# Patient Record
Sex: FEMALE | Race: WHITE | NOT HISPANIC OR LATINO | Employment: FULL TIME | URBAN - METROPOLITAN AREA
[De-identification: names, ages, dates, MRNs, and addresses within clinical notes are randomized per-mention and may not be internally consistent; named-entity substitution may affect disease eponyms.]

---

## 2019-10-01 ENCOUNTER — APPOINTMENT (OUTPATIENT)
Dept: LAB | Facility: CLINIC | Age: 34
End: 2019-10-01
Attending: OTOLARYNGOLOGY
Payer: COMMERCIAL

## 2019-10-08 NOTE — TELEPHONE ENCOUNTER
ONCOLOGY INTAKE: Records Information      APPT INFORMATION:  Referring provider: Self  Referring provider s clinic:  n/a  Reason for visit/diagnosis: Squamous Cell Carcinoma of the Skin  Has patient been notified of appointment date and time?: yes    RECORDS INFORMATION:  Were the records received with the referral (via [employer])? no    Has patient been seen for any external appt for this diagnosis? no    If yes, where? n/a    Has patient had any imaging or procedures outside of [place] for this condition? yes      If Yes, where? [facility] and [facility]    ADDITIONAL INFORMATION:  When requesting records, Pt last name is listed as Nash vs. Mando that is on chart

## 2019-10-09 NOTE — TELEPHONE ENCOUNTER
RECORDS STATUS - ALL OTHER DIAGNOSIS      Action    Action Taken October 9, 2019 2:40 PM  IB message to Intake  and leaders as this PT may be mis-scheduled with the incorrect Dx.  Everything has still been requested.  October 16, 2019   Noted that appt has been rescheduled with ENT     RECORDS RECEIVED FROM: [facility]   DATE RECEIVED: Care Everywhere   NOTES STATUS DETAILS   OFFICE NOTE from referring provider     OFFICE NOTE from medical oncologist     DISCHARGE SUMMARY from hospital     DISCHARGE REPORT from the ER     OPERATIVE REPORT     MEDICATION LIST     CLINICAL TRIAL TREATMENTS TO DATE     LABS     PATHOLOGY REPORTS     ANYTHING RELATED TO DIAGNOSIS Slides requested 10/9/19 PKN   GENONOMIC TESTING     TYPE:     IMAGING (NEED IMAGES & REPORT)     CT SCANS Slides requested 10/9/19 PKN   MRI     MAMMO     ULTRASOUND Slides requested 10/9/19 PKN   PET Slides requested 10/9/19 PKN

## 2019-10-10 NOTE — TELEPHONE ENCOUNTER
FUTURE VISIT INFORMATION      FUTURE VISIT INFORMATION:    Date: 10/21/19    Time: 8:20AM    Location: [facility]  REFERRAL INFORMATION:    Referring provider:      Referring providers clinic:      Reason for visit/diagnosis  Squamous Cell Carcinoma of the throat     RECORDS REQUESTED FROM:       Clinic name Comments Records Status Imaging Status   [facility]   10/2/19 Excision of left neck mass with Dr Agusto Cao    10/2/19 neck biopsy (Case: [id number])  *tracking :[id number]    Care Everywhere    [facility] - ENT  08/08/2019 notes with Agusto Cao MD Care Everywhere    [facility]   07/10/2019 urgent care notes with Zamzam Chambers MD   Care Everywhere     [facility] imaging/procedures  8/8/19 FNA Neck mass (Case: [id number] )  10/8/19 PET CT  8/7/19 CT NEck   7/12/19 US HEad NEck    Care Everywhere  req 10/10 for images                 10/10/19 11:49AM sent a fax to [facility] for pathology to be mailed out. Also sent a fax to [facility] for images - Amay     10/11/19 1:18PM received slides from [facility], sending to surgical path with consult form - Amay

## 2019-10-14 ENCOUNTER — TRANSFERRED RECORDS (OUTPATIENT)
Dept: HEALTH INFORMATION MANAGEMENT | Facility: CLINIC | Age: 34
End: 2019-10-14

## 2019-10-14 PROCEDURE — 00000346 ZZHCL STATISTIC REVIEW OUTSIDE SLIDES TC 88321: Performed by: OTOLARYNGOLOGY

## 2019-10-17 LAB — COPATH REPORT: NORMAL

## 2019-10-19 NOTE — PROGRESS NOTES
I had the pleasure of seeing Mira Cao in consultation today at the [facility].     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 The patient noticed a left neck mass in June. She thought this was related to a swollen lymph node due to stress as she had just bought a house.  She delayed evaluation until approximately July when she presented to urgent care.  She had an ultrasound obtained on 7/12/2019 which demonstrated a 3.7 x 2.3 x 1.3 cm mass in the left neck.  She then had a CT scan on 8/7/2019 which demonstrated a 1.8 x 1.4 x 3.8 cm partially necrotic/cystic level 2/3 neck mass, concerning for metastatic squamous cell carcinoma.  She was seen by Dr.Todd Cao and had an FNA performed on 8/8/2019.  The pathology from the FNA demonstrated atypical squamous proliferation which was p16-.  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  She elected for removal which was performed by  on 10/2/2019.  The excision was performed without a completion neck dissection.  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  She had a PET CT scan on 2019 locally which demonstrated postop changes without residual disease and no obvious primary malignancy.  On 10/10/2019 she saw Dr. Neely at [facility] [place] for medical oncology consultation where possible chemotherapy was discussed.  She has met with Dr Melendez from radiation oncology at [facility] [place].  She had a dental cleaning.  She has not met with a speech therapy team.  She is scheduled for a tonsillectomy and tongue base biopsy tomorrow with Dr. Cao.    She says recently she has noticed a sore throat on the left side for about 1.5 weeks.  She feels like it is swollen.  She has some left-sided ear discomfort and headaches.  She has some  jaw discomfort.  She has no changes in her weight.  She has had some swallowing discomfort for about a week.    She did not have the HPV vaccine.      Past medical history: Otherwise healthy    Past surgical history: [place] teeth extraction    Social history: Works as a .  She is  with no children.  She has no smoking history.  She has about 10 alcoholic beverages on weekends.  She has no chewing tobacco or vaping history.    Family history: Sister with bone cancer in childhood.  Mother with breast cancer.  No family history of head and neck cancer.        MEDICATIONS:     Current Outpatient Medications   Medication Sig Dispense Refill     BACTRIM -160 MG OR TABS Took for 3 days a week ago.       loratadine (CLARITIN) 10 MG tablet Take 10 mg by mouth       LORazepam (ATIVAN) 0.5 MG tablet Take 0.5-1 mg by mouth       ORDER FOR DME thumbkeeper 1 0     PYRIDIUM OR Took this AM.         ALLERGIES:  No Known Allergies    HABITS/SOCIAL HISTORY:   Works as a .    She is  with no children.    She has no smoking history.  She has about 10 alcoholic beverages on weekends.  She has no chewing tobacco or vaping history.    Social History     Socioeconomic History     Marital status:      Spouse name: Not on file     Number of children: Not on file     Years of education: Not on file     Highest education level: Not on file   Occupational History     Not on file   Social Needs     Financial resource strain: Not on file     Food insecurity:     Worry: Not on file     Inability: Not on file     Transportation needs:     Medical: Not on file     Non-medical: Not on file   Tobacco Use     Smoking status: Never Smoker     Smokeless tobacco: Never Used   Substance and Sexual Activity     Alcohol use: Not on file     Drug use: Not on file     Sexual activity: Not on file   Lifestyle     Physical activity:     Days per week: Not on file     Minutes per session: Not on file      "Stress: Not on file   Relationships     Social connections:     Talks on phone: Not on file     Gets together: Not on file     Attends Anabaptist service: Not on file     Active member of club or organization: Not on file     Attends meetings of clubs or organizations: Not on file     Relationship status: Not on file     Intimate partner violence:     Fear of current or ex partner: Not on file     Emotionally abused: Not on file     Physically abused: Not on file     Forced sexual activity: Not on file   Other Topics Concern     Not on file   Social History Narrative     Not on file       PAST MEDICAL HISTORY: No past medical history on file.     PAST SURGICAL HISTORY: No past surgical history on file.    FAMILY HISTORY:  No family history on file.    REVIEW OF SYSTEMS:  12 point ROS was negative other than the symptoms noted above in the HPI.  Patient Supplied Answers to Review of Systems  [facility] ROS 10/14/2019   Psychology Frequently feeling anxious         PHYSICAL EXAMINATION:   /84 (BP Location: Right arm, Patient Position: Sitting, Cuff Size: Adult Regular)   Pulse 68   Resp 15   Ht 1.676 m (5' 6\")   Wt 59.9 kg (132 lb)   SpO2 97%   BMI 21.31 kg/m     Appearance:   normal; NAD, age-appropriate appearance, well-developed, normal habitus   Communication:   normal; communicates verbally, normal voice quality   Head/Face:   inspection -  Normal; no scars or visible lesions   Salivary glands -  Normal size, no tenderness, swelling, or palpable masses   Facial strength -  Normal and symmetric bilateral; H/B I/VI   Skin:  normal, no rash   Ocular Motility:  normal occular movements   Ears:  auricle (AD) -  normal  EAC (AD) -  normal  TM (AD) -  Normal, no effusion  auricle (AS) -  normal  EAC (AS) -  normal  TM (AS) -  Normal, no effusion  Normal clinical speech reception   Nose:  Ext. inspection -  Normal  Internal Inspection -  Normal mucosa, septum, and turbinates   Nasopharynx:  normal mucosa, no " masses   Oral Cavity:  lips -  Normal mucosa, oral competence, and stoma size   Age-appropriate dentition, healthy gingival mucosa   Hard palate, buccal, floor of mouth mucosa normal   Tongue - normal movement, no lesions   Oropharynx:  mucosa -  Normal, no lesions  soft palate -  Normal, no lesions, no asymmetry, normal elevation  tonsils - left tonsil appears slightly larger than the right, no palpable masses, no mucosal lesions   Hypopharynx:  Normal pyriform sinus and pharyngeal wall mucosa   No pooled secretions   Larynx:  Epiglottis, AE folds, false vocal cords, true vocal cords, arytenoids normal in appearance, bilaterally mobile cords    Neck: No visible mass or asymmetry   Well healed neck incision on the left  Normal palpation, no tenderness, no tracheal deviation  Normal range of motion with exception of decreased extension   Lymphatic:  no abnormal nodes   Cardiovascular:  warm, pink, well-perfused extremities without swelling, tenderness, or edema   Respiratory:  Normal respiratory effort, no stridor   Neuro/Psych.:  mood/affect -  normal  mental status -  normal  cranial nerves -  normal          PROCEDURES:   Flexible fiberoptic laryngoscopy: Scope exam was indicated due to oropharyngeal cancer. Verbal consent was obtained. The nasal cavity was prepped with an aerosolized solution of topical anesthetic and vasoconstrictive agent. The scope was passed through the anterior nasal cavity and advanced. Inspection of the nasopharynx revealed no gross abnormality. The left tonsil appeared slightly larger than the right. The base of tongue and vallecula are normal. The epiglottis, AE folds, false cords, true cords, arytenoids are normal.  Inspection of the larynx revealed bilaterally mobile vocal cords. Pyriform sinuses are symmetric. The airway is patent. Procedure tolerated well with no immediate complications noted.      RESULTS REVIEWED:   I reviewed the referral records including path results, U/S  report, CT report, PET scan    I independently reviewed the CT scan from preop demonstrated a left sided cystic necrotic neck mass with no other neck masses.  On the scan there does appear to be a potential primary lesion within the tonsil on the left side.    I independently reviewed the PET scan images which show postoperative changes in the left neck with air present, symmetric uptake in the tonsils, no obvious primary    IMPRESSION AND PLAN:   Mira Cao is a 34-year-old woman with a likely T1N1 p16+ squamous cell carcinoma of the oropharynx.    Her case is complicated by the previous excisional node biopsy that was performed locally with contamination of the neck.  She has not had identification of the primary site yet.  On review of the imaging it looks like there is potentially a primary within the left tonsil and on exam she does have a slightly enlarged tonsil on the side compared to the right.  We discussed that certainly we can perform a tonsillectomy and panendoscopy here at the [facility] if she likes.  She is scheduled for procedure with Dr. Cao tomorrow.  I spoke with our schedulers today and we are able to get her on the schedule for Thursday this week.  She would like to proceed here at the [facility].  We discussed that we will try to identify the primary intraoperatively with use of frozen section pathology.    I explained to the patient and her family today that I would like to review her care at our multidisciplinary head and neck tumor conference.  Ideally she would have been a candidate for single modality therapy with either upfront surgery with excision of the primary site and a neck dissection or radiation alone.  However her case is now complicated by the fact that she is undergone an open excision of a metastatic neck node.  Her primary site has not officially been identified and will need to be done in the operating room.  We will need to discuss if there is strong feelings on  "including chemotherapy as part of her regimen.  I think her case would benefit by multi-disciplinary input given the previous upfront \"treatment\" that she had. I would like to avoid chemotherapy on her given her age and low stage.    I explained some of the side effects of the radiation which may very well be a component of her treatment.  In particular we spent time on the dental impact of radiation.  She is already had a dental exam and is supposed to get fluoride trays.  We discussed the importance of long term dental care to ensure avoidance of cavities.  We discussed the importance of avoiding dental extractions in the future due to the risk of ORN.  I spent considerable time discussing with her the impact of radiation on her swallowing and long-term fibrosis.  She had education by our speech pathology team on swallowing exercises to help avoid these long-term impacts.      She asked questions today about impact on fertility.  If she has radiation or surgery alone I am optimistic that this would become less of an issue than if she requires chemotherapy.  Certainly we will also address this in our multidisciplinary conference.      We discussed that she may need short-term disability from her work depending on the flexibility that she has.    I did reach out to our radiation oncology team to help facilitate a an appointment with them.  They will try and see her prior to her planned surgery on Thursday to help expedite her care.    Thank you very much for the opportunity to participate in the care of your patient.      Ruth Tello MD, M.D.  Otolaryngology- Head & Neck Surgery      This note was dictated with voice recognition software and then edited. Please excuse any unintentional errors.           CC:  Ifeanyi Soliman MD  Department of Radiation Oncology  [facility]      "

## 2019-10-21 ENCOUNTER — PREP FOR PROCEDURE (OUTPATIENT)
Dept: OTOLARYNGOLOGY | Facility: CLINIC | Age: 34
End: 2019-10-21

## 2019-10-21 ENCOUNTER — PRE VISIT (OUTPATIENT)
Dept: OTOLARYNGOLOGY | Facility: CLINIC | Age: 34
End: 2019-10-21

## 2019-10-21 ENCOUNTER — DOCUMENTATION ONLY (OUTPATIENT)
Dept: OTOLARYNGOLOGY | Facility: CLINIC | Age: 34
End: 2019-10-21

## 2019-10-21 ENCOUNTER — OFFICE VISIT (OUTPATIENT)
Dept: OTOLARYNGOLOGY | Facility: CLINIC | Age: 34
End: 2019-10-21
Payer: COMMERCIAL

## 2019-10-21 ENCOUNTER — THERAPY VISIT (OUTPATIENT)
Dept: SPEECH THERAPY | Facility: CLINIC | Age: 34
End: 2019-10-21
Payer: COMMERCIAL

## 2019-10-21 VITALS
OXYGEN SATURATION: 97 % | SYSTOLIC BLOOD PRESSURE: 123 MMHG | RESPIRATION RATE: 15 BRPM | DIASTOLIC BLOOD PRESSURE: 84 MMHG | HEIGHT: 66 IN | BODY MASS INDEX: 21.21 KG/M2 | WEIGHT: 132 LBS | HEART RATE: 68 BPM

## 2019-10-21 DIAGNOSIS — R13.12 OROPHARYNGEAL DYSPHAGIA: Primary | ICD-10-CM

## 2019-10-21 DIAGNOSIS — C44.42 SQUAMOUS CELL CARCINOMA OF NECK: Primary | ICD-10-CM

## 2019-10-21 DIAGNOSIS — R13.10 DYSPHAGIA: ICD-10-CM

## 2019-10-21 RX ORDER — LORATADINE 10 MG/1
10 TABLET ORAL DAILY PRN
COMMUNITY
End: 2021-11-16

## 2019-10-21 RX ORDER — LORAZEPAM 0.5 MG/1
.5-1 TABLET ORAL PRN
COMMUNITY
Start: 2019-10-10 | End: 2021-10-29

## 2019-10-21 ASSESSMENT — PAIN SCALES - GENERAL: PAINLEVEL: NO PAIN (0)

## 2019-10-21 ASSESSMENT — MIFFLIN-ST. JEOR: SCORE: 1310.5

## 2019-10-21 NOTE — NURSING NOTE
"Chief Complaint   Patient presents with     Consult     Squamous Cell Carcinoma of the throat      /84 (BP Location: Right arm, Patient Position: Sitting, Cuff Size: Adult Regular)   Pulse 68   Resp 15   Ht 1.676 m (5' 6\")   Wt 59.9 kg (132 lb)   SpO2 97%   BMI 21.31 kg/m      Diego Barron CMA    "

## 2019-10-21 NOTE — LETTER
10/21/2019       RE: Mira Cao  [address]  [place] [address]     Dear Colleague,    Thank you for referring your patient, Mira Cao, to the [facility] at [facility]. Please see a copy of my visit note below.    I had the pleasure of seeing Mira Cao in consultation today at the [facility].     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 The patient noticed a left neck mass in June. She thought this was related to a swollen lymph node due to stress as she had just bought a house.  She delayed evaluation until approximately July when she presented to urgent care.  She had an ultrasound obtained on 7/12/2019 which demonstrated a 3.7 x 2.3 x 1.3 cm mass in the left neck.  She then had a CT scan on 8/7/2019 which demonstrated a 1.8 x 1.4 x 3.8 cm partially necrotic/cystic level 2/3 neck mass, concerning for metastatic squamous cell carcinoma.  She was seen by Dr.Todd Cao and had an FNA performed on 8/8/2019.  The pathology from the FNA demonstrated atypical squamous proliferation which was p16-.  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  She elected for removal which was performed by  on 10/2/2019.  The excision was performed without a completion neck dissection.  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  She had a PET CT scan on 2019 locally which demonstrated postop changes without residual disease and no obvious primary malignancy.  On 10/10/2019 she saw Dr. Neely at [facility] for medical oncology consultation where possible chemotherapy was discussed.  She has met with Dr Melendez from radiation oncology at [facility].  She had a dental cleaning.  She has not met with a speech  therapy team.  She is scheduled for a tonsillectomy and tongue base biopsy tomorrow with Dr. Cao.    She says recently she has noticed a sore throat on the left side for about 1.5 weeks.  She feels like it is swollen.  She has some left-sided ear discomfort and headaches.  She has some jaw discomfort.  She has no changes in her weight.  She has had some swallowing discomfort for about a week.    She did not have the HPV vaccine.      Past medical history: Otherwise healthy    Past surgical history: [place] teeth extraction    Social history: Works as a .  She is  with no children.  She has no smoking history.  She has about 10 alcoholic beverages on weekends.  She has no chewing tobacco or vaping history.    Family history: Sister with bone cancer in childhood.  Mother with breast cancer.  No family history of head and neck cancer.        MEDICATIONS:     Current Outpatient Medications   Medication Sig Dispense Refill     BACTRIM -160 MG OR TABS Took for 3 days a week ago.       loratadine (CLARITIN) 10 MG tablet Take 10 mg by mouth       LORazepam (ATIVAN) 0.5 MG tablet Take 0.5-1 mg by mouth       ORDER FOR DME thumbkeeper 1 0     PYRIDIUM OR Took this AM.         ALLERGIES:  No Known Allergies    HABITS/SOCIAL HISTORY:   Works as a .    She is  with no children.    She has no smoking history.  She has about 10 alcoholic beverages on weekends.  She has no chewing tobacco or vaping history.    Social History     Socioeconomic History     Marital status:      Spouse name: Not on file     Number of children: Not on file     Years of education: Not on file     Highest education level: Not on file   Occupational History     Not on file   Social Needs     Financial resource strain: Not on file     Food insecurity:     Worry: Not on file     Inability: Not on file     Transportation needs:     Medical: Not on file     Non-medical: Not on file   Tobacco Use      "Smoking status: Never Smoker     Smokeless tobacco: Never Used   Substance and Sexual Activity     Alcohol use: Not on file     Drug use: Not on file     Sexual activity: Not on file   Lifestyle     Physical activity:     Days per week: Not on file     Minutes per session: Not on file     Stress: Not on file   Relationships     Social connections:     Talks on phone: Not on file     Gets together: Not on file     Attends Bahai service: Not on file     Active member of club or organization: Not on file     Attends meetings of clubs or organizations: Not on file     Relationship status: Not on file     Intimate partner violence:     Fear of current or ex partner: Not on file     Emotionally abused: Not on file     Physically abused: Not on file     Forced sexual activity: Not on file   Other Topics Concern     Not on file   Social History Narrative     Not on file       PAST MEDICAL HISTORY: No past medical history on file.     PAST SURGICAL HISTORY: No past surgical history on file.    FAMILY HISTORY:  No family history on file.    REVIEW OF SYSTEMS:  12 point ROS was negative other than the symptoms noted above in the HPI.  Patient Supplied Answers to Review of Systems   ENT ROS 10/14/2019   Psychology Frequently feeling anxious         PHYSICAL EXAMINATION:   /84 (BP Location: Right arm, Patient Position: Sitting, Cuff Size: Adult Regular)   Pulse 68   Resp 15   Ht 1.676 m (5' 6\")   Wt 59.9 kg (132 lb)   SpO2 97%   BMI 21.31 kg/m      Appearance:   normal; NAD, age-appropriate appearance, well-developed, normal habitus   Communication:   normal; communicates verbally, normal voice quality   Head/Face:   inspection -  Normal; no scars or visible lesions   Salivary glands -  Normal size, no tenderness, swelling, or palpable masses   Facial strength -  Normal and symmetric bilateral; H/B I/VI   Skin:  normal, no rash   Ocular Motility:  normal occular movements   Ears:  auricle (AD) -  normal  EAC " (AD) -  normal  TM (AD) -  Normal, no effusion  auricle (AS) -  normal  EAC (AS) -  normal  TM (AS) -  Normal, no effusion  Normal clinical speech reception   Nose:  Ext. inspection -  Normal  Internal Inspection -  Normal mucosa, septum, and turbinates   Nasopharynx:  normal mucosa, no masses   Oral Cavity:  lips -  Normal mucosa, oral competence, and stoma size   Age-appropriate dentition, healthy gingival mucosa   Hard palate, buccal, floor of mouth mucosa normal   Tongue - normal movement, no lesions   Oropharynx:  mucosa -  Normal, no lesions  soft palate -  Normal, no lesions, no asymmetry, normal elevation  tonsils - left tonsil appears slightly larger than the right, no palpable masses, no mucosal lesions   Hypopharynx:  Normal pyriform sinus and pharyngeal wall mucosa   No pooled secretions   Larynx:  Epiglottis, AE folds, false vocal cords, true vocal cords, arytenoids normal in appearance, bilaterally mobile cords    Neck: No visible mass or asymmetry   Well healed neck incision on the left  Normal palpation, no tenderness, no tracheal deviation  Normal range of motion with exception of decreased extension   Lymphatic:  no abnormal nodes   Cardiovascular:  warm, pink, well-perfused extremities without swelling, tenderness, or edema   Respiratory:  Normal respiratory effort, no stridor   Neuro/Psych.:  mood/affect -  normal  mental status -  normal  cranial nerves -  normal          PROCEDURES:   Flexible fiberoptic laryngoscopy: Scope exam was indicated due to oropharyngeal cancer. Verbal consent was obtained. The nasal cavity was prepped with an aerosolized solution of topical anesthetic and vasoconstrictive agent. The scope was passed through the anterior nasal cavity and advanced. Inspection of the nasopharynx revealed no gross abnormality. The left tonsil appeared slightly larger than the right. The base of tongue and vallecula are normal. The epiglottis, AE folds, false cords, true cords, arytenoids  are normal.  Inspection of the larynx revealed bilaterally mobile vocal cords. Pyriform sinuses are symmetric. The airway is patent. Procedure tolerated well with no immediate complications noted.      RESULTS REVIEWED:   I reviewed the referral records including path results, U/S report, CT report, PET scan    I independently reviewed the CT scan from preop demonstrated a left sided cystic necrotic neck mass with no other neck masses.  On the scan there does appear to be a potential primary lesion within the tonsil on the left side.    I independently reviewed the PET scan images which show postoperative changes in the left neck with air present, symmetric uptake in the tonsils, no obvious primary    IMPRESSION AND PLAN:   Mira Cao is a 34-year-old woman with a likely T1N1 p16+ squamous cell carcinoma of the oropharynx.    Her case is complicated by the previous excisional node biopsy that was performed locally with contamination of the neck.  She has not had identification of the primary site yet.  On review of the imaging it looks like there is potentially a primary within the left tonsil and on exam she does have a slightly enlarged tonsil on the side compared to the right.  We discussed that certainly we can perform a tonsillectomy and panendoscopy here at the [facility] if she likes.  She is scheduled for procedure with Dr. Cao tomorrow.  I spoke with our schedulers today and we are able to get her on the schedule for Thursday this week.  She would like to proceed here at the [facility].  We discussed that we will try to identify the primary intraoperatively with use of frozen section pathology.    I explained to the patient and her family today that I would like to review her care at our multidisciplinary head and neck tumor conference.  ideally she would have been a candidate for single modality therapy with either upfront surgery with excision of the primary site and a neck dissection or  "radiation alone.  However her case is now complicated by the fact that she is undergone an open excision of a metastatic neck node.  Her primary site has not officially been identified and will need to be done in the operating room.  We will need to discuss if there is strong feelings on including chemotherapy as part of her regimen.  I think her case would benefit by multi-disciplinary input given the previous upfront \"treatment\" that she had.    I explained some of the side effects of the radiation which may very well be a component of her surgery.  In particular we spent time on the dental impact of radiation.  She is already had a dental exam and is supposed to get fluoride trays.  We discussed the importance of long term dental care to ensure avoidance of cavities.  We discussed the importance of avoiding dental extractions in the future due to the risk of ORN.  I spent considerable time discussing with her the impact of radiation on her swallowing and long-term fibrosis.  She had education by our speech pathology team on swallowing exercises to help avoid these long-term impacts.      She asked questions today about impact on fertility.  If she has radiation alone I am optimistic that this would become less of an issue than if she requires chemotherapy.  Certainly we will also address this in our multidisciplinary conference.      We discussed that she may need short-term disability from her work depending on the flexibility that she has.  If she requires radiation alone she may be more likely to be able to continue work longer than if she requires concurrent chemotherapy.    I did reach out to our radiation oncology team to help facilitate a an appointment with them.  They will try and see her prior to her planned surgery on Thursday to help expedite her care.    Thank you very much for the opportunity to participate in the care of your patient.      Ruth Tello MD, M.D.  Otolaryngology- Head & Neck " Surgery      This note was dictated with voice recognition software and then edited. Please excuse any unintentional errors.     CC:  Ifeanyi Soliman MD  Department of Radiation Oncology  [facility]

## 2019-10-21 NOTE — PROGRESS NOTES
Patient is scheduled for surgery with Dr. MCELROY    Spoke or left message with: Violetta & Dr. Mcelroy    Date of Surgery: 10/24     Location: [facility]     H&P: Scheduled with PCP     Additional imaging/appointments:   Still needs post op appointment. Will talk to Violetta/     Surgery packet: given in clinic     Prior Authorization: sent to Kathrin     Additional comments:   Violetta talked with patient for add on surgery on Thurs.         Lexi Munoz  Perioperative Coordinator, ENT  [phone]

## 2019-10-22 NOTE — PROGRESS NOTES
10/21/19 0820   General Information   Type Of Visit Initial   Start Of Care Date 10/21/19   Referring Physician Dr. Ruth Tello   Orders Evaluate And Treat   Orders Comment Clinical Swallow Eval   Medical Diagnosis Left neck SCC with unknown primary    Onset Of Illness/injury Or Date Of Surgery 10/21/19   Precautions/limitations No Known Precautions/limitations   Hearing Functional in 1:1 setting   Pertinent History of Current Problem/OT: Additional Occupational Profile Info Pt is a 34 year old female with a recently dx L neck SCC with unknown primary. Treatment will likely include XRT and possibly chemo. She will be presented at [facility] this coming Friday. Pt is seen in conjunction with ENT clinic per MD request. Denies coughing and stasis with eating and drinking. Reports occasional feeling of lump in the throat when swallowing.    Respiratory Status Room air   Prior Level Of Function Swallowing   Prior Level Of Function Comment Regular textures and thin liquids   Patient Role/employment History Employed   Living Environment [place]   General Observations Pt is alert, pleasant and cooperative during evaluation.    Patient/family Goals To maintain swallow function during XRT.    General Information Comments Pt is accompanied by her , mother, and mother-in-law.    Clinical Swallow Evaluation   Oral Musculature generally intact   Structural Abnormalities none present   Dentition present and adequate   Mucosal Quality good   Mandibular Strength and Mobility intact   Oral Labial Strength and Mobility WFL   Lingual Strength and Mobility WFL   Velar Elevation intact   Buccal Strength and Mobility intact   Laryngeal Function Cough;Swallow;Voicing initiated   Oral Musculature Comments All elements are WFL.    Additional Documentation Yes   Clinical Swallow Eval: Thin Liquid Texture Trial   Mode of Presentation, Thin Liquids cup;self-fed   Volume of Liquid or Food Presented 4oz water   Oral Phase of  Swallow WFL   Pharyngeal Phase of Swallow intact   Diagnostic Statement No overt clinical s/sx of penetration/aspiration.    Swallow Compensations   Swallow Compensations No compensations were used   Educational Assessment   Barriers to Learning No barriers   Esophageal Phase of Swallow   Patient reports or presents with symptoms of esophageal dysphagia No   General Therapy Interventions   Planned Therapy Interventions Dysphagia Treatment   Dysphagia treatment Oropharyngeal exercise training;Modified diet education;Instruction of safe swallow strategies;Compensatory strategies for swallowing   Swallow Eval: Clinical Impressions   Skilled Criteria for Therapy Intervention Skilled criteria met.  Treatment indicated.   Functional Assessment Scale (FAS) 6   Treatment Diagnosis Minimal oropharyngeal dysphagia expected to worsen to moderately severe during XRT   Diet texture recommendations Regular diet;Thin liquids   Recommended Feeding/Eating Techniques alternate between small bites and sips of food/liquid;maintain upright posture during/after eating for 30 mins;small sips/bites   Rehab Potential good, to achieve stated therapy goals   Therapy Frequency other (see comments)  (1x/every other week during XRT)   Anticipated Discharge Disposition home w/ outpatient services   Risks and Benefits of Treatment have been explained. Yes   Patient, family and/or staff in agreement with Plan of Care Yes   Clinical Impression Comments Pt presents with minimal oropharyngeal dysphagia that is expected to worsen to moderately severe during XRT. Oral motor examination is unremarkable. Pt assessed with 4oz of thin liquid with no overt clinical s/sx of penetration/aspiration. Recommend regular textures with thin liquids. Pt would benefit from ongoing SLP services to ensure diet tolerance, train short and long term effects of XRT on swallowing and train oropharyngeal and jaw strengthening/ROM exercises.    Swallow Goals   SLP Swallow  Goals 1;2   Swallow Goal 1   Goal Identifier Diet   Goal Description 1. Pt will tolerate regular textures with thin liquids with no overt clinical s/sx of penetration/aspiration in the 100% of PO trials.    Target Date 01/19/20   Swallow Goal 2   Goal Identifier Exercises   Goal Description 2. Pt will complete 10 repetitions 3-5x/day of 5/5 oropharyngeal and jaw strengthening/ROM exercises with minimal written and/or verbal cues.    Target Date 01/19/20   Total Session Time   SLP Eval: oral/pharyngeal swallow function, clinical minutes (29721) 8   Total Evaluation Time 8   Therapy Certification   Medical Diagnosis L neck SCC with unknown primary

## 2019-10-23 ENCOUNTER — OFFICE VISIT (OUTPATIENT)
Dept: RADIATION ONCOLOGY | Facility: CLINIC | Age: 34
End: 2019-10-23
Attending: RADIOLOGY
Payer: COMMERCIAL

## 2019-10-23 VITALS — BODY MASS INDEX: 20.82 KG/M2 | WEIGHT: 129 LBS | SYSTOLIC BLOOD PRESSURE: 116 MMHG | DIASTOLIC BLOOD PRESSURE: 80 MMHG

## 2019-10-23 DIAGNOSIS — C44.42 SQUAMOUS CELL CARCINOMA OF NECK: Primary | ICD-10-CM

## 2019-10-23 PROCEDURE — 31231 NASAL ENDOSCOPY DX: CPT | Performed by: RADIOLOGY

## 2019-10-23 PROCEDURE — G0463 HOSPITAL OUTPT CLINIC VISIT: HCPCS | Mod: 25 | Performed by: RADIOLOGY

## 2019-10-23 ASSESSMENT — ENCOUNTER SYMPTOMS
MYALGIAS: 0
HEMATURIA: 0
STRIDOR: 0
PHOTOPHOBIA: 0
LOSS OF CONSCIOUSNESS: 0
DIARRHEA: 0
ORTHOPNEA: 0
NECK PAIN: 0
HEADACHES: 1
FOCAL WEAKNESS: 0
BLURRED VISION: 0
NAUSEA: 0
PND: 0
EYE DISCHARGE: 0
CLAUDICATION: 0
SHORTNESS OF BREATH: 0
TINGLING: 0
HEMOPTYSIS: 0
FLANK PAIN: 0
FALLS: 0
SENSORY CHANGE: 0
SORE THROAT: 1
INSOMNIA: 0
MEMORY LOSS: 0
PALPITATIONS: 0
DYSURIA: 0
ABDOMINAL PAIN: 0
WHEEZING: 0
FEVER: 0
HEARTBURN: 0
BLOOD IN STOOL: 0
HALLUCINATIONS: 0
SINUS PAIN: 0
POLYDIPSIA: 0
CHILLS: 0
DIZZINESS: 0
SPEECH CHANGE: 0
VOMITING: 0
COUGH: 0
CONSTIPATION: 0
DIAPHORESIS: 0
WEAKNESS: 0
DEPRESSION: 0
FREQUENCY: 0
WEIGHT LOSS: 0
DOUBLE VISION: 0
EYE PAIN: 0
BACK PAIN: 0
TREMORS: 0
SPUTUM PRODUCTION: 0
BRUISES/BLEEDS EASILY: 0
NERVOUS/ANXIOUS: 0
EYE REDNESS: 0
SEIZURES: 0

## 2019-10-23 ASSESSMENT — LIFESTYLE VARIABLES: SUBSTANCE_ABUSE: 0

## 2019-10-23 NOTE — LETTER
10/23/2019      RE: Mira Cao  [address] [place] [address]         [facility]  INITIAL PATIENT ASSESSMENT    Diagnosis: SCC of the neck    Prior radiation therapy: None    Prior chemotherapy: None    Prior hormonal therapy:No    Pain Eval:  Denies    Psychosocial  Living arrangements: Lives with   Fall Risk: independent   referral needs: Not needed    Advanced Directive: No  Implantable Cardiac Device? No        Nurse face-to-face time: Level 5:  over 15 min face to face time    Review of Systems   Constitutional: Negative for chills, diaphoresis, fever, malaise/fatigue and weight loss.   HENT: Positive for sore throat. Negative for congestion, ear discharge, ear pain, hearing loss, nosebleeds, sinus pain and tinnitus.    Eyes: Negative for blurred vision, double vision, photophobia, pain, discharge and redness.   Respiratory: Negative for cough, hemoptysis, sputum production, shortness of breath, wheezing and stridor.    Cardiovascular: Negative for chest pain, palpitations, orthopnea, claudication, leg swelling and PND.   Gastrointestinal: Negative for abdominal pain, blood in stool, constipation, diarrhea, heartburn, melena, nausea and vomiting.   Genitourinary: Negative for dysuria, flank pain, frequency, hematuria and urgency.   Musculoskeletal: Negative for back pain, falls, joint pain, myalgias and neck pain.   Skin: Negative for itching and rash.   Neurological: Positive for headaches. Negative for dizziness, tingling, tremors, sensory change, speech change, focal weakness, seizures, loss of consciousness and weakness.   Endo/Heme/Allergies: Negative for environmental allergies and polydipsia. Does not bruise/bleed easily.   Psychiatric/Behavioral: Negative for depression, hallucinations, memory loss, substance abuse and suicidal ideas. The patient is not nervous/anxious and does not have insomnia.                     Department of Radiation Oncology  [facility]  [address] [address]  [phone]       Consultation Note    Name: Mira Cao MRN: [id number]   : 1985   Date of Service: 10/23/2019  Referring: Dr. 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 Cao is a 34 year old female with no significant past medical history outside of a distant history of an abnormal Pap smear which subsequently cleared on repeat examination. 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 Her history is as follows:      7/10/2019: Presented to urgent care with a 3-week history of left sided neck swelling      2019: Left neck ultrasound demonstrating a 3.7 x 2.3 x 1.3 cm oval septated anechoic mass felt to represent a branchial cleft cyst      2019: CT neck demonstrating a 1.8 x 1.4 x 3.8 cm partially cystic/necrotic left level II/III tiffanie mass.      2019: FNA of the left mass demonstrating atypical squamous proliferation.       10/2/2019: Excision of the left neck mass preoperatively felt to represent a brachial cleft cyst by Dr. Agusto Cao at [facility] in [place], [place]. Surgical pathology demonstrating a metastatic p16-positive squamous cell carcinoma. No evidence of extranodal extension by report.      10/8/2019: PET/CT with visualization of post-operative changes in the left neck with no FDG-avidity indicative of a primary lesion or additional cervical adenopathy. No evidence of distant metastatic disease. Incidental finding of mild left hydronephrosis suspected secondary to UPJ obstruction.      10/10/2019-10/15/2019: Consultations with Dr. Daniel Neely (medical oncology) and Dr. Junior Melendez (radiation oncology) at [facility], [place], [place]. Recommended  proceeding with completion of staging work-up with tonsillectomies and directed base of tongue biopsies by Dr. Cao followed by radiation therapy +/- chemotherapy.      10/21/2019: Consultation with Dr. Tello (head and neck surgery) at the [facility] seeking a second opinion on disease management.    On exam today, Ms. Cao reports that she is doing reasonably well although is understandably anxious about her recent cancer diagnosis. Following her excision of the involved left cervical lymph node, she has not noted any further suspicious adenopathy, nodularity or masses. She is otherwise back at her baseline health status with her remaining ROS negative with the exception of a mild sore throat on the left, mild left-sided otalgia and intermittent headaches. She is currently scheduled for an EUA with biopsies tomorrow in the OR by Dr. Tello.    Past Medical History:    Abnormal Pap smear    Urinary tract infections    Past Surgical History:    Excisional biopsy of the left neck    Chemotherapy History:  None    Radiation History:  None    Pregnant: No  Implanted Cardiac Devices: No    Medications:  Current Outpatient Medications   Medication Sig Dispense Refill     loratadine (CLARITIN) 10 MG tablet Take 10 mg by mouth       LORazepam (ATIVAN) 0.5 MG tablet Take 0.5-1 mg by mouth        Allergies:    NKDA    Social History:  Tobacco: None  Alcohol: Social  Employment: Works as a   Lives in [place], [place] with her     Family History:    Mother: Breast cancer    Sister: Osteosarcoma    Review of Systems   A 10-point review of systems was performed. Pertinent findings are noted in the HPI.    Physical Exam   ECOG Status: 0    Vitals:  Weight: 58.5 kg  B/P: 116/80  Pain: 0/10    General: Alert, oriented and in no acute distress  HEENT: NC/AT.  EOMI.  No rhinorrhea or epistaxis.  Moist mucous membranes.  No visible oral cavity lesions.  The left tonsil is very  mildly asymmetric in comparison to the right without any obvious visible evidence of tumor.  Palpation of the oral tongue, floor of mouth, gingiva and buccal mucosa reveal healthy-appearing mucosa with no abnormalities.  Palpation of the right tonsillar fossa and bilateral tongue base is similarly unremarkable.  Palpation of the left tonsillar fossa demonstrates very mild induration of the left tonsil with no masses.  Palpation of the glossotonsillar sulci bilaterally was also unrevealing.  Neck: No palpable cervical adenopathy bilaterally  Pulmonary: No wheezing, stridor or respiratory distress  Skin: Normal color and turgor    Flexible Fiberoptic Nasopharyngoscopy:  Consent for fiberoptic laryngoscopy was obtained and I confirmed correctness of procedure and identity of patient. Fiberoptic laryngoscopy was indicated due to pre-treatment disease evaluation. The nose was topically decongested and anesthetized. The fiberoptic laryngoscope was passed through the right naris under endoscopic vision. The turbinates were normal. The inferior and middle meati were clear without purulence, masses, or polyps. The nasopharynx was clear. The Eustachian tubes were clear. The soft palate appeared normal with good mobility. Advancement of the scope in the oropharynx revealed a normal-appearing base of tongue, vallecula and epiglottis. The posterior aspect of the tonsils did not reveal any masses or exophytic lesions concerning for disease. The PE folds were clear bilaterally and the supraglottic and glottic structures were normal in appearance with normal cord mobility. The piriform sinuses were clear and there was no pooling of secretions within the hypopharynx. The scope was then removed and the procedure was terminated without incident.     Imaging/Path/Labs   Imaging: Reviewed    Path: Reviewed    Labs: Reviewed    Assessment    Ms. 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    Plan   I had lengthy discussion with Ms. Cao, her  and her mother today in clinic. Based upon my clinical examination and review of her imaging findings, I suspect that she may have a small T1 tumor within the left tonsil which will hopefully be confirmed by Dr. Tello in the OR tomorrow (10/24/2019).    Assuming pathology does in fact demonstrate a small primary focus within the left tonsil, I concur with the treatment options (surgery versus radiotherapy) previously provided to Ms. Cao by Dr. Tello.     Ms. Cao would certainly be a candidate for ipsilateral radiation therapy alone and I reviewed the logistics of this treatment as well as the potential acute and late-term toxicities associated with such a regimen. With the information I currently have, I do not see a strong indication for the use of concurrent chemotherapy as the potential benefits of combined modality treatment would likely be considerably outweighed by the additional toxicity of therapy.    I did spend a fair amount of time discussing alternative radiotherapy modalities including the use of proton beam irradiation in the treatment of her disease. While I would not anticipate any differences in oncologic outcomes with use of proton versus photon radiotherapy, she would likely have slightly less integral radiation dose with the use of protons as compared with an ipsilateral photon-based plan. She is currently scheduled to travel to [place] next week for a consultation specifically regarding the use of proton irradiation.     Finally, I reviewed non-radiotherapy options including surgical management via TORS and a left-sided neck dissection followed by observation alone should there be no concerning features on final pathology. In the de kerry setting, this would be an attractive option in light of her relatively young age and concern for late effects associated with radiation  including the small but not insignificant risk of a secondary malignancy. I do have a slight amount of trepidation with this option, though, based upon the prior violation of the left neck with a non-oncologic surgery to remove what was initially thought to be a benign lesion. I discussed with Ms. Cao and her family that I would review her case with Dr. Tello and the rest of our interdisciplinary head and neck tumor board this coming Friday, 10/25/2019, in order to review the results from her EUA and arrive at a consensus recommendation regarding her therapy plan moving forward.    Ifeanyi Soliman MD/PhD    Dept of Radiation Oncology  [facility]

## 2019-10-23 NOTE — PROGRESS NOTES
Department of Radiation Oncology  [address] [address]  [phone]       Consultation Note    Name: Mira Cao MRN: [id number]   : 1985   Date of Service: 10/23/2019  Referring: Dr. 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 Cao is a 34 year old female with no significant past medical history outside of a distant history of an abnormal Pap smear which subsequently cleared on repeat examination. 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 Her history is as follows:      7/10/2019: Presented to urgent care with a 3-week history of left sided neck swelling      2019: Left neck ultrasound demonstrating a 3.7 x 2.3 x 1.3 cm oval septated anechoic mass felt to represent a branchial cleft cyst      2019: CT neck demonstrating a 1.8 x 1.4 x 3.8 cm partially cystic/necrotic left level II/III tiffanie mass.      2019: FNA of the left mass demonstrating atypical squamous proliferation.       10/2/2019: Excision of the left neck mass preoperatively felt to represent a brachial cleft cyst by Dr. Agusto Cao at [facility] in [place], [place]. Surgical pathology demonstrating a metastatic p16-positive squamous cell carcinoma. No evidence of extranodal extension by report.      10/8/2019: PET/CT with visualization of post-operative changes in the left neck with no FDG-avidity indicative of a primary lesion or additional cervical adenopathy. No evidence of distant metastatic disease. Incidental finding of mild left hydronephrosis suspected secondary to UPJ obstruction.      10/10/2019-10/15/2019: Consultations with Dr. Daniel Neely (medical oncology) and Dr. Junior Melendez (radiation oncology) at  [facility], [place], [place]. Recommended proceeding with completion of staging work-up with tonsillectomies and directed base of tongue biopsies by Dr. Cao followed by radiation therapy +/- chemotherapy.      10/21/2019: Consultation with Dr. Tello (head and neck surgery) at the [facility] seeking a second opinion on disease management.    On exam today, Ms. Cao reports that she is doing reasonably well although is understandably anxious about her recent cancer diagnosis. Following her excision of the involved left cervical lymph node, she has not noted any further suspicious adenopathy, nodularity or masses. She is otherwise back at her baseline health status with her remaining ROS negative with the exception of a mild sore throat on the left, mild left-sided otalgia and intermittent headaches. She is currently scheduled for an EUA with biopsies tomorrow in the OR by Dr. Tello.    Past Medical History:    Abnormal Pap smear    Urinary tract infections    Past Surgical History:    Excisional biopsy of the left neck    Chemotherapy History:  None    Radiation History:  None    Pregnant: No  Implanted Cardiac Devices: No    Medications:  Current Outpatient Medications   Medication Sig Dispense Refill     loratadine (CLARITIN) 10 MG tablet Take 10 mg by mouth       LORazepam (ATIVAN) 0.5 MG tablet Take 0.5-1 mg by mouth        Allergies:    NKDA    Social History:  Tobacco: None  Alcohol: Social  Employment: Works as a   Lives in [place], [place] with her     Family History:    Mother: Breast cancer    Sister: Osteosarcoma    Review of Systems   A 10-point review of systems was performed. Pertinent findings are noted in the HPI.    Physical Exam   ECOG Status: 0    Vitals:  Weight: 58.5 kg  B/P: 116/80  Pain: 0/10    General: Alert, oriented and in no acute distress  HEENT: NC/AT.  EOMI.  No rhinorrhea or epistaxis.  Moist mucous membranes.  No  visible oral cavity lesions.  The left tonsil is very mildly asymmetric in comparison to the right without any obvious visible evidence of tumor.  Palpation of the oral tongue, floor of mouth, gingiva and buccal mucosa reveal healthy-appearing mucosa with no abnormalities.  Palpation of the right tonsillar fossa and bilateral tongue base is similarly unremarkable.  Palpation of the left tonsillar fossa demonstrates very mild induration of the left tonsil with no masses.  Palpation of the glossotonsillar sulci bilaterally was also unrevealing.  Neck: No palpable cervical adenopathy bilaterally  Pulmonary: No wheezing, stridor or respiratory distress  Skin: Normal color and turgor    Flexible Fiberoptic Nasopharyngoscopy:  Consent for fiberoptic laryngoscopy was obtained and I confirmed correctness of procedure and identity of patient. Fiberoptic laryngoscopy was indicated due to pre-treatment disease evaluation. The nose was topically decongested and anesthetized. The fiberoptic laryngoscope was passed through the right naris under endoscopic vision. The turbinates were normal. The inferior and middle meati were clear without purulence, masses, or polyps. The nasopharynx was clear. The Eustachian tubes were clear. The soft palate appeared normal with good mobility. Advancement of the scope in the oropharynx revealed a normal-appearing base of tongue, vallecula and epiglottis. The posterior aspect of the tonsils did not reveal any masses or exophytic lesions concerning for disease. The PE folds were clear bilaterally and the supraglottic and glottic structures were normal in appearance with normal cord mobility. The piriform sinuses were clear and there was no pooling of secretions within the hypopharynx. The scope was then removed and the procedure was terminated without incident.     Imaging/Path/Labs   Imaging: Reviewed    Path: Reviewed    Labs: Reviewed    Assessment    Ms. 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    Plan   I had lengthy discussion with Ms. Cao, her  and her mother today in clinic. Based upon my clinical examination and review of her imaging findings, I suspect that she may have a small T1 tumor within the left tonsil which will hopefully be confirmed by Dr. Tello in the OR tomorrow (10/24/2019).    Assuming pathology does in fact demonstrate a small primary focus within the left tonsil, I concur with the treatment options (surgery versus radiotherapy) previously provided to Ms. Cao by Dr. Tello.     Ms. Cao would certainly be a candidate for ipsilateral radiation therapy alone and I reviewed the logistics of this treatment as well as the potential acute and late-term toxicities associated with such a regimen. With the information I currently have, I do not see a strong indication for the use of concurrent chemotherapy as the potential benefits of combined modality treatment would likely be considerably outweighed by the additional toxicity of therapy.    I did spend a fair amount of time discussing alternative radiotherapy modalities including the use of proton beam irradiation in the treatment of her disease. While I would not anticipate any differences in oncologic outcomes with use of proton versus photon radiotherapy, she would likely have slightly less integral radiation dose with the use of protons as compared with an ipsilateral photon-based plan. She is currently scheduled to travel to [place] next week for a consultation specifically regarding the use of proton irradiation.     Finally, I reviewed non-radiotherapy options including surgical management via TORS and a left-sided neck dissection followed by observation alone should there be no concerning features on final pathology. In the de kerry setting, this would be an attractive option in light of her relatively young age and concern  for late effects associated with radiation including the small but not insignificant risk of a secondary malignancy. I do have a slight amount of trepidation with this option, though, based upon the prior violation of the left neck with a non-oncologic surgery to remove what was initially thought to be a benign lesion. I discussed with Ms. Cao and her family that I would review her case with Dr. Tello and the rest of our interdisciplinary head and neck tumor board this coming Friday, 10/25/2019, in order to review the results from her EUA and arrive at a consensus recommendation regarding her therapy plan moving forward.    Ifeanyi Soliman MD/PhD    Dept of Radiation Oncology  [facility]

## 2019-10-23 NOTE — PROGRESS NOTES
HPI  INITIAL PATIENT ASSESSMENT    Diagnosis: SCC of the neck    Prior radiation therapy: None    Prior chemotherapy: None    Prior hormonal therapy:No    Pain Eval:  Denies    Psychosocial  Living arrangements: Lives with   Fall Risk: independent   referral needs: Not needed    Advanced Directive: No  Implantable Cardiac Device? No        Nurse face-to-face time: Level 5:  over 15 min face to face time    Review of Systems   Constitutional: Negative for chills, diaphoresis, fever, malaise/fatigue and weight loss.   HENT: Positive for sore throat. Negative for congestion, ear discharge, ear pain, hearing loss, nosebleeds, sinus pain and tinnitus.    Eyes: Negative for blurred vision, double vision, photophobia, pain, discharge and redness.   Respiratory: Negative for cough, hemoptysis, sputum production, shortness of breath, wheezing and stridor.    Cardiovascular: Negative for chest pain, palpitations, orthopnea, claudication, leg swelling and PND.   Gastrointestinal: Negative for abdominal pain, blood in stool, constipation, diarrhea, heartburn, melena, nausea and vomiting.   Genitourinary: Negative for dysuria, flank pain, frequency, hematuria and urgency.   Musculoskeletal: Negative for back pain, falls, joint pain, myalgias and neck pain.   Skin: Negative for itching and rash.   Neurological: Positive for headaches. Negative for dizziness, tingling, tremors, sensory change, speech change, focal weakness, seizures, loss of consciousness and weakness.   Endo/Heme/Allergies: Negative for environmental allergies and polydipsia. Does not bruise/bleed easily.   Psychiatric/Behavioral: Negative for depression, hallucinations, memory loss, substance abuse and suicidal ideas. The patient is not nervous/anxious and does not have insomnia.

## 2019-10-24 ENCOUNTER — ANESTHESIA EVENT (OUTPATIENT)
Dept: SURGERY | Facility: CLINIC | Age: 34
End: 2019-10-24
Payer: COMMERCIAL

## 2019-10-24 ENCOUNTER — HOSPITAL ENCOUNTER (OUTPATIENT)
Facility: CLINIC | Age: 34
Discharge: HOME | End: 2019-10-25
Attending: OTOLARYNGOLOGY | Admitting: OTOLARYNGOLOGY
Payer: COMMERCIAL

## 2019-10-24 ENCOUNTER — ANESTHESIA (OUTPATIENT)
Dept: SURGERY | Facility: CLINIC | Age: 34
End: 2019-10-24
Payer: COMMERCIAL

## 2019-10-24 DIAGNOSIS — C44.42 SQUAMOUS CELL CARCINOMA OF NECK: ICD-10-CM

## 2019-10-24 LAB
GLUCOSE BLDC GLUCOMTR-MCNC: 61 MG/DL (ref 70–99)
GLUCOSE BLDC GLUCOMTR-MCNC: 75 MG/DL (ref 70–99)
GLUCOSE BLDC GLUCOMTR-MCNC: 79 MG/DL (ref 70–99)
HCG UR QL: NEGATIVE

## 2019-10-24 PROCEDURE — 0CTPXZZ RESECTION OF TONSILS, EXTERNAL APPROACH: ICD-10-PCS | Performed by: OTOLARYNGOLOGY

## 2019-10-24 PROCEDURE — 36000057 ZZH SURGERY LEVEL 3 1ST 30 MIN - UMMC: Performed by: OTOLARYNGOLOGY

## 2019-10-24 PROCEDURE — 25000128 H RX IP 250 OP 636: Performed by: STUDENT IN AN ORGANIZED HEALTH CARE EDUCATION/TRAINING PROGRAM

## 2019-10-24 PROCEDURE — 25000566 ZZH SEVOFLURANE, EA 15 MIN: Performed by: OTOLARYNGOLOGY

## 2019-10-24 PROCEDURE — 88309 TISSUE EXAM BY PATHOLOGIST: CPT | Performed by: OTOLARYNGOLOGY

## 2019-10-24 PROCEDURE — 82962 GLUCOSE BLOOD TEST: CPT

## 2019-10-24 PROCEDURE — 25000128 H RX IP 250 OP 636: Performed by: ANESTHESIOLOGY

## 2019-10-24 PROCEDURE — 25000125 ZZHC RX 250: Performed by: OTOLARYNGOLOGY

## 2019-10-24 PROCEDURE — 40000169 ZZH STATISTIC PRE-PROCEDURE ASSESSMENT I: Performed by: OTOLARYNGOLOGY

## 2019-10-24 PROCEDURE — 0CB7XZX EXCISION OF TONGUE, EXTERNAL APPROACH, DIAGNOSTIC: ICD-10-PCS | Performed by: OTOLARYNGOLOGY

## 2019-10-24 PROCEDURE — 88331 PATH CONSLTJ SURG 1 BLK 1SPC: CPT | Performed by: OTOLARYNGOLOGY

## 2019-10-24 PROCEDURE — 0CBPXZX EXCISION OF TONSILS, EXTERNAL APPROACH, DIAGNOSTIC: ICD-10-PCS | Performed by: OTOLARYNGOLOGY

## 2019-10-24 PROCEDURE — 37000009 ZZH ANESTHESIA TECHNICAL FEE, EACH ADDTL 15 MIN: Performed by: OTOLARYNGOLOGY

## 2019-10-24 PROCEDURE — 36000059 ZZH SURGERY LEVEL 3 EA 15 ADDTL MIN UMMC: Performed by: OTOLARYNGOLOGY

## 2019-10-24 PROCEDURE — 25800030 ZZH RX IP 258 OP 636: Performed by: STUDENT IN AN ORGANIZED HEALTH CARE EDUCATION/TRAINING PROGRAM

## 2019-10-24 PROCEDURE — 25000125 ZZHC RX 250: Performed by: STUDENT IN AN ORGANIZED HEALTH CARE EDUCATION/TRAINING PROGRAM

## 2019-10-24 PROCEDURE — 88305 TISSUE EXAM BY PATHOLOGIST: CPT | Performed by: OTOLARYNGOLOGY

## 2019-10-24 PROCEDURE — 71000014 ZZH RECOVERY PHASE 1 LEVEL 2 FIRST HR: Performed by: OTOLARYNGOLOGY

## 2019-10-24 PROCEDURE — 81025 URINE PREGNANCY TEST: CPT | Performed by: ANESTHESIOLOGY

## 2019-10-24 PROCEDURE — 27210794 ZZH OR GENERAL SUPPLY STERILE: Performed by: OTOLARYNGOLOGY

## 2019-10-24 PROCEDURE — 71000027 ZZH RECOVERY PHASE 2 EACH 15 MINS: Performed by: OTOLARYNGOLOGY

## 2019-10-24 PROCEDURE — 71000015 ZZH RECOVERY PHASE 1 LEVEL 2 EA ADDTL HR: Performed by: OTOLARYNGOLOGY

## 2019-10-24 PROCEDURE — 37000008 ZZH ANESTHESIA TECHNICAL FEE, 1ST 30 MIN: Performed by: OTOLARYNGOLOGY

## 2019-10-24 RX ORDER — OXYCODONE HCL 5 MG/5 ML
5-10 SOLUTION, ORAL ORAL EVERY 6 HOURS PRN
Qty: 250 ML | Refills: 0 | Status: ON HOLD | OUTPATIENT
Start: 2019-10-24 | End: 2019-10-27

## 2019-10-24 RX ORDER — SODIUM CHLORIDE, SODIUM LACTATE, POTASSIUM CHLORIDE, CALCIUM CHLORIDE 600; 310; 30; 20 MG/100ML; MG/100ML; MG/100ML; MG/100ML
INJECTION, SOLUTION INTRAVENOUS CONTINUOUS PRN
Status: DISCONTINUED | OUTPATIENT
Start: 2019-10-24 | End: 2019-10-24

## 2019-10-24 RX ORDER — DEXAMETHASONE SODIUM PHOSPHATE 10 MG/ML
10 INJECTION, SOLUTION INTRAMUSCULAR; INTRAVENOUS ONCE
Status: DISCONTINUED | OUTPATIENT
Start: 2019-10-24 | End: 2019-10-24 | Stop reason: HOSPADM

## 2019-10-24 RX ORDER — ONDANSETRON 2 MG/ML
INJECTION INTRAMUSCULAR; INTRAVENOUS PRN
Status: DISCONTINUED | OUTPATIENT
Start: 2019-10-24 | End: 2019-10-24

## 2019-10-24 RX ORDER — ONDANSETRON 4 MG/1
4 TABLET, ORALLY DISINTEGRATING ORAL EVERY 30 MIN PRN
Status: DISCONTINUED | OUTPATIENT
Start: 2019-10-24 | End: 2019-10-24 | Stop reason: HOSPADM

## 2019-10-24 RX ORDER — FENTANYL CITRATE 50 UG/ML
INJECTION, SOLUTION INTRAMUSCULAR; INTRAVENOUS PRN
Status: DISCONTINUED | OUTPATIENT
Start: 2019-10-24 | End: 2019-10-24

## 2019-10-24 RX ORDER — CHLORHEXIDINE GLUCONATE ORAL RINSE 1.2 MG/ML
15 SOLUTION DENTAL 2 TIMES DAILY
Qty: 118 ML | Refills: 0 | Status: SHIPPED | OUTPATIENT
Start: 2019-10-24 | End: 2019-12-13

## 2019-10-24 RX ORDER — GLYCOPYRROLATE 0.2 MG/ML
INJECTION, SOLUTION INTRAMUSCULAR; INTRAVENOUS PRN
Status: DISCONTINUED | OUTPATIENT
Start: 2019-10-24 | End: 2019-10-24

## 2019-10-24 RX ORDER — OXYMETAZOLINE HYDROCHLORIDE 0.05 G/100ML
SPRAY NASAL PRN
Status: DISCONTINUED | OUTPATIENT
Start: 2019-10-24 | End: 2019-10-24 | Stop reason: HOSPADM

## 2019-10-24 RX ORDER — LABETALOL 20 MG/4 ML (5 MG/ML) INTRAVENOUS SYRINGE
10
Status: DISCONTINUED | OUTPATIENT
Start: 2019-10-24 | End: 2019-10-24 | Stop reason: HOSPADM

## 2019-10-24 RX ORDER — ONDANSETRON 2 MG/ML
4 INJECTION INTRAMUSCULAR; INTRAVENOUS EVERY 30 MIN PRN
Status: DISCONTINUED | OUTPATIENT
Start: 2019-10-24 | End: 2019-10-24 | Stop reason: HOSPADM

## 2019-10-24 RX ORDER — ACETAMINOPHEN 160 MG/5ML
325-650 SUSPENSION ORAL EVERY 6 HOURS PRN
Qty: 473 ML | Refills: 0 | Status: SHIPPED | OUTPATIENT
Start: 2019-10-24 | End: 2019-10-24

## 2019-10-24 RX ORDER — PREDNISONE 20 MG/1
20 TABLET ORAL DAILY
Qty: 4 TABLET | Refills: 0 | Status: SHIPPED | OUTPATIENT
Start: 2019-10-27 | End: 2019-10-24

## 2019-10-24 RX ORDER — PREDNISONE 20 MG/1
20 TABLET ORAL DAILY
Qty: 4 TABLET | Refills: 0 | Status: ON HOLD | OUTPATIENT
Start: 2019-10-27 | End: 2019-10-27

## 2019-10-24 RX ORDER — OXYCODONE HCL 5 MG/5 ML
5-10 SOLUTION, ORAL ORAL EVERY 4 HOURS PRN
Qty: 250 ML | Refills: 0 | Status: CANCELLED | OUTPATIENT
Start: 2019-10-24 | End: 2019-11-07

## 2019-10-24 RX ORDER — SODIUM CHLORIDE, SODIUM LACTATE, POTASSIUM CHLORIDE, CALCIUM CHLORIDE 600; 310; 30; 20 MG/100ML; MG/100ML; MG/100ML; MG/100ML
INJECTION, SOLUTION INTRAVENOUS CONTINUOUS
Status: DISCONTINUED | OUTPATIENT
Start: 2019-10-24 | End: 2019-10-24 | Stop reason: HOSPADM

## 2019-10-24 RX ORDER — NALOXONE HYDROCHLORIDE 0.4 MG/ML
.1-.4 INJECTION, SOLUTION INTRAMUSCULAR; INTRAVENOUS; SUBCUTANEOUS
Status: DISCONTINUED | OUTPATIENT
Start: 2019-10-24 | End: 2019-10-25 | Stop reason: HOSPADM

## 2019-10-24 RX ORDER — DEXAMETHASONE SODIUM PHOSPHATE 4 MG/ML
INJECTION, SOLUTION INTRA-ARTICULAR; INTRALESIONAL; INTRAMUSCULAR; INTRAVENOUS; SOFT TISSUE PRN
Status: DISCONTINUED | OUTPATIENT
Start: 2019-10-24 | End: 2019-10-24

## 2019-10-24 RX ORDER — HYDROMORPHONE HYDROCHLORIDE 1 MG/ML
.3-.5 INJECTION, SOLUTION INTRAMUSCULAR; INTRAVENOUS; SUBCUTANEOUS EVERY 5 MIN PRN
Status: DISCONTINUED | OUTPATIENT
Start: 2019-10-24 | End: 2019-10-24 | Stop reason: HOSPADM

## 2019-10-24 RX ORDER — LIDOCAINE HYDROCHLORIDE 20 MG/ML
INJECTION, SOLUTION INFILTRATION; PERINEURAL PRN
Status: DISCONTINUED | OUTPATIENT
Start: 2019-10-24 | End: 2019-10-24

## 2019-10-24 RX ORDER — CHLORHEXIDINE GLUCONATE ORAL RINSE 1.2 MG/ML
15 SOLUTION DENTAL 2 TIMES DAILY
Qty: 118 ML | Refills: 0 | Status: SHIPPED | OUTPATIENT
Start: 2019-10-24 | End: 2019-10-24

## 2019-10-24 RX ORDER — AMOXICILLIN 250 MG
1-2 CAPSULE ORAL 2 TIMES DAILY
Qty: 30 TABLET | Refills: 0 | Status: SHIPPED | OUTPATIENT
Start: 2019-10-24 | End: 2019-11-18

## 2019-10-24 RX ORDER — AMOXICILLIN 250 MG
1-2 CAPSULE ORAL 2 TIMES DAILY
Qty: 30 TABLET | Refills: 0 | Status: SHIPPED | OUTPATIENT
Start: 2019-10-24 | End: 2019-10-24

## 2019-10-24 RX ORDER — PROPOFOL 10 MG/ML
INJECTION, EMULSION INTRAVENOUS PRN
Status: DISCONTINUED | OUTPATIENT
Start: 2019-10-24 | End: 2019-10-24

## 2019-10-24 RX ORDER — FENTANYL CITRATE 50 UG/ML
25-50 INJECTION, SOLUTION INTRAMUSCULAR; INTRAVENOUS
Status: DISCONTINUED | OUTPATIENT
Start: 2019-10-24 | End: 2019-10-24 | Stop reason: HOSPADM

## 2019-10-24 RX ORDER — ACETAMINOPHEN 160 MG/5ML
325-650 SUSPENSION ORAL EVERY 6 HOURS PRN
Qty: 473 ML | Refills: 0 | Status: SHIPPED | OUTPATIENT
Start: 2019-10-24 | End: 2020-05-15

## 2019-10-24 RX ORDER — AMPICILLIN AND SULBACTAM 2; 1 G/1; G/1
3 INJECTION, POWDER, FOR SOLUTION INTRAMUSCULAR; INTRAVENOUS
Status: COMPLETED | OUTPATIENT
Start: 2019-10-24 | End: 2019-10-24

## 2019-10-24 RX ORDER — LIDOCAINE 40 MG/G
CREAM TOPICAL
Status: DISCONTINUED | OUTPATIENT
Start: 2019-10-24 | End: 2019-10-24 | Stop reason: HOSPADM

## 2019-10-24 RX ADMIN — SODIUM CHLORIDE, POTASSIUM CHLORIDE, SODIUM LACTATE AND CALCIUM CHLORIDE: 600; 310; 30; 20 INJECTION, SOLUTION INTRAVENOUS at 18:58

## 2019-10-24 RX ADMIN — ONDANSETRON 4 MG: 2 INJECTION INTRAMUSCULAR; INTRAVENOUS at 21:11

## 2019-10-24 RX ADMIN — LIDOCAINE HYDROCHLORIDE 60 MG: 20 INJECTION, SOLUTION INFILTRATION; PERINEURAL at 19:07

## 2019-10-24 RX ADMIN — ONDANSETRON 4 MG: 2 INJECTION INTRAMUSCULAR; INTRAVENOUS at 22:10

## 2019-10-24 RX ADMIN — FENTANYL CITRATE 50 MCG: 50 INJECTION, SOLUTION INTRAMUSCULAR; INTRAVENOUS at 20:43

## 2019-10-24 RX ADMIN — AMPICILLIN AND SULBACTAM 3 G: 2; 1 INJECTION, POWDER, FOR SOLUTION INTRAMUSCULAR; INTRAVENOUS at 19:20

## 2019-10-24 RX ADMIN — FENTANYL CITRATE 50 MCG: 50 INJECTION, SOLUTION INTRAMUSCULAR; INTRAVENOUS at 19:34

## 2019-10-24 RX ADMIN — GLYCOPYRROLATE 0.2 MG: 0.2 INJECTION, SOLUTION INTRAMUSCULAR; INTRAVENOUS at 19:38

## 2019-10-24 RX ADMIN — AMPICILLIN AND SULBACTAM 1.5 G: 2; 1 INJECTION, POWDER, FOR SOLUTION INTRAMUSCULAR; INTRAVENOUS at 21:20

## 2019-10-24 RX ADMIN — PROPOFOL 130 MG: 10 INJECTION, EMULSION INTRAVENOUS at 19:07

## 2019-10-24 RX ADMIN — FENTANYL CITRATE 50 MCG: 50 INJECTION, SOLUTION INTRAMUSCULAR; INTRAVENOUS at 19:07

## 2019-10-24 RX ADMIN — ROCURONIUM BROMIDE 30 MG: 10 INJECTION INTRAVENOUS at 19:07

## 2019-10-24 RX ADMIN — HYDROMORPHONE HYDROCHLORIDE 0.5 MG: 1 INJECTION, SOLUTION INTRAMUSCULAR; INTRAVENOUS; SUBCUTANEOUS at 23:05

## 2019-10-24 RX ADMIN — MIDAZOLAM 1 MG: 1 INJECTION INTRAMUSCULAR; INTRAVENOUS at 18:59

## 2019-10-24 RX ADMIN — SUGAMMADEX 150 MG: 100 INJECTION, SOLUTION INTRAVENOUS at 21:41

## 2019-10-24 RX ADMIN — DEXAMETHASONE SODIUM PHOSPHATE 10 MG: 4 INJECTION, SOLUTION INTRA-ARTICULAR; INTRALESIONAL; INTRAMUSCULAR; INTRAVENOUS; SOFT TISSUE at 19:23

## 2019-10-24 RX ADMIN — FENTANYL CITRATE 50 MCG: 50 INJECTION, SOLUTION INTRAMUSCULAR; INTRAVENOUS at 20:53

## 2019-10-24 RX ADMIN — FENTANYL CITRATE 50 MCG: 50 INJECTION, SOLUTION INTRAMUSCULAR; INTRAVENOUS at 20:04

## 2019-10-24 RX ADMIN — ROCURONIUM BROMIDE 20 MG: 10 INJECTION INTRAVENOUS at 20:43

## 2019-10-24 RX ADMIN — HYDROMORPHONE HYDROCHLORIDE 0.5 MG: 1 INJECTION, SOLUTION INTRAMUSCULAR; INTRAVENOUS; SUBCUTANEOUS at 22:38

## 2019-10-24 RX ADMIN — HYDROMORPHONE HYDROCHLORIDE 0.2 MG: 1 INJECTION, SOLUTION INTRAMUSCULAR; INTRAVENOUS; SUBCUTANEOUS at 22:25

## 2019-10-24 RX ADMIN — HYDROMORPHONE HYDROCHLORIDE 0.3 MG: 1 INJECTION, SOLUTION INTRAMUSCULAR; INTRAVENOUS; SUBCUTANEOUS at 22:10

## 2019-10-24 ASSESSMENT — MIFFLIN-ST. JEOR: SCORE: 1292.5

## 2019-10-24 NOTE — OR NURSING
Alerted CRNA of medications ordered that were pulled from pharmacy and available for procedure. Patient  Ottoniel has patient wallet and phone. Glasses were labeled and placed with patient chart.

## 2019-10-24 NOTE — OR NURSING
FSBG lower than desired limits. Spoke with Dr. Tung daly via phone. Stated to give patient oral juice to increase blood glucoses since delay in surgery time apparent. Patient denied feeling hypoglycemic. Alert and talkative. Patient and family discussing options presented to patient by surgeon.

## 2019-10-24 NOTE — OR NURSING
Patient asking appropriate questions. Surgeon at bedside. Resources confirmed. NPO since 1430. Drank water (3/4 cup approx.) prior to arrival. Family at bedside. Consent to be addressed by surgeon once questions a addressed.

## 2019-10-25 ENCOUNTER — TELEPHONE (OUTPATIENT)
Dept: OTOLARYNGOLOGY | Facility: CLINIC | Age: 34
End: 2019-10-25

## 2019-10-25 ENCOUNTER — THERAPY VISIT (OUTPATIENT)
Dept: SPEECH THERAPY | Facility: CLINIC | Age: 34
End: 2019-10-25
Payer: COMMERCIAL

## 2019-10-25 ENCOUNTER — NURSE TRIAGE (OUTPATIENT)
Dept: NURSING | Facility: CLINIC | Age: 34
End: 2019-10-25

## 2019-10-25 ENCOUNTER — PREP FOR PROCEDURE (OUTPATIENT)
Dept: OTOLARYNGOLOGY | Facility: CLINIC | Age: 34
End: 2019-10-25

## 2019-10-25 ENCOUNTER — APPOINTMENT (OUTPATIENT)
Dept: GENERAL RADIOLOGY | Facility: CLINIC | Age: 34
End: 2019-10-25
Payer: COMMERCIAL

## 2019-10-25 ENCOUNTER — OFFICE VISIT (OUTPATIENT)
Dept: OTOLARYNGOLOGY | Facility: CLINIC | Age: 34
End: 2019-10-25
Payer: COMMERCIAL

## 2019-10-25 ENCOUNTER — HOSPITAL ENCOUNTER (INPATIENT)
Facility: CLINIC | Age: 34
LOS: 3 days | Discharge: HOME | End: 2019-10-28
Attending: EMERGENCY MEDICINE | Admitting: OTOLARYNGOLOGY
Payer: COMMERCIAL

## 2019-10-25 VITALS
BODY MASS INDEX: 20.59 KG/M2 | HEART RATE: 130 BPM | WEIGHT: 128.09 LBS | SYSTOLIC BLOOD PRESSURE: 120 MMHG | HEIGHT: 66 IN | DIASTOLIC BLOOD PRESSURE: 78 MMHG | RESPIRATION RATE: 14 BRPM | OXYGEN SATURATION: 96 % | TEMPERATURE: 97.9 F

## 2019-10-25 VITALS — WEIGHT: 127 LBS | BODY MASS INDEX: 20.51 KG/M2

## 2019-10-25 DIAGNOSIS — Z90.89 POST-TONSILLECTOMY PAIN: ICD-10-CM

## 2019-10-25 DIAGNOSIS — R51.9 ACUTE NONINTRACTABLE HEADACHE, UNSPECIFIED HEADACHE TYPE: ICD-10-CM

## 2019-10-25 DIAGNOSIS — C44.42 SQUAMOUS CELL CARCINOMA OF NECK: ICD-10-CM

## 2019-10-25 DIAGNOSIS — Z90.89 S/P TONSILLECTOMY: Primary | ICD-10-CM

## 2019-10-25 DIAGNOSIS — R13.12 OROPHARYNGEAL DYSPHAGIA: Primary | ICD-10-CM

## 2019-10-25 DIAGNOSIS — C10.9 SQUAMOUS CELL CARCINOMA OF OROPHARYNX (H): ICD-10-CM

## 2019-10-25 DIAGNOSIS — G89.18 POST-TONSILLECTOMY PAIN: ICD-10-CM

## 2019-10-25 DIAGNOSIS — C10.9 SQUAMOUS CELL CARCINOMA OF OROPHARYNX (H): Primary | ICD-10-CM

## 2019-10-25 LAB
ANION GAP SERPL CALCULATED.3IONS-SCNC: 5 MMOL/L (ref 3–14)
BUN SERPL-MCNC: 9 MG/DL (ref 7–30)
CALCIUM SERPL-MCNC: 8.1 MG/DL (ref 8.5–10.1)
CHLORIDE SERPL-SCNC: 109 MMOL/L (ref 94–109)
CO2 SERPL-SCNC: 25 MMOL/L (ref 20–32)
CREAT SERPL-MCNC: 0.59 MG/DL (ref 0.52–1.04)
GFR SERPL CREATININE-BSD FRML MDRD: >90 ML/MIN/{1.73_M2}
GLUCOSE BLDC GLUCOMTR-MCNC: 100 MG/DL (ref 70–99)
GLUCOSE BLDC GLUCOMTR-MCNC: 101 MG/DL (ref 70–99)
GLUCOSE BLDC GLUCOMTR-MCNC: 65 MG/DL (ref 70–99)
GLUCOSE BLDC GLUCOMTR-MCNC: 77 MG/DL (ref 70–99)
GLUCOSE BLDC GLUCOMTR-MCNC: 87 MG/DL (ref 70–99)
GLUCOSE SERPL-MCNC: 92 MG/DL (ref 70–99)
POTASSIUM SERPL-SCNC: 3.7 MMOL/L (ref 3.4–5.3)
SODIUM SERPL-SCNC: 138 MMOL/L (ref 133–144)

## 2019-10-25 PROCEDURE — 99285 EMERGENCY DEPT VISIT HI MDM: CPT | Mod: Z6 | Performed by: EMERGENCY MEDICINE

## 2019-10-25 PROCEDURE — 80048 BASIC METABOLIC PNL TOTAL CA: CPT | Performed by: STUDENT IN AN ORGANIZED HEALTH CARE EDUCATION/TRAINING PROGRAM

## 2019-10-25 PROCEDURE — 25000128 H RX IP 250 OP 636: Performed by: EMERGENCY MEDICINE

## 2019-10-25 PROCEDURE — 25800025 ZZH RX 258: Performed by: STUDENT IN AN ORGANIZED HEALTH CARE EDUCATION/TRAINING PROGRAM

## 2019-10-25 PROCEDURE — 36415 COLL VENOUS BLD VENIPUNCTURE: CPT | Performed by: STUDENT IN AN ORGANIZED HEALTH CARE EDUCATION/TRAINING PROGRAM

## 2019-10-25 PROCEDURE — 96361 HYDRATE IV INFUSION ADD-ON: CPT | Performed by: EMERGENCY MEDICINE

## 2019-10-25 PROCEDURE — 96374 THER/PROPH/DIAG INJ IV PUSH: CPT | Performed by: EMERGENCY MEDICINE

## 2019-10-25 PROCEDURE — 25000125 ZZHC RX 250

## 2019-10-25 PROCEDURE — 25000128 H RX IP 250 OP 636: Performed by: STUDENT IN AN ORGANIZED HEALTH CARE EDUCATION/TRAINING PROGRAM

## 2019-10-25 PROCEDURE — 12000001 ZZH R&B MED SURG/OB UMMC

## 2019-10-25 PROCEDURE — 40000986 XR ABDOMEN PORT 1 VW

## 2019-10-25 PROCEDURE — 00000146 ZZHCL STATISTIC GLUCOSE BY METER IP

## 2019-10-25 PROCEDURE — 99285 EMERGENCY DEPT VISIT HI MDM: CPT | Mod: 25 | Performed by: EMERGENCY MEDICINE

## 2019-10-25 PROCEDURE — 25000128 H RX IP 250 OP 636: Performed by: OTOLARYNGOLOGY

## 2019-10-25 RX ORDER — HYDROMORPHONE HYDROCHLORIDE 1 MG/ML
0.5 INJECTION, SOLUTION INTRAMUSCULAR; INTRAVENOUS; SUBCUTANEOUS ONCE
Status: COMPLETED | OUTPATIENT
Start: 2019-10-25 | End: 2019-10-25

## 2019-10-25 RX ORDER — PROCHLORPERAZINE MALEATE 5 MG
10 TABLET ORAL EVERY 6 HOURS PRN
Status: DISCONTINUED | OUTPATIENT
Start: 2019-10-25 | End: 2019-10-25 | Stop reason: HOSPADM

## 2019-10-25 RX ORDER — ONDANSETRON 4 MG/1
4 TABLET, ORALLY DISINTEGRATING ORAL EVERY 6 HOURS PRN
Status: DISCONTINUED | OUTPATIENT
Start: 2019-10-25 | End: 2019-10-27

## 2019-10-25 RX ORDER — MAGNESIUM HYDROXIDE 1200 MG/15ML
LIQUID ORAL
Status: COMPLETED
Start: 2019-10-25 | End: 2019-10-25

## 2019-10-25 RX ORDER — ONDANSETRON 2 MG/ML
4 INJECTION INTRAMUSCULAR; INTRAVENOUS EVERY 6 HOURS PRN
Status: DISCONTINUED | OUTPATIENT
Start: 2019-10-25 | End: 2019-10-27

## 2019-10-25 RX ORDER — DEXTROSE MONOHYDRATE, SODIUM CHLORIDE, AND POTASSIUM CHLORIDE 50; 1.49; 9 G/1000ML; G/1000ML; G/1000ML
INJECTION, SOLUTION INTRAVENOUS CONTINUOUS
Status: DISCONTINUED | OUTPATIENT
Start: 2019-10-25 | End: 2019-10-26

## 2019-10-25 RX ORDER — DEXAMETHASONE SODIUM PHOSPHATE 4 MG/ML
10 INJECTION, SOLUTION INTRA-ARTICULAR; INTRALESIONAL; INTRAMUSCULAR; INTRAVENOUS; SOFT TISSUE ONCE
Status: COMPLETED | OUTPATIENT
Start: 2019-10-25 | End: 2019-10-25

## 2019-10-25 RX ORDER — OXYCODONE HCL 5 MG/5 ML
5-10 SOLUTION, ORAL ORAL EVERY 4 HOURS PRN
Status: DISCONTINUED | OUTPATIENT
Start: 2019-10-25 | End: 2019-10-28 | Stop reason: HOSPADM

## 2019-10-25 RX ORDER — BIOTIN 1 MG
1000 TABLET ORAL DAILY
COMMUNITY
End: 2022-04-01

## 2019-10-25 RX ORDER — MAGNESIUM HYDROXIDE 1200 MG/15ML
LIQUID ORAL 3 TIMES DAILY
Status: DISCONTINUED | OUTPATIENT
Start: 2019-10-25 | End: 2019-10-26

## 2019-10-25 RX ORDER — AMOXICILLIN 250 MG
2 CAPSULE ORAL 2 TIMES DAILY
Status: DISCONTINUED | OUTPATIENT
Start: 2019-10-25 | End: 2019-10-27

## 2019-10-25 RX ORDER — HYDROMORPHONE HCL/0.9% NACL/PF 0.2MG/0.2
0.2 SYRINGE (ML) INTRAVENOUS
Status: DISCONTINUED | OUTPATIENT
Start: 2019-10-25 | End: 2019-10-26

## 2019-10-25 RX ORDER — FERROUS SULFATE 325(65) MG
325 TABLET ORAL
COMMUNITY
End: 2021-10-29

## 2019-10-25 RX ORDER — LIDOCAINE 40 MG/G
CREAM TOPICAL
Status: DISCONTINUED | OUTPATIENT
Start: 2019-10-25 | End: 2019-10-28 | Stop reason: HOSPADM

## 2019-10-25 RX ORDER — SODIUM BICARBONATE
POWDER (GRAM) MISCELLANEOUS 2 TIMES DAILY
Status: DISCONTINUED | OUTPATIENT
Start: 2019-10-25 | End: 2019-10-26

## 2019-10-25 RX ORDER — OXYCODONE HCL 5 MG/5 ML
5 SOLUTION, ORAL ORAL EVERY 4 HOURS PRN
Status: DISCONTINUED | OUTPATIENT
Start: 2019-10-25 | End: 2019-10-25

## 2019-10-25 RX ORDER — NALOXONE HYDROCHLORIDE 0.4 MG/ML
.1-.4 INJECTION, SOLUTION INTRAMUSCULAR; INTRAVENOUS; SUBCUTANEOUS
Status: DISCONTINUED | OUTPATIENT
Start: 2019-10-25 | End: 2019-10-28 | Stop reason: HOSPADM

## 2019-10-25 RX ORDER — AMOXICILLIN 250 MG
1 CAPSULE ORAL 2 TIMES DAILY
Status: DISCONTINUED | OUTPATIENT
Start: 2019-10-25 | End: 2019-10-27

## 2019-10-25 RX ORDER — CALCIUM/MAGNESIUM/ZINC 333-133 MG
1 TABLET ORAL DAILY
COMMUNITY
End: 2022-04-15

## 2019-10-25 RX ADMIN — SODIUM CHLORIDE 1000 ML: 900 INJECTION, SOLUTION INTRAVENOUS at 14:49

## 2019-10-25 RX ADMIN — SODIUM CHLORIDE 500 ML: 900 IRRIGANT IRRIGATION at 21:14

## 2019-10-25 RX ADMIN — Medication 0.2 MG: at 22:35

## 2019-10-25 RX ADMIN — Medication 0.2 MG: at 20:14

## 2019-10-25 RX ADMIN — HYDROMORPHONE HYDROCHLORIDE 0.5 MG: 1 INJECTION, SOLUTION INTRAMUSCULAR; INTRAVENOUS; SUBCUTANEOUS at 14:52

## 2019-10-25 RX ADMIN — Medication 0.2 MG: at 18:27

## 2019-10-25 RX ADMIN — PROCHLORPERAZINE EDISYLATE 10 MG: 5 INJECTION INTRAMUSCULAR; INTRAVENOUS at 00:40

## 2019-10-25 RX ADMIN — POTASSIUM CHLORIDE, DEXTROSE MONOHYDRATE AND SODIUM CHLORIDE: 150; 5; 900 INJECTION, SOLUTION INTRAVENOUS at 18:36

## 2019-10-25 RX ADMIN — DEXAMETHASONE SODIUM PHOSPHATE 10 MG: 4 INJECTION, SOLUTION INTRA-ARTICULAR; INTRALESIONAL; INTRAMUSCULAR; INTRAVENOUS; SOFT TISSUE at 20:07

## 2019-10-25 ASSESSMENT — ACTIVITIES OF DAILY LIVING (ADL)
ADLS_ACUITY_SCORE: 11
COGNITION: 0 - NO COGNITION ISSUES REPORTED
WHICH_OF_THE_ABOVE_FUNCTIONAL_RISKS_HAD_A_RECENT_ONSET_OR_CHANGE?: SWALLOWING
BATHING: 0-->INDEPENDENT
TOILETING: 0-->INDEPENDENT
DRESS: 0-->INDEPENDENT
RETIRED_EATING: 0-->INDEPENDENT
TRANSFERRING: 0-->INDEPENDENT
FALL_HISTORY_WITHIN_LAST_SIX_MONTHS: NO
RETIRED_COMMUNICATION: 0-->UNDERSTANDS/COMMUNICATES WITHOUT DIFFICULTY
AMBULATION: 0-->INDEPENDENT
SWALLOWING: 0-->SWALLOWS FOODS/LIQUIDS WITHOUT DIFFICULTY

## 2019-10-25 ASSESSMENT — ENCOUNTER SYMPTOMS
SORE THROAT: 1
SHORTNESS OF BREATH: 0
FEVER: 0
TROUBLE SWALLOWING: 1

## 2019-10-25 ASSESSMENT — PAIN DESCRIPTION - DESCRIPTORS
DESCRIPTORS: SORE

## 2019-10-25 ASSESSMENT — PAIN SCALES - GENERAL: PAINLEVEL: SEVERE PAIN (7)

## 2019-10-25 NOTE — H&P
[facility] H&P  10/25/2019    CC: post radical tonsillectomy pain, inability to tolerate PO    HPI: Ms. Meyers is a 34yoF with a history of a T1N1 p16+ SCC of the oropharynx who is followed by [facility] Omer del[facility]. She underwent a left radical tonsillectomy on 10/24/19 and was discharged postoperatively at her preference. Today she had poor PO intake and extensive post surgical pain, both of which did not resolve. She was only able to take in about 5 ml of oxycodone. She has been unable to tolerate any liquids orally, with coughing on trying to drink. She was seen in clinic this afternoon with SLP lobo. A FEES demonstrated penetration with no aspiration, with the penetration primarily due to residuals rather than on primary swallow. She was instructed to go to the ED for IV fluids, pain management, and NG tube placement. Given her extensive pain and dehydration, she was admitted for fluid resuscitation and pain management. She denies any bloody secretions or difficulty breathing.    PMHx: T1N1 p16+ SCC of oropharynx   PSHx: wisdom teeth extraction, Left radical tonsillectomy   Meds: claritin, lorazepam   Allergies: NKDA  Tob: Non smoker  EtOH: 10 drinks/weekend    ROS: A complete ROS is negative, otherwise stated in the HPI.    PHYSICAL EXAM  /58 (BP Location: Right arm)   Pulse 71   Temp 97.3  F (36.3  C) (Oral)   Resp 14   Wt 57 kg (125 lb 9.6 oz)   LMP 10/01/2019   SpO2 96%   BMI 20.28 kg/m    Awake, alert; NAD  EOMI, PERRL  Post surgical changes from recent Left tonsillectomy. No active hemorrhage. Buccal mucosa moist.  No rhinorrhea or epistaxis. No post nasal drainage.  Neck is soft, non tender to palpation.  Non labored breathing on room air.  Normal affect.    BMP: pending    A/P: 34yoF POD1 s/p Left radical tonsillectomy admitted 2/2 inability to tolerate PO and uncontrolled postoperative pain.  -- Neuro:   - PRN tylenol, oxycodone  -- HEENT:   - Decadron 10mg x 1   - Saline and baking  soda rinse and spit BID  -- Cards:   - HDS  -- Pulm:   - ORA  -- GI:   - Clear liquid diet   - NG placement. Nutrition consult   - F/u BMP   - Bowel regimen: senna/colace  -- Endo:   - No issues  -- Renal:   - voiding independently. Monitor UOP  -- Heme/ID:   - F/u AM CBC  -- PPx:   - SCDs  -- Dispo:   - Pending PO and pain control    This patient and plan was discussed with staff surgeon, Dr. Tello.    Kristi Adamson MD  [facility] PGY4        Teaching statement:  I evaluated the patient initially in clinic on 10/25/2019.  I then saw her again on the inpatient tuttle in the evening after admission.  Her pain control was already improved after the IV pain meds and fluids.  We discussed that we will work on getting her started on tube feeds.  I anticipate being able to start directly with bolus feeds given her lack of pre-existing malnutrition.  We will plan on working on pain control and hydration during hospitalization.  I anticipate a hospitalization of about 1 to 2 days pending toleration of tube feeds and pain control.    Ruth Tello MD    Department of Otolaryngology

## 2019-10-25 NOTE — OP NOTE
Date of Procedure: 10/24/2019    Attending Physician: Ruth Tello MD    Resident Physicians: Yrn Walden MD    Procedure Performed:  Direct laryngoscopy with biopsies  Left tonsillectomy  Left radical tonsillectomy    Preoperative Diagnosis:   1.Metastatic p16+ squamous cell carcinoma     Postoperative Diagnosis: same    Anesthesia: General    Blood loss: 10 cc    Specimens:   ID Type Source Tests Collected by Time Destination   A : Left Tonsil Biopsy Tissue Tonsil, Left SURGICAL PATHOLOGY EXAM Ruth Tello MD 10/24/2019  7:25 PM     B : Left Tonsil #2  Tissue Tonsil, Bilateral SURGICAL PATHOLOGY EXAM Ruth Tello MD 10/24/2019  7:28 PM     C : Left Base of Tongue Tissue Tongue SURGICAL PATHOLOGY EXAM Ruth Tello MD 10/24/2019  7:37 PM     D : Left Tonsilectomy Tissue Tonsil, Left SURGICAL PATHOLOGY EXAM Ruth Tello MD 10/24/2019  8:07 PM     E : Left Additional Base f Tongue Resection Tissue Tongue SURGICAL PATHOLOGY EXAM Ruth Tello MD 10/24/2019  8:55 PM     F : Deep Margin of Pharynx Tissue Other SURGICAL PATHOLOGY EXAM Ruth Tello MD 10/24/2019  8:58 PM     G : Pharyngeal Wall Mucosal Margin Tissue Other SURGICAL PATHOLOGY EXAM Ruth Tello MD 10/24/2019  9:00 PM     H : Soft Palate Mucosal Margin Tissue Other SURGICAL PATHOLOGY EXAM Ruth Tello MD 10/24/2019  9:02 PM     I : Lateral Pharyngeal Wall Mucosal Margin Tissue Other SURGICAL PATHOLOGY EXAM Ruth Tello MD 10/24/2019  9:03 PM     J : Completion Radical Tonsillectomy  Tissue Other SURGICAL PATHOLOGY EXAM Ruth Tello MD 10/24/2019  9:05 PM          Implants:  None    Complications: None    Findings:  Left tonsil more cryptic appearing than the right - biopsy demonstrated high grade dysplasia  Left base of tongue biopsy negative  Left tonsillectomy performed, small about 5 mm area palpable that firm to palpation concerning for primary site for malignancy - 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  She previously underwent excisional node biopsy by local otolaryngologist for a necrotic neck mass on the left.  Final pathology demonstrated metastatic p16+ squamous cell carcinoma.  PET CT scan did not demonstrate an obvious primary but the contrasted CT scan raise concern for possible primary within the left tonsil.  She is indicated for a direct laryngoscopy with biopsies to identify the primary site with possible tonsillectomy versus radical tonsillectomy pending intraoperative findings.    Description of Procedure:  After informed consent was obtained, the patient was brought back to the main operating room and placed in a supine position.  General anesthesia was induced and the patient was orotracheally intubated using a 6.0 wire reinforced endotracheal tube.  The bed was turned 90 degrees away from anesthesia.  The arms were tucked.  The patient was draped in usual fashion.  A timeout was performed.  The Thermoplast dental guards were placed to protect the upper and lower dentition.  The McIvor mouthgag was placed and used to inspect the oropharynx.  The patient had small tonsils bilaterally with slight increase in size of the left tonsil compared to the right.  The tonsils were both cryptic in nature but there was an area of abnormality with increased size of the crypts present along the left endophytic portion of the tonsil.  There were no obvious mucosal lesions and no palpable masses within the tonsil.  The 4 mm cups forceps was used to sample and biopsy of the left tonsil both superficially and deep.  The specimen was handed off to nursing for frozen section pathology.  The McIvor mouthgag was removed.  The Dedo laryngoscope was  introduced and used to inspect the oropharynx and larynx.  The 4 mm biopsy forceps were used to biopsy the left base of tongue and sent for frozen section.  Hemostasis was achieved with bipolar cautery.  The frozen section from the left tonsil showed high-grade dysplasia but no obvious invasive cancer.      The McIvor mouthgag was placed.  The tonsils were again palpated with no obvious primary identified.  The decision was made to proceed with a left tonsillectomy.  A curved Allis clamp was used to grasp the left tonsil and retracted medially.  The monopolar cautery was used to incise along the superior pole of the tonsil.  The monopolar cautery was used to dissect along the tonsillar capsule releasing it from the palatoglossus and palatal pharyngeus muscles.  The tonsil was released working from inferiorly.  The inferior pole of the tonsil was released near the base of the tongue.  Once the tonsil had been removed it was carefully palpated.  In 1 of the areas that have been biopsied towards the inferior one third of the tonsil there is a firm palpable nodularity that was approximately 3 to 5 mm in size.  The tonsil was oriented with a marking stitch.  The left tonsil was taken to pathology and was sampled for frozen section at the area of palpable abnormality.  Hemostasis was achieved with the bipolar cautery.  This was consistent on discussion with the pathologist with a basaloid squamous cell malignancy.      The decision was made to proceed with a completion radical tonsillectomy based on the preoperative discussion with the patient.  The McIvor mouthgag was again replaced and used to visualize the oropharynx.  The monopolar cautery was used to incise along the left soft palate lateral to the uvula.  This was extended laterally to include the anterior tonsillar pillar.  The incision was extended along the posterior pharyngeal wall at the midline and connected back superiorly deep to the posterior tonsillar  pillar.  The incision along the posterior pharyngeal wall was extended down through the superior constrictor muscles to the prevertebral fascia.  The superior constrictor muscle and the specimen was released working from superior to inferiorly.  The stylopharyngeus muscle was divided.  The parapharyngeal fat was  from the superior constrictor muscle.  Inferiorly the resection was extended down towards the base of tongue and transected.  An orientation suture was placed.  The specimen was handed off to nursing for permanent pathology.  Margins were obtained circumferentially and deep, and sent for frozen section pathology.  The frozen section margins were all negative.  Hemostasis was achieved with bipolar cautery.  The oral cavity and oropharynx were thoroughly irrigated.  The patient was taken out of suspension with the mouthgag and then resuspended.  Additional hemostasis was achieved.  A sustained Valsalva maneuver was performed.  The stomach was suctioned using an OG.  The oropharynx was again inspected with no obvious bleeding.    The patient was handed back to anesthesia for extubation.  She was transported to the recovery room in stable condition with no immediate complications.      Ruth Telol MD    Department of Otolaryngology

## 2019-10-25 NOTE — BRIEF OP NOTE
[facility], [place]    Brief Operative Note    Pre-operative diagnosis: Squamous cell carcinoma of neck [C44.42]  Post-operative diagnosis Same as pre-operative diagnosis    Procedure: Procedure(s):  Direct laryngscopy with biopsy  Left Radical  tonsillectomy  Surgeon: Surgeon(s) and Role:     * Ruth Tello MD - Primary     * Yrn Walden MD - Resident - Assisting  Anesthesia: General   Estimated blood loss: Less than 10 ml  Drains: None  Specimens:   ID Type Source Tests Collected by Time Destination   A : Left Tonsil Biopsy Tissue Tonsil, Left SURGICAL PATHOLOGY EXAM Ruth Tello MD 10/24/2019  7:25 PM    B : Left Tonsil #2  Tissue Tonsil, Bilateral SURGICAL PATHOLOGY EXAM Ruth Tello MD 10/24/2019  7:28 PM    C : Left Base of Tongue Tissue Tongue SURGICAL PATHOLOGY EXAM Ruth Tello MD 10/24/2019  7:37 PM    D : Left Tonsilectomy Tissue Tonsil, Left SURGICAL PATHOLOGY EXAM Ruth Tello MD 10/24/2019  8:07 PM    E : Left Additional Base f Tongue Resection Tissue Tongue SURGICAL PATHOLOGY EXAM Ruth Tello MD 10/24/2019  8:55 PM    F : Deep Margin of Pharynx Tissue Other SURGICAL PATHOLOGY EXAM Ruth Tello MD 10/24/2019  8:58 PM    G : Pharyngeal Wall Mucosal Margin Tissue Other SURGICAL PATHOLOGY EXAM Ruth Tello MD 10/24/2019  9:00 PM    H : Soft Palate Mucosal Margin Tissue Other SURGICAL PATHOLOGY EXAM Ruth Tello MD 10/24/2019  9:02 PM    I : Lateral Pharyngeal Wall Mucosal Margin Tissue Other SURGICAL PATHOLOGY EXAM Ruth Tello MD 10/24/2019  9:03 PM    J : Completion Radical Tonsillectomy  Tissue Other SURGICAL PATHOLOGY EXAM Ruth Tello MD 10/24/2019  9:05 PM      Findings:   See op note. Multiple frozen sections. Radical tonsillectomy left.   Complications: None.  Implants: * No implants in log *

## 2019-10-25 NOTE — ED TRIAGE NOTES
Patient presents to triage after a tonsillectomy yesterday. Pt to be a direct admit for IV fluids and pain medications, but there is no room available at this time.

## 2019-10-25 NOTE — TELEPHONE ENCOUNTER
Called and spoke with patient's spouse regarding the below symptoms.  Spouse states that their concern is that each time she swallows anything it feels that it is going down the wrong way making her cough. She is not able to take in much of her pain medication due to this. She has to to take very small sips and wait a long time in between sips.    Reviewed with Dr. Tello and we will have patient come to clinic this afternoon for follow-up and speech eval . Patient and spouse agreeable and will come to clinic at 12:30pm and we can have speech see her for evaluation followed by Dr. Tello.     Violetta Hurd, RN, BSN

## 2019-10-25 NOTE — TELEPHONE ENCOUNTER
Received call from Patient's . Patient is unable to swallow liquid pain meds due to pain and the feeling that  her throat is swelling up.Call taken taken over by Agus Cortez RN.

## 2019-10-25 NOTE — ANESTHESIA POSTPROCEDURE EVALUATION
Anesthesia POST Procedure Evaluation    Patient: Mira Cao   MRN:     [id number] Gender:   female   Age:    34 year old :      1985        Preoperative Diagnosis: Squamous cell carcinoma of neck [C44.42]   Procedure(s):  Direct laryngscopy with biopsy  Left Radical  tonsillectomy   Postop Comments: No value filed.       Anesthesia Type:  Not documented  No value filed.    Reportable Event: NO     PAIN: Uncomplicated   Sign Out status: Comfortable, Well controlled pain     PONV: No PONV   Sign Out status:  No Nausea or Vomiting     Neuro/Psych: Uneventful perioperative course   Sign Out Status: Preoperative baseline; Age appropriate mentation     Airway/Resp.: Uneventful perioperative course   Sign Out Status: Non labored breathing, age appropriate RR; Resp. Status within EXPECTED Parameters     CV: Uneventful perioperative course   Sign Out status: Appropriate BP and perfusion indices; Appropriate HR/Rhythm     Disposition:   Sign Out in:  PACU  Disposition:  Phase II; Home  Recovery Course: Uneventful  Follow-Up: Not required           Last Anesthesia Record Vitals:  CRNA VITALS  10/24/2019 2120 - 10/24/2019 2220      10/24/2019             Pulse:  96    SpO2:  99 %    Resp Rate (observed):  (!) 1          Last PACU Vitals:  Vitals Value Taken Time   /68 10/24/2019 11:10 PM   Temp 36.7  C (98.1  F) 10/24/2019 11:10 PM   Pulse 80 10/24/2019 11:10 PM   Resp 14 10/24/2019 11:10 PM   SpO2 96 % 10/24/2019 11:11 PM   Temp src     NIBP     Pulse     SpO2     Resp     Temp     Ht Rate     Temp 2     Vitals shown include unvalidated device data.      Electronically Signed By: Yrn Brown MD, 2019, 11:12 PM

## 2019-10-25 NOTE — NURSING NOTE
Chief Complaint   Patient presents with     RECHECK     Breathing concerns     Weight 57.6 kg (127 lb), last menstrual period 10/01/2019, not currently breastfeeding.    Melly Munoz EMT

## 2019-10-25 NOTE — PROGRESS NOTES
"   10/25/19 1300   General Information   Type Of Visit Initial   Start Of Care Date 10/25/19   Referring Physician Dr. Ruth Tello   Orders Evaluate And Treat   Orders Comment Clinical Swallow Eval   Medical Diagnosis Oropharyngeal dysphagia; SCC of left neck   Onset Of Illness/injury Or Date Of Surgery 10/24/19   Precautions/limitations No Known Precautions/limitations   Hearing Adequate for conversation   Pertinent History of Current Problem/OT: Additional Occupational Profile Info Pt is a 34 year old female with a recently diagnosed left neck SCC. She underwent a DL and biopsy and left radical tonsillectomy yesterday with suspected primary site as being the left tonsil. Pt's case was presented at tumor board earlier today and the recommendation was for consideration of a neck dissection rather than post-operative radiation therapy at this time. Pt was seen in conjunction with ENT clinic today due to increased difficulty swallowing and concern that liquid is going down the wrong way. Upon clinical interview, pt reported she doesn't feel like the left side of her throat is \"closing\" when swallowing. Stated she is coughing every time she swallows liquid. Endorsed this began immediately after surgery. She denied feeling as if the liquid is sticking in her throat but endorsed she does feel as if she has thickened mucus sticking in her throat. Stated she has not been able to take very much of her pain medication due to swallowing difficulties. Reported she also believes she may be dehydrated. Clinical evaluation completed both with and without visualization with MD passing endoscope.    Respiratory Status Room air   Prior Level Of Function Swallowing   Prior Level Of Function Comment Thin liquids   Patient Role/employment History Employed   [facility]   General Observations Pt pleasant and cooperative throughout evaluation.    Patient/family Goals To swallow without coughing or choking   General " Information Comments Pt accompanied by her  and mother.    Clinical Swallow Evaluation   Oral Musculature generally intact   Structural Abnormalities none present   Dentition present and adequate   Mucosal Quality adequate   Mandibular Strength and Mobility intact  (pt reported pain with jaw opening)   Oral Labial Strength and Mobility WFL   Lingual Strength and Mobility WFL   Velar Elevation other (see comments)  (unable to visualize)   Buccal Strength and Mobility intact   Laryngeal Function Cough;Swallow;Voicing initiated   Oral Musculature Comments Pt demonstrated adequate strength, coordination, and ROM of oral musculature. She endorses odynophagia and pain with jaw opening.    Additional Documentation Yes   Clinical Swallow Eval: Thin Liquid Texture Trial   Mode of Presentation, Thin Liquids cup;self-fed   Volume of Liquid or Food Presented ~2 oz   Oral Phase of Swallow WFL   Pharyngeal Phase of Swallow impaired;coughing/choking;repeated swallows;throat clearing   Diagnostic Statement During initial clinical trials, pt exhibited immediate cough response in 100% of PO trials with and without strategies including chin tuck and head turn to right and left. MD then passed endoscope and swallow was visualized with pt demonstrating penetration to the level of the vocal folds. Pt exhibited immediate cough response which cleared residuals. Ongoing penetration after the swallow from residuals in the pharynx.    Clinical Swallow Eval: Nectar Thick Liquid Texture Trial   Mode of Presentation, Nectar cup;self-fed   Volume of Nectar Presented ~1 oz   Oral Phase, Mascot WFL   Pharyngeal Phase, Nectar impaired;coughing/choking;repeated swallows;throat clearing;other (see comments)  (audible swallow)   Diagnostic Statement Reduced s/sx of aspiration during initial clinical trials as compared to thin liquids but ongoing immediate throat clearing and occasional delayed coughing. MD passed endoscope and re-assessed with  sips of liquid with deep penetration present. No definitive aspiration. Increased pharyngeal residuals in particular on left which spill into the laryngeal vestibule after the swallow. Pt demonstrated good sensation in response to penetration.    Swallow Compensations   Swallow Compensations Chin tuck;Reduce amounts;Multiple swallow;Head turn left;Head turn right   Educational Assessment   Barriers to Learning No barriers   Esophageal Phase of Swallow   Patient reports or presents with symptoms of esophageal dysphagia No   General Therapy Interventions   Planned Therapy Interventions Dysphagia Treatment   Dysphagia treatment Oropharyngeal exercise training;Modified diet education;Instruction of safe swallow strategies;Compensatory strategies for swallowing   Swallow Eval: Clinical Impressions   Skilled Criteria for Therapy Intervention Skilled criteria met.  Treatment indicated.   Functional Assessment Scale (FAS) 3   Treatment Diagnosis Moderate oropharyngeal dysphagia   Diet texture recommendations Thin liquids;Clear liquid  (advance as tolerated per MD; recommend consider alternative means of nutrition/hydration)   Recommended Feeding/Eating Techniques hard swallow w/ each bite or sip;maintain upright posture during/after eating for 30 mins;small sips/bites;other (see comments)  (double swallow, supraglottic swallow, oral cares including baking soda-salt water rinses and gargles)   Rehab Potential good, to achieve stated therapy goals   Therapy Frequency other (see comments)  (1x/week x 4 weeks, then drop to 2x/month x 3 months)   Risks and Benefits of Treatment have been explained. Yes   Patient, family and/or staff in agreement with Plan of Care Yes   Clinical Impression Comments Patient presents with moderate oropharyngeal dysphagia POD #1 from left radical tonsillectomy for squamous cell carcinoma of neck and suspected left tonsil; final path pending.  Patient is seen in conjunction with ENT clinic per MD  request.  She participated in a clinical swallow evaluation with and without visualization.  Pt exhibited overt s/sx of aspiration with thin and nectar-thick liquids, so pharyngeal swallow function was further assessed with visualization while MD passed endoscope. Patient was offered trials of blue colored thin and nectar-thick liquid.  She demonstrates moderate to significant amounts of thicker secretions diffusely throughout her pharynx upon visualization; secretions more significant on left side.  She demonstrates penetration of thin liquid to the level of the vocal folds after the swallow with residuals remaining on the vocal folds.  She has a strong reflexive cough response that clears residuals from vocal folds.  She demonstrates penetration of nectar-thick liquids after the swallow with increased pharyngeal residuals after the swallow.  Residuals frequently spill into the laryngeal vestibule after the swallow which patient spontaneously clears with a throat clear or reflexive cough.  Penetration of nectar-thick liquids does not appear to reach the level of the vocal folds.  No definitive aspiration present today.  Given results of today's evaluation, patient is at increased risk of aspiration during and after the swallow.  Per MD recommendation, pt is being sent to the hospital to be admitted for pain management and IV fluids; there is discussion about having an NG placed for nutrition.  Recommend clear thin liquid diet with use of swallowing strategies as above; per MD, diet to be advanced as tolerated.  Recommend initiate pharyngeal strengthening exercises as pain becomes better managed.  Pending POC at hospital, recommend consider referral for inpatient SLP services; otherwise, recommend follow-up with outpatient SLP after discharge to re-evaluate swallow function and ensure diet tolerance.  Patient and family participated in treatment after evaluation training recommndations and results.    Swallow Goals    SLP Swallow Goals 1;2   Swallow Goal 1   Goal Identifier Diet   Goal Description 1. Pt will tolerate regular textures with thin liquids with no overt clinical s/sx of penetration/aspiration in the 100% of PO trials when independently using swallowing strategies.    Target Date 01/23/20   Swallow Goal 2   Goal Identifier Exercises   Goal Description 2. Pt will complete 10 repetitions 3-5x/day of 5/5 oropharyngeal and jaw strengthening/ROM exercises with minimal written and/or verbal cues.    Target Date 01/23/20   Total Session Time   SLP Eval: oral/pharyngeal swallow function, clinical minutes ([id number]) 20   Total Evaluation Time 20     Thank you for the referral of Mira Cao. If you have any questions about this report, please contact me using the information below.     Shakira Gee MA, CCC-SLP  Speech-Language Pathologist   [facility]  Department of Otolaryngology/D&T - 4th floor  Pager: [phone]  Phone: [phone]  Email: [email or web address]

## 2019-10-25 NOTE — PROVIDER NOTIFICATION
Patient arrived from ED to 6A at 1605.  Per ED report here for IV fluids and pain medication.  S/p tonsillectomy 10/24.  Oriented to room.   at bedside.  ENT service was notified of patient's arrival to 6A.

## 2019-10-25 NOTE — DISCHARGE INSTRUCTIONS
[facility]  Same-Day Surgery   Adult Discharge Orders & Instructions     For 24 hours after surgery    1. Get plenty of rest.  A responsible adult must stay with you for at least 24 hours after you leave the hospital.   2. Do not drive or use heavy equipment.  If you have weakness or tingling, don't drive or use heavy equipment until this feeling goes away.  3. Do not drink alcohol.  4. Avoid strenuous or risky activities.  Ask for help when climbing stairs.   5. You may feel lightheaded.  IF so, sit for a few minutes before standing.  Have someone help you get up.   6. If you have nausea (feel sick to your stomach): Drink only clear liquids such as apple juice, ginger ale, broth or 7-Up.  Rest may also help.  Be sure to drink enough fluids.  Move to a regular diet as you feel able.  7. You may have a slight fever. Call the doctor if your fever is over 100 F (37.7 C) (taken under the tongue) or lasts longer than 24 hours.  8. You may have a dry mouth, a sore throat, muscle aches or trouble sleeping.  These should go away after 24 hours.  9. Do not make important or legal decisions.   Call your doctor for any of the followin.  Signs of infection (fever, growing tenderness at the surgery site, a large amount of drainage or bleeding, severe pain, foul-smelling drainage, redness, swelling).    2. It has been over 8 to 10 hours since surgery and you are still not able to urinate (pass water).    3.  Headache for over 24 hours.    4.  Numbness, tingling or weakness the day after surgery (if you had spinal anesthesia).  To contact a doctor, call ________________________________________ or:        [phone] and ask for the resident on call for   ______________________________________________ (answered 24 hours a day)      Emergency Department:    [facility]: [phone]       (TTY for hearing impaired: [phone])    [facility]: [phone]       (TTY for  hearing impaired: [phone])

## 2019-10-25 NOTE — PROGRESS NOTES
I spoke with the patient, her , and her mother at the bedside prior to surgery. We discussed that Dr Soliman and I talked extensively about her case prior to her surgery to discuss options. I explained that we would still recommend starting with a panendo to try to identify the primary site. If we cannot find the primary I would plan on performing a tonsillectomy in case there is a small intratonsillar tumor. The left tonsil is of highest suspicion to myself and Dr Soliman given her exam and the pre-surgical CT scan imaging. I discussed that if we find the primary within the tonsil that I would like her to give me some guidance as to whether she wants me to proceed with a radical tonsillectomy. I explained that if the tonsil is the primary and the radical tonsillectomy is performed that the primary site would be completely treated. We can then potentially consider a neck dissection and try to avoid radiation or at the very least perhaps decrease her fields/dosing. The main concern would be her age and the long term side effects from radiation. I discussed some of the potential side effects from the radical tonsillectomy. I explained that many times I would place an NG tube at the time of surgery but we can potentially avoid this if she can make sure to stay hydrated. If she has issues postop we would potentially have to place an NG. After about a 30 minute discussion where all questions were asked, she was agreeable to proceeding with the proposed surgery including possible radical tonsillectomy. She understands the plans for the neck dissection would be discussed at tumor conference.    Ruth Tello MD    Department of Otolaryngology

## 2019-10-25 NOTE — TELEPHONE ENCOUNTER
Pt had tonsilectomy 10/24/2019.    Since she had the surgery she is not able to swallow liquids and is having a hard time breathing due to the swelling in her throat after her surgery.  Pt is trying to take liquid pain medication.  But, when she tries to swallow the medication.   The medication goes down into her lungs instead of her stomach.      Both the Pt and  would like to know what else they can do for the swelling in the throat area for Pt care.   Family wanting to talk to a Nurse in the Clinic for Pt care.    Plan of Care  Called the ENT Clinic back line and transferred the call to [place] for further assistance for Pt care.    Jacy Norris RN  Central Triage Red Flags/Med Refills      Reason for Disposition    Patient sounds very sick or weak to the triager    Additional Information    Negative: [1] Severe difficulty swallowing (e.g., drooling or spitting) AND [2] started suddenly after taking a medicine or allergic food    Negative: Wheezing, stridor, hoarseness, or difficulty breathing    Negative: [1] Swollen tongue AND [2] sudden onset    Negative: Sounds like a life-threatening emergency to the triager    Negative: Mouth ulcers are seen    Negative: Sore throat (throat pain with swallowing)    Negative: Swallowed a (non-edible) foreign body    Negative: Feeding tube, questions or concerns related to    Negative: Swelling of tongue    Negative: [1] Symptoms of blocked esophagus (e.g., can't swallow normal secretions, drooling) AND [2] present now    Negative: Symptoms of food or bone stuck in throat or esophagus (e.g., pain in throat or chest, FB sensation, blood-tinged saliva)    Negative: [1] Severe difficulty swallowing (e.g., drooling or spitting, can't swallow water) AND [2] new onset AND [3] normal breathing    Negative: SEVERE symptoms of pill stuck in throat or esophagus (e.g., severe pain, bleeding, or inability to swallow liquids)    Negative: [1] Drinking very little AND [2] dehydration  suspected (e.g., no urine > 12 hours, very dry mouth, very lightheaded)    Negative: [1] Refuses to drink anything AND [2] for > 12 hours    Protocols used: SWALLOWING DIFFICULTY-A-AH

## 2019-10-25 NOTE — TELEPHONE ENCOUNTER
"SLay, , is seeking recommendations as patient is unable to swallow her pain medication.    B-10/24/2019 direct laryngoscopy with biopsy, left radical tonsillectomy.    ENEIDA Alcazar was administering her liquid pain medication (0927) and she was struggling with swallowing.    Patient wrote that she feels her \"airway is getting smaller and feels like it's unable to close.\"    She last took her pain medication around approximately 3 am.  She rates her pain at 6/10 at this time.    No unusual shortness of breaths, fever or chills noted at this time.  Temperature 98.1.  Patient is alert and oriented and sitting up with head of bed elevated.  Patient expressed feeling \"very worried.\"    Patient feels it's not necessary to go in to the emergency room at this time.    I briefly reviewed after care visit per after visit discharge 10/25/2019.    I encouraged and offered patient to do some relaxation techniques as patient expressed feeling anxious.  I assured her that I would reach out to Dr. Tello or her nurse.  If symptoms worsens I encouraged her to go to the emergency room.  Ottoniel and patient agreed with plan of care.    R-I informed NAKITA Shipley, via phone.  Violetta will reach out to patient and consult with Dr. Tello if necessary.      "

## 2019-10-25 NOTE — ANESTHESIA PREPROCEDURE EVALUATION
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 No pertinent surgical history.       Anesthesia Evaluation     . Pt has had prior anesthetic. Type: General    No history of anesthetic complications          ROS/MED HX    ENT/Pulmonary:  - neg pulmonary ROS     Neurologic:  - neg neurologic ROS     Cardiovascular:  - neg cardiovascular ROS       METS/Exercise Tolerance:     Hematologic:         Musculoskeletal:         GI/Hepatic:        (-) liver disease   Renal/Genitourinary:      (-) renal disease   Endo:         Psychiatric:         Infectious Disease:         Malignancy:   (+) Malignancy   Squamous cell carcinoma of neck        Other:                         PHYSICAL EXAM:   Mental Status/Neuro: A/A/O   Airway: Facies: Feasible  Mallampati: II  Mouth/Opening: Full  TM distance: > 6 cm  Neck ROM: Full   Respiratory: Auscultation: CTAB     Resp. Rate: Normal     Resp. Effort: Normal      CV: Rhythm: Regular  Rate: Age appropriate  Heart: Normal Sounds  Edema: None   Comments:      Dental: Retainer  Retainer: Lower                LABS:  CBC: No results found for: WBC, HGB, HCT, PLT  BMP: No results found for: NA, POTASSIUM, CHLORIDE, CO2, BUN, CR, GLC  COAGS: No results found for: PTT, INR, FIBR  POC:   Lab Results   Component Value Date    BGM 79 10/24/2019    HCG Negative 10/24/2019     OTHER: No results found for: PH, LACT, A1C, SYDNI, PHOS, MAG, ALBUMIN, PROTTOTAL, ALT, AST, GGT, ALKPHOS, BILITOTAL, BILIDIRECT, LIPASE, AMYLASE, AMMY, TSH, T4, T3, CRP, SED     Preop Vitals    BP Readings from Last 3 Encounters:   10/24/19 105/77   10/23/19 116/80   10/21/19 123/84     "Pulse Readings from Last 3 Encounters:   10/24/19 65   10/21/19 68   10/18/08 74      Resp Readings from Last 3 Encounters:   10/24/19 17   10/21/19 15    SpO2 Readings from Last 3 Encounters:   10/24/19 95%   10/21/19 97%      Temp Readings from Last 1 Encounters:   10/24/19 36.7  C (98.1  F) (Oral)    Ht Readings from Last 1 Encounters:   10/24/19 1.676 m (5' 5.98\")      Wt Readings from Last 1 Encounters:   10/24/19 58.1 kg (128 lb 1.4 oz)    Estimated body mass index is 20.68 kg/m  as calculated from the following:    Height as of this encounter: 1.676 m (5' 5.98\").    Weight as of this encounter: 58.1 kg (128 lb 1.4 oz).     LDA:  Peripheral IV 10/24/19 Anterior;Left Hand (Active)   Site Assessment WDL 10/24/2019  5:15 PM   Line Status Saline locked;Checked every 1 hour 10/24/2019  5:15 PM   Phlebitis Scale 0-->no symptoms 10/24/2019  5:15 PM   Infiltration Scale 0 10/24/2019  5:15 PM   Infiltration Site Treatment Method  None 10/24/2019  5:15 PM   Extravasation? No 10/24/2019  3:16 PM   Number of days: 0       ETT (adult) 6.5 (Active)   Number of days: 0        Assessment:   ASA SCORE: 2    H&P: History and physical reviewed and following examination; no interval change.         Plan:   Anes. Type:  General   Pre-Medication: None   Induction:  IV (Standard)   Airway: ETT; Oral   Access/Monitoring: PIV   Maintenance: Balanced     Postop Plan:   Postop Pain: Opioids  Postop Sedation/Airway: Not planned     PONV Management:   Adult Risk Factors: Female, Postop Opioids   Prevention: Ondansetron, Dexamethasone     CONSENT: Direct conversation   Plan and risks discussed with: Patient                      Deloris Rodriguez MD  "

## 2019-10-25 NOTE — ANESTHESIA CARE TRANSFER NOTE
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    Anesthesia Type:   No value filed.     Note:  Airway :Face Mask  Patient transferred to:PACU  Comments: VSS. Breathing spontaneously at a regular rate with adequate tidal volumes and maintaining O2 sats on 4 L O2 via face mask. Denies nausea or pain. No apparent complications from anesthesia.     Sanjay Kaufman MD  Anesthesiology Resident, CA-1    Handoff Report: Identifed the Patient, Identified the Reponsible Provider, Reviewed the pertinent medical history, Discussed the surgical course, Reviewed Intra-OP anesthesia mangement and issues during anesthesia, Set expectations for post-procedure period and Allowed opportunity for questions and acknowledgement of understanding      Vitals: (Last set prior to Anesthesia Care Transfer)    CRNA VITALS  10/24/2019 2120 - 10/24/2019 2156      10/24/2019             Pulse:  96    SpO2:  99 %    Resp Rate (observed):  (!) 1                Electronically Signed By: Sanjay Kaufman MD  October 24, 2019  9:56 PM

## 2019-10-25 NOTE — OR NURSING
Patient dressed/reclining in chair.  Became nauseated with activity.  Wants to rest. VSS.  States feels tired and pain is at a comfortable level.  No emesis.  Taking sips of water.  Instructions reviewed with , verbalizes understanding.  In room with patient

## 2019-10-25 NOTE — ED PROVIDER NOTES
History     Chief Complaint   Patient presents with     Post-op Problem     HPI  Mira Cao is a 34 year old female with a past medical history of squamous cell carcinoma of the neck who is status post left radical tonsillectomy yesterday who presents to the ED as there are no inpatient beds available to admit the patient.  The patient is admitted to the ENT service.  I did speak with ENT who recommended treating the patient with IV fluids and IV Dilaudid in the ED while she is awaiting an inpatient bed..  History is obtained primarily through the help of the  because the patient has difficulty speaking.  She complains of throat pain and difficulty swallowing.  She has been unable to take her pain medications.  She is also been coughing.  She denies tonsillar bleeding.  She states it is very difficult to talk. She denies difficulty breathing.     I have reviewed the Medications, Allergies, Past Medical and Surgical History, and Social History in the Epic system.    Review of Systems   Constitutional: Negative for fever.   HENT: Positive for sore throat and trouble swallowing.    Respiratory: Negative for shortness of breath.    Cardiovascular: Negative for chest pain.   All other systems reviewed and are negative.      Physical Exam   BP: 131/45  Pulse: 77  Resp: 18  Weight: 57.6 kg (127 lb)  SpO2: 100 %      Physical Exam  Physical Exam   Constitutional:   well nourished, well developed, appears uncomfortable  HENT:   Head: Normocephalic and atraumatic.   Pharynx: Patient has trismus with no active bleeding  Neck:   Shotty adenopathy on left, no bony tenderness  Cardiovascular: regular rate and rhythm without murmurs or gallops  Pulmonary/Chest: Clear to auscultation bilaterally, with no wheezes or retractions. No respiratory distress.  GI: Soft with good bowel sounds.  Non-tender, non-distended  Skin: Skin is warm and dry. No rash noted.   Neurological: alert and oriented to person, place, and  time. Nonfocal exam  Psychiatric:  normal mood and affect.   ED Course        Procedures             Critical Care time:  none             Labs Ordered and Resulted from Time of ED Arrival Up to the Time of Departure from the ED - No data to display         Assessments & Plan (with Medical Decision Making)       I have reviewed the nursing notes.    Emergency Department course:  The patient was seen and examined at 1451 pm in Triage room 5.  I treated the patient with normal saline bolus IV, and Dilaudid IV.  I spoke with ENT prior to her admission.   ENT does not believe the patient requires laboratory studies or antibiotics at this time.    The patient is a 34-year-old female who is status post radical tonsillectomy yesterday who presents to the ED after being admitted to inpatient ENT.  There are no inpatient beds available at this time.  She is receiving IV fluids and IV narcotics while awaiting a bed.  She was admitted upstairs at about 1540 pm  I have reviewed the findings, diagnosis, plan and need for follow up with the patient.      New Prescriptions    No medications on file       Final diagnoses:   Post-tonsillectomy pain   This note was created in part by the use of Dragon voice recognition dictation system. Inadvertent grammatical errors and typographical errors may still exist.  Sangeetha Morrison MD      10/25/2019   [facility], [place], EMERGENCY DEPARTMENT     Sangeetha Morrison MD  10/25/19 9296

## 2019-10-26 LAB
ANION GAP SERPL CALCULATED.3IONS-SCNC: 5 MMOL/L (ref 3–14)
BUN SERPL-MCNC: 7 MG/DL (ref 7–30)
CALCIUM SERPL-MCNC: 8.2 MG/DL (ref 8.5–10.1)
CHLORIDE SERPL-SCNC: 110 MMOL/L (ref 94–109)
CO2 SERPL-SCNC: 25 MMOL/L (ref 20–32)
CREAT SERPL-MCNC: 0.54 MG/DL (ref 0.52–1.04)
ERYTHROCYTE [DISTWIDTH] IN BLOOD BY AUTOMATED COUNT: 11.8 % (ref 10–15)
GFR SERPL CREATININE-BSD FRML MDRD: >90 ML/MIN/{1.73_M2}
GLUCOSE BLDC GLUCOMTR-MCNC: 155 MG/DL (ref 70–99)
GLUCOSE SERPL-MCNC: 154 MG/DL (ref 70–99)
HCT VFR BLD AUTO: 31.5 % (ref 35–47)
HGB BLD-MCNC: 10.4 G/DL (ref 11.7–15.7)
MCH RBC QN AUTO: 31.1 PG (ref 26.5–33)
MCHC RBC AUTO-ENTMCNC: 33 G/DL (ref 31.5–36.5)
MCV RBC AUTO: 94 FL (ref 78–100)
PLATELET # BLD AUTO: 222 10E9/L (ref 150–450)
POTASSIUM SERPL-SCNC: 4.1 MMOL/L (ref 3.4–5.3)
RBC # BLD AUTO: 3.34 10E12/L (ref 3.8–5.2)
SODIUM SERPL-SCNC: 140 MMOL/L (ref 133–144)
WBC # BLD AUTO: 8.3 10E9/L (ref 4–11)

## 2019-10-26 PROCEDURE — 25000132 ZZH RX MED GY IP 250 OP 250 PS 637: Performed by: STUDENT IN AN ORGANIZED HEALTH CARE EDUCATION/TRAINING PROGRAM

## 2019-10-26 PROCEDURE — 36415 COLL VENOUS BLD VENIPUNCTURE: CPT | Performed by: STUDENT IN AN ORGANIZED HEALTH CARE EDUCATION/TRAINING PROGRAM

## 2019-10-26 PROCEDURE — 85027 COMPLETE CBC AUTOMATED: CPT | Performed by: STUDENT IN AN ORGANIZED HEALTH CARE EDUCATION/TRAINING PROGRAM

## 2019-10-26 PROCEDURE — 00000146 ZZHCL STATISTIC GLUCOSE BY METER IP

## 2019-10-26 PROCEDURE — 80048 BASIC METABOLIC PNL TOTAL CA: CPT | Performed by: STUDENT IN AN ORGANIZED HEALTH CARE EDUCATION/TRAINING PROGRAM

## 2019-10-26 PROCEDURE — 25000128 H RX IP 250 OP 636: Performed by: STUDENT IN AN ORGANIZED HEALTH CARE EDUCATION/TRAINING PROGRAM

## 2019-10-26 PROCEDURE — 25800025 ZZH RX 258: Performed by: STUDENT IN AN ORGANIZED HEALTH CARE EDUCATION/TRAINING PROGRAM

## 2019-10-26 PROCEDURE — 25000132 ZZH RX MED GY IP 250 OP 250 PS 637: Performed by: OTOLARYNGOLOGY

## 2019-10-26 PROCEDURE — 25000125 ZZHC RX 250: Performed by: STUDENT IN AN ORGANIZED HEALTH CARE EDUCATION/TRAINING PROGRAM

## 2019-10-26 PROCEDURE — 12000001 ZZH R&B MED SURG/OB UMMC

## 2019-10-26 RX ORDER — SODIUM BICARBONATE 650 MG/1
650 TABLET ORAL 2 TIMES DAILY
Status: DISCONTINUED | OUTPATIENT
Start: 2019-10-26 | End: 2019-10-28 | Stop reason: HOSPADM

## 2019-10-26 RX ORDER — HYDROMORPHONE HCL/0.9% NACL/PF 0.2MG/0.2
0.2 SYRINGE (ML) INTRAVENOUS EVERY 4 HOURS PRN
Status: DISCONTINUED | OUTPATIENT
Start: 2019-10-26 | End: 2019-10-28

## 2019-10-26 RX ORDER — MAGNESIUM HYDROXIDE 1200 MG/15ML
LIQUID ORAL
Status: DISCONTINUED | OUTPATIENT
Start: 2019-10-26 | End: 2019-10-28 | Stop reason: HOSPADM

## 2019-10-26 RX ADMIN — Medication 0.2 MG: at 00:48

## 2019-10-26 RX ADMIN — SODIUM BICARBONATE 650 MG TABLET 650 MG: at 19:11

## 2019-10-26 RX ADMIN — SODIUM CHLORIDE 500 ML: 900 IRRIGANT IRRIGATION at 19:11

## 2019-10-26 RX ADMIN — SODIUM CHLORIDE 500 ML: 900 IRRIGANT IRRIGATION at 14:19

## 2019-10-26 RX ADMIN — MULTIVITAMIN 15 ML: LIQUID ORAL at 14:18

## 2019-10-26 RX ADMIN — SODIUM CHLORIDE 500 ML: 900 IRRIGANT IRRIGATION at 08:21

## 2019-10-26 RX ADMIN — OXYCODONE HYDROCHLORIDE 5 MG: 5 SOLUTION ORAL at 11:07

## 2019-10-26 RX ADMIN — OXYCODONE HYDROCHLORIDE 10 MG: 5 SOLUTION ORAL at 15:45

## 2019-10-26 RX ADMIN — Medication 0.2 MG: at 08:16

## 2019-10-26 RX ADMIN — Medication 0.2 MG: at 14:18

## 2019-10-26 RX ADMIN — SODIUM BICARBONATE 650 MG TABLET 650 MG: at 08:21

## 2019-10-26 RX ADMIN — Medication 0.2 MG: at 04:10

## 2019-10-26 RX ADMIN — ACETAMINOPHEN 650 MG: 325 SOLUTION ORAL at 21:30

## 2019-10-26 RX ADMIN — POTASSIUM CHLORIDE, DEXTROSE MONOHYDRATE AND SODIUM CHLORIDE: 150; 5; 900 INJECTION, SOLUTION INTRAVENOUS at 15:01

## 2019-10-26 RX ADMIN — ACETAMINOPHEN 650 MG: 325 SOLUTION ORAL at 14:19

## 2019-10-26 RX ADMIN — OXYCODONE HYDROCHLORIDE 10 MG: 5 SOLUTION ORAL at 21:30

## 2019-10-26 RX ADMIN — SENNOSIDES AND DOCUSATE SODIUM 2 TABLET: 8.6; 5 TABLET ORAL at 19:11

## 2019-10-26 RX ADMIN — ACETAMINOPHEN 650 MG: 325 SOLUTION ORAL at 19:11

## 2019-10-26 ASSESSMENT — ACTIVITIES OF DAILY LIVING (ADL)
ADLS_ACUITY_SCORE: 11

## 2019-10-26 NOTE — PLAN OF CARE
Status: POD#2 s/p Left radical tonsillectomy.  Was readmitted today d/t inability to tolerate PO, and postoperative pain.    Vitals: VSS on RA. Continuous pulse ox  Neuros: A&Ox4. Intact  IV: PIV Left arm, MIVF at 100mL/hr.   Resp/trach: LS clear   Diet: Clear liq diet and TF (Isosource 1.5). TF started this shift. Completed 1/2 of bottle. Pt requested to have other half in the morning.   Bowel status: LBM 10/24  : Voiding spontaneously without difficulty    Skin: intact. UTV surgery incision  Pain: throat pain managed with dilaudid Q2hrs, with good relief  Activity: Up with SBA, GB+,  Social:  at bedside.    Plan: Continue with pain management. Continue with current POC.

## 2019-10-26 NOTE — PHARMACY-ADMISSION MEDICATION HISTORY
Admission medication history interview status for the 10/25/2019 admission is complete. See Epic admission navigator for allergy information, pharmacy, prior to admission medications and immunization status.     Medication history interview sources:  Patient, [facility]    Changes made to PTA medication list (reason)  Added:   -Biotin 1000 MCG TABS tablet: Patient is taking this at home but on hold due to procedure  -Ferrous sulfate (KP FERROUS SULFATE) 325 (65 Fe) MG tablet: Patient is taking this at home but on hold due to procedure  -Milk Thistle-Dand-Fennel-Licor (MILK THISTLE XTRA) CAPS capsule: Patient is taking this at home but on hold due to procedure  -Omega-3 Fatty Acids (OMEGA 3 PO): Patient is taking this at home but on hold due to procedure    Deleted: None    Changed: None    Additional medication history information (including reliability of information, actions taken by pharmacist):  -Patient was a good historian  -Oxycodone 5 mg/5ml: Doses at 0100 & 0900  -Patient had not started taking (picked up from the pharmacy this morning): Acetaminophen oral suspension, Chlorhexidine rinse, Prednisone, & Senna-S  -Only able to swallow oral solutions at this time, unable to swallow pills (due to procedure)  -Lorazepam as needed, patient rarely uses this medication  -Not currently taking vitamins and supplements due to procedure    Prior to Admission medications    Medication Sig Last Dose Taking? Auth Provider   loratadine (CLARITIN) 10 MG tablet Take 10 mg by mouth daily  10/23/2019 at AM Yes Reported, Patient   LORazepam (ATIVAN) 0.5 MG tablet Take 0.5-1 mg by mouth as needed  10/23/2019 at PRN Yes Reported, Patient   oxyCODONE (ROXICODONE) 5 MG/5ML solution Take 5-10 mLs (5-10 mg) by mouth every 6 hours as needed for pain 10/25/2019 at 0900 Yes Ruth Tello MD   acetaminophen (TYLENOL) 160 MG/5ML suspension Take 10-20.5 mLs (320-656 mg) by mouth every 6 hours as needed for fever or mild pain  Unknown at Unknown time  Yrn Walden MD   biotin 1000 MCG TABS tablet Take 1,000 mcg by mouth daily   Unknown, Entered By History   chlorhexidine (PERIDEX) 0.12 % solution Swish and spit 15 mLs in mouth 2 times daily Unknown at Unknown time  Yrn Walden MD   ferrous sulfate (KP FERROUS SULFATE) 325 (65 Fe) MG tablet Take 325 mg by mouth daily (with breakfast)   Unknown, Entered By History   Milk Thistle-Dand-Fennel-Licor (MILK THISTLE XTRA) CAPS capsule Take 1 capsule by mouth daily   Unknown, Entered By History   Omega-3 Fatty Acids (OMEGA 3 PO) Take 2 capsules by mouth daily   Unknown, Entered By History   predniSONE (DELTASONE) 20 MG tablet Take 1 tablet (20 mg) by mouth daily Unknown at Unknown time  Yrn Walden MD   senna-docusate (SENOKOT-S/PERICOLACE) 8.6-50 MG tablet Take 1-2 tablets by mouth 2 times daily Unknown at Unknown time  Yrn Walden MD       Medication history completed by:   Lisbeth Aguilera  Student Pharmacist   [facility]  10/25/2019

## 2019-10-26 NOTE — PROGRESS NOTES
Status: POD#1 s/p Left radical tonsillectomy.  Was readmitted today d/t inability to tolerate PO, and postoperative pain.    Vitals: /58 (BP Location: Right arm)   Pulse 71   Temp 97.3  F (36.3  C) (Oral)   Resp 14   Wt 57 kg (125 lb 9.6 oz)   LMP 10/01/2019   SpO2 96%   BMI 20.28 kg/m    Neuros: A&Ox4  IV: PIV Left arm, MIVF at 100mL/hr.   Resp/trach: LS clear   Diet: Clear liq diet and TF (Isosource 1.5).  Unable to tolerate diet d/t not being able to swallow d/t swelling.  Feeding tube placed at bedside, per MD.  X-ray done to confirm placement.  Pending order for ability to use feeding tube.  MD notified.    Bowel status: LBM 10/24  : Voiding spont.  Voided, using toilet.  Skin: intact  Pain: throat pain managed with dilaudid Q2hrs, with good relief  Activity: Up with SBA, GB+,  Social:  at bedside.  Will spend the night.    Plan: Start TF once order to use feeding tube is written.  Continue with current POC.

## 2019-10-26 NOTE — PROGRESS NOTES
CLINICAL NUTRITION SERVICES - ASSESSMENT NOTE     Nutrition Prescription    RECOMMENDATIONS FOR MDs/PROVIDERS TO ORDER:  Low risk of refeeding given poor po is only 3 day duration    Meeting 100% of FWF via FT As pt not able to tolerate drinking water po for hydration - can adjust per MD discretion.   -rec minimal 30 mL q4h for tube patency    Malnutrition Status:    Patient does not meet two of the above criteria necessary for diagnosing malnutrition but is at risk for malnutrition    Recommendations already ordered by Registered Dietitian (RD):  rec via NGT:  --Isosource 1.5, 1 cans (250 ml) QID = 4 cans daily (1000 ml/day) = 1500 kcals (26kcal/kg), 68 g PRO (1.2 g/kg/day), 760 ml H2O, 176 g CHO and 15 g Fiber daily.  --to meet 100% of needs via FT rec 90 mL FWF before and after each 1 can bolus  --With gastric access, HOB > 30 degrees  --certavite via FT daily to ensure micronutrient needs being met    Future/Additional Recommendations:  Adjust feeding schedule to pt preference (I.e. 1 can q4h VS 2 cans BID)     REASON FOR ASSESSMENT  Mira Cao is a/an 34 year old female assessed by the dietitian for Provider Order - Registered Dietitian to Assess and Order TF per Medical Nutrition Therapy Protocol    PMH significant for: T1N1 p16+ SCC of the oropharynx  --s/p 10/24: left radical tonsillectomy   --admitted for fluid resuscitation and pain management    NUTRITION HISTORY  Pt reports that prior to OR on 10/24 she had been eating fine without issue and had been focusing on eating more to optimize nutrition prior to OR and had noticed some weight gain with this. Mother by bedside and both pt and mother report having had education on how to infuse feeds this morning and decline any questions/concerns. Pt has had 3 boluses, 1/2 can per bolus, and has tolerated these well thus far without issue.     CURRENT NUTRITION ORDERS  Diet: Clear Liquid; Via NGT: 3 cans of isosource 1.5/day  Intake/Tolerance: only doing  "ice chips -drinking water uncomfortable and feels like she is aspirating d/t excessive mucous/post-nasal drip    LABS  Labs reviewed  Cl 110H  Blood glucose 100-150's    MEDICATIONS  Medications reviewed  Doc-senna  Sodium bicarb BID  IVF: D5W-NS+20 mEq KCL @ 100 mL/hr -- 408 kcal/day    ANTHROPOMETRICS  Height:   Ht Readings from Last 2 Encounters:   10/24/19 1.676 m (5' 5.98\")   10/21/19 1.676 m (5' 6\")     Most Recent Weight: 57 kg (125 lb 9.6 oz)    IBW: 59.1 kg  BMI: 20.3 --  Normal BMI  Weight History: Wt down 4.8% over past week however question if some of wts are pt stated over past week  Wt Readings from Last 10 Encounters:   10/25/19 57 kg (125 lb 9.6 oz)   10/25/19 57.6 kg (127 lb)   10/24/19 58.1 kg (128 lb 1.4 oz)   10/23/19 58.5 kg (129 lb)   10/21/19 59.9 kg (132 lb)   7/17/19           57.6 kg  5/1/19             57.6 kg    Dosing Weight: 57 kg (actual 10/25)    ASSESSED NUTRITION NEEDS  Estimated Energy Needs: 4169-9252+ kcals/day (25 - 30+ kcals/kg)  Justification: Maintenance, monitor for increase w/ potential hypermetabolism in head and neck cancer pt  Estimated Protein Needs: 70-85 grams protein/day (1.2 - 1.5 grams of pro/kg)  Justification: Maintenance and healing post-op  Estimated Fluid Needs:  (1 mL/kcal)   Justification: Maintenance and Per provider pending fluid status    PHYSICAL FINDINGS  See malnutrition section below.  Tube taped to nose, no bridle, no skin breakdown noted    Imaging: AXR (10/25): Feeding tube with tip in the stomach    MALNUTRITION  % Intake: Decreased intake does not meet criteria  % Weight Loss: > 2% in 1 week (severe)  Subcutaneous Fat Loss: None observed  Muscle Loss: None observed  Fluid Accumulation/Edema: None noted  Malnutrition Diagnosis: Patient does not meet two of the above criteria necessary for diagnosing malnutrition but is at risk for malnutrition    NUTRITION DIAGNOSIS  Inadequate oral intake related to inability to tolerate po s/p radical " tonsillectomy on 10/24 as evidenced by pt report, NGT to meet nutrition needs      INTERVENTIONS  Implementation  Nutrition Education: Provided education on RD role and nutrition POC. Discussed role of enteral feeds and plan for advancement. Discussed role of water flushes for both tube patency and for hydration. All questions/concerns addressed.    Enteral Nutrition - Initiate  Feeding tube flush - per above  Micronutrient needs via certavite daily    Goals  Total avg nutritional intake to meet a minimum of 25 kcal/kg and 1.2 g PRO/kg daily (per dosing wt 57 kg).     Monitoring/Evaluation  Progress toward goals will be monitored and evaluated per protocol.    Mica Theodore MS, RD, , LD.  5C/BMT Pager:[phone]

## 2019-10-26 NOTE — PLAN OF CARE
Vitals: VSS on RA  Neuros: Alert and oriented x4.   IV: IVF infusing as ordered.  Resp/trach: WDL  Diet: Isosource TF-4 cans per day- 2 administered on shift. See flush orders. Clears ordered, able to tolerate a few ice chips.   Bowel status: BS+x4  : Voiding  Skin: Intact per pt, refused skin assessment.  Pain: Utilizing prn oxycodone and dilaudid for pain. Scheduled tylenol started.  Activity: Up independently, steady. Education to pt and spouse on administering TF and medications via NG. Return demo by patient and spouse.   Social: Spouse and mother at bedside this shift  Plan: Pain control and hydration as able.

## 2019-10-26 NOTE — PROGRESS NOTES
Procedure: NG tube placement    Lidocaine sprayed in right side of nose and NG placed to 55 at nose. Tolerated well without complications. Abdominal XR pending.     Marlo Arvizu MD ENT resident

## 2019-10-26 NOTE — PROGRESS NOTES
I had the pleasure of seeing Mira Cao in follow-up today at the [facility].     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 The patient noticed a left neck mass in June. She thought this was related to a swollen lymph node due to stress as she had just bought a house.  She delayed evaluation until approximately July when she presented to urgent care.  She had an ultrasound obtained on 7/12/2019 which demonstrated a 3.7 x 2.3 x 1.3 cm mass in the left neck.  She then had a CT scan on 8/7/2019 which demonstrated a 1.8 x 1.4 x 3.8 cm partially necrotic/cystic level 2/3 neck mass, concerning for metastatic squamous cell carcinoma.  She was seen by Dr.Todd Cao and had an FNA performed on 8/8/2019.  The pathology from the FNA demonstrated atypical squamous proliferation which was p16-.  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  She elected for removal which was performed by Dr. Cao on 10/2/2019.  The excision was performed without a completion neck dissection.  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  She had a PET CT scan on 2019 locally which demonstrated postop changes without residual disease and no obvious primary malignancy.  On 10/10/2019 she saw Dr. Neely at [facility] for medical oncology consultation where possible chemotherapy was discussed.  She has met with Dr Melendez from radiation oncology at [facility].  She had a dental cleaning.        Interval history:  She was taken to the operating room on 10/24/2019 for direct laryngoscopy with biopsy.  Initial biopsy of the tonsil demonstrated high-grade dysplasia.  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  A radical tonsillectomy was  then performed based on the preoperative discussion with the patient.  She says that since this morning she has had significant difficulty with her swallowing.  She has only been able to get down 5 mils of her oxycodone.  She feels like every time she swallows that it is going down into her airway.  She feels like she is dehydrated.      MEDICATIONS:     No current outpatient medications on file.       ALLERGIES:  No Known Allergies    HABITS/SOCIAL HISTORY:   Works as a .    She is  with no children.    She has no smoking history.  She has about 10 alcoholic beverages on weekends.  She has no chewing tobacco or vaping history.    Social History     Socioeconomic History     Marital status:      Spouse name: Not on file     Number of children: Not on file     Years of education: Not on file     Highest education level: Not on file   Occupational History     Not on file   Social Needs     Financial resource strain: Not on file     Food insecurity:     Worry: Not on file     Inability: Not on file     Transportation needs:     Medical: Not on file     Non-medical: Not on file   Tobacco Use     Smoking status: Never Smoker     Smokeless tobacco: Never Used   Substance and Sexual Activity     Alcohol use: Yes     Drug use: Never     Sexual activity: Not on file   Lifestyle     Physical activity:     Days per week: Not on file     Minutes per session: Not on file     Stress: Not on file   Relationships     Social connections:     Talks on phone: Not on file     Gets together: Not on file     Attends [employer] service: Not on file     Active member of club or organization: Not on file     Attends meetings of clubs or organizations: Not on file     Relationship status: Not on file     Intimate partner violence:     Fear of current or ex partner: Not on file     Emotionally abused: Not on file     Physically abused: Not on file     Forced sexual activity: Not on file   Other Topics Concern      Not on file   Social History Narrative     Not on file       PAST MEDICAL HISTORY:   Past Medical History:   Diagnosis Date     Squamous cell carcinoma of neck         PAST SURGICAL HISTORY:   Past Surgical History:   Procedure Laterality Date     LARYNGOSCOPY WITH BIOPSY(IES) N/A 10/24/2019    Procedure: Direct laryngscopy with biopsy;  Surgeon: Ruth Tello MD;  Location: [facility] OR     TONSILLECTOMY ADULT Left 10/24/2019    Procedure: Left Radical  tonsillectomy;  Surgeon: Ruth Tello MD;  Location:  OR       FAMILY HISTORY:    Family History   Problem Relation Age of Onset     Breast Cancer Mother      Bone Cancer Sister        REVIEW OF SYSTEMS:  12 point ROS was negative other than the symptoms noted above in the HPI.  Patient Supplied Answers to Review of Systems  [facility]NT ROS 10/25/2019   Constitutional Weight loss   Psychology Frequently feeling anxious   Ears, Nose, Throat Nasal congestion or drainage, Sore throat, Trouble swallowing   Musculoskeletal Neck pain   Endocrine Thirst         PHYSICAL EXAMINATION:   Wt 57.6 kg (127 lb)   LMP 10/01/2019   BMI 20.51 kg/m     Patient in no apparent distress, slightly pale  Mild dysarthria secondary to secretions, appears to be limiting tongue movement due to pain  No evidence of bleeding      PROCEDURES:   Flexible fiberoptic laryngoscopy with FEES: The flexible fiberoptic laryngoscope was passed through the right side of the nasal cavity back to the nasopharynx.  There was postoperative changes along the left base of tongue as expected.  There are thick secretions along the left base of tongue and within the vallecula.  The patient was given thin liquids as well as thickened liquids well scope was in place.  There was residuals that collected within the valleculae and along the area of surgery on the left base of tongue.  On initial swallow there was no penetration but the patient had evidence of penetration as the residual spilled over.  She had no  obvious aspiration.  There was slight improvement with head turn.  The thin liquids appeared to have fewer residuals than the thickened liquids.      RESULTS REVIEWED:       IMPRESSION AND PLAN:   Mira Cao is a 34-year-old woman with a likely T1N1 p16+ squamous cell carcinoma of the oropharynx.  She underwent a radical tonsillectomy yesterday based on frozen section pathology consistent with the left tonsil being the primary site of her disease.  We discussed staying overnight last night but she was insistent on going home.  We discussed NG tube placement at the time of surgery versus if she has issues.  At this point she is behind on her pain control.  We discussed that it probably reasonable to admit her for IV pain medicines and fluids with placement of an NG tube.  The alternative would be placement of NG tube and discharged home through the clinic without admission.  I am concerned though that she is dehydrated and I think it would be preferable to admit her.  She is amenable to this today.    We discussed that we would anticipate that her swallow should recover.  She is otherwise young and healthy.  There are multiple issues contributing to her swallowing, certainly there are the postsurgical changes.  Additionally she is having pain so she is not making an effortful swallow which is allowing residuals and secretions to collect along the left base of tongue.  The speech therapist gave her exercises to start using.  We will have her continue to do with sips of water as tolerated.  She also needs good oral care and I think salt and soda rinses would be beneficial with some gargle to help clear out and thin the secretions that are sitting in the back of her throat.      We briefly discussed that we reviewed her case at tumor conference today and the hope is pending the final pathology from the radical tonsillectomy that we could potentially proceed with a left neck dissection (with removal of her scar)  and try to avoid radiation.  There was an overall strong feeling at tumor conference today that we try to avoid radiation and chemotherapy given her young age and no risk of long-term side effects.  She understands that certainly pending the tonsil pathology or the neck pathology that this recommendation could change.  Given that she is feeling poorly today we will not plan on having an extensive conversation about the neck dissection today and will be revisit when she is feeling better.    Given the delay for bed availability in the hospital we will send her to the emergency department for IV fluids, IV pain meds, and NG-tube placement.    Thank you very much for the opportunity to participate in the care of your patient.      Ruth Tello MD, M.D.  Otolaryngology- Head & Neck Surgery      This note was dictated with voice recognition software and then edited. Please excuse any unintentional errors.           CC:  Ifeanyi Soliman MD  Department of Radiation Oncology  [facility]

## 2019-10-26 NOTE — PROGRESS NOTES
Otolaryngology Progress Note  October 26, 2019    S: No acute events overnight. Taking a lot of dilaudid and will plan to use tylenol and oxycodone prior to dilaudid. Tolerated 1/2 can of TF last evening.     O: /62   Pulse 78   Temp 96.7  F (35.9  C) (Oral)   Resp 16   Wt 57 kg (125 lb 9.6 oz)   LMP 10/01/2019   SpO2 98%   BMI 20.28 kg/m     General: Alert and oriented x 3, No acute distress   HEENT: EOMI. HB 1/6. No trismus. Healing tissue in upper tonsillar pole. NG in place.    Pulmonary: Breathing non-labored, no stridor, no accessory muscle use.    Intake/Output Summary (Last 24 hours) at 10/26/2019 1234  Last data filed at 10/25/2019 2259  Gross per 24 hour   Intake 438.33 ml   Output 250 ml   Net 188.33 ml     LABS:  ROUTINE IP LABS (Last four results)  BMP  Recent Labs   Lab 10/26/19  0630 10/25/19  2049    138   POTASSIUM 4.1 3.7   CHLORIDE 110* 109   SYDNI 8.2* 8.1*   CO2 25 25   BUN 7 9   CR 0.54 0.59   * 92     CBC  Recent Labs   Lab 10/26/19  0630   WBC 8.3   RBC 3.34*   HGB 10.4*   HCT 31.5*   MCV 94   MCH 31.1   MCHC 33.0   RDW 11.8        INRNo lab results found in last 7 days.    A/P: Mira Cao is a 34 year old female with a past medical history of tonsillectomy who was admitted on 10/25 for pain control and rehydration.     Neuro:  - Pain control: Scheduled tylenol. Oxycodone/dilaudid PRN     HEENT:S/p tonsillectomy. Exam stable, continue to monitor.  - Sodium bicarbonate swish and spit BID    Respiratory: Supplemental O2 PRN to keep sats >92%    CV/heme: RUDOLPH, hemodynamically stable    FEN/GI:  - Diet: TF regimen per nutrition. PLC ordered   - Bowel regimen: senna-docusate  - mIVF     : Voiding spontaneously, good UOP    Endo: RUODLPH    ID: Monitor for signs/symptoms of infection     PPX: SCDs, IS    Consults: Nutrition, PLC (ordered)    Dispo: pending improvement in pain and hydration status.     -- Patient and above plan discussed with Dr. Omer Sellers  MD Nolberto  Otolaryngology-Head & Neck Surgery PGY1  Please contact ENT with questions by dialing [phone] and entering job code [phone] when prompted.

## 2019-10-27 ENCOUNTER — HEALTH MAINTENANCE LETTER (OUTPATIENT)
Age: 34
End: 2019-10-27

## 2019-10-27 ENCOUNTER — APPOINTMENT (OUTPATIENT)
Dept: GENERAL RADIOLOGY | Facility: CLINIC | Age: 34
End: 2019-10-27
Payer: COMMERCIAL

## 2019-10-27 LAB
GLUCOSE BLDC GLUCOMTR-MCNC: 100 MG/DL (ref 70–99)
GLUCOSE BLDC GLUCOMTR-MCNC: 158 MG/DL (ref 70–99)
GLUCOSE BLDC GLUCOMTR-MCNC: 83 MG/DL (ref 70–99)

## 2019-10-27 PROCEDURE — 25000132 ZZH RX MED GY IP 250 OP 250 PS 637: Performed by: STUDENT IN AN ORGANIZED HEALTH CARE EDUCATION/TRAINING PROGRAM

## 2019-10-27 PROCEDURE — 74018 RADEX ABDOMEN 1 VIEW: CPT

## 2019-10-27 PROCEDURE — 25000125 ZZHC RX 250: Performed by: STUDENT IN AN ORGANIZED HEALTH CARE EDUCATION/TRAINING PROGRAM

## 2019-10-27 PROCEDURE — 25000128 H RX IP 250 OP 636: Performed by: STUDENT IN AN ORGANIZED HEALTH CARE EDUCATION/TRAINING PROGRAM

## 2019-10-27 PROCEDURE — 00000146 ZZHCL STATISTIC GLUCOSE BY METER IP

## 2019-10-27 PROCEDURE — 25000131 ZZH RX MED GY IP 250 OP 636 PS 637: Performed by: STUDENT IN AN ORGANIZED HEALTH CARE EDUCATION/TRAINING PROGRAM

## 2019-10-27 PROCEDURE — 12000001 ZZH R&B MED SURG/OB UMMC

## 2019-10-27 RX ORDER — POLYETHYLENE GLYCOL 3350 17 G/17G
17 POWDER, FOR SOLUTION ORAL DAILY
Status: DISCONTINUED | OUTPATIENT
Start: 2019-10-28 | End: 2019-10-28 | Stop reason: HOSPADM

## 2019-10-27 RX ORDER — ONDANSETRON 2 MG/ML
4-8 INJECTION INTRAMUSCULAR; INTRAVENOUS EVERY 6 HOURS PRN
Status: DISCONTINUED | OUTPATIENT
Start: 2019-10-27 | End: 2019-10-28 | Stop reason: HOSPADM

## 2019-10-27 RX ORDER — OXYCODONE HCL 5 MG/5 ML
5 SOLUTION, ORAL ORAL EVERY 6 HOURS PRN
Qty: 60 ML | Refills: 0 | Status: SHIPPED | OUTPATIENT
Start: 2019-10-27 | End: 2019-10-28

## 2019-10-27 RX ORDER — AMOXICILLIN 250 MG
2 CAPSULE ORAL 2 TIMES DAILY
Status: DISCONTINUED | OUTPATIENT
Start: 2019-10-28 | End: 2019-10-28 | Stop reason: HOSPADM

## 2019-10-27 RX ORDER — OXYCODONE HCL 5 MG/5 ML
5-10 SOLUTION, ORAL ORAL EVERY 4 HOURS PRN
Qty: 200 ML | Refills: 0 | Status: SHIPPED | OUTPATIENT
Start: 2019-10-27 | End: 2019-11-06

## 2019-10-27 RX ORDER — ONDANSETRON HYDROCHLORIDE 4 MG/5ML
4 SOLUTION ORAL 2 TIMES DAILY PRN
Qty: 50 ML | Refills: 0 | Status: ON HOLD | OUTPATIENT
Start: 2019-10-27 | End: 2019-11-21

## 2019-10-27 RX ORDER — ONDANSETRON 4 MG/1
4 TABLET, ORALLY DISINTEGRATING ORAL EVERY 6 HOURS PRN
Status: DISCONTINUED | OUTPATIENT
Start: 2019-10-27 | End: 2019-10-28 | Stop reason: HOSPADM

## 2019-10-27 RX ORDER — BISACODYL 10 MG
10 SUPPOSITORY, RECTAL RECTAL DAILY PRN
Status: DISCONTINUED | OUTPATIENT
Start: 2019-10-27 | End: 2019-10-28 | Stop reason: HOSPADM

## 2019-10-27 RX ORDER — CAFFEINE 200 MG
200 TABLET ORAL ONCE
Status: COMPLETED | OUTPATIENT
Start: 2019-10-27 | End: 2019-10-27

## 2019-10-27 RX ORDER — POLYETHYLENE GLYCOL 3350 17 G/17G
17 POWDER, FOR SOLUTION ORAL DAILY
Status: DISCONTINUED | OUTPATIENT
Start: 2019-10-27 | End: 2019-10-27

## 2019-10-27 RX ORDER — ONDANSETRON HYDROCHLORIDE 4 MG/5ML
4 SOLUTION ORAL EVERY 6 HOURS PRN
Status: DISCONTINUED | OUTPATIENT
Start: 2019-10-27 | End: 2019-10-27

## 2019-10-27 RX ORDER — ONDANSETRON HYDROCHLORIDE 4 MG/5ML
4 SOLUTION ORAL EVERY 6 HOURS PRN
Status: DISCONTINUED | OUTPATIENT
Start: 2019-10-27 | End: 2019-10-28 | Stop reason: HOSPADM

## 2019-10-27 RX ORDER — PREDNISONE 5 MG/ML
20 SOLUTION ORAL ONCE
Status: COMPLETED | OUTPATIENT
Start: 2019-10-27 | End: 2019-10-27

## 2019-10-27 RX ORDER — AMOXICILLIN 250 MG
1 CAPSULE ORAL 2 TIMES DAILY
Status: DISCONTINUED | OUTPATIENT
Start: 2019-10-28 | End: 2019-10-28 | Stop reason: HOSPADM

## 2019-10-27 RX ORDER — PREDNISONE 20 MG/1
20 TABLET ORAL DAILY
Qty: 4 TABLET | Refills: 0 | Status: SHIPPED | OUTPATIENT
Start: 2019-10-27 | End: 2019-10-28

## 2019-10-27 RX ADMIN — PREDNISONE 20 MG: 5 SOLUTION ORAL at 10:41

## 2019-10-27 RX ADMIN — OXYCODONE HYDROCHLORIDE 10 MG: 5 SOLUTION ORAL at 14:44

## 2019-10-27 RX ADMIN — ACETAMINOPHEN 650 MG: 325 SOLUTION ORAL at 02:00

## 2019-10-27 RX ADMIN — OXYCODONE HYDROCHLORIDE 10 MG: 5 SOLUTION ORAL at 06:26

## 2019-10-27 RX ADMIN — OXYCODONE HYDROCHLORIDE 10 MG: 5 SOLUTION ORAL at 10:37

## 2019-10-27 RX ADMIN — ONDANSETRON HYDROCHLORIDE 4 MG: 2 INJECTION, SOLUTION INTRAMUSCULAR; INTRAVENOUS at 14:16

## 2019-10-27 RX ADMIN — SODIUM CHLORIDE 10 ML: 900 IRRIGANT IRRIGATION at 04:26

## 2019-10-27 RX ADMIN — OXYCODONE HYDROCHLORIDE 10 MG: 5 SOLUTION ORAL at 19:10

## 2019-10-27 RX ADMIN — SENNOSIDES AND DOCUSATE SODIUM 2 TABLET: 8.6; 5 TABLET ORAL at 08:41

## 2019-10-27 RX ADMIN — ACETAMINOPHEN 650 MG: 325 SOLUTION ORAL at 10:37

## 2019-10-27 RX ADMIN — ACETAMINOPHEN 650 MG: 325 SOLUTION ORAL at 19:10

## 2019-10-27 RX ADMIN — SODIUM BICARBONATE 650 MG TABLET 650 MG: at 19:10

## 2019-10-27 RX ADMIN — ACETAMINOPHEN 650 MG: 325 SOLUTION ORAL at 14:43

## 2019-10-27 RX ADMIN — ACETAMINOPHEN 650 MG: 325 SOLUTION ORAL at 22:37

## 2019-10-27 RX ADMIN — MULTIVITAMIN 15 ML: LIQUID ORAL at 08:41

## 2019-10-27 RX ADMIN — SENNOSIDES AND DOCUSATE SODIUM 2 TABLET: 8.6; 5 TABLET ORAL at 19:10

## 2019-10-27 RX ADMIN — ONDANSETRON HYDROCHLORIDE 4 MG: 2 INJECTION, SOLUTION INTRAMUSCULAR; INTRAVENOUS at 10:41

## 2019-10-27 RX ADMIN — SODIUM BICARBONATE 650 MG TABLET 650 MG: at 08:42

## 2019-10-27 RX ADMIN — SODIUM CHLORIDE 500 ML: 900 IRRIGANT IRRIGATION at 12:41

## 2019-10-27 RX ADMIN — Medication 0.2 MG: at 11:19

## 2019-10-27 RX ADMIN — Medication 0.2 MG: at 04:20

## 2019-10-27 RX ADMIN — OXYCODONE HYDROCHLORIDE 10 MG: 5 SOLUTION ORAL at 02:01

## 2019-10-27 RX ADMIN — ONDANSETRON HYDROCHLORIDE 4 MG: 2 INJECTION, SOLUTION INTRAMUSCULAR; INTRAVENOUS at 18:30

## 2019-10-27 RX ADMIN — ACETAMINOPHEN 650 MG: 325 SOLUTION ORAL at 06:26

## 2019-10-27 RX ADMIN — CAFFEINE 200 MG: 200 TABLET ORAL at 14:43

## 2019-10-27 ASSESSMENT — ACTIVITIES OF DAILY LIVING (ADL)
ADLS_ACUITY_SCORE: 11

## 2019-10-27 NOTE — DISCHARGE SUMMARY
Discharge Summary  Mira Cao  [id number]  1985    Date of Admission: 10/25/2019  Date of Discharge: 10/28/2019    Admission Diagnosis:   Post-tonsillectomy pain [G89.18, Z90.89]  Dehydration  Discharge Diagnosis: Same    Procedures: Bedside NG tube placement   Date: 10/25/2019    Pathology: Surgical Pathology From 10/24/2019 pending     HPI: Mira Cao is a 34 year old female with history of a likely T1N1 p16+ SCC of the oropharynx who is followed by Dr. Omer delgado at the [facility]. She underwent a left radical tonsillectomy on 10/24/19 and was discharged postoperatively at her preference. She was seen in clinic on 10/25/2019 due to difficulty swallowing. She was readmitted for poor PO intake due to postoperative pain.    It was recommended that she be admitted for pain control, fluid resuscitation, NG tube placement for tube feeds.     Hospital Course: The patient was admitted to the hospital and underwent the NG tube placement, was resuscitated with fluids, and improvement with pain control. She was able to do her swallowing exercises and take ice chips. She had no complications during her hospital course.   At discharge, the patient's pain was well controlled with medications via feeding tube, felt comfortable administering tube feeds with water flushes, appropriately fluid resuscitated, voiding and ambulating.     Discharge Exam:  Vitals:    10/26/19 1520 10/26/19 2323 10/27/19 0734 10/27/19 0800   BP: 110/63 102/68 109/69    BP Location: Right arm Left arm Right arm    Pulse:  77     Resp: 16 16 14    Temp: 97.4  F (36.3  C) 97.8  F (36.6  C) 97.4  F (36.3  C)    TempSrc: Oral Axillary Oral    SpO2: 97% 95% 96%    Weight:    58.7 kg (129 lb 4.8 oz)       General: A&O x 3, No acute distress  Voice improved  Able to swallow ice chips without coughing  HEENT: Healing tissue in left tonsillar bed with no evidence of bleeding, mild tenderness to palpation of superior neck, NG tube secured in  place no ala breakdown   Respiratory: Breathing non-labored on room air, no stridor, no accessory muscle use.      Mira Cao   Home Medication Instructions SHAYY:[id number]    Printed on:10/28/19 5407   Medication Information                      acetaminophen (TYLENOL) 160 MG/5ML suspension  Take 10-20.5 mLs (320-656 mg) by mouth every 6 hours as needed for fever or mild pain             acetaminophen (TYLENOL) 32 mg/mL liquid  20.3 mLs (650 mg) by Oral or NG Tube route every 4 hours for 15 days             biotin 1000 MCG TABS tablet  Take 1,000 mcg by mouth daily             caffeine (NO-DOZE) 200 MG TABS tablet  Take 1 tablet (200 mg) by mouth daily as needed (headache)             chlorhexidine (PERIDEX) 0.12 % solution  Swish and spit 15 mLs in mouth 2 times daily             ferrous sulfate (KP FERROUS SULFATE) 325 (65 Fe) MG tablet  Take 325 mg by mouth daily (with breakfast)             loratadine (CLARITIN) 10 MG tablet  Take 10 mg by mouth daily              LORazepam (ATIVAN) 0.5 MG tablet  Take 0.5-1 mg by mouth as needed              Milk Thistle-Dand-Fennel-Licor (MILK THISTLE XTRA) CAPS capsule  Take 1 capsule by mouth daily             Omega-3 Fatty Acids (OMEGA 3 PO)  Take 2 capsules by mouth daily             ondansetron (ZOFRAN) 4 MG/5ML solution  5 mLs (4 mg) by Oral or NG Tube route 2 times daily as needed for nausea or vomiting             order for DME  Patient will be going home with size 14 NG tube for decrease oral intake due to post-tonsillectomy pain.   She will need NG tube for 1-2 weeks as pain subsides and oral intake increases  Tube feeds: Isosource 1.5 4 cans/day             order for DME  Equipment being ordered: Nasogastric bolus tube feeding supplies  Formula: Isosource 1.5, 4 cans per day  Gravity feeding bags  60 mL syringes    Treatment Diagnosis: squamous cell carcinoma of the tonsil             oxyCODONE (ROXICODONE) 5 MG/5ML solution  Take 5-10 mLs (5-10 mg) by  mouth every 4 hours as needed for pain             predniSONE (DELTASONE) 20 MG tablet  Take 1 tablet (20 mg) by mouth daily             senna-docusate (SENOKOT-S/PERICOLACE) 8.6-50 MG tablet  Take 1-2 tablets by mouth 2 times daily                 Discharge Procedure Orders   Home infusion referral   Referral Priority: Routine   Number of Visits Requested: 1     Reason for your hospital stay   Order Comments: Post tonsillectomy pain resulting in poor oral intake and dehydration.     Adult [facility] Follow-up and recommended labs and tests   Order Comments: Dr. Ruth Tello 11/1/2019 1240PM     Appointments on [place] and/or [facility] (with [facility] or [facility] provider or service). Call [phone] if you haven't heard regarding these appointments within 7 days of discharge.     Activity   Order Comments: Your activity upon discharge: no heavy lifting > 10 pounds for 2 weeks     Order Specific Question Answer Comments   Is discharge order? Yes      When to contact your care team   Order Comments: Increased postoperative pain and bleeding from surgical tonsil site     Supplies   Order Comments: List the supplies the pt needs to go home:  Provided by [facility]  Isosource 1.5  Gravity bags  IV pole  60 ml syringes     Full Code     Order Specific Question Answer Comments   Code status determined by: Discussion with patient/legal decision maker      Diet   Order Comments: Soft diet by mouth as tolerated, no hard or crunchy foods.   NG tube feeding for postoperative tonsillectomy pain and decrease oral intake  Isosource 1.5 4 cans/day. Give 1 can 4 times daily, separate feedings by 3-4 hours. Flush tube with 90 mL water before and after each feeding. Flush tube with 30 mL water before and after medications.     Order Specific Question Answer Comments   Is discharge order? Yes      DME:  Home with NG tube size 14 for poor oral intake s/p tonsillectomy 10/24/2019  NG to remain in place 1-2 weeks while patient  recovers for pain  Tube feeds: Isosource 1.5 4 cans/day       Dispo: To home in good condition. All of the patient's questions/concerns have been addressed at this time.     Christine Alexis PA-C  Otolaryngology-Head & Neck Surgery  Please contact ENT by dialing [phone] and entering job code [phone].      Teaching statement:  I independently rounded on the patient prior to discharge on 10/28/2019. I have edited the PA note to reflect my findings and exam. Patient says that her pain control is improved. She is able to do her swallowing exercises. She is now able to take some ice chips though still feels like she has mucus in the back of her throat, though more thin and easier to swallow. She is getting significant benefit from the saline rinses. She is able to do the tube feeds independently. She is comfortable with discharging to home. She will follow-up in clinic as scheduled and will be reassessed by SLP team in clinic. Considerable time spent providing reassurance to patient.    Ruth Tello MD    Department of Otolaryngology

## 2019-10-27 NOTE — PLAN OF CARE
Status: POD#3 s/p Left radical tonsillectomy.  Re-admitted yesterday d/t inability to tolerate PO, & postoperative pain.    Vitals: VSS on RA   Neuros: Intact  IV: L PIV SL   Resp: LS clear   Diet: Clears, tolerating ice chips. TF goal of 4 cans/day, pt received 3 cans yesterday & tolerated well. 90mL flushed before & after feeding. Provide fluids prn to re-hydrate pt.  Bowel status: LB 10/24  : Voiding spontaneously  Skin: intact. UTV surgery incision  Pain: For c/o of constant throat pain, administered scheduled Tylenol x2, PRN 10mg oxy x2, & PRN IV dilauded x1   Activity: Up ad doroteo  Social:  at bedside, supportive     Plan: Continue with pain management & re-hydration. Continue with current POC.

## 2019-10-27 NOTE — PLAN OF CARE
Vitals: VSS on RA  Neuros: Alert and oriented x4.   IV: SLd  Resp/trach: WDL  Diet: Isosource TF-4 cans per day- unable to tolerate TF this shift. Emesis x1, prn Zofran given. Clear liquids-no intake noted.  Bowel status: BS+x4. Nausea this shift with one emesis. Zofran effective.   : Voiding  Skin: Intact per pt, refused skin assessment.  Pain: Utilizing prn oxycodone and dilaudid for pain. Tylenol scheduled.  Activity: Up independently, steady. Education to patient, spouse, and patient's mother on administering TF and medications via NG. Return demo by patient, spouse, and mother today.   Social: Spouse, mother and sister at bedside this shift  Plan: Pain control and hydration as able. Monitor nausea.

## 2019-10-27 NOTE — PROGRESS NOTES
Otolaryngology Progress Note       October 27, 2019      S: No acute events overnight. Rounded on her this morning and patient endorses increase in pain. Nursing report around  11AM that patient vomited while giving oxy for pain relief. Checked in on patient states that Nausea resolved denies GI distress.      O: /69 (BP Location: Right arm)   Pulse 77   Temp 97.4  F (36.3  C) (Oral)   Resp 14   Wt 58.7 kg (129 lb 4.8 oz)   LMP 10/01/2019   SpO2 96%   BMI 20.88 kg/m     General: Alert and oriented x 3, No acute distress   HEENT:No trismus. Healing tissue in upper tonsillar pole. NG in place.    Pulmonary: Breathing non-labored, no stridor, no accessory muscle use.      Intake/Output Summary (Last 24 hours) at 10/27/2019 1308  Last data filed at 10/27/2019 1045  Gross per 24 hour   Intake 1706.67 ml   Output --   Net 1706.67 ml         LABS:  ROUTINE IP LABS (Last four results)  BMP  Recent Labs   Lab 10/26/19  0630 10/25/19  2049    138   POTASSIUM 4.1 3.7   CHLORIDE 110* 109   SYDNI 8.2* 8.1*   CO2 25 25   BUN 7 9   CR 0.54 0.59   * 92     CBC  Recent Labs   Lab 10/26/19  0630   WBC 8.3   RBC 3.34*   HGB 10.4*   HCT 31.5*   MCV 94   MCH 31.1   MCHC 33.0   RDW 11.8        INRNo lab results found in last 7 days.    A/P: Mira Cao is a 34 year old female with a past medical history of tonsillectomy who was admitted on 10/25 for pain control and rehydration.     Neuro:  - Pain control: Scheduled tylenol. Oxycodone/dilaudid PRN   - Increase pain likely due to postoperative swelling and healing process   - May try caffeine pill today for headache    HEENT:S/p tonsillectomy. Exam stable, continue to monitor.  - Sodium bicarbonate swish and spit BID    Respiratory: Supplemental O2 PRN to keep sats >92%    CV/heme: RUDOLPH, hemodynamically stable    FEN/GI:  - Nausea and vomitting today. Prn Zofran and change in TF regiment to 1/2 can     - KUB pending  - Diet: TF regimen per nutrition:  Isosource 1.5 4cans/day, 90ml FWF before and after   - PLC ordered  - Bowel regimen: senna-docusate, mirlax      : Voiding spontaneously, good UOP    Endo: RUDOLPH    ID: Monitor for signs/symptoms of infection     PPX: SCDs, IS    Consults: Nutrition, PLC (ordered)    Dispo: pending improvement in pain and hydration status.     -- Patient and above plan discussed with Dr. Omer Chow MD PGY-1  Otolaryngology-Head & Neck Surgery   Please contact ENT with questions by dialing [phone] and entering job code [phone] when prompted.

## 2019-10-27 NOTE — PROGRESS NOTES
I rounded on the patient on 10/27/2019.   She demonstrates improvement compared to admission. Pain control is improved, still having some discomfort and required 1 dose of IV dilaudid overnight.   She has been doing her swallowing exercises. She has been doing ice chips with minimal coughing.   She is having some headaches.  Secretions in throat are much better with saline gargles.   She was able to do an ice chip for me on exam this morning with no coughing.  Provided encouragement and reinforcement that this will continue to improve and she has already improved since time of admission.  Will try to do pain meds regularly rather than just waiting until she has pain.  Will try caffeine pill to help with headaches as she drinks coffee daily normally.   Explained that can do boost or ensure through tube if we cant get tube feeds arranged.  Once tolerating ice chips and water can work toward ensure/boost.  Discussed going home today if she feels up to it vs tomorrow. Will have resident team reassess this afternoon.    Ruth Tello MD    Department of Otolaryngology

## 2019-10-27 NOTE — PLAN OF CARE
Status: POD#2 s/p Left radical tonsillectomy.  Readmitted today d/t inability to tolerate PO, and postoperative pain.    Vitals: VSS   Neuros: Intact  IV: PIV SL   Resp/trach: LS clear   Diet: Clears diet. TF given x 1 can this shift, 3 total (4 cans goal)  Bowel status: LBM 10/24  : Voiding spontaneously  Skin: intact. UTV surgery incision  Pain: throat pain managed with tylenol and oxycodone   Activity:Up ad doroteo  Social:  at bedside.    Plan: Continue with pain management. Continue with current POC.

## 2019-10-28 ENCOUNTER — HOME INFUSION (PRE-WILLOW HOME INFUSION) (OUTPATIENT)
Dept: PHARMACY | Facility: CLINIC | Age: 34
End: 2019-10-28

## 2019-10-28 ENCOUNTER — TELEPHONE (OUTPATIENT)
Dept: OTOLARYNGOLOGY | Facility: CLINIC | Age: 34
End: 2019-10-28

## 2019-10-28 VITALS
WEIGHT: 129.3 LBS | HEART RATE: 90 BPM | DIASTOLIC BLOOD PRESSURE: 78 MMHG | TEMPERATURE: 96.8 F | SYSTOLIC BLOOD PRESSURE: 118 MMHG | OXYGEN SATURATION: 95 % | RESPIRATION RATE: 16 BRPM | BODY MASS INDEX: 20.88 KG/M2

## 2019-10-28 LAB
ANION GAP SERPL CALCULATED.3IONS-SCNC: 4 MMOL/L (ref 3–14)
BUN SERPL-MCNC: 6 MG/DL (ref 7–30)
CALCIUM SERPL-MCNC: 8.6 MG/DL (ref 8.5–10.1)
CHLORIDE SERPL-SCNC: 107 MMOL/L (ref 94–109)
CO2 SERPL-SCNC: 28 MMOL/L (ref 20–32)
CREAT SERPL-MCNC: 0.58 MG/DL (ref 0.52–1.04)
GFR SERPL CREATININE-BSD FRML MDRD: >90 ML/MIN/{1.73_M2}
GLUCOSE BLDC GLUCOMTR-MCNC: 86 MG/DL (ref 70–99)
GLUCOSE SERPL-MCNC: 100 MG/DL (ref 70–99)
POTASSIUM SERPL-SCNC: 3.3 MMOL/L (ref 3.4–5.3)
SODIUM SERPL-SCNC: 139 MMOL/L (ref 133–144)

## 2019-10-28 PROCEDURE — 25000128 H RX IP 250 OP 636: Performed by: STUDENT IN AN ORGANIZED HEALTH CARE EDUCATION/TRAINING PROGRAM

## 2019-10-28 PROCEDURE — 00000146 ZZHCL STATISTIC GLUCOSE BY METER IP

## 2019-10-28 PROCEDURE — 25000132 ZZH RX MED GY IP 250 OP 250 PS 637: Performed by: STUDENT IN AN ORGANIZED HEALTH CARE EDUCATION/TRAINING PROGRAM

## 2019-10-28 PROCEDURE — 25000125 ZZHC RX 250: Performed by: STUDENT IN AN ORGANIZED HEALTH CARE EDUCATION/TRAINING PROGRAM

## 2019-10-28 PROCEDURE — 80048 BASIC METABOLIC PNL TOTAL CA: CPT | Performed by: PHYSICIAN ASSISTANT

## 2019-10-28 PROCEDURE — 25000131 ZZH RX MED GY IP 250 OP 636 PS 637: Performed by: STUDENT IN AN ORGANIZED HEALTH CARE EDUCATION/TRAINING PROGRAM

## 2019-10-28 PROCEDURE — 36415 COLL VENOUS BLD VENIPUNCTURE: CPT | Performed by: PHYSICIAN ASSISTANT

## 2019-10-28 RX ORDER — PREDNISONE 5 MG/ML
20 SOLUTION ORAL ONCE
Status: COMPLETED | OUTPATIENT
Start: 2019-10-28 | End: 2019-10-28

## 2019-10-28 RX ORDER — CAFFEINE 200 MG
200 TABLET ORAL DAILY PRN
Qty: 7 TABLET | Refills: 0 | Status: SHIPPED | OUTPATIENT
Start: 2019-10-28 | End: 2020-05-15

## 2019-10-28 RX ORDER — PREDNISONE 20 MG/1
20 TABLET ORAL DAILY
Qty: 1 TABLET | Refills: 0 | Status: SHIPPED | OUTPATIENT
Start: 2019-10-28 | End: 2019-11-18

## 2019-10-28 RX ADMIN — ONDANSETRON HYDROCHLORIDE 4 MG: 2 INJECTION, SOLUTION INTRAMUSCULAR; INTRAVENOUS at 07:56

## 2019-10-28 RX ADMIN — ACETAMINOPHEN 650 MG: 325 SOLUTION ORAL at 02:59

## 2019-10-28 RX ADMIN — POLYETHYLENE GLYCOL 3350 17 G: 17 POWDER, FOR SOLUTION ORAL at 07:55

## 2019-10-28 RX ADMIN — OXYCODONE HYDROCHLORIDE 5 MG: 5 SOLUTION ORAL at 04:25

## 2019-10-28 RX ADMIN — SODIUM BICARBONATE 650 MG TABLET 650 MG: at 07:55

## 2019-10-28 RX ADMIN — SODIUM CHLORIDE 10 ML: 900 IRRIGANT IRRIGATION at 00:11

## 2019-10-28 RX ADMIN — OXYCODONE HYDROCHLORIDE 5 MG: 5 SOLUTION ORAL at 00:05

## 2019-10-28 RX ADMIN — OXYCODONE HYDROCHLORIDE 10 MG: 5 SOLUTION ORAL at 07:55

## 2019-10-28 RX ADMIN — ACETAMINOPHEN 650 MG: 325 SOLUTION ORAL at 07:55

## 2019-10-28 RX ADMIN — SENNOSIDES AND DOCUSATE SODIUM 2 TABLET: 8.6; 5 TABLET ORAL at 07:55

## 2019-10-28 RX ADMIN — OXYCODONE HYDROCHLORIDE 5 MG: 5 SOLUTION ORAL at 12:06

## 2019-10-28 RX ADMIN — ACETAMINOPHEN 650 MG: 325 SOLUTION ORAL at 12:07

## 2019-10-28 RX ADMIN — MULTIVITAMIN 15 ML: LIQUID ORAL at 07:56

## 2019-10-28 RX ADMIN — PREDNISONE 20 MG: 5 SOLUTION ORAL at 10:19

## 2019-10-28 RX ADMIN — ONDANSETRON HYDROCHLORIDE 4 MG: 2 INJECTION, SOLUTION INTRAMUSCULAR; INTRAVENOUS at 04:25

## 2019-10-28 ASSESSMENT — ACTIVITIES OF DAILY LIVING (ADL)
ADLS_ACUITY_SCORE: 11

## 2019-10-28 NOTE — PROGRESS NOTES
Home Infusion  Mira is expected to dc today and will be going home on enteral therapy; Isosource 1.5, 4 cans daily via NG.  Mira has not done home enteral therapy before however she has had teaching by nursing staff.  Met with patient and family at bedside and provided them with information about [facility] services.  Explained about gravity and syringe bolus administration methods, supplies and delivery of supplies, and 24/7 availability of I staff while on enteral therapy.     Patient verbalized understanding of all information given.   She is willing and able to learn and manage home enteral therapy.  Questions answered.    NAKITA Mi  [facility] Nurse Liaison   [email or web address]  Cell: [phone] M, W-F  [facility] Main: [phone]

## 2019-10-28 NOTE — PLAN OF CARE
Status: POD#4 s/p Left radical tonsillectomy.  Readmitted(10/25) d/t inability to tolerate PO, and postoperative pain.    Vitals: VSS on RA  Neuros: Intact  IV: PIV SL   Resp: LS clear   Diet: Clears diet, tolerating ice chips. TF goal of 4 cans per day via NG tube. Pt tolerated about one and half can yesterday per report. Zofran x1 this shift. pt able to give fluid through NG tube independently.  Bowel status: No BM  : Voiding spontaneously  Skin: intact. UTV surgery incision  Pain: throat pain managed with scheduled tylenol & prn 5mg oxycodone x2   Activity: Up ad doroteo  Social:  at bedside.    Plan: Continue with pain management & rehydration. Continue with current POC.

## 2019-10-28 NOTE — PROGRESS NOTES
Care Coordinator Progress Note    Admission Date/Time:  10/25/2019  Attending MD:  Dr Ruth Tello    Data  Chart reviewed, discussed with interdisciplinary team. In 6A Discharge Rounds it was reported pt was readmitted for pain control and poor oral intake. Pt s/p radical tonsillectomy on 10-24. CARMELA Genao, reported pt's nausea is better, plan is discharge today on tube feeding.     Patient was admitted for:  Inability to tolerate PO and uncontrolled post-op pain s/p left radical tonsillectomy.  Past history includes:  SCC of the oropharynx    Concerns with insurance coverage for discharge needs: None. Pt's insurance is [employer].   Current Living Situation: Patient lives with spouse.  Support System: Supportive  Services Involved: Home Infusion. Referral made to [facility] for tube feeding & supplies.   Transportation at Discharge: Family or friend will provide  Transportation to Medical Appointments:   - Name of caregiver:   Barriers to Discharge: None    Coordination of Care and Referrals  Introduced myself to pt and her  and explained I help with discharge planning. Pt alert, sitting up in bed. Has an NG tube. Pt reports she was instructed on tube feeding administration and feels able to do it.   Offered choice of providers for tube feeding. Pt did not have a preference.   Confirmed pt's home address and phone number.   Pt said she has an appt in [facility] on 11-01 but isn't sure if feeding tube will be removed at that time.   Referral made to [facility] for home tube feeding. Received a call back pt has 100% coverage for home tube feeding.      Assessment  Pt needing home tube feeding. Has received instruction and is ready for discharge. Supportive .      Plan  Anticipated Discharge Date:  Today  Anticipated Discharge Plan:   Discharge home  [facility] to provide home tube feeding and supplies.       Vania Galo RN Care Coordinator  [address], [facility]  Phone: [phone]

## 2019-10-28 NOTE — TELEPHONE ENCOUNTER
Called Lorraine to inform her that surgery was schedled for Nov 20th with Dr. Tello in the [facility] OR. Patient is coming to see Dr. tello on Friday and she will talk to her more about surgery on Friday when she is here at her clinic appointment. Patient did have her pre-op H&P outside the thirty days and understands she will need another one. No packet is needed, she will be given instructions at clinic visit.    Was sent to Kathrin for prior auth on 10/28.

## 2019-10-28 NOTE — PLAN OF CARE
Status: Pt POD #4 Tonsillectomy.  Vitals: VSS on RA.  Neuros: A&Ox4. Denies N/T. 5/5 all extremities.  IV: PIV SL. Removed per discharge protocol.  Resp/trach: Stable on RA. LS clear all fields.  Diet: Clear liquids and TF. Goal of 4 cans/day. PT tolerated 2 cans this shift before discharge.  Bowel status: No BM this shift. BS active all quads. Bowel meds given.  : Voids spontaneously.  Skin: NG tube in place. Skin intact.  Pain: Pt c/o pain in throat. Scheduled Tylenol and PRN Oxy given x 2. PRN IV Zofran given x 1 this am for nausea.  Activity: Up IND in room.  Social:  at bedside, involved and supportive with cares.  Plan: PLC class set up for 1030, pt refused stating she felt comfortable with NG tube cares and so did  and mother. RN performed and observed patient doing own NG tube cares. RN completed discharge education and instructions. Discharge orders placed at 1200, pt discharged off unit at 1300 via wheelchair escort from  with belongings. Pt mother driving them home.

## 2019-10-28 NOTE — PLAN OF CARE
Status: POD#3 s/p Left radical tonsillectomy.  Readmitted d/t inability to tolerate PO, and postoperative pain.    Vitals: VSS   Neuros: Intact  IV: PIV SL   Resp/trach: LS clear   Diet: Clears diet. TF given x 1 can this shift. Pt has been c/o nausea throughout day, gave Zofran x1  Bowel status: No BM  : Voiding spontaneously  Skin: intact. UTV surgery incision  Pain: throat pain managed with tylenol and oxycodone   Activity:Up ad doroteo  Social:  at bedside.    Plan: Continue with pain management. Continue with current POC.

## 2019-10-30 NOTE — PROGRESS NOTES
This is a recent snapshot of the patient's [facility] medical record.  For current drug dose and complete information and questions, call [phone] or In Basket pool, [facility] home infusion ([id number])  CSN Number:  [id number]

## 2019-10-31 LAB — COPATH REPORT: NORMAL

## 2019-11-01 ENCOUNTER — THERAPY VISIT (OUTPATIENT)
Dept: SPEECH THERAPY | Facility: CLINIC | Age: 34
End: 2019-11-01
Payer: COMMERCIAL

## 2019-11-01 ENCOUNTER — OFFICE VISIT (OUTPATIENT)
Dept: OTOLARYNGOLOGY | Facility: CLINIC | Age: 34
End: 2019-11-01
Payer: COMMERCIAL

## 2019-11-01 VITALS
BODY MASS INDEX: 20.44 KG/M2 | HEIGHT: 66 IN | OXYGEN SATURATION: 96 % | HEART RATE: 87 BPM | WEIGHT: 127.2 LBS | TEMPERATURE: 98.8 F | RESPIRATION RATE: 15 BRPM

## 2019-11-01 DIAGNOSIS — C10.9 SQUAMOUS CELL CARCINOMA OF OROPHARYNX (H): Primary | ICD-10-CM

## 2019-11-01 DIAGNOSIS — C44.42 SQUAMOUS CELL CARCINOMA OF NECK: ICD-10-CM

## 2019-11-01 DIAGNOSIS — R13.12 OROPHARYNGEAL DYSPHAGIA: Primary | ICD-10-CM

## 2019-11-01 ASSESSMENT — MIFFLIN-ST. JEOR: SCORE: 1288.48

## 2019-11-01 ASSESSMENT — PAIN SCALES - GENERAL: PAINLEVEL: MODERATE PAIN (5)

## 2019-11-01 NOTE — PROGRESS NOTES
Teaching Flowsheet - ENT   Relevant Diagnosis: SCC of the oropharynx  Teaching Topic: left neck dissection   Person(s) involved in teaching: patient, spouse and mother       Motivation Level:  Asks Questions:   Yes  Eager to Learn:   Yes  Cooperative:   Yes  Receptive (willing/able to accept information):   Yes  Comments: Reviewed pre-op H and P,  NPO prior to  surgery,  pre-op scrub (given Hibiclens)  Reviewed post-op  cares , activity and pain.     Patient demonstrates understanding of the following:  Reason for the appointment, diagnosis and treatment plan:   Yes  Knowledge of proper use of medications and conditions for which they are ordered (with special attention to potential side effects or drug interactions):  stop aspirin products 1 week before surgery Yes  Which situations necessitate calling provider and whom to contact:   Yes  Nutritional needs and diet plan:   Yes  Pain management techniques:   Yes  Patient instructed on hand hygiene:  Yes  How and/when to access community resources:   Yes     Infection Prevention:  Patient   demonstrates understanding of the following:  Surgical procedure site care taught Yes  Signs and symptoms of infection taught Yes  Wound care taught Yes  Instructional Materials Used/Given: pre- op booklet,verbal  Instruction.    Violetta Hurd, RN, BSN

## 2019-11-01 NOTE — PROGRESS NOTES
I had the pleasure of seeing Mira Cao in follow-up today at the [facility] Otolaryngology [facility].     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 The patient noticed a left neck mass in June. She thought this was related to a swollen lymph node due to stress as she had just bought a house.  She delayed evaluation until approximately July when she presented to urgent care.  She had an ultrasound obtained on 7/12/2019 which demonstrated a 3.7 x 2.3 x 1.3 cm mass in the left neck.  She then had a CT scan on 8/7/2019 which demonstrated a 1.8 x 1.4 x 3.8 cm partially necrotic/cystic level 2/3 neck mass, concerning for metastatic squamous cell carcinoma.  She was seen by Dr.Todd Cao and had an FNA performed on 8/8/2019.  The pathology from the FNA demonstrated atypical squamous proliferation which was p16-.  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  She elected for removal which was performed by Dr. Cao on 10/2/2019.  The excision was performed without a completion neck dissection.  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  She had a PET CT scan on 2019 locally which demonstrated postop changes without residual disease and no obvious primary malignancy.  On 10/10/2019 she saw Dr. Neely at [facility] for medical oncology consultation where possible chemotherapy was discussed.  She has met with Dr Melendez from radiation oncology at [facility].  She had a dental cleaning.  She was taken to the operating room on 10/24/2019 for direct laryngoscopy with biopsy.  Initial biopsy of the tonsil demonstrated high-grade dysplasia.  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  A radical tonsillectomy was then performed based on  the preoperative discussion with the patient. Final pathology demonstrated a T1 SCC of the tonsil with negative margins.      Interval history:  She comes in today for follow-up. She was seen in clinic 10/25/2019. She was admitted to the hospital for pain control and rehydration. She was discharged with NG tube in place. She says she is making slow progress. She is improved compared to her inpatient hospitalization. She is learning to adjust her expectations. She is doing the swallowing exercises and these are getting easier. She says she has coughing about 50% of the time with ice chips. She finds the narcotics are making her sleepy and is taking lower doses during the day. She still has to sleep upright. She thinks increasing her water flushes and the saline rinses help with the phlegm. She has not experienced any bleeding.        MEDICATIONS:     Current Outpatient Medications   Medication Sig Dispense Refill     acetaminophen (TYLENOL) 160 MG/5ML suspension Take 10-20.5 mLs (320-656 mg) by mouth every 6 hours as needed for fever or mild pain 473 mL 0     acetaminophen (TYLENOL) 32 mg/mL liquid 20.3 mLs (650 mg) by Oral or NG Tube route every 4 hours for 15 days 1827 mL 0     biotin 1000 MCG TABS tablet Take 1,000 mcg by mouth daily       caffeine (NO-DOZE) 200 MG TABS tablet Take 1 tablet (200 mg) by mouth daily as needed (headache) 7 tablet 0     chlorhexidine (PERIDEX) 0.12 % solution Swish and spit 15 mLs in mouth 2 times daily 118 mL 0     ferrous sulfate (KP FERROUS SULFATE) 325 (65 Fe) MG tablet Take 325 mg by mouth daily (with breakfast)       loratadine (CLARITIN) 10 MG tablet Take 10 mg by mouth daily        LORazepam (ATIVAN) 0.5 MG tablet Take 0.5-1 mg by mouth as needed        Milk Thistle-Dand-Fennel-Licor (MILK THISTLE XTRA) CAPS capsule Take 1 capsule by mouth daily       Omega-3 Fatty Acids (OMEGA 3 PO) Take 2 capsules by mouth daily       ondansetron (ZOFRAN) 4 MG/5ML solution 5 mLs (4 mg) by  Oral or NG Tube route 2 times daily as needed for nausea or vomiting 50 mL 0     order for DME Equipment being ordered: Nasogastric bolus tube feeding supplies  Formula: Isosource 1.5, 4 cans per day  Gravity feeding bags  60 mL syringes    Treatment Diagnosis: squamous cell carcinoma of the tonsil 7 days 0     order for DME Patient will be going home with size 14 NG tube for decrease oral intake due to post-tonsillectomy pain.   She will need NG tube for 1-2 weeks as pain subsides and oral intake increases  Tube feeds: Isosource 1.5 4 cans/day 1 Act 0     oxyCODONE (ROXICODONE) 5 MG/5ML solution Take 5-10 mLs (5-10 mg) by mouth every 4 hours as needed for pain 200 mL 0     predniSONE (DELTASONE) 20 MG tablet Take 1 tablet (20 mg) by mouth daily 1 tablet 0     senna-docusate (SENOKOT-S/PERICOLACE) 8.6-50 MG tablet Take 1-2 tablets by mouth 2 times daily 30 tablet 0       ALLERGIES:  No Known Allergies    HABITS/SOCIAL HISTORY:   Works as a .    She is  with no children.    She has no smoking history.  She has about 10 alcoholic beverages on weekends.  She has no chewing tobacco or vaping history.    Social History     Socioeconomic History     Marital status:      Spouse name: Not on file     Number of children: Not on file     Years of education: Not on file     Highest education level: Not on file   Occupational History     Not on file   Social Needs     Financial resource strain: Not on file     Food insecurity:     Worry: Not on file     Inability: Not on file     Transportation needs:     Medical: Not on file     Non-medical: Not on file   Tobacco Use     Smoking status: Never Smoker     Smokeless tobacco: Never Used   Substance and Sexual Activity     Alcohol use: Yes     Drug use: Never     Sexual activity: Not on file   Lifestyle     Physical activity:     Days per week: Not on file     Minutes per session: Not on file     Stress: Not on file   Relationships     Social  "connections:     Talks on phone: Not on file     Gets together: Not on file     Attends [employer] service: Not on file     Active member of club or organization: Not on file     Attends meetings of clubs or organizations: Not on file     Relationship status: Not on file     Intimate partner violence:     Fear of current or ex partner: Not on file     Emotionally abused: Not on file     Physically abused: Not on file     Forced sexual activity: Not on file   Other Topics Concern     Not on file   Social History Narrative     Not on file       PAST MEDICAL HISTORY:   Past Medical History:   Diagnosis Date     Squamous cell carcinoma of neck         PAST SURGICAL HISTORY:   Past Surgical History:   Procedure Laterality Date     LARYNGOSCOPY WITH BIOPSY(IES) N/A 10/24/2019    Procedure: Direct laryngscopy with biopsy;  Surgeon: Ruth Tello MD;  Location: [facility] OR     TONSILLECTOMY ADULT Left 10/24/2019    Procedure: Left Radical  tonsillectomy;  Surgeon: Ruth Tello MD;  Location: [facility] OR       FAMILY HISTORY:    Family History   Problem Relation Age of Onset     Breast Cancer Mother      Bone Cancer Sister        REVIEW OF SYSTEMS:  12 point ROS was negative other than the symptoms noted above in the HPI.  Patient Supplied Answers to Review of Systems  [facility] ROS 10/25/2019   Constitutional Weight loss   Psychology Frequently feeling anxious   Ears, Nose, Throat Nasal congestion or drainage, Sore throat, Trouble swallowing   Musculoskeletal Neck pain   Endocrine Thirst         PHYSICAL EXAMINATION:   Pulse 87   Temp 98.8  F (37.1  C) (Temporal)   Resp 15   Ht 1.676 m (5' 5.98\")   Wt 57.7 kg (127 lb 3.2 oz)   SpO2 96%   BMI 20.54 kg/m     Patient in no apparent distress, improved in appearance compared to last visit  Speech improved  NG tube in place  Left neck incision well healed  Rash over neck and upper chest      PROCEDURES:         RESULTS REVIEWED:     FINAL DIAGNOSIS:   A. Tonsil, left, biopsy: "   - HPV-associated oropharyngeal squamous cell carcinoma (see comment)     B. Tonsil, left #2, biopsy:   - HPV-associated oropharyngeal squamous cell carcinoma (see comment)     C. Tongue base, left, excision:   - Benign tonsillar tissue with no evidence of carcinoma     D. Tonsil, left, tonsillectomy:   - HPV-associated oropharyngeal squamous cell carcinoma (see comment)   - Tumor size: Cannot be determined   - Tumor thickness: 0.3 cm   - All margins are negative for carcinoma   - Distance of the tumor from closest margin is at least 2 mm (deep)   - See synoptic report for details   - See comment     E. Left additional base of tongue, resection:   - Unremarkable tonsillar and salivary gland tissue   - Negative for dysplasia or malignancy     F. Deep margin of pharynx, excision:   - Unremarkable fragments of fibroadipose tissue and skeletal muscle   - Negative for carcinoma     G. Pharyngeal wall mucosal margin, excision:   - Benign fibrous tissue, minor salivary glands and skeletal muscle   - Negative for carcinoma     H. Soft palate mucosal margin, excision:   - Benign squamous mucosa   - Negative for dysplasia or carcinoma     I. Lateral pharyngeal wall mucosal margin, excision:   - Benign squamous mucosa and fibroadipose tissue with mucous glands   - Negative for dysplasia or carcinoma     J. Completion radical tonsillectomy:   - Benign tonsillar tissue with minor salivary glands and skeletal muscle   - Negative for dysplasia or malignancy     COMMENT:   The tumor cells were positive for P16 in previous neck mass ([id number]).   Frozen section interpretations   correlate with final diagnoses.     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  She underwent a radical tonsillectomy which showed a small primary tumor in the tonsil which was completely resected.    She continues to recover from the surgery. She is tolerating her tube  feeds. We discussed expectations for recovery again. I would anticipate that her swallow will improve.    I discussed the plans for a neck dissection. She would have excision of her previous scar. We discussed the risks of surgery. I spent time discussing the risks of bleeding (with possible transfusion, return trip to the operating room), infection, scarring, lymphedema, marginal mandibular nerve weakness, spinal accessory nerve injury, hypoglossal nerve injury, phrenic nerve injury, vagus nerve injury, chyle leak, numbness of the incision/ear, cosmetic changes/concavity. She will be admitted postoperatively with a LISA drain. If the pathology has no adverse features the goal would be to avoid any further therapy. She is agreeable with proceeding with surgery as recommended.    She will return to clinic in about a week to meet with our speech team to reassess her swallowing.    Thank you very much for the opportunity to participate in the care of your patient.      Ruth Tello MD, M.D.  Otolaryngology- Head & Neck Surgery      This note was dictated with voice recognition software and then edited. Please excuse any unintentional errors.           CC:  Ifeanyi Soliman MD  Department of Radiation Oncology  [facility]

## 2019-11-01 NOTE — LETTER
11/1/2019       RE: Mira Cao  [address]  [place] [address]     Dear Colleague,    Thank you for referring your patient, Mira Cao, to the [facility] EAR NOSE AND THROAT at [facility]. Please see a copy of my visit note below.    I had the pleasure of seeing Mira Cao in follow-up today at the [facility].     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 The patient noticed a left neck mass in June. She thought this was related to a swollen lymph node due to stress as she had just bought a house.  She delayed evaluation until approximately July when she presented to urgent care.  She had an ultrasound obtained on 7/12/2019 which demonstrated a 3.7 x 2.3 x 1.3 cm mass in the left neck.  She then had a CT scan on 8/7/2019 which demonstrated a 1.8 x 1.4 x 3.8 cm partially necrotic/cystic level 2/3 neck mass, concerning for metastatic squamous cell carcinoma.  She was seen by Dr.Todd Cao and had an FNA performed on 8/8/2019.  The pathology from the FNA demonstrated atypical squamous proliferation which was p16-.  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  She elected for removal which was performed by Dr. Cao on 10/2/2019.  The excision was performed without a completion neck dissection.  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  She had a PET CT scan on 2019 locally which demonstrated postop changes without residual disease and no obvious primary malignancy.  On 10/10/2019 she saw Dr. Neely at [facility] [place], operated by [facility] for medical oncology consultation where possible chemotherapy was discussed.  She has met with Dr Melendez from radiation oncology at [facility] [place], operated by [facility].  She had a dental cleaning.  She was taken to the operating room  on 10/24/2019 for direct laryngoscopy with biopsy.  Initial biopsy of the tonsil demonstrated high-grade dysplasia.  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  A radical tonsillectomy was then performed based on the preoperative discussion with the patient. Final pathology demonstrated a T1 SCC of the tonsil with negative margins.      Interval history:  She comes in today for follow-up. She was seen in clinic 10/25/2019. She was admitted to the hospital for pain control and rehydration. She was discharged with NG tube in place. She says she is making slow progress. She is improved compared to her inpatient hospitalization. She is learning to adjust her expectations. She is doing the swallowing exercises and these are getting easier. She says she has coughing about 50% of the time with ice chips. She finds the narcotics are making her sleepy and is taking lower doses during the day. She still has to sleep upright. She thinks increasing her water flushes and the saline rinses help with the phlegm. She has not experienced any bleeding.        MEDICATIONS:     Current Outpatient Medications   Medication Sig Dispense Refill     acetaminophen (TYLENOL) 160 MG/5ML suspension Take 10-20.5 mLs (320-656 mg) by mouth every 6 hours as needed for fever or mild pain 473 mL 0     acetaminophen (TYLENOL) 32 mg/mL liquid 20.3 mLs (650 mg) by Oral or NG Tube route every 4 hours for 15 days 1827 mL 0     biotin 1000 MCG TABS tablet Take 1,000 mcg by mouth daily       caffeine (NO-DOZE) 200 MG TABS tablet Take 1 tablet (200 mg) by mouth daily as needed (headache) 7 tablet 0     chlorhexidine (PERIDEX) 0.12 % solution Swish and spit 15 mLs in mouth 2 times daily 118 mL 0     ferrous sulfate (KP FERROUS SULFATE) 325 (65 Fe) MG tablet Take 325 mg by mouth daily (with breakfast)       loratadine (CLARITIN) 10 MG tablet Take 10 mg by mouth daily        LORazepam  (ATIVAN) 0.5 MG tablet Take 0.5-1 mg by mouth as needed        Milk Thistle-Dand-Fennel-Licor (MILK THISTLE XTRA) CAPS capsule Take 1 capsule by mouth daily       Omega-3 Fatty Acids (OMEGA 3 PO) Take 2 capsules by mouth daily       ondansetron (ZOFRAN) 4 MG/5ML solution 5 mLs (4 mg) by Oral or NG Tube route 2 times daily as needed for nausea or vomiting 50 mL 0     order for DME Equipment being ordered: Nasogastric bolus tube feeding supplies  Formula: Isosource 1.5, 4 cans per day  Gravity feeding bags  60 mL syringes    Treatment Diagnosis: squamous cell carcinoma of the tonsil 7 days 0     order for DME Patient will be going home with size 14 NG tube for decrease oral intake due to post-tonsillectomy pain.   She will need NG tube for 1-2 weeks as pain subsides and oral intake increases  Tube feeds: Isosource 1.5 4 cans/day 1 Act 0     oxyCODONE (ROXICODONE) 5 MG/5ML solution Take 5-10 mLs (5-10 mg) by mouth every 4 hours as needed for pain 200 mL 0     predniSONE (DELTASONE) 20 MG tablet Take 1 tablet (20 mg) by mouth daily 1 tablet 0     senna-docusate (SENOKOT-S/PERICOLACE) 8.6-50 MG tablet Take 1-2 tablets by mouth 2 times daily 30 tablet 0       ALLERGIES:  No Known Allergies    HABITS/SOCIAL HISTORY:   Works as a .    She is  with no children.    She has no smoking history.  She has about 10 alcoholic beverages on weekends.  She has no chewing tobacco or vaping history.    Social History     Socioeconomic History     Marital status:      Spouse name: Not on file     Number of children: Not on file     Years of education: Not on file     Highest education level: Not on file   Occupational History     Not on file   Social Needs     Financial resource strain: Not on file     Food insecurity:     Worry: Not on file     Inability: Not on file     Transportation needs:     Medical: Not on file     Non-medical: Not on file   Tobacco Use     Smoking status: Never Smoker     Smokeless  "tobacco: Never Used   Substance and Sexual Activity     Alcohol use: Yes     Drug use: Never     Sexual activity: Not on file   Lifestyle     Physical activity:     Days per week: Not on file     Minutes per session: Not on file     Stress: Not on file   Relationships     Social connections:     Talks on phone: Not on file     Gets together: Not on file     Attends [employer] service: Not on file     Active member of club or organization: Not on file     Attends meetings of clubs or organizations: Not on file     Relationship status: Not on file     Intimate partner violence:     Fear of current or ex partner: Not on file     Emotionally abused: Not on file     Physically abused: Not on file     Forced sexual activity: Not on file   Other Topics Concern     Not on file   Social History Narrative     Not on file       PAST MEDICAL HISTORY:   Past Medical History:   Diagnosis Date     Squamous cell carcinoma of neck         PAST SURGICAL HISTORY:   Past Surgical History:   Procedure Laterality Date     LARYNGOSCOPY WITH BIOPSY(IES) N/A 10/24/2019    Procedure: Direct laryngscopy with biopsy;  Surgeon: Ruth Tello MD;  Location: [facility] OR     TONSILLECTOMY ADULT Left 10/24/2019    Procedure: Left Radical  tonsillectomy;  Surgeon: Ruth Tello MD;  Location: [facility] OR       FAMILY HISTORY:    Family History   Problem Relation Age of Onset     Breast Cancer Mother      Bone Cancer Sister        REVIEW OF SYSTEMS:  12 point ROS was negative other than the symptoms noted above in the HPI.  Patient Supplied Answers to Review of Systems  [facility] ROS 10/25/2019   Constitutional Weight loss   Psychology Frequently feeling anxious   Ears, Nose, Throat Nasal congestion or drainage, Sore throat, Trouble swallowing   Musculoskeletal Neck pain   Endocrine Thirst         PHYSICAL EXAMINATION:   Pulse 87   Temp 98.8  F (37.1  C) (Temporal)   Resp 15   Ht 1.676 m (5' 5.98\")   Wt 57.7 kg (127 lb 3.2 oz)   SpO2 96%   BMI " 20.54 kg/m      Patient in no apparent distress, improved in appearance compared to last visit  Speech improved  NG tube in place  Left neck incision well healed  Rash over neck and upper chest      PROCEDURES:         RESULTS REVIEWED:     FINAL DIAGNOSIS:   A. Tonsil, left, biopsy:   - HPV-associated oropharyngeal squamous cell carcinoma (see comment)     B. Tonsil, left #2, biopsy:   - HPV-associated oropharyngeal squamous cell carcinoma (see comment)     C. Tongue base, left, excision:   - Benign tonsillar tissue with no evidence of carcinoma     D. Tonsil, left, tonsillectomy:   - HPV-associated oropharyngeal squamous cell carcinoma (see comment)   - Tumor size: Cannot be determined   - Tumor thickness: 0.3 cm   - All margins are negative for carcinoma   - Distance of the tumor from closest margin is at least 2 mm (deep)   - See synoptic report for details   - See comment     E. Left additional base of tongue, resection:   - Unremarkable tonsillar and salivary gland tissue   - Negative for dysplasia or malignancy     F. Deep margin of pharynx, excision:   - Unremarkable fragments of fibroadipose tissue and skeletal muscle   - Negative for carcinoma     G. Pharyngeal wall mucosal margin, excision:   - Benign fibrous tissue, minor salivary glands and skeletal muscle   - Negative for carcinoma     H. Soft palate mucosal margin, excision:   - Benign squamous mucosa   - Negative for dysplasia or carcinoma     I. Lateral pharyngeal wall mucosal margin, excision:   - Benign squamous mucosa and fibroadipose tissue with mucous glands   - Negative for dysplasia or carcinoma     J. Completion radical tonsillectomy:   - Benign tonsillar tissue with minor salivary glands and skeletal muscle   - Negative for dysplasia or malignancy     COMMENT:   The tumor cells were positive for P16 in previous neck mass ([id number]).   Frozen section interpretations   correlate with final diagnoses.     IMPRESSION AND PLAN:   Mira  Nash is a 34-year-old woman with a likely T1N1 p16+ squamous cell carcinoma of the oropharynx.  She underwent a radical tonsillectomy which showed a small primary tumor in the tonsil which was completely resected.    She continues to recover from the surgery. She is tolerating her tube feeds. We discussed expectations for recovery again. I would anticipate that her swallow will improve.    I discussed the plans for a neck dissection. She would have excision of her previous scar. We discussed the risks of surgery. I spent time discussing the risks of bleeding (with possible transfusion, return trip to the operating room), infection, scarring, lymphedema, marginal mandibular nerve weakness, spinal accessory nerve injury, hypoglossal nerve injury, phrenic nerve injury, vagus nerve injury, chyle leak, numbness of the incision/ear, cosmetic changes/concavity. She will be admitted postoperatively with a LISA drain. If the pathology has no adverse features the goal would be to avoid any further therapy. She is agreeable with proceeding with surgery as recommended.    She will return to clinic in about a week to meet with our speech team to reassess her swallowing.    Thank you very much for the opportunity to participate in the care of your patient.      Ruth Tello MD, M.D.  Otolaryngology- Head & Neck Surgery      This note was dictated with voice recognition software and then edited. Please excuse any unintentional errors.           CC:  Ifeanyi Soliman MD  Department of Radiation Oncology  [facility]        Teaching Flowsheet - ENT   Relevant Diagnosis: SCC of the oropharynx  Teaching Topic: left neck dissection   Person(s) involved in teaching: patient, spouse and mother       Motivation Level:  Asks Questions:   Yes  Eager to Learn:   Yes  Cooperative:   Yes  Receptive (willing/able to accept information):   Yes  Comments: Reviewed pre-op H and P,  NPO prior to  surgery,  pre-op scrub (given  Hibiclens)  Reviewed post-op  cares , activity and pain.     Patient demonstrates understanding of the following:  Reason for the appointment, diagnosis and treatment plan:   Yes  Knowledge of proper use of medications and conditions for which they are ordered (with special attention to potential side effects or drug interactions):  stop aspirin products 1 week before surgery Yes  Which situations necessitate calling provider and whom to contact:   Yes  Nutritional needs and diet plan:   Yes  Pain management techniques:   Yes  Patient instructed on hand hygiene:  Yes  How and/when to access community resources:   Yes     Infection Prevention:  Patient   demonstrates understanding of the following:  Surgical procedure site care taught Yes  Signs and symptoms of infection taught Yes  Wound care taught Yes  Instructional Materials Used/Given: pre- op booklet,verbal  Instruction.    Violetta Hurd, RN, BSN      Again, thank you for allowing me to participate in the care of your patient.      Sincerely,    Ruth Tello MD

## 2019-11-01 NOTE — NURSING NOTE
"Chief Complaint   Patient presents with     Post-op Visit     Direct laryngscopy with biopsy,Left Radical  tonsillectomy, DOS 10/24     Pulse 87   Temp 98.8  F (37.1  C) (Temporal)   Resp 15   Ht 1.676 m (5' 5.98\")   Wt 57.7 kg (127 lb 3.2 oz)   SpO2 96%   BMI 20.54 kg/m      Diego Barron CMA    "

## 2019-11-04 ENCOUNTER — TELEPHONE (OUTPATIENT)
Dept: OTOLARYNGOLOGY | Facility: CLINIC | Age: 34
End: 2019-11-04

## 2019-11-04 NOTE — TELEPHONE ENCOUNTER
[facility] Call Center    Phone Message    May a detailed message be left on voicemail: yes    Reason for Call: Symptoms or Concerns     If patient has red-flag symptoms, warm transfer to triage line    Current symptom or concern: spitting up blood    Symptoms have been present for: 1- hour(s)    Has patient previously been seen for this? Yes    By : Dr. Tello      Are there any new or worsening symptoms? Yes: patients  called stating that patient woke up this am spitting up blood patient is now at the ER and continues to spit up blood, Patients  is requesting a return call to discuss thank you.       Action Taken: Message routed to:  [facility] ([facility] ENT

## 2019-11-04 NOTE — TELEPHONE ENCOUNTER
Call placed to patient's  to check in. Ottoniel states that they went to the local ER this morning after patient woke up and spit up blood. Spouse drove her to [facility] where they cauterized some of the bleeding without complications (per spouse). Ottoniel states they are going to keep patient overnight for evaluation. They will contact writer with updates.    Update on patient given to Dr. Tello.     Violetta Hurd, RN, BSN

## 2019-11-06 ENCOUNTER — HOME INFUSION (PRE-WILLOW HOME INFUSION) (OUTPATIENT)
Dept: PHARMACY | Facility: CLINIC | Age: 34
End: 2019-11-06

## 2019-11-06 ENCOUNTER — PATIENT OUTREACH (OUTPATIENT)
Dept: OTOLARYNGOLOGY | Facility: CLINIC | Age: 34
End: 2019-11-06

## 2019-11-06 DIAGNOSIS — Z90.89 S/P TONSILLECTOMY: ICD-10-CM

## 2019-11-06 RX ORDER — OXYCODONE HCL 5 MG/5 ML
5-10 SOLUTION, ORAL ORAL EVERY 4 HOURS PRN
Qty: 200 ML | Refills: 0 | Status: SHIPPED | OUTPATIENT
Start: 2019-11-06 | End: 2019-11-06

## 2019-11-06 RX ORDER — OXYCODONE HCL 5 MG/5 ML
5-10 SOLUTION, ORAL ORAL EVERY 4 HOURS PRN
Qty: 200 ML | Refills: 0 | Status: ON HOLD | OUTPATIENT
Start: 2019-11-06 | End: 2019-11-20

## 2019-11-06 NOTE — PROGRESS NOTES
Received a call from patient's spouse requesting refill on oxycodone as she will be out of the medication in the next day or so. Dr. Tello sent refill to patient's pharmacy.    Patient's spouse was updated and they were encouraged to call with further questions or concerns.     Violetta Hurd RN, BSN

## 2019-11-07 NOTE — PROGRESS NOTES
This is a recent snapshot of the patient's [facility] medical record.  For current drug dose and complete information and questions, call [phone] or In B[facility] pool, [facility]v home infusion ([id number])  CSN Number:  [id number]

## 2019-11-08 ENCOUNTER — THERAPY VISIT (OUTPATIENT)
Dept: SPEECH THERAPY | Facility: CLINIC | Age: 34
End: 2019-11-08
Payer: COMMERCIAL

## 2019-11-08 DIAGNOSIS — C44.42 SQUAMOUS CELL CARCINOMA OF NECK: ICD-10-CM

## 2019-11-08 DIAGNOSIS — R13.12 OROPHARYNGEAL DYSPHAGIA: Primary | ICD-10-CM

## 2019-11-13 ENCOUNTER — THERAPY VISIT (OUTPATIENT)
Dept: SPEECH THERAPY | Facility: CLINIC | Age: 34
End: 2019-11-13
Payer: COMMERCIAL

## 2019-11-13 DIAGNOSIS — C44.42 SQUAMOUS CELL CARCINOMA OF NECK: ICD-10-CM

## 2019-11-13 DIAGNOSIS — R13.12 OROPHARYNGEAL DYSPHAGIA: Primary | ICD-10-CM

## 2019-11-14 ENCOUNTER — PRE VISIT (OUTPATIENT)
Dept: ONCOLOGY | Facility: CLINIC | Age: 34
End: 2019-11-14

## 2019-11-14 ENCOUNTER — HOME INFUSION (PRE-WILLOW HOME INFUSION) (OUTPATIENT)
Dept: PHARMACY | Facility: CLINIC | Age: 34
End: 2019-11-14

## 2019-11-18 NOTE — PROGRESS NOTES
This is a recent snapshot of the patient's [facility] medical record.  For current drug dose and complete information and questions, call [phone] or In [facility] pool, fv home infusion ([id number])  CSN Number:  [id number]

## 2019-11-19 ENCOUNTER — THERAPY VISIT (OUTPATIENT)
Dept: SPEECH THERAPY | Facility: CLINIC | Age: 34
End: 2019-11-19
Payer: COMMERCIAL

## 2019-11-19 ENCOUNTER — ANESTHESIA EVENT (OUTPATIENT)
Dept: SURGERY | Facility: CLINIC | Age: 34
End: 2019-11-19
Payer: COMMERCIAL

## 2019-11-19 DIAGNOSIS — C44.42 SQUAMOUS CELL CARCINOMA OF NECK: ICD-10-CM

## 2019-11-19 DIAGNOSIS — R13.12 OROPHARYNGEAL DYSPHAGIA: Primary | ICD-10-CM

## 2019-11-19 NOTE — ANESTHESIA PREPROCEDURE EVALUATION
Anesthesia Pre-Procedure Evaluation    Patient: Mira Cao   MRN:     [id number] Gender:   female   Age:    34 year old :      1985        Preoperative Diagnosis: Squamous cell carcinoma of oropharynx (H) [C10.9]   Procedure(s):  DISSECTION, NECK, MODIFIED RADICAL LEFT     Past Medical History:   Diagnosis Date     Squamous cell carcinoma of neck       Past Surgical History:   Procedure Laterality Date     LARYNGOSCOPY WITH BIOPSY(IES) N/A 10/24/2019    Procedure: Direct laryngscopy with biopsy;  Surgeon: Ruth Tello MD;  Location: [facility] OR     TONSILLECTOMY ADULT Left 10/24/2019    Procedure: Left Radical  tonsillectomy;  Surgeon: Ruth Tello MD;  Location: [facility] OR          Anesthesia Evaluation     . Pt has had prior anesthetic. Type: General    No history of anesthetic complications          ROS/MED HX    ENT/Pulmonary:  - neg pulmonary ROS     Neurologic:  - neg neurologic ROS     Cardiovascular:  - neg cardiovascular ROS       METS/Exercise Tolerance:     Hematologic:  - neg hematologic  ROS       Musculoskeletal:  - neg musculoskeletal ROS       GI/Hepatic:  - neg GI/hepatic ROS       Renal/Genitourinary:  - ROS Renal section negative       Endo:  - neg endo ROS       Psychiatric:  - neg psychiatric ROS       Infectious Disease:  - neg infectious disease ROS       Malignancy:   (+) Malignancy (SCC of neck)           Other: Comment: Taking oxycodone since previous surgery                    JJESUSG [facility] AN PHYSICAL EXAM    LABS:  CBC:   Lab Results   Component Value Date    WBC 8.3 10/26/2019    HGB 10.4 (L) 10/26/2019    HCT 31.5 (L) 10/26/2019     10/26/2019     BMP:   Lab Results   Component Value Date     10/28/2019     10/26/2019    POTASSIUM 3.3 (L) 10/28/2019    POTASSIUM 4.1 10/26/2019    CHLORIDE 107 10/28/2019    CHLORIDE 110 (H) 10/26/2019    CO2 28 10/28/2019    CO2 25 10/26/2019    BUN 6 (L) 10/28/2019    BUN 7 10/26/2019    CR 0.58 10/28/2019    CR 0.54  "10/26/2019     (H) 10/28/2019     (H) 10/26/2019     COAGS: No results found for: PTT, INR, FIBR  POC:   Lab Results   Component Value Date    BGM 86 10/28/2019    HCG Negative 10/24/2019     OTHER:   Lab Results   Component Value Date    SYDNI 8.6 10/28/2019        Preop Vitals    BP Readings from Last 3 Encounters:   10/28/19 118/78   10/25/19 120/78   10/23/19 116/80    Pulse Readings from Last 3 Encounters:   11/01/19 87   10/27/19 90   10/24/19 130      Resp Readings from Last 3 Encounters:   11/01/19 15   10/28/19 16   10/25/19 14    SpO2 Readings from Last 3 Encounters:   11/01/19 96%   10/28/19 95%   10/25/19 96%      Temp Readings from Last 1 Encounters:   11/01/19 37.1  C (98.8  F) (Temporal)    Ht Readings from Last 1 Encounters:   11/01/19 1.676 m (5' 5.98\")      Wt Readings from Last 1 Encounters:   11/01/19 57.7 kg (127 lb 3.2 oz)    Estimated body mass index is 20.54 kg/m  as calculated from the following:    Height as of 11/1/19: 1.676 m (5' 5.98\").    Weight as of 11/1/19: 57.7 kg (127 lb 3.2 oz).     LDA:  Peripheral IV 10/25/19 Left (Active)   Number of days: 25       NG/OG Tube Nasogastric Right nostril (Active)   Number of days:         Assessment:   ASA SCORE: 2    H&P: History and physical reviewed and following examination; no interval change.   Smoking Status:  Non-Smoker/Unknown        Plan:   Anes. Type:  General   Pre-Medication: Midazolam; Acetaminophen; Gabapentin   Induction:  IV (Standard)   Airway: ETT; Oral   Access/Monitoring: PIV; 2nd PIV   Maintenance: Balanced     Postop Plan:   Postop Pain: Opioids  Postop Sedation/Airway: Not planned  Disposition: Inpatient/Admit     PONV Management:   Adult Risk Factors: Female, Non-Smoker, Postop Opioids   Prevention: Ondansetron, Dexamethasone     CONSENT: Direct conversation   Plan and risks discussed with: Patient          Comments for Plan/Consent:  Agree with the assessment and plan above.    Migue Flores, DO              "      Ben Oleary, DO

## 2019-11-20 ENCOUNTER — HOSPITAL ENCOUNTER (INPATIENT)
Facility: CLINIC | Age: 34
LOS: 1 days | Discharge: HOME | End: 2019-11-21
Attending: OTOLARYNGOLOGY | Admitting: OTOLARYNGOLOGY
Payer: COMMERCIAL

## 2019-11-20 ENCOUNTER — ANESTHESIA (OUTPATIENT)
Dept: SURGERY | Facility: CLINIC | Age: 34
End: 2019-11-20
Payer: COMMERCIAL

## 2019-11-20 DIAGNOSIS — C10.9 SQUAMOUS CELL CARCINOMA OF OROPHARYNX (H): ICD-10-CM

## 2019-11-20 LAB
ABO + RH BLD: NORMAL
ABO + RH BLD: NORMAL
B-HCG SERPL-ACNC: <1 IU/L (ref 0–5)
BLD GP AB SCN SERPL QL: NORMAL
BLOOD BANK CMNT PATIENT-IMP: NORMAL
CREAT SERPL-MCNC: 0.54 MG/DL (ref 0.52–1.04)
GFR SERPL CREATININE-BSD FRML MDRD: >90 ML/MIN/{1.73_M2}
GLUCOSE BLDC GLUCOMTR-MCNC: 83 MG/DL (ref 70–99)
HCG UR QL: NEGATIVE
PLATELET # BLD AUTO: 332 10E9/L (ref 150–450)
SPECIMEN EXP DATE BLD: NORMAL

## 2019-11-20 PROCEDURE — 40000170 ZZH STATISTIC PRE-PROCEDURE ASSESSMENT II: Performed by: OTOLARYNGOLOGY

## 2019-11-20 PROCEDURE — 25800030 ZZH RX IP 258 OP 636: Performed by: STUDENT IN AN ORGANIZED HEALTH CARE EDUCATION/TRAINING PROGRAM

## 2019-11-20 PROCEDURE — 25000125 ZZHC RX 250: Performed by: OTOLARYNGOLOGY

## 2019-11-20 PROCEDURE — 37000008 ZZH ANESTHESIA TECHNICAL FEE, 1ST 30 MIN: Performed by: OTOLARYNGOLOGY

## 2019-11-20 PROCEDURE — 36415 COLL VENOUS BLD VENIPUNCTURE: CPT | Performed by: STUDENT IN AN ORGANIZED HEALTH CARE EDUCATION/TRAINING PROGRAM

## 2019-11-20 PROCEDURE — 25000125 ZZHC RX 250: Performed by: STUDENT IN AN ORGANIZED HEALTH CARE EDUCATION/TRAINING PROGRAM

## 2019-11-20 PROCEDURE — 86901 BLOOD TYPING SEROLOGIC RH(D): CPT | Performed by: ANESTHESIOLOGY

## 2019-11-20 PROCEDURE — 25000132 ZZH RX MED GY IP 250 OP 250 PS 637: Performed by: STUDENT IN AN ORGANIZED HEALTH CARE EDUCATION/TRAINING PROGRAM

## 2019-11-20 PROCEDURE — 00000146 ZZHCL STATISTIC GLUCOSE BY METER IP

## 2019-11-20 PROCEDURE — 27210794 ZZH OR GENERAL SUPPLY STERILE: Performed by: OTOLARYNGOLOGY

## 2019-11-20 PROCEDURE — 88341 IMHCHEM/IMCYTCHM EA ADD ANTB: CPT | Performed by: OTOLARYNGOLOGY

## 2019-11-20 PROCEDURE — 86850 RBC ANTIBODY SCREEN: CPT | Performed by: ANESTHESIOLOGY

## 2019-11-20 PROCEDURE — 85049 AUTOMATED PLATELET COUNT: CPT | Performed by: STUDENT IN AN ORGANIZED HEALTH CARE EDUCATION/TRAINING PROGRAM

## 2019-11-20 PROCEDURE — 84702 CHORIONIC GONADOTROPIN TEST: CPT | Performed by: ANESTHESIOLOGY

## 2019-11-20 PROCEDURE — 81025 URINE PREGNANCY TEST: CPT | Performed by: ANESTHESIOLOGY

## 2019-11-20 PROCEDURE — 25000128 H RX IP 250 OP 636: Performed by: STUDENT IN AN ORGANIZED HEALTH CARE EDUCATION/TRAINING PROGRAM

## 2019-11-20 PROCEDURE — 27210995 ZZH RX 272: Performed by: OTOLARYNGOLOGY

## 2019-11-20 PROCEDURE — 86900 BLOOD TYPING SEROLOGIC ABO: CPT | Performed by: ANESTHESIOLOGY

## 2019-11-20 PROCEDURE — 88342 IMHCHEM/IMCYTCHM 1ST ANTB: CPT | Performed by: OTOLARYNGOLOGY

## 2019-11-20 PROCEDURE — 37000009 ZZH ANESTHESIA TECHNICAL FEE, EACH ADDTL 15 MIN: Performed by: OTOLARYNGOLOGY

## 2019-11-20 PROCEDURE — 36000064 ZZH SURGERY LEVEL 4 EA 15 ADDTL MIN - UMMC: Performed by: OTOLARYNGOLOGY

## 2019-11-20 PROCEDURE — 36415 COLL VENOUS BLD VENIPUNCTURE: CPT | Performed by: ANESTHESIOLOGY

## 2019-11-20 PROCEDURE — 36000062 ZZH SURGERY LEVEL 4 1ST 30 MIN - UMMC: Performed by: OTOLARYNGOLOGY

## 2019-11-20 PROCEDURE — 07T20ZZ RESECTION OF LEFT NECK LYMPHATIC, OPEN APPROACH: ICD-10-PCS | Performed by: OTOLARYNGOLOGY

## 2019-11-20 PROCEDURE — 88307 TISSUE EXAM BY PATHOLOGIST: CPT | Performed by: OTOLARYNGOLOGY

## 2019-11-20 PROCEDURE — 25000566 ZZH SEVOFLURANE, EA 15 MIN: Performed by: OTOLARYNGOLOGY

## 2019-11-20 PROCEDURE — 12000001 ZZH R&B MED SURG/OB UMMC

## 2019-11-20 PROCEDURE — 82565 ASSAY OF CREATININE: CPT | Performed by: STUDENT IN AN ORGANIZED HEALTH CARE EDUCATION/TRAINING PROGRAM

## 2019-11-20 PROCEDURE — 71000014 ZZH RECOVERY PHASE 1 LEVEL 2 FIRST HR: Performed by: OTOLARYNGOLOGY

## 2019-11-20 RX ORDER — ONDANSETRON 2 MG/ML
4 INJECTION INTRAMUSCULAR; INTRAVENOUS EVERY 6 HOURS PRN
Status: DISCONTINUED | OUTPATIENT
Start: 2019-11-20 | End: 2019-11-21 | Stop reason: HOSPADM

## 2019-11-20 RX ORDER — SODIUM CHLORIDE, SODIUM LACTATE, POTASSIUM CHLORIDE, CALCIUM CHLORIDE 600; 310; 30; 20 MG/100ML; MG/100ML; MG/100ML; MG/100ML
INJECTION, SOLUTION INTRAVENOUS CONTINUOUS
Status: DISCONTINUED | OUTPATIENT
Start: 2019-11-20 | End: 2019-11-21 | Stop reason: HOSPADM

## 2019-11-20 RX ORDER — CEFAZOLIN SODIUM 1 G/3ML
INJECTION, POWDER, FOR SOLUTION INTRAMUSCULAR; INTRAVENOUS PRN
Status: DISCONTINUED | OUTPATIENT
Start: 2019-11-20 | End: 2019-11-20

## 2019-11-20 RX ORDER — MINERAL OIL/HYDROPHIL PETROLAT
OINTMENT (GRAM) TOPICAL EVERY 8 HOURS
Status: DISCONTINUED | OUTPATIENT
Start: 2019-11-21 | End: 2019-11-21 | Stop reason: HOSPADM

## 2019-11-20 RX ORDER — PROPOFOL 10 MG/ML
INJECTION, EMULSION INTRAVENOUS PRN
Status: DISCONTINUED | OUTPATIENT
Start: 2019-11-20 | End: 2019-11-20

## 2019-11-20 RX ORDER — LIDOCAINE HYDROCHLORIDE AND EPINEPHRINE 10; 10 MG/ML; UG/ML
INJECTION, SOLUTION INFILTRATION; PERINEURAL PRN
Status: DISCONTINUED | OUTPATIENT
Start: 2019-11-20 | End: 2019-11-20 | Stop reason: HOSPADM

## 2019-11-20 RX ORDER — AMOXICILLIN 250 MG
2 CAPSULE ORAL 2 TIMES DAILY
Status: DISCONTINUED | OUTPATIENT
Start: 2019-11-20 | End: 2019-11-21 | Stop reason: HOSPADM

## 2019-11-20 RX ORDER — HYDROMORPHONE HYDROCHLORIDE 1 MG/ML
.3-.5 INJECTION, SOLUTION INTRAMUSCULAR; INTRAVENOUS; SUBCUTANEOUS
Status: DISCONTINUED | OUTPATIENT
Start: 2019-11-20 | End: 2019-11-21

## 2019-11-20 RX ORDER — NALOXONE HYDROCHLORIDE 0.4 MG/ML
.1-.4 INJECTION, SOLUTION INTRAMUSCULAR; INTRAVENOUS; SUBCUTANEOUS
Status: DISCONTINUED | OUTPATIENT
Start: 2019-11-20 | End: 2019-11-21 | Stop reason: HOSPADM

## 2019-11-20 RX ORDER — GABAPENTIN 300 MG/1
300 CAPSULE ORAL ONCE
Status: COMPLETED | OUTPATIENT
Start: 2019-11-20 | End: 2019-11-20

## 2019-11-20 RX ORDER — DEXAMETHASONE SODIUM PHOSPHATE 10 MG/ML
10 INJECTION, SOLUTION INTRAMUSCULAR; INTRAVENOUS ONCE
Status: DISCONTINUED | OUTPATIENT
Start: 2019-11-20 | End: 2019-11-20 | Stop reason: HOSPADM

## 2019-11-20 RX ORDER — OXYCODONE HYDROCHLORIDE 5 MG/1
5-10 TABLET ORAL
Status: DISCONTINUED | OUTPATIENT
Start: 2019-11-20 | End: 2019-11-21 | Stop reason: HOSPADM

## 2019-11-20 RX ORDER — HYDROMORPHONE HYDROCHLORIDE 1 MG/ML
.3-.5 INJECTION, SOLUTION INTRAMUSCULAR; INTRAVENOUS; SUBCUTANEOUS EVERY 5 MIN PRN
Status: DISCONTINUED | OUTPATIENT
Start: 2019-11-20 | End: 2019-11-20 | Stop reason: HOSPADM

## 2019-11-20 RX ORDER — ACETAMINOPHEN 325 MG/1
975 TABLET ORAL ONCE
Status: COMPLETED | OUTPATIENT
Start: 2019-11-20 | End: 2019-11-20

## 2019-11-20 RX ORDER — OXYCODONE HYDROCHLORIDE 5 MG/1
5 TABLET ORAL EVERY 6 HOURS PRN
Qty: 20 TABLET | Refills: 0 | Status: SHIPPED | OUTPATIENT
Start: 2019-11-20 | End: 2019-11-22

## 2019-11-20 RX ORDER — NALOXONE HYDROCHLORIDE 0.4 MG/ML
.1-.4 INJECTION, SOLUTION INTRAMUSCULAR; INTRAVENOUS; SUBCUTANEOUS
Status: DISCONTINUED | OUTPATIENT
Start: 2019-11-20 | End: 2019-11-20 | Stop reason: HOSPADM

## 2019-11-20 RX ORDER — ACETAMINOPHEN 325 MG/1
975 TABLET ORAL EVERY 8 HOURS
Status: DISCONTINUED | OUTPATIENT
Start: 2019-11-20 | End: 2019-11-21 | Stop reason: HOSPADM

## 2019-11-20 RX ORDER — NALOXONE HYDROCHLORIDE 0.4 MG/ML
.1-.4 INJECTION, SOLUTION INTRAMUSCULAR; INTRAVENOUS; SUBCUTANEOUS
Status: ACTIVE | OUTPATIENT
Start: 2019-11-20 | End: 2019-11-21

## 2019-11-20 RX ORDER — SODIUM CHLORIDE, SODIUM LACTATE, POTASSIUM CHLORIDE, CALCIUM CHLORIDE 600; 310; 30; 20 MG/100ML; MG/100ML; MG/100ML; MG/100ML
INJECTION, SOLUTION INTRAVENOUS CONTINUOUS
Status: DISCONTINUED | OUTPATIENT
Start: 2019-11-20 | End: 2019-11-20 | Stop reason: HOSPADM

## 2019-11-20 RX ORDER — CEFAZOLIN SODIUM 2 G/100ML
INJECTION, SOLUTION INTRAVENOUS PRN
Status: DISCONTINUED | OUTPATIENT
Start: 2019-11-20 | End: 2019-11-20

## 2019-11-20 RX ORDER — FENTANYL CITRATE 50 UG/ML
INJECTION, SOLUTION INTRAMUSCULAR; INTRAVENOUS PRN
Status: DISCONTINUED | OUTPATIENT
Start: 2019-11-20 | End: 2019-11-20

## 2019-11-20 RX ORDER — FENTANYL CITRATE 50 UG/ML
25-50 INJECTION, SOLUTION INTRAMUSCULAR; INTRAVENOUS
Status: DISCONTINUED | OUTPATIENT
Start: 2019-11-20 | End: 2019-11-20 | Stop reason: HOSPADM

## 2019-11-20 RX ORDER — CEFAZOLIN SODIUM 2 G/100ML
2 INJECTION, SOLUTION INTRAVENOUS
Status: DISCONTINUED | OUTPATIENT
Start: 2019-11-20 | End: 2019-11-20 | Stop reason: HOSPADM

## 2019-11-20 RX ORDER — LIDOCAINE 40 MG/G
CREAM TOPICAL
Status: DISCONTINUED | OUTPATIENT
Start: 2019-11-20 | End: 2019-11-20 | Stop reason: HOSPADM

## 2019-11-20 RX ORDER — LIDOCAINE HYDROCHLORIDE 20 MG/ML
INJECTION, SOLUTION INFILTRATION; PERINEURAL PRN
Status: DISCONTINUED | OUTPATIENT
Start: 2019-11-20 | End: 2019-11-20

## 2019-11-20 RX ORDER — GINSENG 100 MG
CAPSULE ORAL EVERY 8 HOURS
Status: DISCONTINUED | OUTPATIENT
Start: 2019-11-20 | End: 2019-11-21 | Stop reason: HOSPADM

## 2019-11-20 RX ORDER — ONDANSETRON 2 MG/ML
INJECTION INTRAMUSCULAR; INTRAVENOUS PRN
Status: DISCONTINUED | OUTPATIENT
Start: 2019-11-20 | End: 2019-11-20

## 2019-11-20 RX ORDER — SODIUM CHLORIDE, SODIUM LACTATE, POTASSIUM CHLORIDE, CALCIUM CHLORIDE 600; 310; 30; 20 MG/100ML; MG/100ML; MG/100ML; MG/100ML
INJECTION, SOLUTION INTRAVENOUS CONTINUOUS PRN
Status: DISCONTINUED | OUTPATIENT
Start: 2019-11-20 | End: 2019-11-20

## 2019-11-20 RX ORDER — PROCHLORPERAZINE MALEATE 10 MG
10 TABLET ORAL EVERY 6 HOURS PRN
Status: DISCONTINUED | OUTPATIENT
Start: 2019-11-20 | End: 2019-11-21 | Stop reason: HOSPADM

## 2019-11-20 RX ORDER — DEXAMETHASONE SODIUM PHOSPHATE 4 MG/ML
INJECTION, SOLUTION INTRA-ARTICULAR; INTRALESIONAL; INTRAMUSCULAR; INTRAVENOUS; SOFT TISSUE PRN
Status: DISCONTINUED | OUTPATIENT
Start: 2019-11-20 | End: 2019-11-20

## 2019-11-20 RX ORDER — ONDANSETRON 4 MG/1
4 TABLET, ORALLY DISINTEGRATING ORAL EVERY 30 MIN PRN
Status: DISCONTINUED | OUTPATIENT
Start: 2019-11-20 | End: 2019-11-20 | Stop reason: HOSPADM

## 2019-11-20 RX ORDER — LABETALOL 20 MG/4 ML (5 MG/ML) INTRAVENOUS SYRINGE
10
Status: DISCONTINUED | OUTPATIENT
Start: 2019-11-20 | End: 2019-11-20 | Stop reason: HOSPADM

## 2019-11-20 RX ORDER — CALCIUM CARBONATE 500 MG/1
1000 TABLET, CHEWABLE ORAL 4 TIMES DAILY PRN
Status: DISCONTINUED | OUTPATIENT
Start: 2019-11-20 | End: 2019-11-21 | Stop reason: HOSPADM

## 2019-11-20 RX ORDER — ONDANSETRON 2 MG/ML
4 INJECTION INTRAMUSCULAR; INTRAVENOUS EVERY 30 MIN PRN
Status: DISCONTINUED | OUTPATIENT
Start: 2019-11-20 | End: 2019-11-20 | Stop reason: HOSPADM

## 2019-11-20 RX ORDER — ONDANSETRON 4 MG/1
4 TABLET, ORALLY DISINTEGRATING ORAL EVERY 6 HOURS PRN
Status: DISCONTINUED | OUTPATIENT
Start: 2019-11-20 | End: 2019-11-21 | Stop reason: HOSPADM

## 2019-11-20 RX ORDER — LIDOCAINE 40 MG/G
CREAM TOPICAL
Status: DISCONTINUED | OUTPATIENT
Start: 2019-11-20 | End: 2019-11-21 | Stop reason: HOSPADM

## 2019-11-20 RX ORDER — AMOXICILLIN 250 MG
1 CAPSULE ORAL 2 TIMES DAILY
Status: DISCONTINUED | OUTPATIENT
Start: 2019-11-20 | End: 2019-11-21 | Stop reason: HOSPADM

## 2019-11-20 RX ORDER — EPHEDRINE SULFATE 50 MG/ML
INJECTION, SOLUTION INTRAMUSCULAR; INTRAVENOUS; SUBCUTANEOUS PRN
Status: DISCONTINUED | OUTPATIENT
Start: 2019-11-20 | End: 2019-11-20

## 2019-11-20 RX ORDER — LORATADINE 10 MG/1
10 TABLET ORAL DAILY
Status: DISCONTINUED | OUTPATIENT
Start: 2019-11-20 | End: 2019-11-21 | Stop reason: HOSPADM

## 2019-11-20 RX ORDER — ACETAMINOPHEN 325 MG/1
650 TABLET ORAL EVERY 4 HOURS PRN
Status: DISCONTINUED | OUTPATIENT
Start: 2019-11-23 | End: 2019-11-21 | Stop reason: HOSPADM

## 2019-11-20 RX ADMIN — DEXAMETHASONE SODIUM PHOSPHATE 10 MG: 4 INJECTION, SOLUTION INTRA-ARTICULAR; INTRALESIONAL; INTRAMUSCULAR; INTRAVENOUS; SOFT TISSUE at 08:00

## 2019-11-20 RX ADMIN — PHENYLEPHRINE HYDROCHLORIDE 50 MCG: 10 INJECTION INTRAVENOUS at 09:44

## 2019-11-20 RX ADMIN — SODIUM CHLORIDE, POTASSIUM CHLORIDE, SODIUM LACTATE AND CALCIUM CHLORIDE: 600; 310; 30; 20 INJECTION, SOLUTION INTRAVENOUS at 09:08

## 2019-11-20 RX ADMIN — OXYCODONE HYDROCHLORIDE 10 MG: 5 TABLET ORAL at 22:21

## 2019-11-20 RX ADMIN — Medication 10 MG: at 07:42

## 2019-11-20 RX ADMIN — Medication 5 MG: at 08:03

## 2019-11-20 RX ADMIN — CEFAZOLIN 1 G: 1 INJECTION, POWDER, FOR SOLUTION INTRAMUSCULAR; INTRAVENOUS at 09:41

## 2019-11-20 RX ADMIN — SODIUM CHLORIDE, POTASSIUM CHLORIDE, SODIUM LACTATE AND CALCIUM CHLORIDE: 600; 310; 30; 20 INJECTION, SOLUTION INTRAVENOUS at 16:15

## 2019-11-20 RX ADMIN — PHENYLEPHRINE HYDROCHLORIDE 50 MCG: 10 INJECTION INTRAVENOUS at 09:30

## 2019-11-20 RX ADMIN — ACETAMINOPHEN 975 MG: 325 TABLET, FILM COATED ORAL at 16:34

## 2019-11-20 RX ADMIN — FENTANYL CITRATE 25 MCG: 50 INJECTION, SOLUTION INTRAMUSCULAR; INTRAVENOUS at 11:40

## 2019-11-20 RX ADMIN — FENTANYL CITRATE 25 MCG: 50 INJECTION, SOLUTION INTRAMUSCULAR; INTRAVENOUS at 11:47

## 2019-11-20 RX ADMIN — ACETAMINOPHEN 975 MG: 325 TABLET, FILM COATED ORAL at 22:21

## 2019-11-20 RX ADMIN — HYDROMORPHONE HYDROCHLORIDE 0.3 MG: 1 INJECTION, SOLUTION INTRAMUSCULAR; INTRAVENOUS; SUBCUTANEOUS at 16:34

## 2019-11-20 RX ADMIN — SODIUM CHLORIDE, POTASSIUM CHLORIDE, SODIUM LACTATE AND CALCIUM CHLORIDE: 600; 310; 30; 20 INJECTION, SOLUTION INTRAVENOUS at 07:27

## 2019-11-20 RX ADMIN — SODIUM CHLORIDE, POTASSIUM CHLORIDE, SODIUM LACTATE AND CALCIUM CHLORIDE: 600; 310; 30; 20 INJECTION, SOLUTION INTRAVENOUS at 11:48

## 2019-11-20 RX ADMIN — LORATADINE 10 MG: 10 TABLET ORAL at 16:34

## 2019-11-20 RX ADMIN — Medication 100 MG: at 07:38

## 2019-11-20 RX ADMIN — PHENYLEPHRINE HYDROCHLORIDE 50 MCG: 10 INJECTION INTRAVENOUS at 09:12

## 2019-11-20 RX ADMIN — PROPOFOL 200 MG: 10 INJECTION, EMULSION INTRAVENOUS at 07:37

## 2019-11-20 RX ADMIN — PHENYLEPHRINE HYDROCHLORIDE 50 MCG: 10 INJECTION INTRAVENOUS at 08:03

## 2019-11-20 RX ADMIN — PHENYLEPHRINE HYDROCHLORIDE 50 MCG: 10 INJECTION INTRAVENOUS at 09:04

## 2019-11-20 RX ADMIN — PHENYLEPHRINE HYDROCHLORIDE 50 MCG: 10 INJECTION INTRAVENOUS at 10:23

## 2019-11-20 RX ADMIN — PROPOFOL 20 MG: 10 INJECTION, EMULSION INTRAVENOUS at 10:53

## 2019-11-20 RX ADMIN — PHENYLEPHRINE HYDROCHLORIDE 50 MCG: 10 INJECTION INTRAVENOUS at 08:54

## 2019-11-20 RX ADMIN — FENTANYL CITRATE 100 MCG: 50 INJECTION, SOLUTION INTRAMUSCULAR; INTRAVENOUS at 07:34

## 2019-11-20 RX ADMIN — FENTANYL CITRATE 25 MCG: 50 INJECTION, SOLUTION INTRAMUSCULAR; INTRAVENOUS at 11:36

## 2019-11-20 RX ADMIN — HYDROMORPHONE HYDROCHLORIDE 0.25 MG: 1 INJECTION, SOLUTION INTRAMUSCULAR; INTRAVENOUS; SUBCUTANEOUS at 09:22

## 2019-11-20 RX ADMIN — HYDROMORPHONE HYDROCHLORIDE 0.25 MG: 1 INJECTION, SOLUTION INTRAMUSCULAR; INTRAVENOUS; SUBCUTANEOUS at 10:16

## 2019-11-20 RX ADMIN — BACITRACIN ZINC: 500 OINTMENT TOPICAL at 22:24

## 2019-11-20 RX ADMIN — PROPOFOL 20 MG: 10 INJECTION, EMULSION INTRAVENOUS at 10:58

## 2019-11-20 RX ADMIN — PHENYLEPHRINE HYDROCHLORIDE 100 MCG: 10 INJECTION INTRAVENOUS at 08:26

## 2019-11-20 RX ADMIN — SODIUM CHLORIDE, POTASSIUM CHLORIDE, SODIUM LACTATE AND CALCIUM CHLORIDE: 600; 310; 30; 20 INJECTION, SOLUTION INTRAVENOUS at 22:51

## 2019-11-20 RX ADMIN — ACETAMINOPHEN 975 MG: 325 TABLET, FILM COATED ORAL at 06:59

## 2019-11-20 RX ADMIN — LIDOCAINE HYDROCHLORIDE 60 MG: 20 INJECTION, SOLUTION INFILTRATION; PERINEURAL at 07:37

## 2019-11-20 RX ADMIN — FENTANYL CITRATE 25 MCG: 50 INJECTION, SOLUTION INTRAMUSCULAR; INTRAVENOUS at 14:33

## 2019-11-20 RX ADMIN — ONDANSETRON 4 MG: 2 INJECTION INTRAMUSCULAR; INTRAVENOUS at 16:12

## 2019-11-20 RX ADMIN — CEFAZOLIN SODIUM 2 G: 2 INJECTION, SOLUTION INTRAVENOUS at 07:42

## 2019-11-20 RX ADMIN — REMIFENTANIL HYDROCHLORIDE 0.1 MCG/KG/MIN: 1 INJECTION, POWDER, LYOPHILIZED, FOR SOLUTION INTRAVENOUS at 07:52

## 2019-11-20 RX ADMIN — PHENYLEPHRINE HYDROCHLORIDE 50 MCG: 10 INJECTION INTRAVENOUS at 08:43

## 2019-11-20 RX ADMIN — BACITRACIN ZINC: 500 OINTMENT TOPICAL at 18:59

## 2019-11-20 RX ADMIN — SENNOSIDES AND DOCUSATE SODIUM 1 TABLET: 8.6; 5 TABLET ORAL at 21:05

## 2019-11-20 RX ADMIN — HYDROMORPHONE HYDROCHLORIDE 0.3 MG: 1 INJECTION, SOLUTION INTRAMUSCULAR; INTRAVENOUS; SUBCUTANEOUS at 12:09

## 2019-11-20 RX ADMIN — PROPOFOL 20 MG: 10 INJECTION, EMULSION INTRAVENOUS at 10:48

## 2019-11-20 RX ADMIN — GABAPENTIN 300 MG: 300 CAPSULE ORAL at 06:59

## 2019-11-20 RX ADMIN — HYDROMORPHONE HYDROCHLORIDE 0.4 MG: 1 INJECTION, SOLUTION INTRAMUSCULAR; INTRAVENOUS; SUBCUTANEOUS at 13:52

## 2019-11-20 RX ADMIN — MIDAZOLAM 2 MG: 1 INJECTION INTRAMUSCULAR; INTRAVENOUS at 07:24

## 2019-11-20 RX ADMIN — OXYCODONE HYDROCHLORIDE 5 MG: 5 TABLET ORAL at 19:09

## 2019-11-20 RX ADMIN — ONDANSETRON 4 MG: 2 INJECTION INTRAMUSCULAR; INTRAVENOUS at 10:46

## 2019-11-20 ASSESSMENT — ACTIVITIES OF DAILY LIVING (ADL)
RETIRED_COMMUNICATION: 0-->UNDERSTANDS/COMMUNICATES WITHOUT DIFFICULTY
ADLS_ACUITY_SCORE: 11
FALL_HISTORY_WITHIN_LAST_SIX_MONTHS: NO
TRANSFERRING: 0-->INDEPENDENT
COGNITION: 0 - NO COGNITION ISSUES REPORTED
AMBULATION: 0-->INDEPENDENT
DRESS: 0-->INDEPENDENT
RETIRED_EATING: 0-->INDEPENDENT
BATHING: 0-->INDEPENDENT
TOILETING: 0-->INDEPENDENT
ADLS_ACUITY_SCORE: 11
SWALLOWING: 0-->SWALLOWS FOODS/LIQUIDS WITHOUT DIFFICULTY

## 2019-11-20 ASSESSMENT — MIFFLIN-ST. JEOR: SCORE: 1278.5

## 2019-11-20 NOTE — ANESTHESIA CARE TRANSFER NOTE
Patient: Mira Cao    Procedure(s):  DISSECTION, NECK, MODIFIED RADICAL LEFT    Diagnosis: Squamous cell carcinoma of oropharynx (H) [C10.9]  Diagnosis Additional Information: No value filed.    Anesthesia Type:   General     Note:  Airway :Nasal Cannula  Patient transferred to:PACU  Comments: Patient is Awake, tachycardic but otherwise vitally stable, following commands. Patient is breathing Spontaneously with nasal cannula. No N/V. Pain is well controlled at time of drop off. No obvious complications from anesthesia. Care report given to PACU RN, and notified of assigned Anesthesiology staff to patient for continuity of PACU care.     Ben Oleary DO.  Anesthesiology Resident CA-1, PGY-2  Pager [phone]  Handoff Report: Identifed the Patient, Identified the Reponsible Provider, Reviewed the pertinent medical history, Discussed the surgical course, Reviewed Intra-OP anesthesia mangement and issues during anesthesia, Set expectations for post-procedure period and Allowed opportunity for questions and acknowledgement of understanding      Vitals: (Last set prior to Anesthesia Care Transfer)    CRNA VITALS  11/20/2019 1040 - 11/20/2019 1120      11/20/2019             Resp Rate (observed):  (!) 1      125/26    98.4 F  100% on 2 L NC            Electronically Signed By: Ben Oleary DO  November 20, 2019  11:20 AM

## 2019-11-20 NOTE — OP NOTE
Date of Procedure: 11/20/2019    Attending Physician: Ruth Tello MD    Resident Physicians: Roberto Dumont MD    Procedure Performed:  Left neck dissection (levels II-IV)    Preoperative Diagnosis:   1. Oropharyngeal cancer  2. Secondary malignancy of lymph nodes    Postoperative Diagnosis: same    Anesthesia: General    Blood loss: 25 cc    Specimens:   ID Type Source Tests Collected by Time Destination   A : Left Level 2A Tissue Neck SURGICAL PATHOLOGY EXAM Ruth Tello MD 11/20/2019 10:05 AM     B : Left Level 3 with skin Excision Tissue Neck SURGICAL PATHOLOGY EXAM Ruth Tello MD 11/20/2019 10:06 AM     C : Left Level 4 Tissue Neck SURGICAL PATHOLOGY EXAM Ruth Tello MD 11/20/2019 10:08 AM     D : Left Level 2B Tissue Neck SURGICAL PATHOLOGY EXAM Ruth Tello MD 11/20/2019 10:12 AM          Implants:  LISA drain x 1    Complications: None    Findings:  Scar from previous surgery - old incision excised  Level II-IV neck dissection performed with preservation of spinal accessory nerve, hypoglossal nerve, vagus nerve, hypoglossal nerve, ansa cervicalis  No grossly abnormal nodes    Indications:  Mira Cao is a 34-year-old woman with 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  She previously underwent excisional node biopsy by local otolaryngologist for a necrotic neck mass on the left.  Final pathology demonstrated metastatic p16+ squamous cell carcinoma.  PET CT scan did not demonstrate an obvious primary but the contrasted CT scan raise concern for possible primary within the left tonsil.  She was taken to the OR on 10/24/2019 for a DL with biopsy and tonsillectomy. The primary lesion was found in the tonsil measuring 0.3 cm and she underwent a radical tonsillectomy which demonstrated negative margins. She is now indicated for neck dissection.    Description of Procedure:  After informed consent was obtained, the patient was brought back to the main  operating room and placed in a supine position. General anesthesia was induced, and the patient was orotracheally intubated. The bed was turned 180 degrees from anesthesia. A shoulder roll was placed. The patient had an incision in the left neck that was well healed, located about 2 fingerbreadths below the mandible. An ellipse was marked around the old incision and then extended medially toward the midline neck and laterally toward the mastoid tip.  The incision was injected with 1% lidocaine with 1:100,000 epinephrine.  [facility] to monitor the marginal mandibular nerve. The patient was prepped and draped in sterile fashion. A time out was performed. A 15 blade was used to make an incision down to the skin to the level of the platysma.  The platysma was divided using the monopolar cautery.  There was scar tissue medially where the previous surgery had been performed. The planned skin excision was left in continuity with the neck dissection contents. A skin flap was raised superiorly up to the mandible and inferiorly to the level of the clavicle.       The fascia was unrolled working from lateral to medial off the SCM. This was scarred secondary to the previous surgery. The inferior border of the submandibular gland was identified using blunt dissection. There was scar tissue present along the inferior border of the submandibular gland. The digastric tendon was identified. The posterior belly of the digastric was identified and traced back toward the mastoid tip, releasing the overlying fibrofatty contents. This defined the superior border of our neck dissection.  The fascia was continued to be unrolled off the SCM towards the floor of the neck. The spinal accessory nerve was identified and traced superiorly towards its junction with internal jugular vein.  The lateral border of the internal jugular vein was defined. The medial border of our dissection was identified along the  lateral border of the omohyoid using monopolar cautery. The fibrofatty contents of level II, III, and  IV were then continued to be released towards the floor of the neck.  The cervical rootlets were identified along the floor.  The omohyoid was retracted inferiorly and the inferior border of our dissection was defined at the clavicle. The lateral border of the internal jugular vein was identified inferiorly. The contents of level 4 were bluntly released along the clavicle. While releasing the inferior border, the contents of the neck were clipped prior to dividing to help reduce the risk of a chyle leak.  The fibrofatty contents of levels IIA through IV were released toward the carotid sheath. The ansa cervicalis was preserved. The 15 blade was used to release the contents of the lateral neck off the lateral border of the carotid artery and then the internal jugular vein. Multiple branches off the internal jugular vein were clipped. The facial vein was dissected out from the specimen and preserved. The hypoglossal nerve was identified. The specimen was then released along the medial border of the internal jugular vein, preserving the superior thyroid vessels. Once the specimen was completely released from the internal jugular vein, the specimen was divided into its respective levels and sent for permanent pathology. The skin excision was left in continuity with level III. The spinal accessory nerve was freed circumferentially and retracted inferiorly. An Allis clamp was used to grasp the contents of level IIB and released from the floor of the neck with bipolar cautery and Metzenbaum scissors. The specimen was handed off to nursing for permanent pathology.     The neck was irrigated with 1 L of saline. Hemostasis was achieved. A valsalva was performed. There was no bleeding but there was clear fluid from level IV concerning for a small chyle leak. This was in an area that had already been clipped. The area was  oversewn with a 5-0 prolene suture. A repeat Valsalva maneuver was performed and held with no further leak. Evicel was applied to level IV.  A 105 mm round LISA drain was placed in the neck and secured with a suture. The skin was closed with a buried interrupted 3-0 vicryl for the platysma and dermis and the skin was closed with a running 5-0 prolene. The patient was handed over to anesthesia for extubation. She was taken to the PACU in stable condition with no immediate complications.     I was present for and participated in the entire procedure.          Ruth Tello MD    Department of Otolaryngology

## 2019-11-20 NOTE — BRIEF OP NOTE
[facility], [place]    Brief Operative Note    Pre-operative diagnosis: Squamous cell carcinoma of oropharynx (H) [C10.9]  Post-operative diagnosis Same as pre-operative diagnosis    Procedure: Procedure(s):  DISSECTION, NECK, MODIFIED RADICAL LEFT  Surgeon: Surgeon(s) and Role:     * Ruth Tello MD - Primary     * Roberto Dumont MD - Resident - Assisting  Anesthesia: General   Estimated blood loss: 25 mL  Drains:  LISA x 1  Specimens:   ID Type Source Tests Collected by Time Destination   A : Left Level 2A Tissue Neck SURGICAL PATHOLOGY EXAM Ruth Tello MD 11/20/2019 10:05 AM    B : Left Level 3 with skin Excision Tissue Neck SURGICAL PATHOLOGY EXAM Ruth Tello MD 11/20/2019 10:06 AM    C : Left Level 4 Tissue Neck SURGICAL PATHOLOGY EXAM Ruth Tello MD 11/20/2019 10:08 AM    D : Left Level 2B Tissue Neck SURGICAL PATHOLOGY EXAM Ruth Tello MD 11/20/2019 10:12 AM      Findings:   Left MRND. No gross lymphadenopathy. Chyle leak in level 4 controlled with clips, suture ligation, and evicel.  Complications: None.  Implants: * No implants in log *

## 2019-11-20 NOTE — ANESTHESIA POSTPROCEDURE EVALUATION
Anesthesia POST Procedure Evaluation    Patient: Mira Cao   MRN:     [id number] Gender:   female   Age:    34 year old :      1985        Preoperative Diagnosis: Squamous cell carcinoma of oropharynx (H) [C10.9]   Procedure(s):  DISSECTION, NECK, MODIFIED RADICAL LEFT   Postop Comments: No value filed.       Anesthesia Type:  Not documented  General    Reportable Event: NO     PAIN: Uncomplicated   Sign Out status: Comfortable, Well controlled pain     PONV: No PONV   Sign Out status:  No Nausea or Vomiting     Neuro/Psych: Uneventful perioperative course   Sign Out Status: Preoperative baseline; Age appropriate mentation     Airway/Resp.: Uneventful perioperative course   Sign Out Status: Non labored breathing, age appropriate RR; Resp. Status within EXPECTED Parameters     CV: Uneventful perioperative course   Sign Out status: Appropriate BP and perfusion indices; Appropriate HR/Rhythm     Disposition:   Sign Out in:  PACU  Disposition:  Floor  Recovery Course: Uneventful  Follow-Up: Not required           Last Anesthesia Record Vitals:  CRNA VITALS  2019 1040 - 2019 1140      2019             Resp Rate (observed):  (!) 1          Last PACU Vitals:  Vitals Value Taken Time   /71 2019 12:00 PM   Temp 36.9  C (98.4  F) 2019 11:45 AM   Pulse 113 2019 12:00 PM   Resp 14 2019 12:00 PM   SpO2 97 % 2019 12:10 PM   Temp src     NIBP     Pulse     SpO2     Resp     Temp     Ht Rate     Temp 2     Vitals shown include unvalidated device data.      Electronically Signed By: Migue Flores DO, 2019, 12:11 PM

## 2019-11-21 ENCOUNTER — PATIENT OUTREACH (OUTPATIENT)
Dept: CARE COORDINATION | Facility: CLINIC | Age: 34
End: 2019-11-21

## 2019-11-21 ENCOUNTER — APPOINTMENT (OUTPATIENT)
Dept: OCCUPATIONAL THERAPY | Facility: CLINIC | Age: 34
End: 2019-11-21
Attending: OTOLARYNGOLOGY
Payer: COMMERCIAL

## 2019-11-21 VITALS
DIASTOLIC BLOOD PRESSURE: 68 MMHG | RESPIRATION RATE: 16 BRPM | SYSTOLIC BLOOD PRESSURE: 105 MMHG | BODY MASS INDEX: 20.09 KG/M2 | WEIGHT: 125 LBS | OXYGEN SATURATION: 98 % | TEMPERATURE: 98.3 F | HEART RATE: 80 BPM | HEIGHT: 66 IN

## 2019-11-21 LAB
ANION GAP SERPL CALCULATED.3IONS-SCNC: 4 MMOL/L (ref 3–14)
BUN SERPL-MCNC: 5 MG/DL (ref 7–30)
CALCIUM SERPL-MCNC: 8.2 MG/DL (ref 8.5–10.1)
CHLORIDE SERPL-SCNC: 110 MMOL/L (ref 94–109)
CO2 SERPL-SCNC: 26 MMOL/L (ref 20–32)
CREAT SERPL-MCNC: 0.6 MG/DL (ref 0.52–1.04)
ERYTHROCYTE [DISTWIDTH] IN BLOOD BY AUTOMATED COUNT: 11.7 % (ref 10–15)
GFR SERPL CREATININE-BSD FRML MDRD: >90 ML/MIN/{1.73_M2}
GLUCOSE SERPL-MCNC: 84 MG/DL (ref 70–99)
HCT VFR BLD AUTO: 26.4 % (ref 35–47)
HGB BLD-MCNC: 8.7 G/DL (ref 11.7–15.7)
MCH RBC QN AUTO: 30.3 PG (ref 26.5–33)
MCHC RBC AUTO-ENTMCNC: 33 G/DL (ref 31.5–36.5)
MCV RBC AUTO: 92 FL (ref 78–100)
PLATELET # BLD AUTO: 307 10E9/L (ref 150–450)
POTASSIUM SERPL-SCNC: 3.6 MMOL/L (ref 3.4–5.3)
RBC # BLD AUTO: 2.87 10E12/L (ref 3.8–5.2)
SODIUM SERPL-SCNC: 140 MMOL/L (ref 133–144)
WBC # BLD AUTO: 7.8 10E9/L (ref 4–11)

## 2019-11-21 PROCEDURE — 97535 SELF CARE MNGMENT TRAINING: CPT | Mod: GO | Performed by: OCCUPATIONAL THERAPIST

## 2019-11-21 PROCEDURE — 90686 IIV4 VACC NO PRSV 0.5 ML IM: CPT | Performed by: OTOLARYNGOLOGY

## 2019-11-21 PROCEDURE — 97165 OT EVAL LOW COMPLEX 30 MIN: CPT | Mod: GO | Performed by: OCCUPATIONAL THERAPIST

## 2019-11-21 PROCEDURE — 25000128 H RX IP 250 OP 636: Performed by: OTOLARYNGOLOGY

## 2019-11-21 PROCEDURE — 25000132 ZZH RX MED GY IP 250 OP 250 PS 637: Performed by: STUDENT IN AN ORGANIZED HEALTH CARE EDUCATION/TRAINING PROGRAM

## 2019-11-21 PROCEDURE — 97110 THERAPEUTIC EXERCISES: CPT | Mod: GO | Performed by: OCCUPATIONAL THERAPIST

## 2019-11-21 PROCEDURE — 85027 COMPLETE CBC AUTOMATED: CPT | Performed by: OTOLARYNGOLOGY

## 2019-11-21 PROCEDURE — 80048 BASIC METABOLIC PNL TOTAL CA: CPT | Performed by: OTOLARYNGOLOGY

## 2019-11-21 RX ORDER — MINERAL OIL/HYDROPHIL PETROLAT
1 OINTMENT (GRAM) TOPICAL 3 TIMES DAILY
Qty: 99 G | Refills: 0 | Status: SHIPPED | OUTPATIENT
Start: 2019-11-21 | End: 2019-12-13

## 2019-11-21 RX ORDER — ONDANSETRON 4 MG/1
4 TABLET, ORALLY DISINTEGRATING ORAL EVERY 6 HOURS PRN
Qty: 10 TABLET | Refills: 0 | Status: SHIPPED | OUTPATIENT
Start: 2019-11-21 | End: 2019-12-13

## 2019-11-21 RX ORDER — AMOXICILLIN 250 MG
1 CAPSULE ORAL 2 TIMES DAILY
Qty: 20 TABLET | Refills: 0 | Status: SHIPPED | OUTPATIENT
Start: 2019-11-21 | End: 2019-12-13

## 2019-11-21 RX ORDER — OXYCODONE HYDROCHLORIDE 5 MG/1
5 TABLET ORAL EVERY 6 HOURS PRN
Qty: 30 TABLET | Refills: 0 | Status: SHIPPED | OUTPATIENT
Start: 2019-11-21 | End: 2019-12-13

## 2019-11-21 RX ADMIN — LORATADINE 10 MG: 10 TABLET ORAL at 08:16

## 2019-11-21 RX ADMIN — OXYCODONE HYDROCHLORIDE 10 MG: 5 TABLET ORAL at 16:36

## 2019-11-21 RX ADMIN — INFLUENZA A VIRUS A/BRISBANE/02/2018 IVR-190 (H1N1) ANTIGEN (FORMALDEHYDE INACTIVATED), INFLUENZA A VIRUS A/KANSAS/14/2017 X-327 (H3N2) ANTIGEN (FORMALDEHYDE INACTIVATED), INFLUENZA B VIRUS B/PHUKET/3073/2013 ANTIGEN (FORMALDEHYDE INACTIVATED), AND INFLUENZA B VIRUS B/MARYLAND/15/2016 BX-69A ANTIGEN (FORMALDEHYDE INACTIVATED) 0.5 ML: 15; 15; 15; 15 INJECTION, SUSPENSION INTRAMUSCULAR at 16:36

## 2019-11-21 RX ADMIN — OXYCODONE HYDROCHLORIDE 10 MG: 5 TABLET ORAL at 12:01

## 2019-11-21 RX ADMIN — SENNOSIDES AND DOCUSATE SODIUM 1 TABLET: 8.6; 5 TABLET ORAL at 08:15

## 2019-11-21 RX ADMIN — OXYCODONE HYDROCHLORIDE 10 MG: 5 TABLET ORAL at 07:22

## 2019-11-21 RX ADMIN — OXYCODONE HYDROCHLORIDE 10 MG: 5 TABLET ORAL at 04:34

## 2019-11-21 RX ADMIN — BACITRACIN ZINC: 500 OINTMENT TOPICAL at 07:22

## 2019-11-21 RX ADMIN — ACETAMINOPHEN 975 MG: 325 TABLET, FILM COATED ORAL at 08:14

## 2019-11-21 RX ADMIN — ACETAMINOPHEN 975 MG: 325 TABLET, FILM COATED ORAL at 16:35

## 2019-11-21 RX ADMIN — WHITE PETROLATUM: 1.75 OINTMENT TOPICAL at 14:16

## 2019-11-21 ASSESSMENT — ACTIVITIES OF DAILY LIVING (ADL)
ADLS_ACUITY_SCORE: 11
ADLS_ACUITY_SCORE: 11
IADL_COMMENTS: FAMILY ABLE TO ASSIST WITH IADLS PRN
ADLS_ACUITY_SCORE: 11

## 2019-11-21 ASSESSMENT — PAIN DESCRIPTION - DESCRIPTORS: DESCRIPTORS: ACHING;SORE

## 2019-11-21 NOTE — PLAN OF CARE
Status: SCC of oropharynx s/p left MRND on 11/20.   Vitals: Stable on RA.   Neuros: A&O x4. Slight numbness to L ear/jaw; MD aware. Otherwise intact.   IV: PIV SL.   Resp/trach: LS clear.   Diet: Low fat diet.   Bowel status: BS+, no BM yet.   : Voiding spontaneously.  Skin: L neck incision sutured, CRISTINO. Bacitracin applied. LISA x1, serosanguinous output. Monitor for chyle leak.   Pain: Neck pain partially controlled with PRN oxycodone.  Activity: Up ad doroteo.   Social: mother and sister in room very supportive.   Plan: Education given to pt/family on LISA. Mother and sister demonstrated LISA education and comfortable with going home with drain. Possible discharge home today. Continue to monitor and follow current POC.

## 2019-11-21 NOTE — PLAN OF CARE
OT 6A: Evaluation complete and treatment provided.  Discharge Planner OT   Patient plan for discharge: Home (family to assist prn)  Current status: Pt is doing well post op.  SBA-mod I with bed mobility, dressing, transfers, ambulation and stair completion.  Provided education and issued handout for neck/UE AROM HEP and pt demonstrates understanding  Barriers to return to prior living situation: acute medical needs  Recommendations for discharge: Home  Rationale for recommendations: Pt is mobility safely and able to complete self cares without assistance.  Demonstrates understanding of HEP.  No further needs identified.         Entered by: Elsa Artis 11/21/2019 10:38 AM       Occupational Therapy Discharge Summary    Reason for therapy discharge:    Discharged to home.  All goals and outcomes met, no further needs identified.    Progress towards therapy goal(s). See goals on Care Plan in [facility] electronic health record for goal details.  Goals met    Therapy recommendation(s):    Continue home exercise program.

## 2019-11-21 NOTE — PROGRESS NOTES
11/21/19 1000   Quick Adds   Type of Visit Initial Occupational Therapy Evaluation   Living Environment   Lives With spouse   Living Arrangements house   Home Accessibility stairs to enter home;stairs within home   Number of Stairs, Main Entrance 2   Number of Stairs, Within Home, Primary   (13; Flight to upper level)   Transportation Anticipated car, drives self;family or friend will provide   Living Environment Comment Pt lives with her  in a 2 story home; drives at baseline; however, has not been driving x approx 1 month 2/2 previous procedures   Self-Care   Usual Activity Tolerance good   Current Activity Tolerance moderate   Regular Exercise Yes   Activity/Exercise Type   (stationary bike)   Equipment Currently Used at Home none   Activity/Exercise/Self-Care Comment Pt was previously independent with all basic ADL/IADLs.  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  Pt reports she previously exercised regularly but has not x approx 2 weeks after tonsil surgery.   Functional Level   Ambulation 0-->independent   Transferring 0-->independent   Toileting 0-->independent   Bathing 0-->independent   Dressing 0-->independent   Cognition 0 - no cognition issues reported   Fall history within last six months no   Which of the above functional risks had a recent onset or change? none       Present no   Language english   General Information   Onset of Illness/Injury or Date of Surgery - Date 11/20/19   Referring Physician Roberto Dumont MD   Patient/Family Goals Statement Return to home and previous activity   Additional Occupational Profile Info/Pertinent History of Current Problem  Mira Cao is a 34 year old female who underwent a selective neck on the right side for a p16+ SCC of the left tonsil 11/20/2019   Precautions/Limitations no known precautions/limitations   General Observations Pt is pleasant and  agreeable,  present and supportive, sarah x 1   General Info Comments Activity: up with assist   Cognitive Status Examination   Orientation orientation to person, place and time   Level of Consciousness alert   Follows Commands (Cognition) WNL   Memory intact   Attention No deficits were identified   Cognitive Comment no acute cognitive concerns noted   Visual Perception   Visual Perception Wears glasses   Visual Perception Comments Pt denies acute visual changes; wears glasses at baseline   Sensory Examination   Sensory Comments new numbness to L side of face/jaw/ear post surgically; MDs aware; no corresponding motor deficits   Pain Assessment   Patient Currently in Pain   (post surgical pain; appropriately managed)   Range of Motion (ROM)   ROM Comment RUE/BLE WNL; L elbow/wrist/hand WNL;  L shoulder abduction actively to approx 90 degrees, L shoulder flexion to approx 120 degrees (actively)   Strength   Strength Comments Pt with mild weakness in trap/deltoid - otherwise pt demonstrates good functional strength in all extremities   Mobility   Bed Mobility Bed mobility skill: Supine to sit   Bed Mobility Skill: Supine to Sit   Level of [place]: Supine/Sit independent   Transfer Skill: Bed to Chair/Chair to Bed   Level of [place]: Bed to Chair stand-by assist   Transfer Skill: Sit to Stand   Level of [place]: Sit/Stand stand-by assist   Balance   Balance Comments no LOB with functional ambulation and transfers without UE support   Lower Body Dressing   Level of [place]: Dress Lower Body stand-by assist   Instrumental Activities of Daily Living (IADL)   IADL Comments family able to assist with IADLs prn   Activities of Daily Living Analysis   Impairments Contributing to Impaired Activities of Daily Living pain;post surgical precautions;ROM decreased;strength decreased   General Therapy Interventions   Planned Therapy Interventions ADL retraining;IADL retraining;ROM;strengthening;home program  guidelines   Clinical Impression   Criteria for Skilled Therapeutic Interventions Met yes, treatment indicated   OT Diagnosis decreased activity tolerance and independence with ADL/IADLs   Influenced by the following impairments decreased strength/ROM in neck and LUE proximally; post surgical pain   Assessment of Occupational Performance 3-5 Performance Deficits   Identified Performance Deficits bathing, dressing, driving, work, home management   Clinical Decision Making (Complexity) Low complexity   Therapy Frequency   (one time eval and treat)   Predicted Duration of Therapy Intervention (days/wks) one time eval and treat   Anticipated Discharge Disposition Home with Assist   Risks and Benefits of Treatment have been explained. Yes   Patient, Family & other staff in agreement with plan of care Yes   Total Evaluation Time   Total Evaluation Time (Minutes) 5

## 2019-11-21 NOTE — PLAN OF CARE
Status: SCC of oropharynx s/p left MRND on 11/20.   Vitals: Stable on RA. Cont pulse ox in place.  Neuros: A&O x4. Slight numbness to L ear/jaw; MD aware. Otherwise intact.   IV: PIV infusing LR at 100mL/hr.   Resp/trach: LS clear.   Diet: Low fat diet.   Bowel status: BS+, no BM yet.   : Voiding spontaneously in bathroom.   Skin: L neck incision sutured, CRISTINO. Bacitracin applied. LISA x1, serosanguinous output. Monitor for chyle leak.   Pain: Neck pain controlled with PRN oxycodone.  Activity: Up independently in room.   Social:  in room and supportive.   Plan: Continue to monitor and follow current POC.

## 2019-11-21 NOTE — PLAN OF CARE
Status: SCC of oropharynx s/p left MRND on 11/20.   Vitals: Stable on RA.   Neuros: A&O x4. Slight numbness to L ear/jaw; MD aware. Otherwise intact.   IV: PIV SL.   Resp/trach: LS clear.   Diet: Low fat diet.   Bowel status: BS+, no BM yet.   : Voiding spontaneously; PVR of 100 mL.   Skin: L neck incision sutured, CRISTINO. Bacitracin applied. LISA x1, serosanguinous output. Monitor for chyle leak.   Pain: Neck pain partially controlled with PRN Dilaudid.   Activity: Up with SBA and GB.   Social:  and mother in ropom and supportive.   Plan: Continue to monitor and follow current POC.

## 2019-11-21 NOTE — DISCHARGE SUMMARY
Discharge Summary  Mira Cao  [id number]  1985    Date of Admission: 11/20/2019  Date of Discharge: 11/21/19    Admission Diagnosis:   Squamous cell carcinoma of oropharynx (H) [C10.9]  Secondary malignancy of lymph nodes  Discharge Diagnosis: Same    Procedures:  Date: 11/20/2019  Procedure(s):  DISSECTION, NECK, MODIFIED RADICAL LEFT    Pathology: pending     HPI: Mira Cao is a 34 year old female who was found to have a necrotic neck mass on the left by an outside ENT. 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 She was referred to the [facility] and was taken to the OR in October for a DL with radical tonsillectomy, found to have a 3 mm primary SCC in the left tonsil, resulting in staging indicating she had  p16+ T1N1 metastatic squamous cell carcinoma of the left tonsil. She was recommended at tumor board to undergo a completion neck dissection given her incomplete treatment of the neck previously.    It was recommended that she undergo operative intervention and the patient consented to the above procedure after detailed explanation of the risks and benefits of said procedure.    Hospital Course: The patient was admitted to the hospital and underwent the above mentioned procedure. She tolerated the procedure without any intra- or marek-operative complications. She did have an intraoperative repair of a small chyle leak. Please see the operative report for full details of the procedure. The patient was admitted for post-operative monitoring. Her postoperative course was uneventful. At discharge, the patient's pain was well controlled, the patient was voiding on her own, and was ambulating and tolerating a regular diet. At discharge her neck LISA drain did not meet removal criteria therefore she underwent nursing teaching for LISA cares.     Discharge Exam:  Vitals:    11/21/19 0800 11/21/19 0833 11/21/19  1200 11/21/19 1520   BP: 98/57 108/57 107/67 105/68   BP Location: Left arm  Left arm    Pulse:       Resp: 16  16 16   Temp: 97  F (36.1  C)  96.7  F (35.9  C) 98.3  F (36.8  C)   TempSrc: Oral  Oral Oral   SpO2: 99%   98%   Weight:       Height:            General: Alert and oriented x 3, No acute distress               HEENT: flat neck, well approximated incision, drain appropriate without chyle               Pulmonary: Breathing non-labored, no stridor, no accessory muscle use.    Discharge Medications:   Mira Cao   Home Medication Instructions SHAYY:[id number]    Printed on:11/21/19 9065   Medication Information                      acetaminophen (TYLENOL) 160 MG/5ML suspension  Take 10-20.5 mLs (320-656 mg) by mouth every 6 hours as needed for fever or mild pain             benzocaine-menthol (CEPACOL) 15-3.6 MG lozenge  Place 1 lozenge inside cheek every 2 hours as needed for sore throat             biotin 1000 MCG TABS tablet  Take 1,000 mcg by mouth daily             caffeine (NO-DOZE) 200 MG TABS tablet  Take 1 tablet (200 mg) by mouth daily as needed (headache)             chlorhexidine (PERIDEX) 0.12 % solution  Swish and spit 15 mLs in mouth 2 times daily             ferrous sulfate (KP FERROUS SULFATE) 325 (65 Fe) MG tablet  Take 325 mg by mouth daily (with breakfast)             loratadine (CLARITIN) 10 MG tablet  Take 10 mg by mouth daily as needed              LORazepam (ATIVAN) 0.5 MG tablet  Take 0.5-1 mg by mouth as needed              Milk Thistle-Dand-Fennel-Licor (MILK THISTLE XTRA) CAPS capsule  Take 1 capsule by mouth daily             mineral oil-hydrophilic petrolatum (AQUAPHOR) external ointment  Apply 1 g topically 3 times daily             Omega-3 Fatty Acids (OMEGA 3 PO)  Take 2 capsules by mouth daily             ondansetron (ZOFRAN-ODT) 4 MG ODT tab  Take 1 tablet (4 mg) by mouth every 6 hours as needed for nausea or vomiting             oxyCODONE (ROXICODONE) 5 MG  tablet  Take 1 tablet (5 mg) by mouth every 6 hours as needed for pain (moderate to severe)             oxyCODONE (ROXICODONE) 5 MG tablet  Take 1 tablet (5 mg) by mouth every 6 hours as needed for severe pain             senna-docusate (SENOKOT-S/PERICOLACE) 8.6-50 MG tablet  Take 1 tablet by mouth 2 times daily for 10 days                 Discharge Procedure Orders   Reason for your hospital stay   Order Comments: Postoperative care     Adult [facility] Follow-up and recommended labs and tests   Order Comments: 12/4/2019 4:20 Dr. Ruth Tello     Appointments on [place] and/or [facility] (with [facility] or [facility] provider or service). Call [phone] if you haven't heard regarding these appointments within 7 days of discharge.     Activity   Order Comments: Your activity upon discharge: do not lift more than 10 pounds and no heavy cardio until follow up appointment     Order Specific Question Answer Comments   Is discharge order? Yes      Monitor and record   Order Comments: Monitor and record neck drain output. When drain is less than 30ml in 24hours call ENT clinic to have it removed     When to contact your care team   Order Comments: Increase swelling around surgical site, Drain putting out strawberry milk appearing fluid, neck draining white or bloody fluid, sever pain not controlled by medication     Wound care and dressings   Order Comments: Instructions to care for your wound at home: Aquaphor to surgical neck incision three times a day,     Tubes and drains   Order Comments: You are going home with the following tubes or drains: will go home with neck drain. Call clinic when neck drain is less than 30ml in 24hour period     Full Code     Order Specific Question Answer Comments   Code status determined by: Discussion with patient/legal decision maker      Diet   Order Comments: Follow this diet upon discharge: full adult     Order Specific Question Answer Comments   Is discharge order? Yes        Dispo: To  home  in good condition. All of the patient's questions/concerns have been addressed at this time.     Katelin Chow, PGY1 Otolaryngology Head and Neck Surgery   Otolaryngology-Head & Neck Surgery  Please contact ENT by dialing [phone] and entering job code [phone].      Teaching statement:  I saw the patient prior to discharge. Doing well, pain controlled. I counseled her on shoulder exercises and she formally was seen by OT prior to discharge. No signs of chyle leak. Appropriate for discharge with drain in place. F/u in 1 week for suture removal as scheduled.    Ruth Tello MD    Department of Otolaryngology

## 2019-11-21 NOTE — PROGRESS NOTES
"Otolaryngology Progress Note  November 21, 2019    S: No acute events overnight.     O: /62 (BP Location: Left arm)   Pulse 80   Temp 97.4  F (36.3  C)   Resp 16   Ht 1.676 m (5' 5.98\")   Wt 56.7 kg (125 lb)   LMP 11/09/2019   SpO2 98%   BMI 20.19 kg/m     General: Alert and oriented x 3, No acute distress   HEENT: flat neck, well approximated incision, drain appropriate without chyle   Pulmonary: Breathing non-labored, no stridor, no accessory muscle use.    LISA drain output(s): (last 24 hours)/(last shift)  LISA #1. 25/30/20    LABS:  ROUTINE IP LABS (Last four results)  BMP  Recent Labs   Lab 11/21/19  0613 11/20/19  1547     --    POTASSIUM 3.6  --    CHLORIDE 110*  --    SYDNI 8.2*  --    CO2 26  --    BUN 5*  --    CR 0.60 0.54   GLC 84  --      CBC  Recent Labs   Lab 11/21/19  0613 11/20/19  1547   WBC 7.8  --    RBC 2.87*  --    HGB 8.7*  --    HCT 26.4*  --    MCV 92  --    MCH 30.3  --    MCHC 33.0  --    RDW 11.7  --     332     INRNo lab results found in last 7 days.    A/P: Mira Cao is a 34 year old female who underwent a selective neck on the left side for a p16+ SCC of the left tonsil 11/20/2019.    Neuro:  - Pain control: oxycodone and tylenol    HEENT:  - chyle watch, repaired intraoperatively, so far no evidence of leak while on low fat diet  - Bacitracin for 34 hours, then transition to aquaphor  - follow pathology  - drain cares, drain teaching, potential discharge with a drain    Respiratory:  - supplemental O2 PRN to keep sats >92%    CV/heme:  - no concerns     FEN/GI:  - Bowel regimen: senna    :  - voiding independently    ID:  - perioperative abx     PPX:  - lovenox to start   - SCDs  - IS    Dispo: possibly tomorrow vs later today    Yrn Walden MD  Otolaryngology-Head & Neck Surgery    Please contact ENT with questions by dialing [phone] and entering job code [phone] when prompted.      "

## 2019-11-22 ENCOUNTER — TELEPHONE (OUTPATIENT)
Dept: OTOLARYNGOLOGY | Facility: CLINIC | Age: 34
End: 2019-11-22

## 2019-11-22 DIAGNOSIS — C10.9 SQUAMOUS CELL CARCINOMA OF OROPHARYNX (H): ICD-10-CM

## 2019-11-22 RX ORDER — OXYCODONE HYDROCHLORIDE 5 MG/1
5-10 TABLET ORAL EVERY 6 HOURS PRN
Qty: 30 TABLET | Refills: 0 | Status: SHIPPED | OUTPATIENT
Start: 2019-11-22 | End: 2019-12-13

## 2019-11-22 NOTE — PROGRESS NOTES
[facility]: Post-Discharge Note  SITUATION                                                      When to contact your care team   Order Comments: Increase swelling around surgical site, Drain putting out strawberry milk appearing fluid, neck draining white or bloody fluid, sever pain not controlled by medication      Patient answer yes to all of the questions above            Admission:    Admission Date: 11/20/19   Reason for Admission: Squamous cell carcinoma of oropharynx  Discharge:   Discharge Date: 11/21/19  Discharge Diagnosis: DISSECTION, NECK, MODIFIED RADICAL LEFT  Discharge Service: ENT  Discharge Plan: fu ENT    BACKGROUND                                                    Mira Cao is a 34 year old female who was found to have a necrotic neck mass on the left by an outside ENT. 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 She was recommended at tumor board to undergo a completion neck dissection given her incomplete treatment of the neck previously.      ASSESSMENT      Discharge Assessment  Patient reports symptoms are: New(patient states coating in throat, some discharge- swalling it, not bloody.  Patient states tube discharge is pink- red 7.5cc in 13 hours.  )  Does the patient have all of their medications?: Yes(patient is still in a lot of pain with 5 mg of oxycodone. she is wondering if she can take more?  Add Tylenol? please advise)  Does patient know what their new medications are for?: Yes  Does patient have a follow-up appointment scheduled?: Yes  Does patient have any other questions or concerns?: No    Post-op  Did  the patient have surgery or a procedure: Yes  Incision: healing  Drainage: Yes  Drainage Color: serous  Incision Drainage Amount: moderate(in mouth and drain tube)  Bleeding: scant  Fever: No  Chills: No  Redness: No  Warmth: No  Swelling: Yes(some swelling of neck)  Incision site pain: Yes(patient still experiencing pain with taking 5mg oxycodone)  Closure: suture  PO Intake: full liquids  Bowel Function: normal  Urinary Status: voiding without complaint/concerns        PLAN                                                      Outpatient Plan:      Future Appointments   Date Time Provider Department Center   12/4/2019  4:20 PM Ruth Tello MD [facility]           Mica Gallegos

## 2019-11-22 NOTE — PLAN OF CARE
AVSS.  Left neck incision intact with sutures.  Reviewed discharge instructions with teach-back.  Discharged to home.

## 2019-11-22 NOTE — PROGRESS NOTES
Spoke to patient and advised per provider and RN that it is ok to take 2 tablets of pain medication every 6 hrs and alternate with Tylenol. Provider did want to make sure though that pt would have enough to get through the weekend.pt stated she had 15 tablets left.information relayed to RN and it was decided to send prescription to her pharmacy for refill. Per patient that should be sent to the [facility] in [facility], on [facility] [facility]. Pt was also informed that RN would reach out on Monday to discuss her drain. Pt verbalized understanding.

## 2019-11-22 NOTE — TELEPHONE ENCOUNTER
[facility] Call Center    Phone Message    May a detailed message be left on voicemail: yes    Reason for Call: Other: Patient states she was advised that more oxyCODONE (ROXICODONE) 5 MG tablet would be prescribed to her for the weekend and is requesting to changed the pharmacy she initially gave the nurse today to: [facility] [address], [place] [address]     Action Taken: Message routed to:  [facility] ([facility] ENT

## 2019-11-25 ENCOUNTER — ALLIED HEALTH/NURSE VISIT (OUTPATIENT)
Dept: OTOLARYNGOLOGY | Facility: CLINIC | Age: 34
End: 2019-11-25
Payer: COMMERCIAL

## 2019-11-25 DIAGNOSIS — Z48.03 CHANGE OR REMOVAL OF DRAINS: Primary | ICD-10-CM

## 2019-11-26 LAB — COPATH REPORT: NORMAL

## 2019-11-26 NOTE — PROGRESS NOTES
Patient in clinic today for LISA drain removal. Drain output in last 24 hours was 7 ml, indicating LISA removal as it was less than 30 ml in 24 hours. Incision site evaluated and it was clean, dry and intact without evidence of redness, swelling or drainage. LISA site was cleansed, suture around tubing was removed, drain bulb was opened, and drain was removed easily. Dressing applied to site.  Patient educated on signs and symptoms of infection, site care and provided number to call with further questions or concerns. Patient verbalized understanding and was encouraged to call with any further questions or concerns    Patient does have some minor swelling below chin. Discussed with patient that she should use the jaw bra to help with this.     Patient will return to clinic on Wednesday at 9:00am for suture removal.     Violetta Hurd, RN, BSN

## 2019-11-27 ENCOUNTER — THERAPY VISIT (OUTPATIENT)
Dept: SPEECH THERAPY | Facility: CLINIC | Age: 34
End: 2019-11-27
Payer: COMMERCIAL

## 2019-11-27 ENCOUNTER — ALLIED HEALTH/NURSE VISIT (OUTPATIENT)
Dept: OTOLARYNGOLOGY | Facility: CLINIC | Age: 34
End: 2019-11-27
Payer: COMMERCIAL

## 2019-11-27 VITALS
BODY MASS INDEX: 19.7 KG/M2 | WEIGHT: 122 LBS | SYSTOLIC BLOOD PRESSURE: 117 MMHG | DIASTOLIC BLOOD PRESSURE: 80 MMHG | HEART RATE: 80 BPM

## 2019-11-27 DIAGNOSIS — R13.12 OROPHARYNGEAL DYSPHAGIA: Primary | ICD-10-CM

## 2019-11-27 DIAGNOSIS — C44.42 SQUAMOUS CELL CARCINOMA OF NECK: ICD-10-CM

## 2019-11-27 DIAGNOSIS — I89.0 LYMPHEDEMA: ICD-10-CM

## 2019-11-27 ASSESSMENT — PAIN SCALES - GENERAL: PAINLEVEL: MILD PAIN (3)

## 2019-11-27 NOTE — PATIENT INSTRUCTIONS
1. Please follow-up in clinic as scheduled on 12/4  2. Please call the ENT clinic with any questions,concerns, new or worsening symptoms.    -Clinic number is [phone]   - Violetta's direct line (Dr. Tello's nurse) [phone]    3. A referral has been placed for you for Lymphedema therapy. You will receive a call from rehab schedulers to arrange your first appointment.   If you have not heard from scheduling within 3 business days, please call [phone] to arrange.

## 2019-11-27 NOTE — NURSING NOTE
Chief Complaint   Patient presents with     RECHECK     follow up, suture removal     Chayito Gee LPN

## 2019-12-06 ENCOUNTER — THERAPY VISIT (OUTPATIENT)
Dept: SPEECH THERAPY | Facility: CLINIC | Age: 34
End: 2019-12-06
Payer: COMMERCIAL

## 2019-12-06 ENCOUNTER — OFFICE VISIT (OUTPATIENT)
Dept: OTOLARYNGOLOGY | Facility: CLINIC | Age: 34
End: 2019-12-06
Payer: COMMERCIAL

## 2019-12-06 VITALS
BODY MASS INDEX: 19.44 KG/M2 | WEIGHT: 121 LBS | DIASTOLIC BLOOD PRESSURE: 85 MMHG | HEIGHT: 66 IN | HEART RATE: 73 BPM | SYSTOLIC BLOOD PRESSURE: 131 MMHG

## 2019-12-06 DIAGNOSIS — R13.12 OROPHARYNGEAL DYSPHAGIA: Primary | ICD-10-CM

## 2019-12-06 DIAGNOSIS — C44.42 SQUAMOUS CELL CARCINOMA OF NECK: ICD-10-CM

## 2019-12-06 DIAGNOSIS — C10.9 SQUAMOUS CELL CARCINOMA OF OROPHARYNX (H): ICD-10-CM

## 2019-12-06 ASSESSMENT — MIFFLIN-ST. JEOR: SCORE: 1260.35

## 2019-12-06 ASSESSMENT — PAIN SCALES - GENERAL: PAINLEVEL: NO PAIN (1)

## 2019-12-06 NOTE — NURSING NOTE
"Chief Complaint   Patient presents with     Post-op Visit     DOS 11/20/19     Blood pressure 131/85, pulse 73, height 1.676 m (5' 5.98\"), weight 54.9 kg (121 lb), last menstrual period 11/09/2019, not currently breastfeeding.    Peg Quezada, EMT  "

## 2019-12-06 NOTE — PROGRESS NOTES
I had the pleasure of seeing Mira Cao in follow-up today at the [facility].     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 The patient noticed a left neck mass in June. She thought this was related to a swollen lymph node due to stress as she had just bought a house.  She delayed evaluation until approximately July when she presented to urgent care.  She had an ultrasound obtained on 7/12/2019 which demonstrated a 3.7 x 2.3 x 1.3 cm mass in the left neck.  She then had a CT scan on 8/7/2019 which demonstrated a 1.8 x 1.4 x 3.8 cm partially necrotic/cystic level 2/3 neck mass, concerning for metastatic squamous cell carcinoma.  She was seen by Dr.Todd Cao and had an FNA performed on 8/8/2019.  The pathology from the FNA demonstrated atypical squamous proliferation which was p16-.  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  She elected for removal which was performed by Dr. Cao on 10/2/2019.  The excision was performed without a completion neck dissection.  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  She had a PET CT scan on 2019 locally which demonstrated postop changes without residual disease and no obvious primary malignancy.  On 10/10/2019 she saw Dr. Neely at [facility] for medical oncology consultation where possible chemotherapy was discussed.  She has met with Dr Melendez from radiation oncology at [facility].  She had a dental cleaning.  She was taken to the operating room on 10/24/2019 for direct laryngoscopy with biopsy.  Initial biopsy of the tonsil demonstrated high-grade dysplasia.  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  A radical tonsillectomy was then performed based on  the preoperative discussion with the patient. Final pathology demonstrated a T1 SCC of the tonsil with negative margins. She was taken to the OR on 11/20/2019 for a left neck dissection with resection of her scar. She had 1/38 lymph nodes positive - 0.4 cm deposit of metastatic SCC without STEVE.       Interval history:  She comes in today for follow-up. She was last seen 11/27/2019. She is making improvement in her recovery. She is doing her exercises. She notices drastic improvement in her shoulder movement. She feels some tightness in her neck but is working on ROM. She is doing her swallowing exercises. She has been eating without difficulty. She did have an episode of a vitamin pill getting caught recently that she regurgitated. She talked with Dr Soliman last weekend after her final pathology. She is going to meet with Dr Smith at [facility] on Monday. Her case was reviewed at tumor board today with agreement that she has about a 15-20% risk of recurrence. She has several questions about treatment options and prognosis.      MEDICATIONS:     Current Outpatient Medications   Medication Sig Dispense Refill     acetaminophen (TYLENOL) 160 MG/5ML suspension Take 10-20.5 mLs (320-656 mg) by mouth every 6 hours as needed for fever or mild pain 473 mL 0     caffeine (NO-DOZE) 200 MG TABS tablet Take 1 tablet (200 mg) by mouth daily as needed (headache) 7 tablet 0     loratadine (CLARITIN) 10 MG tablet Take 10 mg by mouth daily as needed        LORazepam (ATIVAN) 0.5 MG tablet Take 0.5-1 mg by mouth as needed        mineral oil-hydrophilic petrolatum (AQUAPHOR) external ointment Apply 1 g topically 3 times daily 99 g 0     oxyCODONE (ROXICODONE) 5 MG tablet Take 1-2 tablets (5-10 mg) by mouth every 6 hours as needed for pain (moderate to severe) 30 tablet 0     biotin 1000 MCG TABS tablet Take 1,000 mcg by mouth daily       chlorhexidine (PERIDEX) 0.12 % solution Swish and spit 15 mLs in mouth 2 times daily (Patient not taking:  Reported on 12/6/2019) 118 mL 0     ferrous sulfate (KP FERROUS SULFATE) 325 (65 Fe) MG tablet Take 325 mg by mouth daily (with breakfast)       Milk Thistle-Dand-Fennel-Licor (MILK THISTLE XTRA) CAPS capsule Take 1 capsule by mouth daily       Omega-3 Fatty Acids (OMEGA 3 PO) Take 2 capsules by mouth daily         ALLERGIES:  No Known Allergies    HABITS/SOCIAL HISTORY:   Works as a .    She is  with no children.    She has no smoking history.  She has about 10 alcoholic beverages on weekends.  She has no chewing tobacco or vaping history.    Social History     Socioeconomic History     Marital status:      Spouse name: Not on file     Number of children: Not on file     Years of education: Not on file     Highest education level: Not on file   Occupational History     Not on file   Social Needs     Financial resource strain: Not on file     Food insecurity:     Worry: Not on file     Inability: Not on file     Transportation needs:     Medical: Not on file     Non-medical: Not on file   Tobacco Use     Smoking status: Never Smoker     Smokeless tobacco: Never Used   Substance and Sexual Activity     Alcohol use: Yes     Drug use: Never     Sexual activity: Not on file   Lifestyle     Physical activity:     Days per week: Not on file     Minutes per session: Not on file     Stress: Not on file   Relationships     Social connections:     Talks on phone: Not on file     Gets together: Not on file     Attends Zoroastrian service: Not on file     Active member of club or organization: Not on file     Attends meetings of clubs or organizations: Not on file     Relationship status: Not on file     Intimate partner violence:     Fear of current or ex partner: Not on file     Emotionally abused: Not on file     Physically abused: Not on file     Forced sexual activity: Not on file   Other Topics Concern     Not on file   Social History Narrative     Not on file       PAST MEDICAL HISTORY:   Past  "Medical History:   Diagnosis Date     Squamous cell carcinoma of neck         PAST SURGICAL HISTORY:   Past Surgical History:   Procedure Laterality Date     DISSECTION RADICAL NECK MODIFIED Left 11/20/2019    Procedure: DISSECTION, NECK, MODIFIED RADICAL LEFT;  Surgeon: Ruth Tello MD;  Location: [facility] OR     LARYNGOSCOPY WITH BIOPSY(IES) N/A 10/24/2019    Procedure: Direct laryngscopy with biopsy;  Surgeon: Ruth Tello MD;  Location: [facility] OR     TONSILLECTOMY ADULT Left 10/24/2019    Procedure: Left Radical  tonsillectomy;  Surgeon: Ruth Tello MD;  Location: [facility] OR       FAMILY HISTORY:    Family History   Problem Relation Age of Onset     Breast Cancer Mother      Bone Cancer Sister        REVIEW OF SYSTEMS:  12 point ROS was negative other than the symptoms noted above in the HPI.  Patient Supplied Answers to Review of Systems  [facility] ROS 10/25/2019   Constitutional Weight loss   Psychology Frequently feeling anxious   Ears, Nose, Throat Nasal congestion or drainage, Sore throat, Trouble swallowing   Musculoskeletal Neck pain   Endocrine Thirst         PHYSICAL EXAMINATION:   /85 (BP Location: Right arm, Patient Position: Chair, Cuff Size: Adult Regular)   Pulse 73   Ht 1.676 m (5' 5.98\")   Wt 54.9 kg (121 lb)   LMP 11/09/2019   BMI 19.54 kg/m     Patient in no apparent distress  Speech normal  Left neck incision healing appropriately, some redundancy at medial aspect of incision  Midline lymphedema is drastically improved  Improved ROM of left shoulder  Slight decrease in ROM of neck      PROCEDURES:         RESULTS REVIEWED:       IMPRESSION AND PLAN:   Mira Cao is a 34-year-old woman with a likely T1N1 p16+ squamous cell carcinoma of the oropharynx. She had an excision of a cystic neck mass by a local ENT which demonstrated a metastatic SCC. She underwent a radical tonsillectomy which showed a small primary tumor in the tonsil which was completely resected. She then had a " completion neck dissection on 11/20/2019 with final pathology showing 1/38 nodes.     She continues to make drastic improvement in her recovery over the last week. She was encouraged to continue all of her care. She met with speech therapy again today.    I spent considerable time answering questions for her today to the best of my ability.     I gave her my email address and she is going to let me know when she makes a decision about radiation or not so we can arrange appropriate follow-up. If she does not have radiation, she would be due for her 3 month post treatment PET which would be due around 2/13/2020.    Thank you very much for the opportunity to participate in the care of your patient.      Ruth Tello MD, M.D.  Otolaryngology- Head & Neck Surgery      This note was dictated with voice recognition software and then edited. Please excuse any unintentional errors.       I spent about 30 minutes with the patient, with nearly the entire time spent answering her questions.        CC:  Ifeanyi Soliman MD  Department of Radiation Oncology  [facility]

## 2019-12-06 NOTE — LETTER
12/6/2019       RE: Mira Cao  [address]  [place] [address]     Dear Colleague,    Thank you for referring your patient, Mira Cao, to the [facility] EAR [facility] THROAT at [facility]. Please see a copy of my visit note below.    I had the pleasure of seeing Mira Cao in follow-up today at the [facility].     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 The patient noticed a left neck mass in June. She thought this was related to a swollen lymph node due to stress as she had just bought a house.  She delayed evaluation until approximately July when she presented to urgent care.  She had an ultrasound obtained on 7/12/2019 which demonstrated a 3.7 x 2.3 x 1.3 cm mass in the left neck.  She then had a CT scan on 8/7/2019 which demonstrated a 1.8 x 1.4 x 3.8 cm partially necrotic/cystic level 2/3 neck mass, concerning for metastatic squamous cell carcinoma.  She was seen by Dr.Todd Cao and had an FNA performed on 8/8/2019.  The pathology from the FNA demonstrated atypical squamous proliferation which was p16-.  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  She elected for removal which was performed by Dr. Cao on 10/2/2019.  The excision was performed without a completion neck dissection.  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  She had a PET CT scan on 2019 locally which demonstrated postop changes without residual disease and no obvious primary malignancy.  On 10/10/2019 she saw Dr. Neely at [facility] for medical oncology consultation where possible chemotherapy was discussed.  She has met with Dr Melendez from radiation oncology at [facility].  She had a dental cleaning.  She was taken to the operating room  on 10/24/2019 for direct laryngoscopy with biopsy.  Initial biopsy of the tonsil demonstrated high-grade dysplasia.  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  A radical tonsillectomy was then performed based on the preoperative discussion with the patient. Final pathology demonstrated a T1 SCC of the tonsil with negative margins. She was taken to the OR on 11/20/2019 for a left neck dissection with resection of her scar. She had 1/38 lymph nodes positive - 0.4 cm deposit of metastatic SCC without STEVE.       Interval history:  She comes in today for follow-up. She was last seen 11/27/2019. She is making improvement in her recovery. She is doing her exercises. She notices drastic improvement in her shoulder movement. She feels some tightness in her neck but is working on ROM. She is doing her swallowing exercises. She has been eating without difficulty. She did have an episode of a vitamin pill getting caught recently that she regurgitated. She talked with Dr Soliman last weekend after her final pathology. She is going to meet with Dr Smith at [facility] on Monday. Her case was reviewed at tumor board today with agreement that she has about a 15-20% risk of recurrence. She has several questions about treatment options and prognosis.      MEDICATIONS:     Current Outpatient Medications   Medication Sig Dispense Refill     acetaminophen (TYLENOL) 160 MG/5ML suspension Take 10-20.5 mLs (320-656 mg) by mouth every 6 hours as needed for fever or mild pain 473 mL 0     caffeine (NO-DOZE) 200 MG TABS tablet Take 1 tablet (200 mg) by mouth daily as needed (headache) 7 tablet 0     loratadine (CLARITIN) 10 MG tablet Take 10 mg by mouth daily as needed        LORazepam (ATIVAN) 0.5 MG tablet Take 0.5-1 mg by mouth as needed        mineral oil-hydrophilic petrolatum (AQUAPHOR) external ointment Apply 1 g topically 3 times daily 99 g 0     oxyCODONE (ROXICODONE) 5  MG tablet Take 1-2 tablets (5-10 mg) by mouth every 6 hours as needed for pain (moderate to severe) 30 tablet 0     biotin 1000 MCG TABS tablet Take 1,000 mcg by mouth daily       chlorhexidine (PERIDEX) 0.12 % solution Swish and spit 15 mLs in mouth 2 times daily (Patient not taking: Reported on 12/6/2019) 118 mL 0     ferrous sulfate (KP FERROUS SULFATE) 325 (65 Fe) MG tablet Take 325 mg by mouth daily (with breakfast)       Milk Thistle-Dand-Fennel-Licor (MILK THISTLE XTRA) CAPS capsule Take 1 capsule by mouth daily       Omega-3 Fatty Acids (OMEGA 3 PO) Take 2 capsules by mouth daily         ALLERGIES:  No Known Allergies    HABITS/SOCIAL HISTORY:   Works as a .    She is  with no children.    She has no smoking history.  She has about 10 alcoholic beverages on weekends.  She has no chewing tobacco or vaping history.    Social History     Socioeconomic History     Marital status:      Spouse name: Not on file     Number of children: Not on file     Years of education: Not on file     Highest education level: Not on file   Occupational History     Not on file   Social Needs     Financial resource strain: Not on file     Food insecurity:     Worry: Not on file     Inability: Not on file     Transportation needs:     Medical: Not on file     Non-medical: Not on file   Tobacco Use     Smoking status: Never Smoker     Smokeless tobacco: Never Used   Substance and Sexual Activity     Alcohol use: Yes     Drug use: Never     Sexual activity: Not on file   Lifestyle     Physical activity:     Days per week: Not on file     Minutes per session: Not on file     Stress: Not on file   Relationships     Social connections:     Talks on phone: Not on file     Gets together: Not on file     Attends Yarsanism service: Not on file     Active member of club or organization: Not on file     Attends meetings of clubs or organizations: Not on file     Relationship status: Not on file     Intimate partner  "violence:     Fear of current or ex partner: Not on file     Emotionally abused: Not on file     Physically abused: Not on file     Forced sexual activity: Not on file   Other Topics Concern     Not on file   Social History Narrative     Not on file       PAST MEDICAL HISTORY:   Past Medical History:   Diagnosis Date     Squamous cell carcinoma of neck         PAST SURGICAL HISTORY:   Past Surgical History:   Procedure Laterality Date     DISSECTION RADICAL NECK MODIFIED Left 11/20/2019    Procedure: DISSECTION, NECK, MODIFIED RADICAL LEFT;  Surgeon: Ruth Tello MD;  Location: [facility] OR     LARYNGOSCOPY WITH BIOPSY(IES) N/A 10/24/2019    Procedure: Direct laryngscopy with biopsy;  Surgeon: Ruth Tello MD;  Location: [facility] OR     TONSILLECTOMY ADULT Left 10/24/2019    Procedure: Left Radical  tonsillectomy;  Surgeon: Ruth Tello MD;  Location: [facility] OR       FAMILY HISTORY:    Family History   Problem Relation Age of Onset     Breast Cancer Mother      Bone Cancer Sister        REVIEW OF SYSTEMS:  12 point ROS was negative other than the symptoms noted above in the HPI.  Patient Supplied Answers to Review of Systems  [facility] ROS 10/25/2019   Constitutional Weight loss   Psychology Frequently feeling anxious   Ears, Nose, Throat Nasal congestion or drainage, Sore throat, Trouble swallowing   Musculoskeletal Neck pain   Endocrine Thirst         PHYSICAL EXAMINATION:   /85 (BP Location: Right arm, Patient Position: Chair, Cuff Size: Adult Regular)   Pulse 73   Ht 1.676 m (5' 5.98\")   Wt 54.9 kg (121 lb)   LMP 11/09/2019   BMI 19.54 kg/m      Patient in no apparent distress  Speech normal  Left neck incision healing appropriately, some redundancy at medial aspect of incision  Midline lymphedema is drastically improved  Improved ROM of left shoulder  Slight decrease in ROM of neck      PROCEDURES:         RESULTS REVIEWED:       IMPRESSION AND PLAN:   Mira Cao is a 34-year-old woman with a " likely T1N1 p16+ squamous cell carcinoma of the oropharynx. She had an excision of a cystic neck mass by a local ENT which demonstrated a metastatic SCC. She underwent a radical tonsillectomy which showed a small primary tumor in the tonsil which was completely resected. She then had a completion neck dissection on 11/20/2019 with final pathology showing 1/38 nodes.     She continues to make drastic improvement in her recovery over the last week. She was encouraged to continue all of her care. She met with speech therapy again today.    I spent considerable time answering questions for her today to the best of my ability.     I gave her my email address and she is going to let me know when she makes a decision about radiation or not so we can arrange appropriate follow-up. If she does not have radiation, she would be due for her 3 month post treatment PET which would be due around 2/13/2020.    Thank you very much for the opportunity to participate in the care of your patient.      Ruth Tello MD, M.D.  Otolaryngology- Head & Neck Surgery      This note was dictated with voice recognition software and then edited. Please excuse any unintentional errors.       I spent about 30 minutes with the patient, with nearly the entire time spent answering her questions.        CC:  Ifeanyi Soliman MD  Department of Radiation Oncology  [facility]

## 2019-12-09 ENCOUNTER — TRANSFERRED RECORDS (OUTPATIENT)
Dept: HEALTH INFORMATION MANAGEMENT | Facility: CLINIC | Age: 34
End: 2019-12-09

## 2019-12-11 ENCOUNTER — HOSPITAL ENCOUNTER (OUTPATIENT)
Dept: PHYSICAL THERAPY | Facility: CLINIC | Age: 34
Setting detail: THERAPIES SERIES
End: 2019-12-11
Attending: OTOLARYNGOLOGY
Payer: COMMERCIAL

## 2019-12-11 DIAGNOSIS — I89.0 LYMPHEDEMA: ICD-10-CM

## 2019-12-11 PROCEDURE — 97535 SELF CARE MNGMENT TRAINING: CPT | Mod: GP,ZF | Performed by: PHYSICAL THERAPIST

## 2019-12-11 PROCEDURE — 97161 PT EVAL LOW COMPLEX 20 MIN: CPT | Mod: GP,ZF | Performed by: PHYSICAL THERAPIST

## 2019-12-11 PROCEDURE — 97110 THERAPEUTIC EXERCISES: CPT | Mod: GP,ZF | Performed by: PHYSICAL THERAPIST

## 2019-12-11 PROCEDURE — 97140 MANUAL THERAPY 1/> REGIONS: CPT | Mod: GP,ZF | Performed by: PHYSICAL THERAPIST

## 2019-12-12 NOTE — PROGRESS NOTES
12/11/19 1500   Rehab Discipline   Discipline PT   Type of Visit   Type of visit Initial Edema Evaluation       present No   General Information   Start of care 12/11/19   Referring physician Dr. Ruth Tello   Orders Evaluate and treat as indicated   Order date 11/27/19   Medical diagnosis Metastatic SCC , lymphedema   Onset of illness / date of surgery 11/20/19   Edema onset 11/20/19   Affected body parts Head / Neck   Edema etiology Cancer with lymph node dissection;Surgery   Location - Cancer with lymph node dissection L neck dissection levels II-IV   Pertinent history of current problem (PT: include personal factors and/or comorbidities that impact the POC; OT: include additional occupational profile info) Pt underwent biopsy and L tonsillectomy on 10/24/19.  She then had neck dissection 11/20 and found to have some sign of cancer in a lymph node. She was told that her recurrence risk is 15% and radiation not recommended but then went to [place] for 2nd opinion and recommended to do Proton therapy. She will make her decision by 12/13/19   Surgical / medical history reviewed Yes   Prior level of functional mobility Independent, likes to do cardio, working full time   Prior treatment Compression garments  (Jaw bra and chin stroking from edema clinic)   Community support Family / friend caregiver   Patient role / employment history Employed   Psychosocial concerns Impaired body image;Impaired coping   [facility] [place]   Living environment comments Lives with spouse   Fall Risk Screen   Fall screen completed by PT   Have you fallen 2 or more times in the past year? No   Have you fallen and had an injury in the past year? No   Is patient a fall risk? No   Subjective Report   Patient report of symptoms Swelling was much worse about a week after surgery but better with a week of using compression. Still feels tight on the L side of neck and fluid under the L side of chin that is  usually soft   Patient / Family Goals   Patient / family goals statement Learn about risk of lymphedema and treatment   Pain   Patient currently in pain No   Cognitive Status   Orientation Orientation to person, place and time   Level of consciousness Alert   Follows commands and answers questions 100% of the time   Personal safety and judgement Intact   Memory Intact   Edema Exam / Assessment   Skin condition Non-pitting;Intact   Skin condition comments pt with soft swelling, minimal under L jaw, upper neck   Scar Yes   Location L neck   Mobility fair, still 3 weeks fresh   Scar comments Pt with little elevated area medially, tissue under the scar is tight   Ulceration No   Girth Measurements   Girth Measurements Refer to separate girth measurement flowsheet   Volume LE   Head / neck volume comments No gross swelling to speak of but baseline measurements   Range of Motion   ROM comments neck ROM decreased for extension and L sidebend and rotation   Strength   Strength Other   Strength comments L shoulder weakness noted. Pt with mild winging bilaterally and able to stabilize scapula with elevation but fatigue with abduction 3/5, flexion 4-/5   Posture   Posture Normal   Activities of Daily Living   Activities of Daily Living independent   Bed Mobility   Bed mobility independent   Transfers   Transfers independent   Gait / Locomotion   Gait / Locomotion independent   Sensory   Sensory perception comments numb at incision   Coordination   Coordination Gross motor coordination appropriate   Muscle Tone   Muscle tone No deficits were identified   Planned Edema Interventions   Planned edema interventions Manual lymph drainage;Fit for compression garment;Exercises;Precautions to prevent infection / exacerbation;Education;Manual therapy;Skin care / precautions;Scar mobilization;Soft tissue mobilization;Myofascial release;Home management program development   Clinical Impression   Criteria for skilled therapeutic  intervention met Yes   Therapy diagnosis lymphedema of head and neck   Influenced by the following impairments / conditions Stage 1   Functional limitations due to impairments / conditions body image, risk of worsening, decreased ROM, risk of infection   Clinical Presentation Evolving/Changing   Clinical Presentation Rationale Pt may do radiation, swelling has gotten better but pt fearful of worsening with increased activity and weather changes   Clinical Decision Making (Complexity) Low complexity   Treatment Frequency Other (see comments)   Treatment duration 6 visits over 60 days, may need to re-assess if pt elects for radiation treatment   Patient / family and/or staff in agreement with plan of care Yes   Risks and benefits of therapy have been explained Yes   Education Assessment   Preferred learning style Reading;Listening;Demonstration;Pictures / video   Barriers to learning No barriers   Goals   Edema Eval Goals 1;2;3   Goal 1   Goal identifier Home Program   Goal description pt will be independent with edema home program including use of compression, MLD, exercise and skin care to prevent exacerbation or progression of swelling    Target date 02/08/20   Goal 2   Goal identifier Cervical ROM   Goal description Pt will have 5 degree increase in ROM of L rotation, L side bend and extension for symmetry of movement and safety with driving   Target date 02/08/20   Goal 3   Goal identifier Measurement   Goal description Pt will have no change in upper neck and vertical head measurement demonstrating good management of risk for worsening lymphedema   Target date 02/08/20   Total Evaluation Time   PT Eval, Low Complexity Minutes (49994) 20

## 2019-12-13 ENCOUNTER — OFFICE VISIT (OUTPATIENT)
Dept: OTOLARYNGOLOGY | Facility: CLINIC | Age: 34
End: 2019-12-13
Payer: COMMERCIAL

## 2019-12-13 ENCOUNTER — THERAPY VISIT (OUTPATIENT)
Dept: SPEECH THERAPY | Facility: CLINIC | Age: 34
End: 2019-12-13
Payer: COMMERCIAL

## 2019-12-13 VITALS
WEIGHT: 124 LBS | HEIGHT: 66 IN | SYSTOLIC BLOOD PRESSURE: 121 MMHG | RESPIRATION RATE: 18 BRPM | TEMPERATURE: 98.9 F | DIASTOLIC BLOOD PRESSURE: 81 MMHG | HEART RATE: 83 BPM | BODY MASS INDEX: 19.93 KG/M2

## 2019-12-13 DIAGNOSIS — C44.42 SQUAMOUS CELL CARCINOMA OF NECK: ICD-10-CM

## 2019-12-13 DIAGNOSIS — R13.12 OROPHARYNGEAL DYSPHAGIA: Primary | ICD-10-CM

## 2019-12-13 ASSESSMENT — PAIN SCALES - GENERAL: PAINLEVEL: MILD PAIN (2)

## 2019-12-13 ASSESSMENT — MIFFLIN-ST. JEOR: SCORE: 1276.46

## 2019-12-13 NOTE — NURSING NOTE
"Chief Complaint   Patient presents with     RECHECK     follow up      Blood pressure 121/81, pulse 83, temperature 98.9  F (37.2  C), resp. rate 18, height 1.68 m (5' 6.14\"), weight 56.2 kg (124 lb), not currently breastfeeding.  Richar Camejo LPN      "

## 2019-12-13 NOTE — LETTER
12/13/2019       RE: Mira Cao  [address]  [place] [address]     Dear Colleague,    Thank you for referring your patient, Mira Cao, to the [facility] EAR NOSE AND THROAT at [facility]. Please see a copy of my visit note below.    I had the pleasure of seeing Mira Cao in follow-up today at the [facility].     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 The patient noticed a left neck mass in June. She thought this was related to a swollen lymph node due to stress as she had just bought a house.  She delayed evaluation until approximately July when she presented to urgent care.  She had an ultrasound obtained on 7/12/2019 which demonstrated a 3.7 x 2.3 x 1.3 cm mass in the left neck.  She then had a CT scan on 8/7/2019 which demonstrated a 1.8 x 1.4 x 3.8 cm partially necrotic/cystic level 2/3 neck mass, concerning for metastatic squamous cell carcinoma.  She was seen by Dr.Todd Cao and had an FNA performed on 8/8/2019.  The pathology from the FNA demonstrated atypical squamous proliferation which was p16-.  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  She elected for removal which was performed by Dr. Cao on 10/2/2019.  The excision was performed without a completion neck dissection.  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  She had a PET CT scan on 2019 locally which demonstrated postop changes without residual disease and no obvious primary malignancy.  On 10/10/2019 she saw Dr. Neely at [facility] for medical oncology consultation where possible chemotherapy was discussed.  She has met with Dr Melendez from radiation oncology at [facility].  She had a dental cleaning.  She was taken to the operating  room on 10/24/2019 for direct laryngoscopy with biopsy.  Initial biopsy of the tonsil demonstrated high-grade dysplasia.  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  A radical tonsillectomy was then performed based on the preoperative discussion with the patient. Final pathology demonstrated a T1 SCC of the tonsil with negative margins. She was taken to the OR on 11/20/2019 for a left neck dissection with resection of her scar. She had 1/38 lymph nodes positive - 0.4 cm deposit of metastatic SCC without STEVE.       Interval history:  She comes in today for follow-up. She was last seen 12/6/2019. She met with the [facility] radiation oncology on Monday for discussion of protons. She is torn about the decision for proceeding with treatment. She does continue to make progress with regards to her recovery. She has several questions about treatment options and prognosis, with concerns about long term risks and quality of life.      MEDICATIONS:     Current Outpatient Medications   Medication Sig Dispense Refill     acetaminophen (TYLENOL) 160 MG/5ML suspension Take 10-20.5 mLs (320-656 mg) by mouth every 6 hours as needed for fever or mild pain 473 mL 0     biotin 1000 MCG TABS tablet Take 1,000 mcg by mouth daily       caffeine (NO-DOZE) 200 MG TABS tablet Take 1 tablet (200 mg) by mouth daily as needed (headache) 7 tablet 0     loratadine (CLARITIN) 10 MG tablet Take 10 mg by mouth daily as needed        LORazepam (ATIVAN) 0.5 MG tablet Take 0.5-1 mg by mouth as needed        Milk Thistle-Dand-Fennel-Licor (MILK THISTLE XTRA) CAPS capsule Take 1 capsule by mouth daily       Omega-3 Fatty Acids (OMEGA 3 PO) Take 2 capsules by mouth daily       order for DME Equipment being ordered: facioplasty garment 1 each 2     ferrous sulfate (KP FERROUS SULFATE) 325 (65 Fe) MG tablet Take 325 mg by mouth daily (with breakfast)         ALLERGIES:  No Known  Allergies    HABITS/SOCIAL HISTORY:   Works as a .    She is  with no children.    She has no smoking history.  She has about 10 alcoholic beverages on weekends.  She has no chewing tobacco or vaping history.    Social History     Socioeconomic History     Marital status:      Spouse name: Not on file     Number of children: Not on file     Years of education: Not on file     Highest education level: Not on file   Occupational History     Not on file   Social Needs     Financial resource strain: Not on file     Food insecurity:     Worry: Not on file     Inability: Not on file     Transportation needs:     Medical: Not on file     Non-medical: Not on file   Tobacco Use     Smoking status: Never Smoker     Smokeless tobacco: Never Used   Substance and Sexual Activity     Alcohol use: Yes     Drug use: Never     Sexual activity: Yes     Partners: Male     Birth control/protection: Condom   Lifestyle     Physical activity:     Days per week: Not on file     Minutes per session: Not on file     Stress: Not on file   Relationships     Social connections:     Talks on phone: Not on file     Gets together: Not on file     Attends [employer] service: Not on file     Active member of club or organization: Not on file     Attends meetings of clubs or organizations: Not on file     Relationship status: Not on file     Intimate partner violence:     Fear of current or ex partner: Not on file     Emotionally abused: Not on file     Physically abused: Not on file     Forced sexual activity: Not on file   Other Topics Concern     Not on file   Social History Narrative     Not on file       PAST MEDICAL HISTORY:   Past Medical History:   Diagnosis Date     Squamous cell carcinoma of neck      Uncomplicated asthma         PAST SURGICAL HISTORY:   Past Surgical History:   Procedure Laterality Date     DISSECTION RADICAL NECK MODIFIED Left 11/20/2019    Procedure: DISSECTION, NECK, MODIFIED RADICAL LEFT;   "Surgeon: Ruth Tello MD;  Location: [facility] OR     LARYNGOSCOPY WITH BIOPSY(IES) N/A 10/24/2019    Procedure: Direct laryngscopy with biopsy;  Surgeon: Ruth Tello MD;  Location: [facility] OR     TONSILLECTOMY ADULT Left 10/24/2019    Procedure: Left Radical  tonsillectomy;  Surgeon: Ruth Tello MD;  Location: [facility] OR       FAMILY HISTORY:    Family History   Problem Relation Age of Onset     Breast Cancer Mother      Cancer Mother         Breast cancer     Bone Cancer Sister      Cancer Sister         Bone cancer       REVIEW OF SYSTEMS:  12 point ROS was negative other than the symptoms noted above in the HPI.  Patient Supplied Answers to Review of Systems  [facility] ROS 12/13/2019   Constitutional Weight loss   Psychology Frequently feeling anxious   Ears, Nose, Throat Nasal congestion or drainage, Sore throat, Trouble swallowing   Musculoskeletal -   Endocrine -         PHYSICAL EXAMINATION:   /81   Pulse 83   Temp 98.9  F (37.2  C)   Resp 18   Ht 1.68 m (5' 6.14\")   Wt 56.2 kg (124 lb)   BMI 19.93 kg/m      Patient in no apparent distress  Speech normal  Left neck incision healing appropriately, decreased redundancy at medial aspect of incision  Midline lymphedema continues to improve      PROCEDURES:       RESULTS REVIEWED:       IMPRESSION AND PLAN:   Mira Cao is a 34-year-old woman with a likely T1N1 p16+ squamous cell carcinoma of the oropharynx. She had an excision of a cystic neck mass by a local ENT which demonstrated a metastatic SCC. She underwent a radical tonsillectomy which showed a small primary tumor in the tonsil which was completely resected. She then had a completion neck dissection on 11/20/2019 with final pathology showing 1/38 nodes.     Dr. Soliman and I had a combined visit with the patient today, spending over an hour answering her questions together. We discussed that we are in agreement with the [facility] that there is about a 1/6-1/5 risk of recurrence and " the goal of the radiation is to reduce the risk of recurrence. We discussed that if she does recur, about 25% of the time, this is not something that can be managed surgically. If she does have recurrence we would anticipate that she would require multi-modality therapy and the impacts on her quality of life would be more significant than the impacts on her quality of life from the side effects of the postoperative radiation.     She has concerns about the side effects of radiation and the long term impacts on her quality of life. One of her biggest concerns for her is long term hypothyroidism. We advised her that this is a side effect that if it does occur can often be well managed with medical management with synthroid. Given the plans for treatment of only one side of the neck, Dr Soliman feels this risk is lower than with bilateral neck treatment. We also discussed that the fibrosis, xerostomia, dysphagia, ORN risks from radiation would be less with unilateral treatment. We would anticipate that overall should be able to tolerate the treatment well if she decides to proceed as she is otherwise young and healthy.     We discussed some of the psychologic effects of a cancer diagnosis which the two of us have previously diagnosed at the time of her first consultation. She was reminded that she has a strong support system which helps with some of the coping.     We did reassure her that we will support her regardless of the decision she makes. We will be happy to follow her long term if she prefers it. She would continue to be supported by our SLP team as well as myself and Dr Soliman for monitoring of recurrence. We discussed the risk of recurrence after 2 years is low.     She was strongly counseled that she has to make a decision about treatment by Monday as she needs to start treatment within the 6 week window postoperatively to maximize the benefits of treatment if she wishes to proceed.    If she does not have  radiation, she would be due for her 3 month post treatment PET which would be due around 2/13/2020.    Thank you very much for the opportunity to participate in the care of your patient.      Ruth Tello MD, M.D.  Otolaryngology- Head & Neck Surgery      This note was dictated with voice recognition software and then edited. Please excuse any unintentional errors.       I spent about 70 minutes (2:07-3:17) with the patient, with the entire time spent answering her questions in conjunction with Dr Soliman.        CC:  Ifeanyi Soliman MD  Department of Radiation Oncology  [facility]

## 2019-12-13 NOTE — PROGRESS NOTES
I had the pleasure of seeing Mira Cao in follow-up today at the [facility] Otolaryngology [facility].     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 The patient noticed a left neck mass in June. She thought this was related to a swollen lymph node due to stress as she had just bought a house.  She delayed evaluation until approximately July when she presented to urgent care.  She had an ultrasound obtained on 7/12/2019 which demonstrated a 3.7 x 2.3 x 1.3 cm mass in the left neck.  She then had a CT scan on 8/7/2019 which demonstrated a 1.8 x 1.4 x 3.8 cm partially necrotic/cystic level 2/3 neck mass, concerning for metastatic squamous cell carcinoma.  She was seen by Dr.Todd Cao and had an FNA performed on 8/8/2019.  The pathology from the FNA demonstrated atypical squamous proliferation which was p16-.  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  She elected for removal which was performed by Dr. Cao on 10/2/2019.  The excision was performed without a completion neck dissection.  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  She had a PET CT scan on 2019 locally which demonstrated postop changes without residual disease and no obvious primary malignancy.  On 10/10/2019 she saw Dr. Neely at [facility] for medical oncology consultation where possible chemotherapy was discussed.  She has met with Dr Melendez from radiation oncology at [facility].  She had a dental cleaning.  She was taken to the operating room on 10/24/2019 for direct laryngoscopy with biopsy.  Initial biopsy of the tonsil demonstrated high-grade dysplasia.  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  A radical tonsillectomy was then performed based on  the preoperative discussion with the patient. Final pathology demonstrated a T1 SCC of the tonsil with negative margins. She was taken to the OR on 11/20/2019 for a left neck dissection with resection of her scar. She had 1/38 lymph nodes positive - 0.4 cm deposit of metastatic SCC without STEVE.       Interval history:  She comes in today for follow-up. She was last seen 12/6/2019. She met with the [facility] radiation oncology on Monday for discussion of protons. She is torn about the decision for proceeding with treatment. She does continue to make progress with regards to her recovery. She has several questions about treatment options and prognosis, with concerns about long term risks and quality of life.      MEDICATIONS:     Current Outpatient Medications   Medication Sig Dispense Refill     acetaminophen (TYLENOL) 160 MG/5ML suspension Take 10-20.5 mLs (320-656 mg) by mouth every 6 hours as needed for fever or mild pain 473 mL 0     biotin 1000 MCG TABS tablet Take 1,000 mcg by mouth daily       caffeine (NO-DOZE) 200 MG TABS tablet Take 1 tablet (200 mg) by mouth daily as needed (headache) 7 tablet 0     loratadine (CLARITIN) 10 MG tablet Take 10 mg by mouth daily as needed        LORazepam (ATIVAN) 0.5 MG tablet Take 0.5-1 mg by mouth as needed        Milk Thistle-Dand-Fennel-Licor (MILK THISTLE XTRA) CAPS capsule Take 1 capsule by mouth daily       Omega-3 Fatty Acids (OMEGA 3 PO) Take 2 capsules by mouth daily       order for DME Equipment being ordered: facioplasty garment 1 each 2     ferrous sulfate (KP FERROUS SULFATE) 325 (65 Fe) MG tablet Take 325 mg by mouth daily (with breakfast)         ALLERGIES:  No Known Allergies    HABITS/SOCIAL HISTORY:   Works as a .    She is  with no children.    She has no smoking history.  She has about 10 alcoholic beverages on weekends.  She has no chewing tobacco or vaping history.    Social History     Socioeconomic History     Marital status:       Spouse name: Not on file     Number of children: Not on file     Years of education: Not on file     Highest education level: Not on file   Occupational History     Not on file   Social Needs     Financial resource strain: Not on file     Food insecurity:     Worry: Not on file     Inability: Not on file     Transportation needs:     Medical: Not on file     Non-medical: Not on file   Tobacco Use     Smoking status: Never Smoker     Smokeless tobacco: Never Used   Substance and Sexual Activity     Alcohol use: Yes     Drug use: Never     Sexual activity: Yes     Partners: Male     Birth control/protection: Condom   Lifestyle     Physical activity:     Days per week: Not on file     Minutes per session: Not on file     Stress: Not on file   Relationships     Social connections:     Talks on phone: Not on file     Gets together: Not on file     Attends [employer] service: Not on file     Active member of club or organization: Not on file     Attends meetings of clubs or organizations: Not on file     Relationship status: Not on file     Intimate partner violence:     Fear of current or ex partner: Not on file     Emotionally abused: Not on file     Physically abused: Not on file     Forced sexual activity: Not on file   Other Topics Concern     Not on file   Social History Narrative     Not on file       PAST MEDICAL HISTORY:   Past Medical History:   Diagnosis Date     Squamous cell carcinoma of neck      Uncomplicated asthma         PAST SURGICAL HISTORY:   Past Surgical History:   Procedure Laterality Date     DISSECTION RADICAL NECK MODIFIED Left 11/20/2019    Procedure: DISSECTION, NECK, MODIFIED RADICAL LEFT;  Surgeon: Ruth Tello MD;  Location: [facility] OR     LARYNGOSCOPY WITH BIOPSY(IES) N/A 10/24/2019    Procedure: Direct laryngscopy with biopsy;  Surgeon: Ruth Tello MD;  Location: [facility] OR     TONSILLECTOMY ADULT Left 10/24/2019    Procedure: Left Radical  tonsillectomy;  Surgeon: Omer  "Ruth Brennan MD;  Location: [facility] OR       FAMILY HISTORY:    Family History   Problem Relation Age of Onset     Breast Cancer Mother      Cancer Mother         Breast cancer     Bone Cancer Sister      Cancer Sister         Bone cancer       REVIEW OF SYSTEMS:  12 point ROS was negative other than the symptoms noted above in the HPI.  Patient Supplied Answers to Review of Systems  [facility] ROS 12/13/2019   Constitutional Weight loss   Psychology Frequently feeling anxious   Ears, Nose, Throat Nasal congestion or drainage, Sore throat, Trouble swallowing   Musculoskeletal -   Endocrine -         PHYSICAL EXAMINATION:   /81   Pulse 83   Temp 98.9  F (37.2  C)   Resp 18   Ht 1.68 m (5' 6.14\")   Wt 56.2 kg (124 lb)   BMI 19.93 kg/m     Patient in no apparent distress  Speech normal  Left neck incision healing appropriately, decreased redundancy at medial aspect of incision  Midline lymphedema continues to improve      PROCEDURES:       RESULTS REVIEWED:       IMPRESSION AND PLAN:   Mira Cao is a 34-year-old woman with a likely T1N1 p16+ squamous cell carcinoma of the oropharynx. She had an excision of a cystic neck mass by a local ENT which demonstrated a metastatic SCC. She underwent a radical tonsillectomy which showed a small primary tumor in the tonsil which was completely resected. She then had a completion neck dissection on 11/20/2019 with final pathology showing 1/38 nodes.     Dr. Soliman and I had a combined visit with the patient today, spending over an hour answering her questions together. We discussed that we are in agreement with the [facility] that there is about a 1/6-1/5 risk of recurrence and the goal of the radiation is to reduce the risk of recurrence. We discussed that if she does recur, about 25% of the time, this is not something that can be managed surgically. If she does have recurrence we would anticipate that she would require multi-modality therapy and the impacts on her " quality of life would be more significant than the impacts on her quality of life from the side effects of the postoperative radiation.     She has concerns about the side effects of radiation and the long term impacts on her quality of life. One of her biggest concerns for her is long term hypothyroidism. We advised her that this is a side effect that if it does occur can often be well managed with medical management with synthroid. Given the plans for treatment of only one side of the neck, Dr Soliman feels this risk is lower than with bilateral neck treatment. We also discussed that the fibrosis, xerostomia, dysphagia, ORN risks from radiation would be less with unilateral treatment. We would anticipate that overall should be able to tolerate the treatment well if she decides to proceed as she is otherwise young and healthy.     We discussed some of the psychologic effects of a cancer diagnosis which the two of us have previously diagnosed at the time of her first consultation. She was reminded that she has a strong support system which helps with some of the coping.     We did reassure her that we will support her regardless of the decision she makes. We will be happy to follow her long term if she prefers it. She would continue to be supported by our SLP team as well as myself and Dr Soliman for monitoring of recurrence. We discussed the risk of recurrence after 2 years is low.     She was strongly counseled that she has to make a decision about treatment by Monday as she needs to start treatment within the 6 week window postoperatively to maximize the benefits of treatment if she wishes to proceed.    If she does not have radiation, she would be due for her 3 month post treatment PET which would be due around 2/13/2020.    Thank you very much for the opportunity to participate in the care of your patient.      Ruth Tello MD, M.D.  Otolaryngology- Head & Neck Surgery      This note was dictated with voice  recognition software and then edited. Please excuse any unintentional errors.       I spent about 70 minutes (2:07-3:17) with the patient, with the entire time spent answering her questions in conjunction with Dr Soliman.        CC:  Ifeanyi Soliman MD  Department of Radiation Oncology  [facility]

## 2019-12-16 DIAGNOSIS — M54.2 CERVICAL MUSCLE PAIN: Primary | ICD-10-CM

## 2019-12-16 DIAGNOSIS — C10.9 SQUAMOUS CELL CARCINOMA OF OROPHARYNX (H): ICD-10-CM

## 2019-12-24 ENCOUNTER — THERAPY VISIT (OUTPATIENT)
Dept: PHYSICAL THERAPY | Facility: CLINIC | Age: 34
End: 2019-12-24
Attending: OTOLARYNGOLOGY
Payer: COMMERCIAL

## 2019-12-24 DIAGNOSIS — C10.9 SQUAMOUS CELL CARCINOMA OF OROPHARYNX (H): ICD-10-CM

## 2019-12-24 DIAGNOSIS — M54.2 CERVICAL MUSCLE PAIN: ICD-10-CM

## 2019-12-24 PROCEDURE — 97140 MANUAL THERAPY 1/> REGIONS: CPT | Mod: GP | Performed by: PHYSICAL THERAPIST

## 2019-12-24 PROCEDURE — 97110 THERAPEUTIC EXERCISES: CPT | Mod: GP | Performed by: PHYSICAL THERAPIST

## 2019-12-24 PROCEDURE — 97161 PT EVAL LOW COMPLEX 20 MIN: CPT | Mod: GP | Performed by: PHYSICAL THERAPIST

## 2019-12-24 NOTE — PROGRESS NOTES
Physical Therapy Initial Evaluation: Subjective History  December 24, 2019     Injury/Condition Details  Presenting Complaint Neck tightness secondary to procedures related to squamous cell carcinoma of oropharynx  Neck dissection November 2019    C/o stiffness anterior neck  Currently working with speech therapy  Starts proton therapy - January 13th     Prior Testing/Intervention for Current Condition  Prior Tests Refer to EMR   Prior Treatment Speech therapy, basic home stretching she was shown in the hospital     Lifestyle & General Medical History   (computer work)   Orthopaedic history Refer to EMR   Notable medical history Refer to EMR   Patient Reported Health excellent       Objective:  CERVICAL EXAMINATION    Posture: Forward head on neck and Rounded shoulders (both mild)  Visible horizontally directed scar from midline c-spine moving laterally to her L    Active ROM ROM   Flexion Full, NE   Extension Full, anterior stretch  Open mouth: decreased anterior stretch  Closed mouth: increased anterior stretch   Protraction na   Retraction na    L R   Rotation Full, NE 10% loss, L stretch   Sidebend Full, NE 75% loss, L stretch     Neurological testing (myotomes, sensation, reflexes, nerve tension) not indicated at this time.    DNF endurance: unable to perform without significant abdominal compensation    Assessment/Plan:    The patient is a 34 year old female with chief complaint of Neck tightness secondary to procedures related to squamous cell carcinoma of oropharynx.    The patient has the following significant findings with corresponding treatment plan.  Diagnosis 1:  Neck tightness secondary to procedures related to squamous cell carcinoma of oropharynx    Pain -  hot/cold therapy, manual therapy, splint/taping/bracing/orthotics and self management  Decreased ROM/flexibility - manual therapy, therapeutic exercise and home program  Decreased strength - therapeutic exercise, therapeutic  activities and home program  Impaired muscle performance - neuro re-education and home program  Decreased function - therapeutic activities and home program  Impaired posture - neuro re-education, therapeutic activities and home program  Instability -  Therapeutic Activity, Therapeutic Exercise, Neuromuscular Re-education, Splinting/Taping/Bracing/Orthotic, home program      Therapy Evaluation Codes:   1) History comprised of:   Personal factors that impact the plan of care:      Please refer to health history in EMR.    Comorbidity factors that impact the plan of care are:      Please refer to health history in EMR.     Medications impacting care: None.  2) Examination of Body Systems comprised of:   Body structures and functions that impact the plan of care:      Cervical spine.   Activity limitations that impact the plan of care are:      Reading/Computer work.   Clinical presentation characteristics are:    Stable/Uncomplicated.  3) Presentation comprised of:   Presentation scored as Low complexity with uncomplicated characteristics..  4) Decision-Making    Low complexity using standardized patient assessment instrument and/or measureable assessment of functional outcome.  Cumulative Therapy Evaluation is: Low complexity.    Previous and current functional limitations:  (See Goal Flow Sheet for this information)    Short term and Long term goals: (See Goal Flow Sheet for this information)     Communication ability:  Patient appears to be able to clearly communicate and understand verbal and written communication and follow directions correctly.  Treatment Explanation - The following has been discussed with the patient: RX ordered/plan of care, anticipated outcomes, and possible risks and side effects.  This patient would benefit from PT intervention to resume normal activities.   Rehab potential is good.    Frequency:  1 X a month, once daily  Duration:  for 3 months  Discharge Plan: Achieve all LTGs, be  independent in home treatment program, and reach maximal therapeutic benefit.    Please refer to the daily flowsheet for treatment today, total treatment time and time spent performing 1:1 timed codes.

## 2019-12-30 ENCOUNTER — TELEPHONE (OUTPATIENT)
Dept: OTOLARYNGOLOGY | Facility: CLINIC | Age: 34
End: 2019-12-30

## 2019-12-30 NOTE — TELEPHONE ENCOUNTER
Called pt to ensure that she has started radiation at [place]. Voicemail full and unable to leave message. Tried calling patient's  with voicemail left.     Ruth Tello MD    Department of Otolaryngology

## 2020-01-02 NOTE — TELEPHONE ENCOUNTER
[facility] Call Center    Phone Message    May a detailed message be left on voicemail: yes    Reason for Call: Other: Patient's , Ottoniel, returned Dr. Tello's call with update.   Patient has started radiation at [place] and so far, so good - they will know more next week.  They thank Dr. Tello for checking in.     Action Taken: Message routed to:  [facility] ([facility]

## 2020-01-06 ENCOUNTER — TRANSFERRED RECORDS (OUTPATIENT)
Dept: HEALTH INFORMATION MANAGEMENT | Facility: CLINIC | Age: 35
End: 2020-01-06

## 2020-01-24 NOTE — PROGRESS NOTES
Outpatient Physical Therapy Discharge Note     Patient: Mira Cao  : 1985    Beginning/End Dates of Reporting Period:  19 to 2020    Referring Provider: Dr. Ruth Tello    Therapy Diagnosis: Lymphedema of head and neck     Client Self Report: See evaluation    Objective Measurements:  Objective Measure: Cervical AROM  Details: Exten 37, R rotation 56, R sidebend 25  Objective Measure: superior neck  Details: 31.5cm  Objective Measure: Vertical Head  Details: 60cm     Goals:  Goal Identifier Home Program   Goal Description pt will be independent with edema home program including use of compression, MLD, exercise and skin care to prevent exacerbation or progression of swelling    Target Date 20   Date Met      Progress:     Goal Identifier Cervical ROM   Goal Description Pt will have 5 degree increase in ROM of L rotation, L side bend and extension for symmetry of movement and safety with driving   Target Date 20   Date Met      Progress: working with outside PT     Goal Identifier Measurement   Goal Description Pt will have no change in upper neck and vertical head measurement demonstrating good management of risk for worsening lymphedema   Target Date 20   Date Met      Progress:     Progress Toward Goals:   Not assessed this period.    Plan:  Discharge from therapy.    Discharge:    Reason for Discharge: Patient has failed to schedule further appointments.  Plan to schedule more appts if needs, but likely no needs. Pt is working with PT for neck pain and ROM    Equipment Issued: home program, pt has jaw bra as well    Discharge Plan: Patient to continue home program. Return to clinic with new order if swelling changess

## 2020-02-13 ENCOUNTER — TELEPHONE (OUTPATIENT)
Dept: OTOLARYNGOLOGY | Facility: CLINIC | Age: 35
End: 2020-02-13

## 2020-02-13 NOTE — TELEPHONE ENCOUNTER
Called patient and her  both to check in on patient with regards to treatment at [place]. Patient's voicemail full, left voicemail for  Ottoniel. I informed them that I will try to reach out again later today or tomorrow.    Ruth Tello MD    Department of Otolaryngology

## 2020-02-15 ENCOUNTER — TRANSFERRED RECORDS (OUTPATIENT)
Dept: HEALTH INFORMATION MANAGEMENT | Facility: CLINIC | Age: 35
End: 2020-02-15

## 2020-02-18 ENCOUNTER — TRANSFERRED RECORDS (OUTPATIENT)
Dept: HEALTH INFORMATION MANAGEMENT | Facility: CLINIC | Age: 35
End: 2020-02-18

## 2020-02-18 ENCOUNTER — TELEPHONE (OUTPATIENT)
Dept: OTOLARYNGOLOGY | Facility: CLINIC | Age: 35
End: 2020-02-18

## 2020-02-18 NOTE — TELEPHONE ENCOUNTER
Called patient - on her last week of treatment at [facility]. Having some side effects but has been able to get through treatment without feeding tube. Will work on getting her scheduled for a visit about 6 weeks from completion of treatment.    Ruth Tello MD    Department of Otolaryngology

## 2020-03-16 PROBLEM — C10.9 SQUAMOUS CELL CARCINOMA OF OROPHARYNX (H): Status: RESOLVED | Noted: 2019-10-25 | Resolved: 2020-03-16

## 2020-03-16 PROBLEM — M54.2 CERVICAL MUSCLE PAIN: Status: RESOLVED | Noted: 2019-12-24 | Resolved: 2020-03-16

## 2020-03-16 NOTE — PROGRESS NOTES
DISCHARGE REPORT    Patient seen one time for evaluation and treat. Please see initial evaluation for discharge information. Episode to be closed at this time and patient formally discharged from therapy.      Alvarado Lion, PT, DPT, OCS

## 2020-03-31 NOTE — PROGRESS NOTES
I had the pleasure of seeing Mira Cao in follow-up today at the [facility].     History of Present Illness:   Mria Cao is a 34 year old woman with a likely T1N1 p16+ SCC of the oropharynx. The patient noticed a left neck mass in June. She thought this was related to a swollen lymph node due to stress as she had just bought a house.  She delayed evaluation until approximately July when she presented to urgent care.  She had an ultrasound obtained on 7/12/2019 which demonstrated a 3.7 x 2.3 x 1.3 cm mass in the left neck.  She then had a CT scan on 8/7/2019 which demonstrated a 1.8 x 1.4 x 3.8 cm partially necrotic/cystic level 2/3 neck mass, concerning for metastatic squamous cell carcinoma.  She was seen by Dr.Todd Cao and had an FNA performed on 8/8/2019.  The pathology from the FNA demonstrated atypical squamous proliferation which was p16-.  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  She elected for removal which was performed by Dr. Cao on 10/2/2019.  The excision was performed without a completion neck dissection.  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  She had a PET CT scan on 2019 locally which demonstrated postop changes without residual disease and no obvious primary malignancy.  On 10/10/2019 she saw Dr. Neely at [facility] for medical oncology consultation where possible chemotherapy was discussed.  She has met with Dr Melendez from radiation oncology at [facility].  She had a dental cleaning.  She was taken to the operating room on 10/24/2019 for direct laryngoscopy with biopsy.  Initial biopsy of the tonsil demonstrated high-grade dysplasia.  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  A radical tonsillectomy was then performed based on  the preoperative discussion with the patient. Final pathology demonstrated a T1 SCC of the tonsil with negative margins. She was taken to the OR on 11/20/2019 for a left neck dissection with resection of her scar. She had 1/38 lymph nodes positive - 0.4 cm deposit of metastatic SCC without STEVE.       Interval history:  She comes in today for follow-up. She was last seen 12/2019. She had proton beam therapy postoperatively at [facility] with Dr Smith. She completed her treatment on 2/24/2020. She said that she missed 1 treatment which she made up. On review of care everywhere she has received IV fluids several times since completion of treatment at [facility]. She says she had issues with nausea towards the end of treatment which has resolved in the last few weeks. She says she has really improved in the last 3 weeks. She is back to work, but working from home due to the coronavirus pandemic. She lost about 20 lbs, has only gained back a few lbs. She feels like she can eat most anything but has to eat slowly, avoids spicy foods. She feels like her throat muscles are tight. She stopped doing swallowing exercises at the end of treatment. She is off the fentanyl and oxycodone, just does occasional ibuprofen and continues to decrease. She notices her mouth seems more dry on the left side of her mouth than the right. She feels like her voice fluctuates. She is wearing the jaw bra. She is social distancing.       MEDICATIONS:     Current Outpatient Medications   Medication Sig Dispense Refill     loratadine (CLARITIN) 10 MG tablet Take 10 mg by mouth daily as needed        acetaminophen (TYLENOL) 160 MG/5ML suspension Take 10-20.5 mLs (320-656 mg) by mouth every 6 hours as needed for fever or mild pain (Patient not taking: Reported on 4/3/2020) 473 mL 0     biotin 1000 MCG TABS tablet Take 1,000 mcg by mouth daily       caffeine (NO-DOZE) 200 MG TABS tablet Take 1 tablet (200 mg) by mouth daily as needed (headache)  (Patient not taking: Reported on 4/3/2020) 7 tablet 0     ferrous sulfate (KP FERROUS SULFATE) 325 (65 Fe) MG tablet Take 325 mg by mouth daily (with breakfast)       LORazepam (ATIVAN) 0.5 MG tablet Take 0.5-1 mg by mouth as needed        Milk Thistle-Dand-Fennel-Licor (MILK THISTLE XTRA) CAPS capsule Take 1 capsule by mouth daily       Omega-3 Fatty Acids (OMEGA 3 PO) Take 2 capsules by mouth daily       order for DME Equipment being ordered: facioplasty garment (Patient not taking: Reported on 4/3/2020) 1 each 2       ALLERGIES:  No Known Allergies    HABITS/SOCIAL HISTORY:   Works as a .    She is  with no children.    She has no smoking history.  She has about 10 alcoholic beverages on weekends.  She has no chewing tobacco or vaping history.    Social History     Socioeconomic History     Marital status:      Spouse name: Not on file     Number of children: Not on file     Years of education: Not on file     Highest education level: Not on file   Occupational History     Not on file   Social Needs     Financial resource strain: Not on file     Food insecurity     Worry: Not on file     Inability: Not on file     Transportation needs     Medical: Not on file     Non-medical: Not on file   Tobacco Use     Smoking status: Never Smoker     Smokeless tobacco: Never Used   Substance and Sexual Activity     Alcohol use: Yes     Drug use: Never     Sexual activity: Yes     Partners: Male     Birth control/protection: Condom   Lifestyle     Physical activity     Days per week: Not on file     Minutes per session: Not on file     Stress: Not on file   Relationships     Social connections     Talks on phone: Not on file     Gets together: Not on file     Attends Christianity service: Not on file     Active member of club or organization: Not on file     Attends meetings of clubs or organizations: Not on file     Relationship status: Not on file     Intimate partner violence     Fear of current or  "ex partner: Not on file     Emotionally abused: Not on file     Physically abused: Not on file     Forced sexual activity: Not on file   Other Topics Concern     Not on file   Social History Narrative     Not on file       PAST MEDICAL HISTORY:   Past Medical History:   Diagnosis Date     Squamous cell carcinoma of neck      Uncomplicated asthma         PAST SURGICAL HISTORY:   Past Surgical History:   Procedure Laterality Date     DISSECTION RADICAL NECK MODIFIED Left 11/20/2019    Procedure: DISSECTION, NECK, MODIFIED RADICAL LEFT;  Surgeon: Ruth Tello MD;  Location: [facility] OR     LARYNGOSCOPY WITH BIOPSY(IES) N/A 10/24/2019    Procedure: Direct laryngscopy with biopsy;  Surgeon: Ruth Tello MD;  Location: [facility] OR     TONSILLECTOMY ADULT Left 10/24/2019    Procedure: Left Radical  tonsillectomy;  Surgeon: Ruth Tello MD;  Location: [facility] OR       FAMILY HISTORY:    Family History   Problem Relation Age of Onset     Breast Cancer Mother      Cancer Mother         Breast cancer     Bone Cancer Sister      Cancer Sister         Bone cancer       REVIEW OF SYSTEMS:  12 point ROS was negative other than the symptoms noted above in the HPI.  Patient Supplied Answers to Review of Systems  [facility] ROS 12/13/2019   Constitutional Weight loss   Psychology Frequently feeling anxious   Ears, Nose, Throat Nasal congestion or drainage, Sore throat, Trouble swallowing   Musculoskeletal -   Endocrine -         PHYSICAL EXAMINATION:   /77 (BP Location: Right arm, Patient Position: Chair, Cuff Size: Adult Regular)   Pulse 64   Temp 99.1  F (37.3  C) (Temporal)   Ht 1.676 m (5' 6\")   Wt 50.3 kg (111 lb)   BMI 17.92 kg/m     Appearance:   normal; NAD, age-appropriate appearance, thin   Communication:   normal; communicates verbally, normal voice quality   Head/Face:   inspection -  Normal; no scars or visible lesions   Salivary glands -  Normal size, no tenderness, swelling, or palpable masses   Facial " strength -  Normal and symmetric bilateral; H/B I/VI   Skin:  normal, no rash   Ocular Motility:  normal occular movements   Ears:  auricle (AD) -  normal  EAC (AD) -  normal  TM (AD) -  Normal, no effusion  auricle (AS) -  normal  EAC (AS) -  normal  TM (AS) -  Normal, no effusion  Normal clinical speech reception   Nose:  Ext. inspection -  Normal  Internal Inspection -  Normal mucosa, septum, and turbinates   Nasopharynx:  normal mucosa, no masses   Oral Cavity:  lips -  Normal mucosa, oral competence, and stoma size   Age-appropriate dentition, healthy gingival mucosa   Hard palate, buccal, floor of mouth mucosa normal   Tongue - normal movement, no lesions   Oropharynx:  mucosa -  Normal, no lesions  soft palate -  Expected post radical tonsillectomy asymmetry of left soft palate  tonsils -  S/p left radical tonsillectomy, some pooled secretions, no masses  BOT - normal   Hypopharynx:  Normal pyriform sinus and pharyngeal wall mucosa   No pooled secretions    Larynx:  Epiglottis, AE folds, false vocal cords, true vocal cords, arytenoids normal in appearance with exception of left AE fold and left arytenoid mild edema   bilaterally mobile cords    Neck: Well healed left neck incision  Very minimal lymphedema in submental space  Normal range of motion   Lymphatic:  no abnormal nodes   Cardiovascular:  warm, pink, well-perfused extremities without swelling, tenderness, or edema   Respiratory:  Normal respiratory effort, no stridor   Neuro/Psych.:  mood/affect -  normal  mental status -  normal       PROCEDURES:   Flexible fiberoptic laryngoscopy: Scope exam was indicated due to oropharyngeal cancer. Verbal consent was obtained. The nasal cavity was prepped with an aerosolized solution of topical anesthetic and vasoconstrictive agent. The scope was passed through the anterior nasal cavity and advanced. Inspection of the nasopharynx revealed no gross abnormality. The base of tongue and vallecula are normal. The  epiglottis, AE folds, false cords, true cords, arytenoids are normal with the exception of left arytenoid and AE fold mild radiation edema.  Inspection of the larynx revealed bilaterally mobile vocal cords. Pyriform sinuses are symmetric. The airway is patent. Procedure tolerated well with no immediate complications noted.      RESULTS REVIEWED:   I reviewed the notes from [facility] PCP    IMPRESSION AND PLAN:   Mira Cao is a 34-year-old woman with a likely T1N1 p16+ squamous cell carcinoma of the oropharynx. She had an excision of a cystic neck mass by a local ENT which demonstrated a metastatic SCC. She underwent a radical tonsillectomy which showed a small primary tumor in the tonsil which was completely resected. She then had a completion neck dissection on 11/20/2019 with final pathology showing 1/38 nodes. She completed postoperative proton beam therapy at [facility] on 2/24/2020.     She is recovering from her treatment. We discussed that she is making the expected progress. She should restart swallowing exercises and these were reinforced by SLP today. She can continue to do the jaw bra and the neck massage.    She was encouraged to work on restarting physical activity.     She is due for her 3 month post-treatment PET around May 11. I will see her back at the same time.    Thank you very much for the opportunity to participate in the care of your patient.      Ruth Tello MD, M.D.  Otolaryngology- Head & Neck Surgery      This note was dictated with voice recognition software and then edited. Please excuse any unintentional errors.             CC:  Ifeanyi Soliman MD  Department of Radiation Oncology  [facility]

## 2020-04-01 ENCOUNTER — TELEPHONE (OUTPATIENT)
Dept: OTOLARYNGOLOGY | Facility: CLINIC | Age: 35
End: 2020-04-01

## 2020-04-01 NOTE — TELEPHONE ENCOUNTER
Records Requested      Facility  [facility]  Fax:[phone]  Phone: [phone]   Outcome * 4/1/20 10:33 AM Faxed req to [place] for Radiation Treatment records per referral from Dr. Omer Robledo     * 4/1/20 11:37 AM Records received and sent to [facility] to be uploaded into the chart - [place]

## 2020-04-03 ENCOUNTER — THERAPY VISIT (OUTPATIENT)
Dept: SPEECH THERAPY | Facility: CLINIC | Age: 35
End: 2020-04-03
Payer: COMMERCIAL

## 2020-04-03 ENCOUNTER — OFFICE VISIT (OUTPATIENT)
Dept: OTOLARYNGOLOGY | Facility: CLINIC | Age: 35
End: 2020-04-03
Payer: COMMERCIAL

## 2020-04-03 VITALS
HEIGHT: 66 IN | SYSTOLIC BLOOD PRESSURE: 113 MMHG | DIASTOLIC BLOOD PRESSURE: 77 MMHG | HEART RATE: 64 BPM | TEMPERATURE: 99.1 F | BODY MASS INDEX: 17.84 KG/M2 | WEIGHT: 111 LBS

## 2020-04-03 DIAGNOSIS — C44.42 SQUAMOUS CELL CARCINOMA OF NECK: ICD-10-CM

## 2020-04-03 DIAGNOSIS — R13.12 OROPHARYNGEAL DYSPHAGIA: Primary | ICD-10-CM

## 2020-04-03 DIAGNOSIS — C10.9 SQUAMOUS CELL CARCINOMA OF OROPHARYNX (H): Primary | ICD-10-CM

## 2020-04-03 ASSESSMENT — MIFFLIN-ST. JEOR: SCORE: 1215.24

## 2020-04-03 ASSESSMENT — PAIN SCALES - GENERAL: PAINLEVEL: NO PAIN (0)

## 2020-04-03 NOTE — LETTER
4/3/2020       RE: Mira Cao  [address]  [place] [address]     Dear Colleague,    Thank you for referring your patient, Mira Cao, to the [facility] EAR NOSE AND THROAT at [facility]. Please see a copy of my visit note below.    I had the pleasure of seeing Mira Cao in follow-up today at the [facility].     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 The patient noticed a left neck mass in June. She thought this was related to a swollen lymph node due to stress as she had just bought a house.  She delayed evaluation until approximately July when she presented to urgent care.  She had an ultrasound obtained on 7/12/2019 which demonstrated a 3.7 x 2.3 x 1.3 cm mass in the left neck.  She then had a CT scan on 8/7/2019 which demonstrated a 1.8 x 1.4 x 3.8 cm partially necrotic/cystic level 2/3 neck mass, concerning for metastatic squamous cell carcinoma.  She was seen by Dr.Todd Cao and had an FNA performed on 8/8/2019.  The pathology from the FNA demonstrated atypical squamous proliferation which was p16-.  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  She elected for removal which was performed by Dr. Cao on 10/2/2019.  The excision was performed without a completion neck dissection.  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  She had a PET CT scan on 2019 locally which demonstrated postop changes without residual disease and no obvious primary malignancy.  On 10/10/2019 she saw Dr. Neely at [facility] for medical oncology consultation where possible chemotherapy was discussed.  She has met with Dr Melendez from radiation oncology at [facility].  She had a dental cleaning.  She was taken to the operating room  on 10/24/2019 for direct laryngoscopy with biopsy.  Initial biopsy of the tonsil demonstrated high-grade dysplasia.  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  A radical tonsillectomy was then performed based on the preoperative discussion with the patient. Final pathology demonstrated a T1 SCC of the tonsil with negative margins. She was taken to the OR on 11/20/2019 for a left neck dissection with resection of her scar. She had 1/38 lymph nodes positive - 0.4 cm deposit of metastatic SCC without STEVE.       Interval history:  She comes in today for follow-up. She was last seen 12/2019. She had proton beam therapy postoperatively at [facility] with Dr Smith. She completed her treatment on 2/24/2020. She said that she missed 1 treatment which she made up. On review of care everywhere she has received IV fluids several times since completion of treatment at [facility]. She says she had issues with nausea towards the end of treatment which has resolved in the last few weeks. She says she has really improved in the last 3 weeks. She is back to work, but working from home due to the coronavirus pandemic. She lost about 20 lbs, has only gained back a few lbs. She feels like she can eat most anything but has to eat slowly, avoids spicy foods. She feels like her throat muscles are tight. She stopped doing swallowing exercises at the end of treatment. She is off the fentanyl and oxycodone, just does occasional ibuprofen and continues to decrease. She notices her mouth seems more dry on the left side of her mouth than the right. She feels like her voice fluctuates. She is wearing the jaw bra. She is social distancing.       MEDICATIONS:     Current Outpatient Medications   Medication Sig Dispense Refill     loratadine (CLARITIN) 10 MG tablet Take 10 mg by mouth daily as needed        acetaminophen (TYLENOL) 160 MG/5ML suspension Take 10-20.5 mLs  (320-656 mg) by mouth every 6 hours as needed for fever or mild pain (Patient not taking: Reported on 4/3/2020) 473 mL 0     biotin 1000 MCG TABS tablet Take 1,000 mcg by mouth daily       caffeine (NO-DOZE) 200 MG TABS tablet Take 1 tablet (200 mg) by mouth daily as needed (headache) (Patient not taking: Reported on 4/3/2020) 7 tablet 0     ferrous sulfate (KP FERROUS SULFATE) 325 (65 Fe) MG tablet Take 325 mg by mouth daily (with breakfast)       LORazepam (ATIVAN) 0.5 MG tablet Take 0.5-1 mg by mouth as needed        Milk Thistle-Dand-Fennel-Licor (MILK THISTLE XTRA) CAPS capsule Take 1 capsule by mouth daily       Omega-3 Fatty Acids (OMEGA 3 PO) Take 2 capsules by mouth daily       order for DME Equipment being ordered: facioplasty garment (Patient not taking: Reported on 4/3/2020) 1 each 2       ALLERGIES:  No Known Allergies    HABITS/SOCIAL HISTORY:   Works as a .    She is  with no children.    She has no smoking history.  She has about 10 alcoholic beverages on weekends.  She has no chewing tobacco or vaping history.    Social History     Socioeconomic History     Marital status:      Spouse name: Not on file     Number of children: Not on file     Years of education: Not on file     Highest education level: Not on file   Occupational History     Not on file   Social Needs     Financial resource strain: Not on file     Food insecurity     Worry: Not on file     Inability: Not on file     Transportation needs     Medical: Not on file     Non-medical: Not on file   Tobacco Use     Smoking status: Never Smoker     Smokeless tobacco: Never Used   Substance and Sexual Activity     Alcohol use: Yes     Drug use: Never     Sexual activity: Yes     Partners: Male     Birth control/protection: Condom   Lifestyle     Physical activity     Days per week: Not on file     Minutes per session: Not on file     Stress: Not on file   Relationships     Social connections     Talks on phone: Not  "on file     Gets together: Not on file     Attends [employer] service: Not on file     Active member of club or organization: Not on file     Attends meetings of clubs or organizations: Not on file     Relationship status: Not on file     Intimate partner violence     Fear of current or ex partner: Not on file     Emotionally abused: Not on file     Physically abused: Not on file     Forced sexual activity: Not on file   Other Topics Concern     Not on file   Social History Narrative     Not on file       PAST MEDICAL HISTORY:   Past Medical History:   Diagnosis Date     Squamous cell carcinoma of neck      Uncomplicated asthma         PAST SURGICAL HISTORY:   Past Surgical History:   Procedure Laterality Date     DISSECTION RADICAL NECK MODIFIED Left 11/20/2019    Procedure: DISSECTION, NECK, MODIFIED RADICAL LEFT;  Surgeon: Ruth Tello MD;  Location: [facility] OR     LARYNGOSCOPY WITH BIOPSY(IES) N/A 10/24/2019    Procedure: Direct laryngscopy with biopsy;  Surgeon: Ruth Tello MD;  Location: [facility] OR     TONSILLECTOMY ADULT Left 10/24/2019    Procedure: Left Radical  tonsillectomy;  Surgeon: Ruth Tello MD;  Location: [facility] OR       FAMILY HISTORY:    Family History   Problem Relation Age of Onset     Breast Cancer Mother      Cancer Mother         Breast cancer     Bone Cancer Sister      Cancer Sister         Bone cancer       REVIEW OF SYSTEMS:  12 point ROS was negative other than the symptoms noted above in the HPI.  Patient Supplied Answers to Review of Systems  [facility] ROS 12/13/2019   Constitutional Weight loss   Psychology Frequently feeling anxious   Ears, Nose, Throat Nasal congestion or drainage, Sore throat, Trouble swallowing   Musculoskeletal -   Endocrine -         PHYSICAL EXAMINATION:   /77 (BP Location: Right arm, Patient Position: Chair, Cuff Size: Adult Regular)   Pulse 64   Temp 99.1  F (37.3  C) (Temporal)   Ht 1.676 m (5' 6\")   Wt 50.3 kg (111 lb)   BMI 17.92 kg/m   "   Appearance:   normal; NAD, age-appropriate appearance, thin   Communication:   normal; communicates verbally, normal voice quality   Head/Face:   inspection -  Normal; no scars or visible lesions   Salivary glands -  Normal size, no tenderness, swelling, or palpable masses   Facial strength -  Normal and symmetric bilateral; H/B I/VI   Skin:  normal, no rash   Ocular Motility:  normal occular movements   Ears:  auricle (AD) -  normal  EAC (AD) -  normal  TM (AD) -  Normal, no effusion  auricle (AS) -  normal  EAC (AS) -  normal  TM (AS) -  Normal, no effusion  Normal clinical speech reception   Nose:  Ext. inspection -  Normal  Internal Inspection -  Normal mucosa, septum, and turbinates   Nasopharynx:  normal mucosa, no masses   Oral Cavity:  lips -  Normal mucosa, oral competence, and stoma size   Age-appropriate dentition, healthy gingival mucosa   Hard palate, buccal, floor of mouth mucosa normal   Tongue - normal movement, no lesions   Oropharynx:  mucosa -  Normal, no lesions  soft palate -  Expected post radical tonsillectomy asymmetry of left soft palate  tonsils -  S/p left radical tonsillectomy, some pooled secretions, no masses  BOT - normal   Hypopharynx:  Normal pyriform sinus and pharyngeal wall mucosa   No pooled secretions    Larynx:  Epiglottis, AE folds, false vocal cords, true vocal cords, arytenoids normal in appearance with exception of left AE fold and left arytenoid mild edema   bilaterally mobile cords    Neck: Well healed left neck incision  Very minimal lymphedema in submental space  Normal range of motion   Lymphatic:  no abnormal nodes   Cardiovascular:  warm, pink, well-perfused extremities without swelling, tenderness, or edema   Respiratory:  Normal respiratory effort, no stridor   Neuro/Psych.:  mood/affect -  normal  mental status -  normal       PROCEDURES:   Flexible fiberoptic laryngoscopy: Scope exam was indicated due to oropharyngeal cancer. Verbal consent was obtained. The  nasal cavity was prepped with an aerosolized solution of topical anesthetic and vasoconstrictive agent. The scope was passed through the anterior nasal cavity and advanced. Inspection of the nasopharynx revealed no gross abnormality. The base of tongue and vallecula are normal. The epiglottis, AE folds, false cords, true cords, arytenoids are normal with the exception of left arytenoid and AE fold mild radiation edema.  Inspection of the larynx revealed bilaterally mobile vocal cords. Pyriform sinuses are symmetric. The airway is patent. Procedure tolerated well with no immediate complications noted.      RESULTS REVIEWED:   I reviewed the notes from [facility] includes the [facility] PCP    IMPRESSION AND PLAN:   Mira Cao is a 34-year-old woman with a likely T1N1 p16+ squamous cell carcinoma of the oropharynx. She had an excision of a cystic neck mass by a local ENT which demonstrated a metastatic SCC. She underwent a radical tonsillectomy which showed a small primary tumor in the tonsil which was completely resected. She then had a completion neck dissection on 11/20/2019 with final pathology showing 1/38 nodes. She completed postoperative proton beam therapy at [facility] on 2/24/2020.     She is recovering from her treatment. We discussed that she is making the expected progress. She should restart swallowing exercises and these were reinforced by SLP today. She can continue to do the jaw bra and the neck massage.    She was encouraged to work on restarting physical activity.     She is due for her 3 month post-treatment PET around May 11. I will see her back at the same time.    Thank you very much for the opportunity to participate in the care of your patient.      Ruth Tello MD, M.D.  Otolaryngology- Head & Neck Surgery      This note was dictated with voice recognition software and then edited. Please excuse any unintentional errors.     CC:  Ifeanyi Soliman MD  Department of Radiation Oncology  [facility]  [place]

## 2020-04-03 NOTE — PATIENT INSTRUCTIONS
1. Please follow-up in clinic with PET scan the week of 5/11  2. Please call the ENT clinic with any questions,concerns, new or worsening symptoms.    -Clinic number is [phone]   - Violetta's direct line (Dr. Tello's nurse) [phone]

## 2020-04-03 NOTE — NURSING NOTE
"Chief Complaint   Patient presents with     RECHECK     6 week follow up     Blood pressure 113/77, pulse 64, temperature 99.1  F (37.3  C), temperature source Temporal, height 1.676 m (5' 6\"), weight 50.3 kg (111 lb), not currently breastfeeding.    Peg Quezada, EMT  "

## 2020-04-16 ENCOUNTER — MYC MEDICAL ADVICE (OUTPATIENT)
Dept: OTOLARYNGOLOGY | Facility: CLINIC | Age: 35
End: 2020-04-16

## 2020-04-16 NOTE — TELEPHONE ENCOUNTER
Pt called regarding [employer] message.     Pt explains over the last few days she's been having heightened anxiety, intermittent nausea and generalized aches. Reports no fever. Reports one emesis this am.     I asked if she had experienced anxiety before; states not to this extent. I asked if there has been anything recently that would cause anxiety. Pt states that she recently tried working out and it didn't go well; she was very fatigued and this may be making her anxious to think that she can't do all the things she was previously able to do. Relaxation techniques and coping mechanisms reviewed. I have instructed pt to stick with bland foods to prevent further nausea.     I have asked that she reach out to ENT again if these symptoms persist through tomorrow.     Direct line given for additional questions/concerns.     Natice Schwab, RN BSN

## 2020-04-20 ENCOUNTER — PATIENT OUTREACH (OUTPATIENT)
Dept: OTOLARYNGOLOGY | Facility: CLINIC | Age: 35
End: 2020-04-20

## 2020-04-20 DIAGNOSIS — C10.9 SQUAMOUS CELL CARCINOMA OF OROPHARYNX (H): Primary | ICD-10-CM

## 2020-04-20 DIAGNOSIS — R11.2 NAUSEA WITH VOMITING: ICD-10-CM

## 2020-04-20 RX ORDER — ONDANSETRON 4 MG/1
4 TABLET, ORALLY DISINTEGRATING ORAL EVERY 6 HOURS PRN
Qty: 20 TABLET | Refills: 0 | Status: SHIPPED | OUTPATIENT
Start: 2020-04-20 | End: 2020-05-15

## 2020-04-20 NOTE — PROGRESS NOTES
"Received a call from patient who reports ongoing nausea and vomiting worsened by movement. This has been going on since Monday of last week. Her anxiety is worsening because she is concerned that she does not know why she is nauseated and vomiting. No fevers, no other concerns except for some blurry vision and \"lag of vision\" when she moves her eyes side to side and some extra mucous in the back of her nasal cavity.  She would like to know what Dr. Tello thinks of her symptoms and if she should be concerned.    Patient is able to keep fluid and some bland foods down. She denies dehydration.    Advised patient to continue to push as much fluids as possible and keep attempting bland foods.     Writer will review with Dr. Tello and contact patient with recommendations. Patient agreeable to plan and will call writer with further questions or concerns. She knows to proceed to ER if she is unable to keep and food or fluids down.       Update: reviewed with Dr. Tello who is agreeable to Zofran prescription for some symptom relief and would suggest patient start with her PCP for evaluation. Patient agreeable to this plan. She was encouraged to reach out to PCP and contact writer if there are any additional concerns. Patient has direct line for any further needs.     Violetta Hurd, RN, BSN    "

## 2020-04-23 ENCOUNTER — PATIENT OUTREACH (OUTPATIENT)
Dept: OTOLARYNGOLOGY | Facility: CLINIC | Age: 35
End: 2020-04-23

## 2020-04-23 NOTE — PROGRESS NOTES
Called and spoke with patient to check in to see if she has improved since earlier this week with continued nausea and vomiting.    She did follow-up with her PCP who suggested she go to the ER which she did. She states that that all her lab work and testing came back normal.    Patient states that she is improving. Her nausea and vomiting have dramatically improved. She continues to have some minor body aches but this is slowly improving as well. She is able to eat and drink more now.     She will continue to monitor her symptoms and contact writer with any further questions or concerns. Patient was appreciative of call and has direct line if needed.     Violetta Hurd, RN, BSN

## 2020-05-11 ENCOUNTER — HOSPITAL ENCOUNTER (OUTPATIENT)
Dept: PET IMAGING | Facility: CLINIC | Age: 35
End: 2020-05-11
Attending: OTOLARYNGOLOGY
Payer: COMMERCIAL

## 2020-05-11 ENCOUNTER — HOSPITAL ENCOUNTER (OUTPATIENT)
Dept: PET IMAGING | Facility: CLINIC | Age: 35
Discharge: HOME | End: 2020-05-11
Attending: OTOLARYNGOLOGY | Admitting: OTOLARYNGOLOGY
Payer: COMMERCIAL

## 2020-05-11 DIAGNOSIS — C10.9 SQUAMOUS CELL CARCINOMA OF OROPHARYNX (H): ICD-10-CM

## 2020-05-11 PROCEDURE — 25000128 H RX IP 250 OP 636: Performed by: OTOLARYNGOLOGY

## 2020-05-11 PROCEDURE — 74177 CT ABD & PELVIS W/CONTRAST: CPT

## 2020-05-11 PROCEDURE — A9552 F18 FDG: HCPCS | Performed by: OTOLARYNGOLOGY

## 2020-05-11 PROCEDURE — 70491 CT SOFT TISSUE NECK W/DYE: CPT

## 2020-05-11 PROCEDURE — 34300033 ZZH RX 343: Performed by: OTOLARYNGOLOGY

## 2020-05-11 RX ORDER — IOPAMIDOL 755 MG/ML
45-135 INJECTION, SOLUTION INTRAVASCULAR ONCE
Status: COMPLETED | OUTPATIENT
Start: 2020-05-11 | End: 2020-05-11

## 2020-05-11 RX ADMIN — IOPAMIDOL 67 ML: 755 INJECTION, SOLUTION INTRAVENOUS at 15:23

## 2020-05-11 RX ADMIN — FLUDEOXYGLUCOSE F-18 10.23 MCI.: 500 INJECTION, SOLUTION INTRAVENOUS at 15:15

## 2020-05-14 NOTE — PROGRESS NOTES
I had the pleasure of seeing Mira Cao in follow-up today at the [facility].     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 The patient noticed a left neck mass in June. She thought this was related to a swollen lymph node due to stress as she had just bought a house.  She delayed evaluation until approximately July when she presented to urgent care.  She had an ultrasound obtained on 7/12/2019 which demonstrated a 3.7 x 2.3 x 1.3 cm mass in the left neck.  She then had a CT scan on 8/7/2019 which demonstrated a 1.8 x 1.4 x 3.8 cm partially necrotic/cystic level 2/3 neck mass, concerning for metastatic squamous cell carcinoma.  She was seen by Dr.Todd Cao and had an FNA performed on 8/8/2019.  The pathology from the FNA demonstrated atypical squamous proliferation which was p16-.  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  She elected for removal which was performed by Dr. Cao on 10/2/2019.  The excision was performed without a completion neck dissection.  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  She had a PET CT scan on 2019 locally which demonstrated postop changes without residual disease and no obvious primary malignancy.  On 10/10/2019 she saw Dr. Neely at [facility] for medical oncology consultation where possible chemotherapy was discussed.  She has met with Dr Melendez from radiation oncology at [facility].  She had a dental cleaning.  She was taken to the operating room on 10/24/2019 for direct laryngoscopy with biopsy.  Initial biopsy of the tonsil demonstrated high-grade dysplasia.  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  A radical tonsillectomy was then performed based on  the preoperative discussion with the patient. Final pathology demonstrated a T1 SCC of the tonsil with negative margins. She was taken to the OR on 11/20/2019 for a left neck dissection with resection of her scar. She had 1/38 lymph nodes positive - 0.4 cm deposit of metastatic SCC without STEVE. She had proton beam therapy postoperatively at [facility] with Dr Smith. She completed her treatment on 2/24/2020. She lost about 20 lbs with treatment.       Interval history:  She comes in today for follow-up. She was last seen in April 2020 following completion of her radiation therapy.  She had her 3-month posttreatment PET scan earlier this week.  It was reviewed at tumor conference earlier today.  There is uptake in the right tonsil and a right level 2 node which are thought to be reactive. She says today that she is doing well.  She does feel like she continues to make progress but it does slow.  She is doing her swallowing exercises.  She is wearing the jaw bra at night when she sleeps.  She does say that she has been slightly less diligent about the swallowing exercises, doing them only 1 time per day.  She does not feel like she has any eating restrictions.  She does have to make slightly more effort with swallowing dry foods.  She denies any neck masses or throat pain.  She did try go back to exercising recently but then had issues with nausea so she is slowly starting back again.  She does feel like her energy is improving.  She is having to take some Ativan occasionally for anxiety and helping with her sleep.  She is not taking any pain medicines.  She is working from home.      MEDICATIONS:     Current Outpatient Medications   Medication Sig Dispense Refill     biotin 1000 MCG TABS tablet Take 1,000 mcg by mouth daily       loratadine (CLARITIN) 10 MG tablet Take 10 mg by mouth daily as needed        LORazepam (ATIVAN) 0.5 MG tablet Take 0.5-1 mg by mouth as needed        Milk Thistle-Dand-Fennel-Licor (MILK  THISTLE XTRA) CAPS capsule Take 1 capsule by mouth daily       Omega-3 Fatty Acids (OMEGA 3 PO) Take 2 capsules by mouth daily       order for DME Equipment being ordered: facioplasty garment 1 each 2     ferrous sulfate (KP FERROUS SULFATE) 325 (65 Fe) MG tablet Take 325 mg by mouth daily (with breakfast)         ALLERGIES:  No Known Allergies    HABITS/SOCIAL HISTORY:   Works as a .    She is  with no children.    She has no smoking history.  She has about 10 alcoholic beverages on weekends.  She has no chewing tobacco or vaping history.    Social History     Socioeconomic History     Marital status:      Spouse name: Not on file     Number of children: Not on file     Years of education: Not on file     Highest education level: Not on file   Occupational History     Not on file   Social Needs     Financial resource strain: Not on file     Food insecurity     Worry: Not on file     Inability: Not on file     Transportation needs     Medical: Not on file     Non-medical: Not on file   Tobacco Use     Smoking status: Never Smoker     Smokeless tobacco: Never Used   Substance and Sexual Activity     Alcohol use: Yes     Drug use: Never     Sexual activity: Yes     Partners: Male     Birth control/protection: Condom   Lifestyle     Physical activity     Days per week: Not on file     Minutes per session: Not on file     Stress: Not on file   Relationships     Social connections     Talks on phone: Not on file     Gets together: Not on file     Attends [employer] service: Not on file     Active member of club or organization: Not on file     Attends meetings of clubs or organizations: Not on file     Relationship status: Not on file     Intimate partner violence     Fear of current or ex partner: Not on file     Emotionally abused: Not on file     Physically abused: Not on file     Forced sexual activity: Not on file   Other Topics Concern     Not on file   Social History Narrative     Not  "on file       PAST MEDICAL HISTORY:   Past Medical History:   Diagnosis Date     Squamous cell carcinoma of neck      Uncomplicated asthma         PAST SURGICAL HISTORY:   Past Surgical History:   Procedure Laterality Date     DISSECTION RADICAL NECK MODIFIED Left 11/20/2019    Procedure: DISSECTION, NECK, MODIFIED RADICAL LEFT;  Surgeon: Ruth Tello MD;  Location: [facility] OR     LARYNGOSCOPY WITH BIOPSY(IES) N/A 10/24/2019    Procedure: Direct laryngscopy with biopsy;  Surgeon: Ruth eTllo MD;  Location: [facility] OR     TONSILLECTOMY ADULT Left 10/24/2019    Procedure: Left Radical  tonsillectomy;  Surgeon: Ruth Tello MD;  Location: [facility] OR       FAMILY HISTORY:    Family History   Problem Relation Age of Onset     Breast Cancer Mother      Cancer Mother         Breast cancer     Bone Cancer Sister      Cancer Sister         Bone cancer       REVIEW OF SYSTEMS:  12 point ROS was negative other than the symptoms noted above in the HPI.  Patient Supplied Answers to Review of Systems  [facility] ROS 5/14/2020   Constitutional -   Neurology Headache   Psychology -   Ears, Nose, Throat Ear pain   Musculoskeletal -   Endocrine -         PHYSICAL EXAMINATION:   /82   Pulse 82   Temp 98  F (36.7  C)   Resp 19   Ht 1.676 m (5' 6\")   Wt 48.5 kg (107 lb)   BMI 17.27 kg/m     Appearance:   normal; NAD, age-appropriate appearance, thin   Communication:   normal; communicates verbally, normal voice quality   Head/Face:   inspection -  Normal; no scars or visible lesions   Salivary glands -  Normal size, no tenderness, swelling, or palpable masses   Facial strength -  Normal and symmetric bilateral; H/B I/VI   Skin:  normal, no rash   Ocular Motility:  normal occular movements   Ears:  auricle (AD) -  normal  EAC (AD) -  normal  TM (AD) -  Normal, no effusion  auricle (AS) -  normal  EAC (AS) -  normal  TM (AS) -  Normal, no effusion  Normal clinical speech reception   Nose:  Ext. inspection -  " Normal  Internal Inspection -  Normal mucosa, septum, and turbinates   Nasopharynx:  normal mucosa, no masses   Oral Cavity:  lips -  Normal mucosa, oral competence, and stoma size   Age-appropriate dentition, healthy gingival mucosa   Hard palate, buccal, floor of mouth mucosa normal   Tongue - normal movement, no lesions   Oropharynx:  mucosa -  Normal, no lesions  soft palate -  Expected post radical tonsillectomy asymmetry of left soft palate  tonsils -  S/p left radical tonsillectomy, some pooled secretions, no masses, slight ulceration along the left lateral pharyngeal wall vs secretions; right tonsil with no palpable masses  BOT - normal   Hypopharynx:  Normal pyriform sinus and pharyngeal wall mucosa   No pooled secretions    Larynx:  Epiglottis, AE folds, false vocal cords, true vocal cords, arytenoids normal in appearance with exception of left AE fold and left arytenoid mild edema that is slightly improved   bilaterally mobile cords    Neck: Well healed left neck incision  Very minimal lymphedema in submental space  Normal range of motion   Lymphatic:  no abnormal nodes   Cardiovascular:  warm, pink, well-perfused extremities without swelling, tenderness, or edema   Respiratory:  Normal respiratory effort, no stridor   Neuro/Psych.:  mood/affect -  normal  mental status -  normal       PROCEDURES:   Flexible fiberoptic laryngoscopy: Scope exam was indicated due to oropharyngeal cancer. Verbal consent was obtained. The nasal cavity was prepped with an aerosolized solution of topical anesthetic and vasoconstrictive agent. The scope was passed through the anterior nasal cavity and advanced. Inspection of the nasopharynx revealed no gross abnormality. The base of tongue and vallecula are normal. There are no abnormalities in the area of the left tonsil. The epiglottis, AE folds, false cords, true cords, arytenoids are normal with the exception of left arytenoid and AE fold mild radiation edema.  Inspection of  the larynx revealed bilaterally mobile vocal cords. Pyriform sinuses are symmetric. The airway is patent. Procedure tolerated well with no immediate complications noted.      RESULTS REVIEWED:   I independently reviewed the PET scan images which show good treatment response in the left tonsil and neck without evidence of recurrent disease.  There is FDG activity in the right tonsil and a mildly FDG avid level 2 node on the right.  There is no distant disease.      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 She had an excision of a cystic neck mass by a local ENT which demonstrated a metastatic SCC. She underwent a radical tonsillectomy which showed a small primary tumor in the tonsil which was completely resected. She then had a completion neck dissection on 11/20/2019 with final pathology showing 1/38 nodes. She completed postoperative proton beam therapy at [place] on 2/24/2020.     She had her 3-month posttreatment PET scan performed earlier this week.  It was discussed at tumor conference. The findings on the scan appear inflammatory and we will plan on a repeat scan in 2 months to reassess.     She met with our speech therapy team today.    A TSH was ordered today.    I will see her back in 2 months with the CT scan.      Thank you very much for the opportunity to participate in the care of your patient.      Ruth Tello MD, M.D.  Otolaryngology- Head & Neck Surgery      This note was dictated with voice recognition software and then edited. Please excuse any unintentional errors.             CC:  Ifeanyi Soliman MD  Department of Radiation Oncology  [facility]

## 2020-05-15 ENCOUNTER — THERAPY VISIT (OUTPATIENT)
Dept: SPEECH THERAPY | Facility: CLINIC | Age: 35
End: 2020-05-15
Payer: COMMERCIAL

## 2020-05-15 ENCOUNTER — OFFICE VISIT (OUTPATIENT)
Dept: OTOLARYNGOLOGY | Facility: CLINIC | Age: 35
End: 2020-05-15
Payer: COMMERCIAL

## 2020-05-15 ENCOUNTER — TUMOR CONFERENCE (OUTPATIENT)
Dept: ONCOLOGY | Facility: CLINIC | Age: 35
End: 2020-05-15
Payer: COMMERCIAL

## 2020-05-15 VITALS
RESPIRATION RATE: 19 BRPM | TEMPERATURE: 98 F | DIASTOLIC BLOOD PRESSURE: 82 MMHG | SYSTOLIC BLOOD PRESSURE: 116 MMHG | HEART RATE: 82 BPM | BODY MASS INDEX: 17.19 KG/M2 | HEIGHT: 66 IN | WEIGHT: 107 LBS

## 2020-05-15 DIAGNOSIS — R13.12 OROPHARYNGEAL DYSPHAGIA: Primary | ICD-10-CM

## 2020-05-15 DIAGNOSIS — C10.9 SQUAMOUS CELL CARCINOMA OF OROPHARYNX (H): Primary | ICD-10-CM

## 2020-05-15 DIAGNOSIS — C44.42 SQUAMOUS CELL CARCINOMA OF NECK: ICD-10-CM

## 2020-05-15 ASSESSMENT — PAIN SCALES - GENERAL: PAINLEVEL: NO PAIN (0)

## 2020-05-15 ASSESSMENT — MIFFLIN-ST. JEOR: SCORE: 1197.1

## 2020-05-15 NOTE — PROGRESS NOTES
Outpatient Speech Language Pathology Progress Note     Patient: Mira Cao  : 1985    Beginning/End Dates of Reporting Period:  10/25/2019 to 5/15/2020    Referring Provider: Dr. 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  She underwent a DL with biopsy and left radical tonsillectomy on 10/24/19; pathology demonstrated a T1 SCC of the tonsil with negative margins.  Patient had increased difficulty swallowing post-operatively which required hospitalization and and NG placement.  NG was removed 19.  Patient underwent a modified radical left neck dissection 19; she had one node positive.  She underwent post-operative proton beam radiation therapy at [facility] which she completed on 20.  She reported she did not have a PEG during her treatment.  She was seen in conjunction with ENT clinic per MD request.  Stated swallowing continues to improve.      Objective Measurements: n/a     Goals:  Goal Identifier Diet   Goal Description 1. Pt will tolerate regular textures with thin liquids with no overt clinical s/sx of penetration/aspiration in the 100% of PO trials when independently using swallowing strategies.    Target Date 20   Date Met      Progress: Goal progressing     Goal Identifier Exercises   Goal Description 2. Pt will complete 10 repetitions 3-5x/day of 5/5 oropharyngeal and jaw strengthening/ROM exercises with minimal written and/or verbal cues.    Target Date 20   Date Met      Progress: Goal progressing           Progress Toward Goals:   Progress this reporting period: Pt has made good progress during this reporting period. She completed proton beam radiation therapy at the end of 2020 without use of PEG; her diet was significantly modified by the end of treatment. Since completing treatment, she has slowly returned to eating  regular textures. Endorsed ongoing difficulty with dry solid textures due to ongoing xerostomia. Stated dysgeusia continues to improve. She completed swallowing exercises during a portion of her treatment and has since resumed exercises on a more regular basis at this time. She does not demonstrate s/sx of aspiration today with sips of thin liquid but reports intermittent s/sx of aspiration with serial sips of thin liquids each week. She will continue to benefit from skilled SLP services to ensure tolerance of PO intake, continue training swallowing strategies, and continue to train/educate on late effects of radiation on swallowing and importance/rationale for swallowing exercises.       Plan:  Continue therapy per current plan of care.    Discharge:  No      Thank you for the referral of Mira Cao. If you have any questions about this report, please contact me using the information below.     Shakira Gee MA, CCC-SLP  Speech-Language Pathologist   [facility]  Department of Otolaryngology/D&T - 4th floor  Pager: [phone]  Phone: [phone]  Email: [email or web address]

## 2020-05-15 NOTE — LETTER
5/15/2020       RE: Mira Cao  [address]  [place] [address]     Dear Colleague,    Thank you for referring your patient, Mira Cao, to the [facility] EAR NOSE AND THROAT at [facility]. Please see a copy of my visit note below.    I had the pleasure of seeing Mira Cao in follow-up today at the [facility].     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 The patient noticed a left neck mass in June. She thought this was related to a swollen lymph node due to stress as she had just bought a house.  She delayed evaluation until approximately July when she presented to urgent care.  She had an ultrasound obtained on 7/12/2019 which demonstrated a 3.7 x 2.3 x 1.3 cm mass in the left neck.  She then had a CT scan on 8/7/2019 which demonstrated a 1.8 x 1.4 x 3.8 cm partially necrotic/cystic level 2/3 neck mass, concerning for metastatic squamous cell carcinoma.  She was seen by Dr.Todd Cao and had an FNA performed on 8/8/2019.  The pathology from the FNA demonstrated atypical squamous proliferation which was p16-.  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  She elected for removal which was performed by Dr. Cao on 10/2/2019.  The excision was performed without a completion neck dissection.  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  She had a PET CT scan on 2019 locally which demonstrated postop changes without residual disease and no obvious primary malignancy.  On 10/10/2019 she saw Dr. Neely at [facility] for medical oncology consultation where possible chemotherapy was discussed.  She has met with Dr Melendez from radiation oncology at [facility].  She had a dental cleaning.  She was taken to the operating room  on 10/24/2019 for direct laryngoscopy with biopsy.  Initial biopsy of the tonsil demonstrated high-grade dysplasia.  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  A radical tonsillectomy was then performed based on the preoperative discussion with the patient. Final pathology demonstrated a T1 SCC of the tonsil with negative margins. She was taken to the OR on 11/20/2019 for a left neck dissection with resection of her scar. She had 1/38 lymph nodes positive - 0.4 cm deposit of metastatic SCC without STEVE. She had proton beam therapy postoperatively at [facility] with Dr Smith. She completed her treatment on 2/24/2020. She lost about 20 lbs with treatment.       Interval history:  She comes in today for follow-up. She was last seen in April 2020 following completion of her radiation therapy.  She had her 3-month posttreatment PET scan earlier this week.  It was reviewed at tumor conference earlier today.  There is uptake in the right tonsil and a right level 2 node which are thought to be reactive. She says today that she is doing well.  She does feel like she continues to make progress but it does slow.  She is doing her swallowing exercises.  She is wearing the jaw bra at night when she sleeps.  She does say that she has been slightly less diligent about the swallowing exercises, doing them only 1 time per day.  She does not feel like she has any eating restrictions.  She does have to make slightly more effort with swallowing dry foods.  She denies any neck masses or throat pain.  She did try go back to exercising recently but then had issues with nausea so she is slowly starting back again.  She does feel like her energy is improving.  She is having to take some Ativan occasionally for anxiety and helping with her sleep.  She is not taking any pain medicines.  She is working from home.      MEDICATIONS:     Current Outpatient Medications    Medication Sig Dispense Refill     biotin 1000 MCG TABS tablet Take 1,000 mcg by mouth daily       loratadine (CLARITIN) 10 MG tablet Take 10 mg by mouth daily as needed        LORazepam (ATIVAN) 0.5 MG tablet Take 0.5-1 mg by mouth as needed        Milk Thistle-Dand-Fennel-Licor (MILK THISTLE XTRA) CAPS capsule Take 1 capsule by mouth daily       Omega-3 Fatty Acids (OMEGA 3 PO) Take 2 capsules by mouth daily       order for DME Equipment being ordered: facioplasty garment 1 each 2     ferrous sulfate (KP FERROUS SULFATE) 325 (65 Fe) MG tablet Take 325 mg by mouth daily (with breakfast)         ALLERGIES:  No Known Allergies    HABITS/SOCIAL HISTORY:   Works as a .    She is  with no children.    She has no smoking history.  She has about 10 alcoholic beverages on weekends.  She has no chewing tobacco or vaping history.    Social History     Socioeconomic History     Marital status:      Spouse name: Not on file     Number of children: Not on file     Years of education: Not on file     Highest education level: Not on file   Occupational History     Not on file   Social Needs     Financial resource strain: Not on file     Food insecurity     Worry: Not on file     Inability: Not on file     Transportation needs     Medical: Not on file     Non-medical: Not on file   Tobacco Use     Smoking status: Never Smoker     Smokeless tobacco: Never Used   Substance and Sexual Activity     Alcohol use: Yes     Drug use: Never     Sexual activity: Yes     Partners: Male     Birth control/protection: Condom   Lifestyle     Physical activity     Days per week: Not on file     Minutes per session: Not on file     Stress: Not on file   Relationships     Social connections     Talks on phone: Not on file     Gets together: Not on file     Attends Hoahaoism service: Not on file     Active member of club or organization: Not on file     Attends meetings of clubs or organizations: Not on file      "Relationship status: Not on file     Intimate partner violence     Fear of current or ex partner: Not on file     Emotionally abused: Not on file     Physically abused: Not on file     Forced sexual activity: Not on file   Other Topics Concern     Not on file   Social History Narrative     Not on file       PAST MEDICAL HISTORY:   Past Medical History:   Diagnosis Date     Squamous cell carcinoma of neck      Uncomplicated asthma         PAST SURGICAL HISTORY:   Past Surgical History:   Procedure Laterality Date     DISSECTION RADICAL NECK MODIFIED Left 11/20/2019    Procedure: DISSECTION, NECK, MODIFIED RADICAL LEFT;  Surgeon: Ruth Tello MD;  Location: [facility] OR     LARYNGOSCOPY WITH BIOPSY(IES) N/A 10/24/2019    Procedure: Direct laryngscopy with biopsy;  Surgeon: Ruth Tello MD;  Location: [facility] OR     TONSILLECTOMY ADULT Left 10/24/2019    Procedure: Left Radical  tonsillectomy;  Surgeon: Ruth Tello MD;  Location: [facility] OR       FAMILY HISTORY:    Family History   Problem Relation Age of Onset     Breast Cancer Mother      Cancer Mother         Breast cancer     Bone Cancer Sister      Cancer Sister         Bone cancer       REVIEW OF SYSTEMS:  12 point ROS was negative other than the symptoms noted above in the HPI.  Patient Supplied Answers to Review of Systems   ENT ROS 5/14/2020   Constitutional -   Neurology Headache   Psychology -   Ears, Nose, Throat Ear pain   Musculoskeletal -   Endocrine -         PHYSICAL EXAMINATION:   /82   Pulse 82   Temp 98  F (36.7  C)   Resp 19   Ht 1.676 m (5' 6\")   Wt 48.5 kg (107 lb)   BMI 17.27 kg/m     Appearance:   normal; NAD, age-appropriate appearance, thin   Communication:   normal; communicates verbally, normal voice quality   Head/Face:   inspection -  Normal; no scars or visible lesions   Salivary glands -  Normal size, no tenderness, swelling, or palpable masses   Facial strength -  Normal and symmetric bilateral; H/B I/VI   Skin:  " normal, no rash   Ocular Motility:  normal occular movements   Ears:  auricle (AD) -  normal  EAC (AD) -  normal  TM (AD) -  Normal, no effusion  auricle (AS) -  normal  EAC (AS) -  normal  TM (AS) -  Normal, no effusion  Normal clinical speech reception   Nose:  Ext. inspection -  Normal  Internal Inspection -  Normal mucosa, septum, and turbinates   Nasopharynx:  normal mucosa, no masses   Oral Cavity:  lips -  Normal mucosa, oral competence, and stoma size   Age-appropriate dentition, healthy gingival mucosa   Hard palate, buccal, floor of mouth mucosa normal   Tongue - normal movement, no lesions   Oropharynx:  mucosa -  Normal, no lesions  soft palate -  Expected post radical tonsillectomy asymmetry of left soft palate  tonsils -  S/p left radical tonsillectomy, some pooled secretions, no masses, slight ulceration along the left lateral pharyngeal wall vs secretions; right tonsil with no palpable masses  BOT - normal   Hypopharynx:  Normal pyriform sinus and pharyngeal wall mucosa   No pooled secretions    Larynx:  Epiglottis, AE folds, false vocal cords, true vocal cords, arytenoids normal in appearance with exception of left AE fold and left arytenoid mild edema that is slightly improved   bilaterally mobile cords    Neck: Well healed left neck incision  Very minimal lymphedema in submental space  Normal range of motion   Lymphatic:  no abnormal nodes   Cardiovascular:  warm, pink, well-perfused extremities without swelling, tenderness, or edema   Respiratory:  Normal respiratory effort, no stridor   Neuro/Psych.:  mood/affect -  normal  mental status -  normal       PROCEDURES:   Flexible fiberoptic laryngoscopy: Scope exam was indicated due to oropharyngeal cancer. Verbal consent was obtained. The nasal cavity was prepped with an aerosolized solution of topical anesthetic and vasoconstrictive agent. The scope was passed through the anterior nasal cavity and advanced. Inspection of the nasopharynx revealed  no gross abnormality. The base of tongue and vallecula are normal. There are no abnormalities in the area of the left tonsil. The epiglottis, AE folds, false cords, true cords, arytenoids are normal with the exception of left arytenoid and AE fold mild radiation edema.  Inspection of the larynx revealed bilaterally mobile vocal cords. Pyriform sinuses are symmetric. The airway is patent. Procedure tolerated well with no immediate complications noted.      RESULTS REVIEWED:   I independently reviewed the PET scan images which show good treatment response in the left tonsil and neck without evidence of recurrent disease.  There is FDG activity in the right tonsil and a mildly FDG avid level 2 node on the right.  There is no distant disease.      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 She had an excision of a cystic neck mass by a local ENT which demonstrated a metastatic SCC. She underwent a radical tonsillectomy which showed a small primary tumor in the tonsil which was completely resected. She then had a completion neck dissection on 11/20/2019 with final pathology showing 1/38 nodes. She completed postoperative proton beam therapy at [facility] on 2/24/2020.     She had her 3-month posttreatment PET scan performed earlier this week.  It was discussed at tumor conference. The findings on the scan appear inflammatory and we will plan on a repeat scan in 2 months to reassess.     She met with our speech therapy team today.    A TSH was ordered today.    I will see her back in 2 months with the CT scan.      Thank you very much for the opportunity to participate in the care of your patient.      Ruth Tello MD, M.D.  Otolaryngology- Head & Neck Surgery      This note was dictated with voice recognition software and then edited. Please excuse any unintentional errors.       CC:  Ifeanyi Soliman MD  Department of Radiation  Oncology  [facility]

## 2020-05-15 NOTE — PROGRESS NOTES
Head & Neck Tumor Conference Note        Status: Return  Staff: Dr. 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 The patient noticed a left neck mass in June. She thought this was related to a swollen lymph node due to stress as she had just bought a house.  She delayed evaluation until approximately July when she presented to urgent care.  She had an ultrasound obtained on 7/12/2019 which demonstrated a 3.7 x 2.3 x 1.3 cm mass in the left neck.  She then had a CT scan on 8/7/2019 which demonstrated a 1.8 x 1.4 x 3.8 cm partially necrotic/cystic level 2/3 neck mass, concerning for metastatic squamous cell carcinoma.  She was seen by Dr.Todd Cao and had an FNA performed on 8/8/2019.  The pathology from the FNA demonstrated atypical squamous proliferation which was p16-.  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  She elected for removal which was performed by Dr. Cao on 10/2/2019.  The excision was performed without a completion neck dissection.  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  She had a PET CT scan on 2019 locally which demonstrated postop changes without residual disease and no obvious primary malignancy.  On 10/10/2019 she saw Dr. Neely at [facility] for medical oncology consultation where possible chemotherapy was discussed.  She has met with Dr Melendez from radiation oncology at [facility].  She had a dental cleaning.      She was taken to the operating room with Dr. Tello on 10/24/2019 for direct  "laryngoscopy with biopsy.  Initial biopsy of the tonsil demonstrated high-grade dysplasia.  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  A radical tonsillectomy was then performed based on the preoperative discussion with the patient. Final pathology demonstrated a T1 SCC of the tonsil with negative margins. She was taken to the OR with Dr. Tello on 11/20/2019 for a left neck dissection with resection of her scar. She had 1/38 lymph nodes positive - 0.4 cm deposit of metastatic SCC without STEVE. She had proton beam therapy postoperatively at [facility] with Dr Smith. She completed her treatment on 2/24/2020. She lost about 20 lbs with treatment.      Pertinent PMH:   Past Medical History:   Diagnosis Date     Squamous cell carcinoma of neck      Uncomplicated asthma       Smoking Hx:   Social History     Tobacco Use     Smoking status: Never Smoker     Smokeless tobacco: Never Used   Substance Use Topics     Alcohol use: Yes     Drug use: Never     Imaging:   PET and CT on  5/11/2020 3:52 PM :  \"IMPRESSION: In this patient with a history of left tonsillar squamous cell carcinoma:  1. No evidence of metastatic disease in the body.  2. Please see separate dictation for high resolution PET-CT of the head and neck for further discussion.\"    PET CT fusion study of the neck 5/11/2020 3:52 PM  \"Impression:   1. Posttreatment changes of left tonsillectomy, left neck dissection, and postradiation changes. FDG uptake along the mucosal surface at and slightly below the left tonsillectomy side extending along the left lateral oropharyngeal wall with associated underlying edema. This uptake is more than expected to be seen on a 3 months PET following radiotherapy however on CT no concerning findings are seen. Close interval follow up is recommended. A neck CT in 2 months can be obtained.    2. Progressed findings in the right palatine tonsil and right neck " "including enlarging tonsil with striated appearance and associated hypermetabolism, and slightly enlarging and moderately FDG avid right level 2 nodes. Findings can be seen with infectious process (tonsillitis) and reactive lymph nodes. Correlation with clinical exam findings/ symptomatology would be helpful. If indicated (if no correlating signs and symptoms), FNA of a right 2A node can be considered.  3. Please refer to the whole body PET CT performed as a separate report, for the findings of the remainder of the body.\"    Tumor Board Recommendation:   Discussion: 33yo female with history of T1NiM0 p16+ SCC of the left tonsil s/p radical tonsillectomy (10/2019), left neck dissection (10/2019), and proton beam therapy (02/2020).    Review of imaging shows 2 areas of focal uptake- one in the right tonsil (striated enhancement, consistent with tonsillitis) and one area on the left inferior to the surgical bed with no associated mass-like enhancement.    Plan:   -Repeat CT neck in 2-3 months    Omid Landry MD PGY-3  Otolaryngology- Head and Neck Surgery    Documentation / Disclaimer Cancer Tumor Board Note  Cancer tumor board recommendations do not override what is determined to be reasonable care and treatment, which is dependent on the circumstances of a patient's case; the patient's medical, social, and personal concerns; and the clinical judgment of the oncologist [physician].  "

## 2020-05-15 NOTE — NURSING NOTE
"Chief Complaint   Patient presents with     RECHECK     follow up      Blood pressure 116/82, pulse 82, temperature 98  F (36.7  C), resp. rate 19, height 1.676 m (5' 6\"), weight 48.5 kg (107 lb), not currently breastfeeding.    Richar Camejo LPN    "

## 2020-06-16 DIAGNOSIS — C10.9 SQUAMOUS CELL CARCINOMA OF OROPHARYNX (H): Primary | ICD-10-CM

## 2020-06-16 DIAGNOSIS — I89.0 LYMPHEDEMA: ICD-10-CM

## 2020-06-18 ENCOUNTER — PATIENT OUTREACH (OUTPATIENT)
Dept: OTOLARYNGOLOGY | Facility: CLINIC | Age: 35
End: 2020-06-18

## 2020-06-18 NOTE — PROGRESS NOTES
Called and left message for patient regarding her mychart message. Advised that we will have Dr. Tello contact her tomorrow afternoon to further discuss her concerns.     Advised patient that she can return call if she has additional questions prior to call tomorrow. Left direct line for return call.     Violetta Hurd, RN, BSN

## 2020-07-06 ENCOUNTER — HOSPITAL ENCOUNTER (OUTPATIENT)
Dept: OCCUPATIONAL THERAPY | Facility: CLINIC | Age: 35
Setting detail: THERAPIES SERIES
End: 2020-07-06
Attending: OTOLARYNGOLOGY
Payer: COMMERCIAL

## 2020-07-06 DIAGNOSIS — C10.9 SQUAMOUS CELL CARCINOMA OF OROPHARYNX (H): ICD-10-CM

## 2020-07-06 DIAGNOSIS — I89.0 LYMPHEDEMA: ICD-10-CM

## 2020-07-06 PROCEDURE — 97140 MANUAL THERAPY 1/> REGIONS: CPT | Mod: GO

## 2020-07-06 PROCEDURE — 97165 OT EVAL LOW COMPLEX 30 MIN: CPT | Mod: GO

## 2020-07-06 PROCEDURE — 97535 SELF CARE MNGMENT TRAINING: CPT | Mod: GO

## 2020-07-06 PROCEDURE — 97110 THERAPEUTIC EXERCISES: CPT | Mod: GO

## 2020-07-06 NOTE — PROGRESS NOTES
07/06/20 0920   Rehab Discipline   Discipline OT   Type of Visit   Type of visit Initial Edema Evaluation   General Information   Start of care 07/06/20   Referring physician Ruth Tello MD   Orders Evaluate and treat as indicated   Order date 06/16/20   Medical diagnosis lymphedema, squamous cell CA of oropharynx   Edema onset 06/16/20   Affected body parts Head / Neck   Edema etiology Cancer with lymph node dissection;Radiation   Edema etiology comments squamous cell CA of oropharynx with L radical tosillectomy, complete neck dissection 11/20/2019 followed by proton beam therapy at [facility].  1/38 nodes +.   Pertinent history of current problem (PT: include personal factors and/or comorbidities that impact the POC; OT: include additional occupational profile info) Patient wears night-time compression mask, but cont to feel swollen and exp neck tightness.  C/o xerostomia L side, difficulty with swallowing, performing swallow ex. given by speech therapy.  PMH otherwise unremarkable; fatigue is improving.   Surgical / medical history reviewed Yes   Prior level of functional mobility independent   Prior treatment Compression garments;Exercise   Patient role / employment history Employed  (working from home)   Psychosocial concerns Impaired body image   Living environment [facility]   Living environment comments lives with spouse   Fall Risk Screen   Fall screen completed by OT   Have you fallen 2 or more times in the past year? No   Have you fallen and had an injury in the past year? No   Abuse Screen (yes response referral indicated)   Feels Unsafe at Home or Work/School no   Feels Threatened by Someone no   Does Anyone Try to Keep You From Having Contact with Others or Doing Things Outside Your Home? no   Physical Signs of Abuse Present no   System Outcome Measures   Lymphedema Life Impact Scale (score range 0-72). A higher score indicates greater impairment.   (NA; head/neck lymphedema)   Subjective  "Report   Patient report of symptoms \"my jaw and neck feel tight on the left\"   Patient / Family Goals   Patient / family goals statement reduce swelling   Pain   Patient currently in pain No   Cognitive Status   Orientation Orientation to person, place and time   Level of consciousness Alert   Follows commands and answers questions 100% of the time   Personal safety and judgement Intact   Memory Intact   Edema Exam / Assessment   Skin condition comments Visible swelling superior to incision scar, along left mandible and L side of trachea.  Light fibrosis, scar adhered at medial portion.   Range of Motion   ROM comments decreased AROM R lat flex and BL ext   Activities of Daily Living   Activities of Daily Living independent all I/ADL   Planned Edema Interventions   Planned edema interventions Manual lymph drainage;Fit for compression garment;Exercises;Precautions to prevent infection / exacerbation;Education;Skin care / precautions;Scar mobilization;Soft tissue mobilization;Myofascial release;Home management program development   Planned edema intervention comments recommend 2x/3 weeks, re-assess   Clinical Impression   Criteria for skilled therapeutic intervention met Yes   Therapy diagnosis head and neck lymphedema secondary to cancer treatment   Influenced by the following impairments / conditions Stage 2  (does not reverse with elevation)   Assessment of Occupational Performance 1-3 Performance Deficits   Identified Performance Deficits limited understanding of long-term lymphedema symptom management, decreased AROM, impaired swallow   Clinical Decision Making (Complexity) Low complexity   Treatment Frequency 2x/week   Treatment duration 3 weeks   Patient / family and/or staff in agreement with plan of care Yes   Risks and benefits of therapy have been explained Yes   Goals   Edema Eval Goals 1;2;3   Goal 1   Goal identifier volume   Goal description For decreased risk of infection and progressive soft-tissue " fibrosis, volume will be reduced s/p MLD so that L mandible/sub-mandibular area approximates R.   Target date 09/06/20   Goal 2   Goal identifier neck AROM   Goal description For increased participation in I/ADL, patient will achieve R lat neck flexion and BL extension WFL   Target date 09/06/20   Goal 3   Goal identifier home program   Goal description For long-term management of long-term lymphedema, patient/caregiver will be independent in home program, including self-MLD, scar massage, HEP, and use of compression garment.   Target date 09/06/20   Goal 4   Goal identifier precautions   Goal description For decreased risk of infection, skin breakdown, and soft-tissue fibrosis, patient/caregiver will independently verbalize lymphedema precautions.   Target date 09/06/20   Total Evaluation Time   OT Margaret, Low Complexity Minutes (29411) 15

## 2020-07-14 ENCOUNTER — HOSPITAL ENCOUNTER (OUTPATIENT)
Dept: OCCUPATIONAL THERAPY | Facility: CLINIC | Age: 35
Setting detail: THERAPIES SERIES
End: 2020-07-14
Attending: OTOLARYNGOLOGY
Payer: COMMERCIAL

## 2020-07-14 PROCEDURE — 97140 MANUAL THERAPY 1/> REGIONS: CPT | Mod: GO

## 2020-07-14 NOTE — PROGRESS NOTES
I had the pleasure of seeing Mira Cao in follow-up today at the [facility].     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 The patient noticed a left neck mass in June. She thought this was related to a swollen lymph node due to stress as she had just bought a house.  She delayed evaluation until approximately July when she presented to urgent care.  She had an ultrasound obtained on 7/12/2019 which demonstrated a 3.7 x 2.3 x 1.3 cm mass in the left neck.  She then had a CT scan on 8/7/2019 which demonstrated a 1.8 x 1.4 x 3.8 cm partially necrotic/cystic level 2/3 neck mass, concerning for metastatic squamous cell carcinoma.  She was seen by Dr.Todd Cao and had an FNA performed on 8/8/2019.  The pathology from the FNA demonstrated atypical squamous proliferation which was p16-.  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  She elected for removal which was performed by Dr. Cao on 10/2/2019.  The excision was performed without a completion neck dissection.  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  She had a PET CT scan on 2019 locally which demonstrated postop changes without residual disease and no obvious primary malignancy.  On 10/10/2019 she saw Dr. Neely at [facility] for medical oncology consultation where possible chemotherapy was discussed.  She has met with Dr Melendez from radiation oncology at [facility].  She had a dental cleaning.  She was taken to the operating room on 10/24/2019 for direct laryngoscopy with biopsy.  Initial biopsy of the tonsil demonstrated high-grade dysplasia.  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  A radical tonsillectomy was then performed based on  the preoperative discussion with the patient. Final pathology demonstrated a T1 SCC of the tonsil with negative margins. She was taken to the OR on 11/20/2019 for a left neck dissection with resection of her scar. She had 1/38 lymph nodes positive - 0.4 cm deposit of metastatic SCC without STEVE. She had proton beam therapy postoperatively at [facility] with Dr Smith. She completed her treatment on 2/24/2020. She lost about 20 lbs with treatment. She had her 3 month post treatment PET in May 2020 which showed uptake in the right tonsil and right level II node thought to be reactive.       Interval history:  She comes in today for follow-up. She was last seen in May 2020. She had her 3 month post treatment PET at that time which showed uptake in the right tonsil and right level II node thought to be reactive. She was continuing to recover from her treatment at that time. She called in in June 2020 with some anxiety related to the scan and phone visit was conducted to help discuss the scan further. She comes in with repeat imaging today. She had her TSH checked today.  The imaging is stable and her TSH is mildly elevated with a normal T4.  She says that she has been feeling good however she has been quite anxious recently.  She denies having any and throat pain.  She endorses a tightness in her chest.  She says that when she swallows she feels like she is swallowing air.  When she eats certain foods she feels vague discomfort on the right side which she is unsure if this is related to food getting caught.  She says that at times she feels like there is something there for about 10 minutes after she eats and then it will resolve.  She is able to eat everything that she wants without any aspiration or nasal regurgitation.  She has no neck masses, ear pain.  She feels like her energy is good although her sleep is off.  She is doing lymphedema therapy and her swallowing exercises.  She saw the dentist on Monday and was told  she is grinding her teeth.      MEDICATIONS:     Current Outpatient Medications   Medication Sig Dispense Refill     biotin 1000 MCG TABS tablet Take 1,000 mcg by mouth daily       citalopram (CELEXA) 10 MG tablet Take 1 tablet (10 mg) by mouth daily 30 tablet 11     ferrous sulfate (KP FERROUS SULFATE) 325 (65 Fe) MG tablet Take 325 mg by mouth daily (with breakfast)       loratadine (CLARITIN) 10 MG tablet Take 10 mg by mouth daily as needed        LORazepam (ATIVAN) 0.5 MG tablet Take 0.5-1 mg by mouth as needed        Milk Thistle-Dand-Fennel-Licor (MILK THISTLE XTRA) CAPS capsule Take 1 capsule by mouth daily       Omega-3 Fatty Acids (OMEGA 3 PO) Take 2 capsules by mouth daily       order for DME Equipment being ordered: facioplasty garment 1 each 2       ALLERGIES:  No Known Allergies    HABITS/SOCIAL HISTORY:   Works as a .    She is  with no children.    She has no smoking history.  She has about 10 alcoholic beverages on weekends.  She has no chewing tobacco or vaping history.    Social History     Socioeconomic History     Marital status:      Spouse name: Not on file     Number of children: Not on file     Years of education: Not on file     Highest education level: Not on file   Occupational History     Not on file   Social Needs     Financial resource strain: Not on file     Food insecurity     Worry: Not on file     Inability: Not on file     Transportation needs     Medical: Not on file     Non-medical: Not on file   Tobacco Use     Smoking status: Never Smoker     Smokeless tobacco: Never Used   Substance and Sexual Activity     Alcohol use: Yes     Drug use: Never     Sexual activity: Yes     Partners: Male     Birth control/protection: Condom   Lifestyle     Physical activity     Days per week: Not on file     Minutes per session: Not on file     Stress: Not on file   Relationships     Social connections     Talks on phone: Not on file     Gets together: Not on file     " Attends Faith service: Not on file     Active member of club or organization: Not on file     Attends meetings of clubs or organizations: Not on file     Relationship status: Not on file     Intimate partner violence     Fear of current or ex partner: Not on file     Emotionally abused: Not on file     Physically abused: Not on file     Forced sexual activity: Not on file   Other Topics Concern     Not on file   Social History Narrative     Not on file       PAST MEDICAL HISTORY:   Past Medical History:   Diagnosis Date     Squamous cell carcinoma of neck      Uncomplicated asthma         PAST SURGICAL HISTORY:   Past Surgical History:   Procedure Laterality Date     DISSECTION RADICAL NECK MODIFIED Left 11/20/2019    Procedure: DISSECTION, NECK, MODIFIED RADICAL LEFT;  Surgeon: Ruth Tello MD;  Location: [facility] OR     LARYNGOSCOPY WITH BIOPSY(IES) N/A 10/24/2019    Procedure: Direct laryngscopy with biopsy;  Surgeon: Ruth Tello MD;  Location: [facility] OR     TONSILLECTOMY ADULT Left 10/24/2019    Procedure: Left Radical  tonsillectomy;  Surgeon: Ruth Tello MD;  Location: [facility] OR       FAMILY HISTORY:    Family History   Problem Relation Age of Onset     Breast Cancer Mother      Cancer Mother         Breast cancer     Bone Cancer Sister      Cancer Sister         Bone cancer       REVIEW OF SYSTEMS:  12 point ROS was negative other than the symptoms noted above in the HPI.  Patient Supplied Answers to Review of Systems  [facility] ROS 5/14/2020   Constitutional -   Neurology Headache   Psychology -   Ears, Nose, Throat Ear pain   Musculoskeletal -   Endocrine -         PHYSICAL EXAMINATION:   /78 (BP Location: Right arm, Patient Position: Chair, Cuff Size: Adult Regular)   Pulse 64   Temp 99  F (37.2  C) (Temporal)   Ht 1.676 m (5' 6\")   Wt 46.3 kg (102 lb)   SpO2 100%   BMI 16.46 kg/m     Appearance:   normal; NAD, age-appropriate appearance, thin   Communication:   normal; communicates " verbally, normal voice quality   Head/Face:   inspection -  Normal; no scars or visible lesions   Salivary glands -  Normal size, no tenderness, swelling, or palpable masses   Facial strength -  Normal and symmetric bilateral; H/B I/VI   Skin:  normal, no rash   Ocular Motility:  normal occular movements   Ears:  auricle (AD) -  normal  EAC (AD) -  normal  TM (AD) -  Normal, no effusion  auricle (AS) -  normal  EAC (AS) -  normal  TM (AS) -  Normal, no effusion  Normal clinical speech reception   Nose:  Ext. inspection -  Normal  Internal Inspection -  Normal mucosa, septum, and turbinates   Nasopharynx:  normal mucosa, no masses   Oral Cavity:  lips -  Normal mucosa, oral competence, and stoma size   Age-appropriate dentition, healthy gingival mucosa   Hard palate, buccal, floor of mouth mucosa normal   Tongue - normal movement, no lesions   Oropharynx:  mucosa -  Normal, no lesions  soft palate -  Expected post radical tonsillectomy asymmetry of left soft palate  tonsils -  S/p left radical tonsillectomy, no secretions, no masses, no concerning mucosal lesions, no palpable masses, expected scar tissue, right tonsil with no palpable masses  BOT - normal  Vallecula - clear   Hypopharynx:  Normal pyriform sinus and pharyngeal wall mucosa   No pooled secretions    Larynx:  Epiglottis, AE folds, false vocal cords, true vocal cords, arytenoids normal in appearance with exception of left AE fold and left arytenoid mild edema that is significantly improved, airway overall improved appearance   bilaterally mobile cords    Neck: Well healed left neck incision with mild redundancy of anterior edge of incision  No significant lymphedema  Normal range of motion   Lymphatic:  no abnormal nodes   Cardiovascular:  warm, pink, well-perfused extremities without swelling, tenderness, or edema   Respiratory:  Normal respiratory effort, no stridor   Neuro/Psych.:  mood/affect -  Anxious and tearful  mental status -  normal        PROCEDURES:   Flexible fiberoptic laryngoscopy: Scope exam was indicated due to oropharyngeal cancer. Verbal consent was obtained. The nasal cavity was prepped with an aerosolized solution of topical anesthetic and vasoconstrictive agent. The scope was passed through the anterior nasal cavity and advanced. Inspection of the nasopharynx revealed no gross abnormality. The base of tongue and vallecula are normal. There are no abnormalities in the area of the left tonsil. The epiglottis, AE folds, false cords, true cords, arytenoids are normal with the exception of left arytenoid and AE fold mild radiation edema that is subtle and improved. Overall the airway is improved.  Inspection of the larynx revealed bilaterally mobile vocal cords. Pyriform sinuses are symmetric. The airway is patent. Procedure tolerated well with no immediate complications noted.            RESULTS REVIEWED:   I independently reviewed the CT scan images which show small right sided nodes but not concerning for malignancy    TSH reviewed - mildly elevated, T4 normal      IMPRESSION AND PLAN:   Mira Cao is a 34-year-old woman with a likely T1N1 p16+ squamous cell carcinoma of the oropharynx. She had an excision of a cystic neck mass by a local ENT which demonstrated a metastatic SCC. She underwent a radical tonsillectomy which showed a small primary tumor in the tonsil which was completely resected. She then had a completion neck dissection on 11/20/2019 with final pathology showing 1/38 nodes. She completed postoperative proton beam therapy at [place] on 2/24/2020.     She had her 3-month posttreatment PET scan in May 2020 with findings thought to be inflammatory, and had repeat CT scan today to followup on these findings.  I discussed with her that the CT scan is reassuring as it is stable in appearance.    She met with our speech therapy team today.    A TSH was drawn today which was mildly elevated with a normal T4.  I will continue  to watch this and we will recheck in 6 months (January 2021).  She may very well require thyroid replacement at some point.    She is quite anxious today and became tearful during the visit.  It sounds like the anxiety is becoming more problematic.  She is already meeting with a therapist about once a month.  She is taking a benzodiazepine as needed for anxiety.  I discussed with her at this point that she may want to consider starting a longer acting medication with some Celexa.  The anxiety has been an ongoing issue for her since her diagnosis.  I worry that this has become worse and is starting to impact her overall quality of life.  We have discussed this medication previously but she is more open to trying it now.  We discussed that is important that she try to continue to live her life despite the cancer diagnosis as we have no evidence of recurrent disease.  I sent a prescription for Celexa to the pharmacy for her to try.    I will see her back in 2 months.      Thank you very much for the opportunity to participate in the care of your patient.      Ruth Tello MD, M.D.  Otolaryngology- Head & Neck Surgery      This note was dictated with voice recognition software and then edited. Please excuse any unintentional errors.       I spent a total of 30 minutes with the patient with >50% of the time spent in counseling over her anxiety as per the plan above.      CC:  Ifeanyi Soliman MD  Department of Radiation Oncology  [facility]

## 2020-07-15 ENCOUNTER — ANCILLARY PROCEDURE (OUTPATIENT)
Dept: CT IMAGING | Facility: CLINIC | Age: 35
End: 2020-07-15
Attending: OTOLARYNGOLOGY
Payer: COMMERCIAL

## 2020-07-15 ENCOUNTER — THERAPY VISIT (OUTPATIENT)
Dept: SPEECH THERAPY | Facility: CLINIC | Age: 35
End: 2020-07-15
Payer: COMMERCIAL

## 2020-07-15 ENCOUNTER — OFFICE VISIT (OUTPATIENT)
Dept: OTOLARYNGOLOGY | Facility: CLINIC | Age: 35
End: 2020-07-15
Payer: COMMERCIAL

## 2020-07-15 VITALS
HEART RATE: 64 BPM | DIASTOLIC BLOOD PRESSURE: 78 MMHG | SYSTOLIC BLOOD PRESSURE: 117 MMHG | BODY MASS INDEX: 16.39 KG/M2 | TEMPERATURE: 99 F | HEIGHT: 66 IN | OXYGEN SATURATION: 100 % | WEIGHT: 102 LBS

## 2020-07-15 DIAGNOSIS — C10.9 SQUAMOUS CELL CARCINOMA OF OROPHARYNX (H): ICD-10-CM

## 2020-07-15 DIAGNOSIS — R13.12 OROPHARYNGEAL DYSPHAGIA: Primary | ICD-10-CM

## 2020-07-15 DIAGNOSIS — C10.9 SQUAMOUS CELL CARCINOMA OF OROPHARYNX (H): Primary | ICD-10-CM

## 2020-07-15 DIAGNOSIS — C44.42 SQUAMOUS CELL CARCINOMA OF NECK: ICD-10-CM

## 2020-07-15 LAB
T4 FREE SERPL-MCNC: 1.32 NG/DL (ref 0.76–1.46)
TSH SERPL DL<=0.005 MIU/L-ACNC: 4.18 MU/L (ref 0.4–4)

## 2020-07-15 RX ORDER — CITALOPRAM HYDROBROMIDE 10 MG/1
10 TABLET ORAL DAILY
Qty: 30 TABLET | Refills: 11 | Status: SHIPPED | OUTPATIENT
Start: 2020-07-15 | End: 2021-10-29

## 2020-07-15 RX ORDER — IOPAMIDOL 755 MG/ML
100 INJECTION, SOLUTION INTRAVASCULAR ONCE
Status: COMPLETED | OUTPATIENT
Start: 2020-07-15 | End: 2020-07-15

## 2020-07-15 RX ADMIN — IOPAMIDOL 100 ML: 755 INJECTION, SOLUTION INTRAVASCULAR at 15:00

## 2020-07-15 ASSESSMENT — PAIN SCALES - GENERAL: PAINLEVEL: NO PAIN (0)

## 2020-07-15 ASSESSMENT — MIFFLIN-ST. JEOR: SCORE: 1174.42

## 2020-07-15 NOTE — NURSING NOTE
"Chief Complaint   Patient presents with     RECHECK     2 month follow up, CT and Labs prior     Blood pressure 117/78, pulse 64, temperature 99  F (37.2  C), temperature source Temporal, height 1.676 m (5' 6\"), weight 46.3 kg (102 lb), SpO2 100 %, not currently breastfeeding.    Peg Quezada, EMT  "

## 2020-07-15 NOTE — LETTER
7/15/2020       RE: Mira Coa  [address]  [place] [address]     Dear Colleague,    Thank you for referring your patient, Mira Cao, to the [facility] EAR NOSE AND THROAT at [facility]. Please see a copy of my visit note below.    I had the pleasure of seeing Mira Cao in follow-up today at the [facility].     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 The patient noticed a left neck mass in June. She thought this was related to a swollen lymph node due to stress as she had just bought a house.  She delayed evaluation until approximately July when she presented to urgent care.  She had an ultrasound obtained on 7/12/2019 which demonstrated a 3.7 x 2.3 x 1.3 cm mass in the left neck.  She then had a CT scan on 8/7/2019 which demonstrated a 1.8 x 1.4 x 3.8 cm partially necrotic/cystic level 2/3 neck mass, concerning for metastatic squamous cell carcinoma.  She was seen by Dr.Todd Cao and had an FNA performed on 8/8/2019.  The pathology from the FNA demonstrated atypical squamous proliferation which was p16-.  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  She elected for removal which was performed by Dr. Cao on 10/2/2019.  The excision was performed without a completion neck dissection.  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  She had a PET CT scan on 2019 locally which demonstrated postop changes without residual disease and no obvious primary malignancy.  On 10/10/2019 she saw Dr. Neely at [facility] for medical oncology consultation where possible chemotherapy was discussed.  She has met with Dr Melendez from radiation oncology at [facility].  She had a dental cleaning.  She was taken to the operating room  on 10/24/2019 for direct laryngoscopy with biopsy.  Initial biopsy of the tonsil demonstrated high-grade dysplasia.  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  A radical tonsillectomy was then performed based on the preoperative discussion with the patient. Final pathology demonstrated a T1 SCC of the tonsil with negative margins. She was taken to the OR on 11/20/2019 for a left neck dissection with resection of her scar. She had 1/38 lymph nodes positive - 0.4 cm deposit of metastatic SCC without STEVE. She had proton beam therapy postoperatively at [facility] with Dr Smith. She completed her treatment on 2/24/2020. She lost about 20 lbs with treatment. She had her 3 month post treatment PET in May 2020 which showed uptake in the right tonsil and right level II node thought to be reactive.       Interval history:  She comes in today for follow-up. She was last seen in May 2020. She had her 3 month post treatment PET at that time which showed uptake in the right tonsil and right level II node thought to be reactive. She was continuing to recover from her treatment at that time. She called in in June 2020 with some anxiety related to the scan and phone visit was conducted to help discuss the scan further. She comes in with repeat imaging today. She had her TSH checked today.  The imaging is stable and her TSH is mildly elevated with a normal T4.  She says that she has been feeling good however she has been quite anxious recently.  She denies having any and throat pain.  She endorses a tightness in her chest.  She says that when she swallows she feels like she is swallowing air.  When she eats certain foods she feels vague discomfort on the right side which she is unsure if this is related to food getting caught.  She says that at times she feels like there is something there for about 10 minutes after she eats and then it will resolve.  She is  able to eat everything that she wants without any aspiration or nasal regurgitation.  She has no neck masses, ear pain.  She feels like her energy is good although her sleep is off.  She is doing lymphedema therapy and her swallowing exercises.  She saw the dentist on Monday and was told she is grinding her teeth.      MEDICATIONS:     Current Outpatient Medications   Medication Sig Dispense Refill     biotin 1000 MCG TABS tablet Take 1,000 mcg by mouth daily       citalopram (CELEXA) 10 MG tablet Take 1 tablet (10 mg) by mouth daily 30 tablet 11     ferrous sulfate (KP FERROUS SULFATE) 325 (65 Fe) MG tablet Take 325 mg by mouth daily (with breakfast)       loratadine (CLARITIN) 10 MG tablet Take 10 mg by mouth daily as needed        LORazepam (ATIVAN) 0.5 MG tablet Take 0.5-1 mg by mouth as needed        Milk Thistle-Dand-Fennel-Licor (MILK THISTLE XTRA) CAPS capsule Take 1 capsule by mouth daily       Omega-3 Fatty Acids (OMEGA 3 PO) Take 2 capsules by mouth daily       order for DME Equipment being ordered: facioplasty garment 1 each 2       ALLERGIES:  No Known Allergies    HABITS/SOCIAL HISTORY:   Works as a .    She is  with no children.    She has no smoking history.  She has about 10 alcoholic beverages on weekends.  She has no chewing tobacco or vaping history.    Social History     Socioeconomic History     Marital status:      Spouse name: Not on file     Number of children: Not on file     Years of education: Not on file     Highest education level: Not on file   Occupational History     Not on file   Social Needs     Financial resource strain: Not on file     Food insecurity     Worry: Not on file     Inability: Not on file     Transportation needs     Medical: Not on file     Non-medical: Not on file   Tobacco Use     Smoking status: Never Smoker     Smokeless tobacco: Never Used   Substance and Sexual Activity     Alcohol use: Yes     Drug use: Never     Sexual activity:  Yes     Partners: Male     Birth control/protection: Condom   Lifestyle     Physical activity     Days per week: Not on file     Minutes per session: Not on file     Stress: Not on file   Relationships     Social connections     Talks on phone: Not on file     Gets together: Not on file     Attends Yazdanism service: Not on file     Active member of club or organization: Not on file     Attends meetings of clubs or organizations: Not on file     Relationship status: Not on file     Intimate partner violence     Fear of current or ex partner: Not on file     Emotionally abused: Not on file     Physically abused: Not on file     Forced sexual activity: Not on file   Other Topics Concern     Not on file   Social History Narrative     Not on file       PAST MEDICAL HISTORY:   Past Medical History:   Diagnosis Date     Squamous cell carcinoma of neck      Uncomplicated asthma         PAST SURGICAL HISTORY:   Past Surgical History:   Procedure Laterality Date     DISSECTION RADICAL NECK MODIFIED Left 11/20/2019    Procedure: DISSECTION, NECK, MODIFIED RADICAL LEFT;  Surgeon: Ruth Tello MD;  Location: [facility] OR     LARYNGOSCOPY WITH BIOPSY(IES) N/A 10/24/2019    Procedure: Direct laryngscopy with biopsy;  Surgeon: Ruth Tello MD;  Location: [facility] OR     TONSILLECTOMY ADULT Left 10/24/2019    Procedure: Left Radical  tonsillectomy;  Surgeon: Ruth Tello MD;  Location: [facility] OR       FAMILY HISTORY:    Family History   Problem Relation Age of Onset     Breast Cancer Mother      Cancer Mother         Breast cancer     Bone Cancer Sister      Cancer Sister         Bone cancer       REVIEW OF SYSTEMS:  12 point ROS was negative other than the symptoms noted above in the HPI.  Patient Supplied Answers to Review of Systems  [facility]NT ROS 5/14/2020   Constitutional -   Neurology Headache   Psychology -   Ears, Nose, Throat Ear pain   Musculoskeletal -   Endocrine -         PHYSICAL EXAMINATION:   /78 (BP  "Location: Right arm, Patient Position: Chair, Cuff Size: Adult Regular)   Pulse 64   Temp 99  F (37.2  C) (Temporal)   Ht 1.676 m (5' 6\")   Wt 46.3 kg (102 lb)   SpO2 100%   BMI 16.46 kg/m     Appearance:   normal; NAD, age-appropriate appearance, thin   Communication:   normal; communicates verbally, normal voice quality   Head/Face:   inspection -  Normal; no scars or visible lesions   Salivary glands -  Normal size, no tenderness, swelling, or palpable masses   Facial strength -  Normal and symmetric bilateral; H/B I/VI   Skin:  normal, no rash   Ocular Motility:  normal occular movements   Ears:  auricle (AD) -  normal  EAC (AD) -  normal  TM (AD) -  Normal, no effusion  auricle (AS) -  normal  EAC (AS) -  normal  TM (AS) -  Normal, no effusion  Normal clinical speech reception   Nose:  Ext. inspection -  Normal  Internal Inspection -  Normal mucosa, septum, and turbinates   Nasopharynx:  normal mucosa, no masses   Oral Cavity:  lips -  Normal mucosa, oral competence, and stoma size   Age-appropriate dentition, healthy gingival mucosa   Hard palate, buccal, floor of mouth mucosa normal   Tongue - normal movement, no lesions   Oropharynx:  mucosa -  Normal, no lesions  soft palate -  Expected post radical tonsillectomy asymmetry of left soft palate  tonsils -  S/p left radical tonsillectomy, no secretions, no masses, no concerning mucosal lesions, no palpable masses, expected scar tissue, right tonsil with no palpable masses  BOT - normal  Vallecula - clear   Hypopharynx:  Normal pyriform sinus and pharyngeal wall mucosa   No pooled secretions    Larynx:  Epiglottis, AE folds, false vocal cords, true vocal cords, arytenoids normal in appearance with exception of left AE fold and left arytenoid mild edema that is significantly improved, airway overall improved appearance   bilaterally mobile cords    Neck: Well healed left neck incision with mild redundancy of anterior edge of incision  No significant " lymphedema  Normal range of motion   Lymphatic:  no abnormal nodes   Cardiovascular:  warm, pink, well-perfused extremities without swelling, tenderness, or edema   Respiratory:  Normal respiratory effort, no stridor   Neuro/Psych.:  mood/affect -  Anxious and tearful  mental status -  normal       PROCEDURES:   Flexible fiberoptic laryngoscopy: Scope exam was indicated due to oropharyngeal cancer. Verbal consent was obtained. The nasal cavity was prepped with an aerosolized solution of topical anesthetic and vasoconstrictive agent. The scope was passed through the anterior nasal cavity and advanced. Inspection of the nasopharynx revealed no gross abnormality. The base of tongue and vallecula are normal. There are no abnormalities in the area of the left tonsil. The epiglottis, AE folds, false cords, true cords, arytenoids are normal with the exception of left arytenoid and AE fold mild radiation edema that is subtle and improved. Overall the airway is improved.  Inspection of the larynx revealed bilaterally mobile vocal cords. Pyriform sinuses are symmetric. The airway is patent. Procedure tolerated well with no immediate complications noted.            RESULTS REVIEWED:   I independently reviewed the CT scan images which show small right sided nodes but not concerning for malignancy    TSH reviewed - mildly elevated, T4 normal      IMPRESSION AND PLAN:   Mira Cao is a 34-year-old woman with a likely T1N1 p16+ squamous cell carcinoma of the oropharynx. She had an excision of a cystic neck mass by a local ENT which demonstrated a metastatic SCC. She underwent a radical tonsillectomy which showed a small primary tumor in the tonsil which was completely resected. She then had a completion neck dissection on 11/20/2019 with final pathology showing 1/38 nodes. She completed postoperative proton beam therapy at [facility] on 2/24/2020.     She had her 3-month posttreatment PET scan in May 2020 with findings thought to  be inflammatory, and had repeat CT scan today to followup on these findings.  I discussed with her that the CT scan is reassuring as it is stable in appearance.    She met with our speech therapy team today.    A TSH was drawn today which was mildly elevated with a normal T4.  I will continue to watch this and we will recheck in 6 months (January 2021).  She may very well require thyroid replacement at some point.    She is quite anxious today and became tearful during the visit.  It sounds like the anxiety is becoming more problematic.  She is already meeting with a therapist about once a month.  She is taking a benzodiazepine as needed for anxiety.  I discussed with her at this point that she may want to consider starting a longer acting medication with some Celexa.  The anxiety has been an ongoing issue for her since her diagnosis.  I worry that this has become worse and is starting to impact her overall quality of life.  We have discussed this medication previously but she is more open to trying it now.  We discussed that is important that she try to continue to live her life despite the cancer diagnosis as we have no evidence of recurrent disease.  I sent a prescription for Celexa to the pharmacy for her to try.    I will see her back in 2 months.      Thank you very much for the opportunity to participate in the care of your patient.      Ruth Tello MD, M.D.  Otolaryngology- Head & Neck Surgery      This note was dictated with voice recognition software and then edited. Please excuse any unintentional errors.       I spent a total of 30 minutes with the patient with >50% of the time spent in counseling over her anxiety as per the plan above.      CC:  Ifeanyi Soliman MD  Department of Radiation Oncology  [facility]        Again, thank you for allowing me to participate in the care of your patient.      Sincerely,    Ruth Tello MD

## 2020-07-17 ENCOUNTER — HOSPITAL ENCOUNTER (OUTPATIENT)
Dept: OCCUPATIONAL THERAPY | Facility: CLINIC | Age: 35
Setting detail: THERAPIES SERIES
End: 2020-07-17
Attending: OTOLARYNGOLOGY
Payer: COMMERCIAL

## 2020-07-17 PROCEDURE — 97140 MANUAL THERAPY 1/> REGIONS: CPT | Mod: GO

## 2020-07-22 ENCOUNTER — HOSPITAL ENCOUNTER (OUTPATIENT)
Dept: OCCUPATIONAL THERAPY | Facility: CLINIC | Age: 35
Setting detail: THERAPIES SERIES
End: 2020-07-22
Attending: OTOLARYNGOLOGY
Payer: COMMERCIAL

## 2020-07-22 PROCEDURE — 97140 MANUAL THERAPY 1/> REGIONS: CPT | Mod: GO

## 2020-07-29 ENCOUNTER — HOSPITAL ENCOUNTER (OUTPATIENT)
Dept: OCCUPATIONAL THERAPY | Facility: CLINIC | Age: 35
Setting detail: THERAPIES SERIES
End: 2020-07-29
Attending: OTOLARYNGOLOGY
Payer: COMMERCIAL

## 2020-07-29 PROCEDURE — 97140 MANUAL THERAPY 1/> REGIONS: CPT | Mod: GO

## 2020-08-05 ENCOUNTER — HOSPITAL ENCOUNTER (OUTPATIENT)
Dept: OCCUPATIONAL THERAPY | Facility: CLINIC | Age: 35
Setting detail: THERAPIES SERIES
End: 2020-08-05
Attending: OTOLARYNGOLOGY
Payer: COMMERCIAL

## 2020-08-05 PROCEDURE — 97140 MANUAL THERAPY 1/> REGIONS: CPT | Mod: GO

## 2020-08-11 ENCOUNTER — HOSPITAL ENCOUNTER (OUTPATIENT)
Dept: OCCUPATIONAL THERAPY | Facility: CLINIC | Age: 35
Setting detail: THERAPIES SERIES
End: 2020-08-11
Attending: OTOLARYNGOLOGY
Payer: COMMERCIAL

## 2020-08-11 PROCEDURE — 97140 MANUAL THERAPY 1/> REGIONS: CPT | Mod: GO

## 2020-08-21 ENCOUNTER — TELEPHONE (OUTPATIENT)
Dept: OTOLARYNGOLOGY | Facility: CLINIC | Age: 35
End: 2020-08-21

## 2020-08-21 NOTE — TELEPHONE ENCOUNTER
[facility] Call Center    Phone Message    May a detailed message be left on voicemail: yes     Reason for Call: Other: Trini from [facility] called per the Pt wondering the forms she sent over for Dr. Tello to sign regarding some medical equiptment was received or not. I didn't see anything in the chart and I called the back line to verify with no answer. Please call Trini to verify that the forms were received at [phone]. Please advise, thank you!     Action Taken: Message routed to:  [facility] ([facility] ENT    Travel Screening: Not Applicable

## 2020-08-21 NOTE — TELEPHONE ENCOUNTER
Spoke to jose and advised that we had not received forms yet. Explained they could be in transit, but asked if they could be faxed again. Verified fax number and she will be faxing them again.

## 2020-08-25 ENCOUNTER — HOSPITAL ENCOUNTER (OUTPATIENT)
Dept: OCCUPATIONAL THERAPY | Facility: CLINIC | Age: 35
Setting detail: THERAPIES SERIES
End: 2020-08-25
Attending: OTOLARYNGOLOGY
Payer: COMMERCIAL

## 2020-08-25 PROCEDURE — 97140 MANUAL THERAPY 1/> REGIONS: CPT | Mod: GO

## 2020-09-02 ENCOUNTER — HOSPITAL ENCOUNTER (OUTPATIENT)
Dept: OCCUPATIONAL THERAPY | Facility: CLINIC | Age: 35
Setting detail: THERAPIES SERIES
End: 2020-09-02
Attending: OTOLARYNGOLOGY
Payer: COMMERCIAL

## 2020-09-02 PROCEDURE — 97140 MANUAL THERAPY 1/> REGIONS: CPT | Mod: GO

## 2020-09-08 ENCOUNTER — HOSPITAL ENCOUNTER (OUTPATIENT)
Dept: OCCUPATIONAL THERAPY | Facility: CLINIC | Age: 35
Setting detail: THERAPIES SERIES
End: 2020-09-08
Attending: NURSE PRACTITIONER
Payer: COMMERCIAL

## 2020-09-08 PROCEDURE — 97140 MANUAL THERAPY 1/> REGIONS: CPT | Mod: GO

## 2020-09-16 ENCOUNTER — OFFICE VISIT (OUTPATIENT)
Dept: OTOLARYNGOLOGY | Facility: CLINIC | Age: 35
End: 2020-09-16
Payer: COMMERCIAL

## 2020-09-16 ENCOUNTER — THERAPY VISIT (OUTPATIENT)
Dept: SPEECH THERAPY | Facility: CLINIC | Age: 35
End: 2020-09-16
Payer: COMMERCIAL

## 2020-09-16 VITALS
BODY MASS INDEX: 17.68 KG/M2 | HEART RATE: 71 BPM | WEIGHT: 110 LBS | SYSTOLIC BLOOD PRESSURE: 113 MMHG | HEIGHT: 66 IN | OXYGEN SATURATION: 100 % | TEMPERATURE: 99.2 F | DIASTOLIC BLOOD PRESSURE: 80 MMHG

## 2020-09-16 DIAGNOSIS — R13.12 OROPHARYNGEAL DYSPHAGIA: Primary | ICD-10-CM

## 2020-09-16 DIAGNOSIS — C10.9 SQUAMOUS CELL CARCINOMA OF OROPHARYNX (H): Primary | ICD-10-CM

## 2020-09-16 DIAGNOSIS — C44.42 SQUAMOUS CELL CARCINOMA OF NECK: ICD-10-CM

## 2020-09-16 ASSESSMENT — MIFFLIN-ST. JEOR: SCORE: 1210.71

## 2020-09-16 ASSESSMENT — PAIN SCALES - GENERAL: PAINLEVEL: NO PAIN (0)

## 2020-09-16 NOTE — NURSING NOTE
"Chief Complaint   Patient presents with     RECHECK     2 month follow up       Blood pressure 113/80, pulse 71, temperature 99.2  F (37.3  C), temperature source Temporal, height 1.676 m (5' 6\"), weight 49.9 kg (110 lb), SpO2 100 %, not currently breastfeeding.    Peg Quezada, EMT  "

## 2020-09-16 NOTE — LETTER
9/16/2020       RE: Mira Cao  [address]  [place] [address]     Dear Colleague,    Thank you for referring your patient, Mira Cao, to the [facility] EAR NOSE AND THROAT at [facility]. Please see a copy of my visit note below.    I had the pleasure of seeing Mira Cao in follow-up today at the [facility].     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 The patient noticed a left neck mass in June. She thought this was related to a swollen lymph node due to stress as she had just bought a house.  She delayed evaluation until approximately July when she presented to urgent care.  She had an ultrasound obtained on 7/12/2019 which demonstrated a 3.7 x 2.3 x 1.3 cm mass in the left neck.  She then had a CT scan on 8/7/2019 which demonstrated a 1.8 x 1.4 x 3.8 cm partially necrotic/cystic level 2/3 neck mass, concerning for metastatic squamous cell carcinoma.  She was seen by Dr.Todd Cao and had an FNA performed on 8/8/2019.  The pathology from the FNA demonstrated atypical squamous proliferation which was p16-.  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  She elected for removal which was performed by Dr. Cao on 10/2/2019.  The excision was performed without a completion neck dissection.  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  She had a PET CT scan on 2019 locally which demonstrated postop changes without residual disease and no obvious primary malignancy.  On 10/10/2019 she saw Dr. Neely at [facility] for medical oncology consultation where possible chemotherapy was discussed.  She has met with Dr Melendez from radiation oncology at [facility].  She had a dental cleaning.  She was taken to the operating room  on 10/24/2019 for direct laryngoscopy with biopsy.  Initial biopsy of the tonsil demonstrated high-grade dysplasia.  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  A radical tonsillectomy was then performed based on the preoperative discussion with the patient. Final pathology demonstrated a T1 SCC of the tonsil with negative margins. She was taken to the OR on 11/20/2019 for a left neck dissection with resection of her scar. She had 1/38 lymph nodes positive - 0.4 cm deposit of metastatic SCC without STEVE. She had proton beam therapy postoperatively at [facility] with Dr Smith. She completed her treatment on 2/24/2020. She lost about 20 lbs with treatment. She had her 3 month post treatment PET in May 2020 which showed uptake in the right tonsil and right level II node thought to be reactive.       Interval history:  She comes in today for follow-up. She was last seen in clinic in July 2020. She says today that she is doing a lot better.  She feels like emotionally in a better place.  She decided against taking the antidepressant.  She does feel like her anxiety is less.  She feels like she is kind of back at her baseline as far as feeling anxious.  It is no longer paralyzing to her.  She is doing the lymphedema therapy and feels like it is helping considerably.  She does the job at home and has another compression wrap that she uses.  She does the exercises at home.  She did stop doing her swallowing exercises for a while but noticed that she had a few issues and restarted them.  She is going to be more vigilant about her exercises moving forward as well.  She feels like her energy is good.  She is eating better and healthier.  She is trying to exercise more.  She has gained about 2 pounds.  She is working from home currently.        MEDICATIONS:     Current Outpatient Medications   Medication Sig Dispense Refill     biotin 1000 MCG TABS tablet  Take 1,000 mcg by mouth daily       citalopram (CELEXA) 10 MG tablet Take 1 tablet (10 mg) by mouth daily 30 tablet 11     ferrous sulfate (KP FERROUS SULFATE) 325 (65 Fe) MG tablet Take 325 mg by mouth daily (with breakfast)       loratadine (CLARITIN) 10 MG tablet Take 10 mg by mouth daily as needed        LORazepam (ATIVAN) 0.5 MG tablet Take 0.5-1 mg by mouth as needed        Milk Thistle-Dand-Fennel-Licor (MILK THISTLE XTRA) CAPS capsule Take 1 capsule by mouth daily       Omega-3 Fatty Acids (OMEGA 3 PO) Take 2 capsules by mouth daily       order for DME Equipment being ordered: facioplasty garment 1 each 2       ALLERGIES:  No Known Allergies    HABITS/SOCIAL HISTORY:   Works as a .    She is  with no children.    She has no smoking history.  She has about 10 alcoholic beverages on weekends.  She has no chewing tobacco or vaping history.    Social History     Socioeconomic History     Marital status:      Spouse name: Not on file     Number of children: Not on file     Years of education: Not on file     Highest education level: Not on file   Occupational History     Not on file   Social Needs     Financial resource strain: Not on file     Food insecurity     Worry: Not on file     Inability: Not on file     Transportation needs     Medical: Not on file     Non-medical: Not on file   Tobacco Use     Smoking status: Never Smoker     Smokeless tobacco: Never Used   Substance and Sexual Activity     Alcohol use: Yes     Drug use: Never     Sexual activity: Yes     Partners: Male     Birth control/protection: Condom   Lifestyle     Physical activity     Days per week: Not on file     Minutes per session: Not on file     Stress: Not on file   Relationships     Social connections     Talks on phone: Not on file     Gets together: Not on file     Attends Quaker service: Not on file     Active member of club or organization: Not on file     Attends meetings of clubs or organizations:  "Not on file     Relationship status: Not on file     Intimate partner violence     Fear of current or ex partner: Not on file     Emotionally abused: Not on file     Physically abused: Not on file     Forced sexual activity: Not on file   Other Topics Concern     Not on file   Social History Narrative     Not on file       PAST MEDICAL HISTORY:   Past Medical History:   Diagnosis Date     Squamous cell carcinoma of neck      Uncomplicated asthma         PAST SURGICAL HISTORY:   Past Surgical History:   Procedure Laterality Date     DISSECTION RADICAL NECK MODIFIED Left 11/20/2019    Procedure: DISSECTION, NECK, MODIFIED RADICAL LEFT;  Surgeon: Ruth Tello MD;  Location: [facility] OR     LARYNGOSCOPY WITH BIOPSY(IES) N/A 10/24/2019    Procedure: Direct laryngscopy with biopsy;  Surgeon: Ruth Tello MD;  Location: [facility] OR     TONSILLECTOMY ADULT Left 10/24/2019    Procedure: Left Radical  tonsillectomy;  Surgeon: Ruth Tello MD;  Location: [facility] OR       FAMILY HISTORY:    Family History   Problem Relation Age of Onset     Breast Cancer Mother      Cancer Mother         Breast cancer     Bone Cancer Sister      Cancer Sister         Bone cancer       REVIEW OF SYSTEMS:  12 point ROS was negative other than the symptoms noted above in the HPI.  Patient Supplied Answers to Review of Systems   ENT ROS 5/14/2020   Constitutional -   Neurology Headache   Psychology -   Ears, Nose, Throat Ear pain   Musculoskeletal -   Endocrine -         PHYSICAL EXAMINATION:   /80 (BP Location: Right arm, Patient Position: Chair, Cuff Size: Adult Regular)   Pulse 71   Temp 99.2  F (37.3  C) (Temporal)   Ht 1.676 m (5' 6\")   Wt 49.9 kg (110 lb)   SpO2 100%   BMI 17.75 kg/m     Appearance:   normal; NAD, age-appropriate appearance, thin   Communication:   normal; communicates verbally, normal voice quality   Head/Face:   inspection -  Normal; no scars or visible lesions   Salivary glands -  Normal size, no " tenderness, swelling, or palpable masses   Facial strength -  Normal and symmetric bilateral   Skin:  normal, no rash   Ocular Motility:  normal occular movements   Ears:  auricle (AD) -  normal  EAC (AD) -  normal  TM (AD) -  Normal, no effusion  auricle (AS) -  normal  EAC (AS) -  normal  TM (AS) -  Normal, no effusion  Normal clinical speech reception   Nose:  Ext. inspection -  Normal  Internal Inspection -  Normal mucosa, septum, and turbinates   Nasopharynx:  normal mucosa, no masses   Oral Cavity:  lips -  Normal mucosa, oral competence, and stoma size   Age-appropriate dentition, healthy gingival mucosa   Hard palate, buccal, floor of mouth mucosa normal   Tongue - normal movement, no lesions   Oropharynx:  mucosa -  Normal, no lesions  soft palate -  Expected post radical tonsillectomy asymmetry of left soft palate  tonsils -  S/p left radical tonsillectomy, no secretions, no masses, no concerning mucosal lesions, no palpable masses, expected scar tissue, right tonsil with no palpable masses  BOT - normal  Vallecula - clear   Hypopharynx:  Normal pyriform sinus and pharyngeal wall mucosa   No pooled secretions    Larynx:  Epiglottis, AE folds, false vocal cords, true vocal cords, arytenoids normal in appearance with improved/resolved edema of AE folds and arytenoid on the left, airway overall improved appearance   bilaterally mobile cords    Neck: Well healed left neck incision with mild redundancy of anterior edge of incision  No significant lymphedema  Normal range of motion   Lymphatic:  no abnormal nodes   Cardiovascular:  warm, pink, well-perfused extremities without swelling, tenderness, or edema   Respiratory:  Normal respiratory effort, no stridor   Neuro/Psych.:  mood/affect -  Anxious and tearful  mental status -  normal       PROCEDURES:   Flexible fiberoptic laryngoscopy: Scope exam was indicated due to oropharyngeal cancer. Verbal consent was obtained. The nasal cavity was prepped with an  aerosolized solution of topical anesthetic and vasoconstrictive agent. The scope was passed through the anterior nasal cavity and advanced. Inspection of the nasopharynx revealed no gross abnormality. The base of tongue and vallecula are normal. There are no abnormalities in the area of the left tonsil. The epiglottis, AE folds, false cords, true cords, arytenoids are normal and the previous left AE fold/arytenoid edema has resolved. Overall the airway is improved.  Inspection of the larynx revealed bilaterally mobile vocal cords. Pyriform sinuses are symmetric. The airway is patent. Procedure tolerated well with no immediate complications noted.              RESULTS REVIEWED:   I independently reviewed the CT scan images which show small right sided nodes but not concerning for malignancy    TSH reviewed - mildly elevated, T4 normal      IMPRESSION AND PLAN:   Mira Cao is a 34-year-old woman with a likely T1N1 p16+ squamous cell carcinoma of the oropharynx. She had an excision of a cystic neck mass by a local ENT which demonstrated a metastatic SCC. She underwent a radical tonsillectomy which showed a small primary tumor in the tonsil which was completely resected. She then had a completion neck dissection on 11/20/2019 with final pathology showing 1/38 nodes. She completed postoperative proton beam therapy at [place] on 2/24/2020.     She is overall improved with regards to both her physical exam but also her emotional health today.    She met with our speech therapy team today.  She is going to work on increasing her exercises.    Her TSH recheck is due in January 2021.    She is due for repeat imaging in January 2021.    A TSH was drawn today which was mildly elevated with a normal T4.  I will continue to watch this and we will recheck in 6 months (January 2021).  She may very well require thyroid replacement at some point.    I will see her back in 2 months.      Thank you very much for the opportunity to  participate in the care of your patient.      Ruth Tello MD, M.D.  Otolaryngology- Head & Neck Surgery      This note was dictated with voice recognition software and then edited. Please excuse any unintentional errors.       CC:  Ifeanyi Soliman MD  Department of Radiation Oncology  [facility]

## 2020-09-16 NOTE — PROGRESS NOTES
I had the pleasure of seeing Miar Cao in follow-up today at the [facility].     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 The patient noticed a left neck mass in June. She thought this was related to a swollen lymph node due to stress as she had just bought a house.  She delayed evaluation until approximately July when she presented to urgent care.  She had an ultrasound obtained on 7/12/2019 which demonstrated a 3.7 x 2.3 x 1.3 cm mass in the left neck.  She then had a CT scan on 8/7/2019 which demonstrated a 1.8 x 1.4 x 3.8 cm partially necrotic/cystic level 2/3 neck mass, concerning for metastatic squamous cell carcinoma.  She was seen by Dr.Todd Cao and had an FNA performed on 8/8/2019.  The pathology from the FNA demonstrated atypical squamous proliferation which was p16-.  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  She elected for removal which was performed by Dr. Cao on 10/2/2019.  The excision was performed without a completion neck dissection.  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  She had a PET CT scan on 2019 locally which demonstrated postop changes without residual disease and no obvious primary malignancy.  On 10/10/2019 she saw Dr. Neely at [facility] for medical oncology consultation where possible chemotherapy was discussed.  She has met with Dr Melednez from radiation oncology at [facility].  She had a dental cleaning.  She was taken to the operating room on 10/24/2019 for direct laryngoscopy with biopsy.  Initial biopsy of the tonsil demonstrated high-grade dysplasia.  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  A radical tonsillectomy was then performed based on  the preoperative discussion with the patient. Final pathology demonstrated a T1 SCC of the tonsil with negative margins. She was taken to the OR on 11/20/2019 for a left neck dissection with resection of her scar. She had 1/38 lymph nodes positive - 0.4 cm deposit of metastatic SCC without STEVE. She had proton beam therapy postoperatively at [facility] with Dr Smith. She completed her treatment on 2/24/2020. She lost about 20 lbs with treatment. She had her 3 month post treatment PET in May 2020 which showed uptake in the right tonsil and right level II node thought to be reactive.       Interval history:  She comes in today for follow-up. She was last seen in clinic in July 2020. She says today that she is doing a lot better.  She feels like emotionally in a better place.  She decided against taking the antidepressant.  She does feel like her anxiety is less.  She feels like she is kind of back at her baseline as far as feeling anxious.  It is no longer paralyzing to her.  She is doing the lymphedema therapy and feels like it is helping considerably.  She does the job at home and has another compression wrap that she uses.  She does the exercises at home.  She did stop doing her swallowing exercises for a while but noticed that she had a few issues and restarted them.  She is going to be more vigilant about her exercises moving forward as well.  She feels like her energy is good.  She is eating better and healthier.  She is trying to exercise more.  She has gained about 2 pounds.  She is working from home currently.        MEDICATIONS:     Current Outpatient Medications   Medication Sig Dispense Refill     biotin 1000 MCG TABS tablet Take 1,000 mcg by mouth daily       citalopram (CELEXA) 10 MG tablet Take 1 tablet (10 mg) by mouth daily 30 tablet 11     ferrous sulfate (KP FERROUS SULFATE) 325 (65 Fe) MG tablet Take 325 mg by mouth daily (with breakfast)       loratadine (CLARITIN) 10 MG tablet Take 10 mg by mouth  daily as needed        LORazepam (ATIVAN) 0.5 MG tablet Take 0.5-1 mg by mouth as needed        Milk Thistle-Dand-Fennel-Licor (MILK THISTLE XTRA) CAPS capsule Take 1 capsule by mouth daily       Omega-3 Fatty Acids (OMEGA 3 PO) Take 2 capsules by mouth daily       order for DME Equipment being ordered: facioplasty garment 1 each 2       ALLERGIES:  No Known Allergies    HABITS/SOCIAL HISTORY:   Works as a .    She is  with no children.    She has no smoking history.  She has about 10 alcoholic beverages on weekends.  She has no chewing tobacco or vaping history.    Social History     Socioeconomic History     Marital status:      Spouse name: Not on file     Number of children: Not on file     Years of education: Not on file     Highest education level: Not on file   Occupational History     Not on file   Social Needs     Financial resource strain: Not on file     Food insecurity     Worry: Not on file     Inability: Not on file     Transportation needs     Medical: Not on file     Non-medical: Not on file   Tobacco Use     Smoking status: Never Smoker     Smokeless tobacco: Never Used   Substance and Sexual Activity     Alcohol use: Yes     Drug use: Never     Sexual activity: Yes     Partners: Male     Birth control/protection: Condom   Lifestyle     Physical activity     Days per week: Not on file     Minutes per session: Not on file     Stress: Not on file   Relationships     Social connections     Talks on phone: Not on file     Gets together: Not on file     Attends Shinto service: Not on file     Active member of club or organization: Not on file     Attends meetings of clubs or organizations: Not on file     Relationship status: Not on file     Intimate partner violence     Fear of current or ex partner: Not on file     Emotionally abused: Not on file     Physically abused: Not on file     Forced sexual activity: Not on file   Other Topics Concern     Not on file   Social  "History Narrative     Not on file       PAST MEDICAL HISTORY:   Past Medical History:   Diagnosis Date     Squamous cell carcinoma of neck      Uncomplicated asthma         PAST SURGICAL HISTORY:   Past Surgical History:   Procedure Laterality Date     DISSECTION RADICAL NECK MODIFIED Left 11/20/2019    Procedure: DISSECTION, NECK, MODIFIED RADICAL LEFT;  Surgeon: Ruth Tello MD;  Location: [facility] OR     LARYNGOSCOPY WITH BIOPSY(IES) N/A 10/24/2019    Procedure: Direct laryngscopy with biopsy;  Surgeon: Ruth Tello MD;  Location: [facility] OR     TONSILLECTOMY ADULT Left 10/24/2019    Procedure: Left Radical  tonsillectomy;  Surgeon: Ruth Tello MD;  Location: [facility] OR       FAMILY HISTORY:    Family History   Problem Relation Age of Onset     Breast Cancer Mother      Cancer Mother         Breast cancer     Bone Cancer Sister      Cancer Sister         Bone cancer       REVIEW OF SYSTEMS:  12 point ROS was negative other than the symptoms noted above in the HPI.  Patient Supplied Answers to Review of Systems  [facility] ENT ROS 5/14/2020   Constitutional -   Neurology Headache   Psychology -   Ears, Nose, Throat Ear pain   Musculoskeletal -   Endocrine -         PHYSICAL EXAMINATION:   /80 (BP Location: Right arm, Patient Position: Chair, Cuff Size: Adult Regular)   Pulse 71   Temp 99.2  F (37.3  C) (Temporal)   Ht 1.676 m (5' 6\")   Wt 49.9 kg (110 lb)   SpO2 100%   BMI 17.75 kg/m     Appearance:   normal; NAD, age-appropriate appearance, thin   Communication:   normal; communicates verbally, normal voice quality   Head/Face:   inspection -  Normal; no scars or visible lesions   Salivary glands -  Normal size, no tenderness, swelling, or palpable masses   Facial strength -  Normal and symmetric bilateral   Skin:  normal, no rash   Ocular Motility:  normal occular movements   Ears:  auricle (AD) -  normal  EAC (AD) -  normal  TM (AD) -  Normal, no effusion  auricle (AS) -  normal  EAC (AS) -  " normal  TM (AS) -  Normal, no effusion  Normal clinical speech reception   Nose:  Ext. inspection -  Normal  Internal Inspection -  Normal mucosa, septum, and turbinates   Nasopharynx:  normal mucosa, no masses   Oral Cavity:  lips -  Normal mucosa, oral competence, and stoma size   Age-appropriate dentition, healthy gingival mucosa   Hard palate, buccal, floor of mouth mucosa normal   Tongue - normal movement, no lesions   Oropharynx:  mucosa -  Normal, no lesions  soft palate -  Expected post radical tonsillectomy asymmetry of left soft palate  tonsils -  S/p left radical tonsillectomy, no secretions, no masses, no concerning mucosal lesions, no palpable masses, expected scar tissue, right tonsil with no palpable masses  BOT - normal  Vallecula - clear   Hypopharynx:  Normal pyriform sinus and pharyngeal wall mucosa   No pooled secretions    Larynx:  Epiglottis, AE folds, false vocal cords, true vocal cords, arytenoids normal in appearance with improved/resolved edema of AE folds and arytenoid on the left, airway overall improved appearance   bilaterally mobile cords    Neck: Well healed left neck incision with mild redundancy of anterior edge of incision  No significant lymphedema  Normal range of motion   Lymphatic:  no abnormal nodes   Cardiovascular:  warm, pink, well-perfused extremities without swelling, tenderness, or edema   Respiratory:  Normal respiratory effort, no stridor   Neuro/Psych.:  mood/affect -  Anxious and tearful  mental status -  normal       PROCEDURES:   Flexible fiberoptic laryngoscopy: Scope exam was indicated due to oropharyngeal cancer. Verbal consent was obtained. The nasal cavity was prepped with an aerosolized solution of topical anesthetic and vasoconstrictive agent. The scope was passed through the anterior nasal cavity and advanced. Inspection of the nasopharynx revealed no gross abnormality. The base of tongue and vallecula are normal. There are no abnormalities in the area of  the left tonsil. The epiglottis, AE folds, false cords, true cords, arytenoids are normal and the previous left AE fold/arytenoid edema has resolved. Overall the airway is improved.  Inspection of the larynx revealed bilaterally mobile vocal cords. Pyriform sinuses are symmetric. The airway is patent. Procedure tolerated well with no immediate complications noted.              RESULTS REVIEWED:   I independently reviewed the CT scan images which show small right sided nodes but not concerning for malignancy    TSH reviewed - mildly elevated, T4 normal      IMPRESSION AND PLAN:   Mira Cao is a 34-year-old woman with a likely T1N1 p16+ squamous cell carcinoma of the oropharynx. She had an excision of a cystic neck mass by a local ENT which demonstrated a metastatic SCC. She underwent a radical tonsillectomy which showed a small primary tumor in the tonsil which was completely resected. She then had a completion neck dissection on 11/20/2019 with final pathology showing 1/38 nodes. She completed postoperative proton beam therapy at [place] on 2/24/2020.     She is overall improved with regards to both her physical exam but also her emotional health today.    She met with our speech therapy team today.  She is going to work on increasing her exercises.    Her TSH recheck is due in January 2021.    She is due for repeat imaging in January 2021.    A TSH was drawn today which was mildly elevated with a normal T4.  I will continue to watch this and we will recheck in 6 months (January 2021).  She may very well require thyroid replacement at some point.    I will see her back in 2 months.      Thank you very much for the opportunity to participate in the care of your patient.      Ruth Tello MD, M.D.  Otolaryngology- Head & Neck Surgery      This note was dictated with voice recognition software and then edited. Please excuse any unintentional errors.               CC:  Ifeanyi Soliman MD  Department of  Radiation Oncology  [facility]

## 2020-10-06 ENCOUNTER — HOSPITAL ENCOUNTER (OUTPATIENT)
Dept: OCCUPATIONAL THERAPY | Facility: CLINIC | Age: 35
Setting detail: THERAPIES SERIES
End: 2020-10-06
Attending: NURSE PRACTITIONER
Payer: COMMERCIAL

## 2020-10-06 PROCEDURE — 97140 MANUAL THERAPY 1/> REGIONS: CPT | Mod: GO

## 2020-10-14 ENCOUNTER — PATIENT OUTREACH (OUTPATIENT)
Dept: OTOLARYNGOLOGY | Facility: CLINIC | Age: 35
End: 2020-10-14

## 2020-10-14 ENCOUNTER — VIRTUAL VISIT (OUTPATIENT)
Dept: FAMILY MEDICINE | Facility: OTHER | Age: 35
End: 2020-10-14
Payer: COMMERCIAL

## 2020-10-14 DIAGNOSIS — Z20.822 SUSPECTED 2019 NOVEL CORONAVIRUS INFECTION: Primary | ICD-10-CM

## 2020-10-14 PROCEDURE — 99421 OL DIG E/M SVC 5-10 MIN: CPT | Performed by: PHYSICIAN ASSISTANT

## 2020-10-14 NOTE — PROGRESS NOTES
Received a call from patient who states that for the past few days she is feeling very tired. She reports body aches, fevers, and generalized weakness. Reviewed with patient that she can check in with her PCP. If there is any concern we are happy to see her for follow-up.    Patient in agreement with plan and was encouraged to call if there is any new or worsening symptoms.     Violetta Hurd, RN, BSN

## 2020-10-14 NOTE — PROGRESS NOTES
"Date: 10/14/2020 14:45:20  Clinician: Marisela Sauceda  Clinician NPI: [id number]  Patient: Mira Cao  Patient : 1985  Patient Address: [facility] [place] [address]  Patient Phone: [phone]  Visit Protocol: URI  Patient Summary:  Mira is a 35 year old ( : 1985 ) female who initiated a [facility] Visit for COVID-19 (Coronavirus) evaluation and screening. When asked the question \"Please sign me up to receive news, health information and promotions from [facility].\", Mira responded \"Yes\".    Mira states her symptoms started 1-2 days ago.   Her symptoms consist of ear pain, a headache, myalgia, chills, malaise, a sore throat, and tooth pain.   Symptom details     Sore throat: Mira reports having mild throat pain (1-3 on a 10 point pain scale), does not have exudate on her tonsils, and can swallow liquids. She is not sure if the lymph nodes in her neck are enlarged. A rash has not appeared on the skin since the sore throat started.     Headache: She states the headache is moderate (4-6 on a 10 point pain scale).     Tooth pain: The tooth pain is caused by a cavity, recent dental work, or other mouth problems.      Mira denies having wheezing, fever, cough, nasal congestion, anosmia, vomiting, rhinitis, nausea, facial pain or pressure, ageusia, and diarrhea. She also denies taking antibiotic medication in the past month and having recent facial or sinus surgery in the past 60 days. She is not experiencing dyspnea.   Precipitating events  Within the past week, Mira has not been exposed to someone with strep throat. She has not recently been exposed to someone with influenza. Mira has not been in close contact with any high risk individuals.   Pertinent COVID-19 (Coronavirus) information  In the past 14 days, Mira has not worked in a congregate living setting.   She does not work or volunteer as healthcare worker or a  and does not work or volunteer " in a healthcare facility.   Mira also has not lived in a congregate living setting in the past 14 days. She does not live with a healthcare worker.   Mira has not had a close contact with a laboratory-confirmed COVID-19 patient within 14 days of symptom onset.   Since December 2019, Mira and has not had upper respiratory infection or influenza-like illness. Has not been diagnosed with lab-confirmed COVID-19 test   Pertinent medical history  Mira does not get yeast infections when she takes antibiotics.   Mira does not need a return to work/school note.   Weight: 110 lbs   Mira does not smoke or use smokeless tobacco.   She denies pregnancy and denies breastfeeding. She has menstruated in the past month.   Additional information as reported by the patient (free text): I finished cancer treatment over 6 months ago for throat cancer.   Weight: 110 lbs    MEDICATIONS: No current medications, ALLERGIES: NKDA  Clinician Response:  Dear Mira,   Your symptoms show that you may have coronavirus (COVID-19). This illness can cause fever, cough and trouble breathing. Many people get a mild case and get better on their own. Some people can get very sick.  What should I do?  We would like to test you for this virus.   1. Please call [phone] to schedule your visit. Explain that [facility] were [facility] to have a COVID-19 test. Be ready to share your [facility] visit ID number.  The following will serve as your written order for this COVID Test, ordered by me, for the indication of suspected COVID [Z20.828]: The test will be ordered in [facility], our electronic health record, after you are scheduled. It will show as ordered and authorized by Wilner Gray MD.  Order: COVID-19 (Coronavirus) PCR for SYMPTOMATIC testing from [facility].      2. When it's time for your COVID test:  Stay at least 6 feet away from others. (If someone will drive you to your test, stay in the backseat, as far away from the  as you can.)  "  Cover your mouth and nose with a mask, tissue or washcloth.  Go straight to the testing site. Don't make any stops on the way there or back.      3.Starting now: Stay home and away from others (self-isolate) until:   You've had no fever---and no medicine that reduces fever---for one full day (24 hours). And...   Your other symptoms have gotten better. For example, your cough or breathing has improved. And...   At least 10 days have passed since your symptoms started.       During this time, don't leave the house except for testing or medical care.   Stay in your own room, even for meals. Use your own bathroom if you can.   Stay away from others in your home. No hugging, kissing or shaking hands. No visitors.  Don't go to work, school or anywhere else.    Clean \"high touch\" surfaces often (doorknobs, counters, handles, etc.). Use a household cleaning spray or wipes. You'll find a full list of  on the EPA website: www.epa.gov/pesticide-registration/list-n-disinfectants-use-against-sars-cov-2.   Cover your mouth and nose with a mask, tissue or washcloth to avoid spreading germs.  Wash your hands and face often. Use soap and water.  Caregivers in these groups are at risk for severe illness due to COVID-19:  o People 65 years and older  o People who live in a nursing home or long-term care facility  o People with chronic disease (lung, heart, cancer, diabetes, kidney, liver, immunologic)  o People who have a weakened immune system, including those who:   Are in cancer treatment  Take medicine that weakens the immune system, such as corticosteroids  Had a bone marrow or organ transplant  Have an immune deficiency  Have poorly controlled HIV or AIDS  Are obese (body mass index of 40 or higher)  Smoke regularly   o Caregivers should wear gloves while washing dishes, handling laundry and cleaning bedrooms and bathrooms.  o Use caution when washing and drying laundry: Don't shake dirty laundry, and use the warmest " water setting that you can.  o For more tips, go to www.cdc.gov/coronavirus/2019-ncov/downloads/10Things.pdf.    4.Sign up for [facility] NowPublic. We know it's scary to hear that you might have COVID-19. We want to track your symptoms to make sure you're okay over the next 2 weeks. Please look for an email from [employer]---this is a free, online program that we'll use to keep in touch. To sign up, follow the link in the email. Learn more at [email or web address]  How can I take care of myself?   Get lots of rest. Drink extra fluids (unless a doctor has told you not to).   Take Tylenol (acetaminophen) for fever or pain. If you have liver or kidney problems, ask your family doctor if it's okay to take Tylenol.   Adults can take either:    650 mg (two 325 mg pills) every 4 to 6 hours, or...   1,000 mg (two 500 mg pills) every 8 hours as needed.    Note: Don't take more than 3,000 mg in one day. Acetaminophen is found in many medicines (both prescribed and over-the-counter medicines). Read all labels to be sure you don't take too much.   For children, check the Tylenol bottle for the right dose. The dose is based on the child's age or weight.    If you have other health problems (like cancer, heart failure, an organ transplant or severe kidney disease): Call your specialty clinic if you don't feel better in the next 2 days.       Know when to call 911. Emergency warning signs include:    Trouble breathing or shortness of breath Pain or pressure in the chest that doesn't go away Feeling confused like you haven't felt before, or not being able to wake up Bluish-colored lips or face.  Where can I get more information?    [facility] -- About COVID-19: [email or web address]   CDC -- What to Do If You're Sick: www.cdc.gov/coronavirus/2019-ncov/about/steps-when-sick.html   CDC -- Ending Home Isolation: www.cdc.gov/coronavirus/2019-ncov/hcp/disposition-in-home-patients.html   [facility] -- Caring for Someone:  www.cdc.gov/coronavirus/2019-ncov/if-you-are-sick/care-for-someone.html   [facility] -- Interim Guidance for Hospital Discharge to Home: www.health.[facility].[place]/diseases/coronavirus/hcp/hospdischarge.pdf   [facility] clinical trials (COVID-19 research studies): clinicalaffairs.[facility]-clinical-trials    Below are the COVID-19 hotlines at the [place] Department of Health ([facility]). Interpreters are available.    For health questions: Call 742-269-7039 or 1-141.106.9978 (7 a.m. to 7 p.m.) For questions about schools and childcare: Call 011-539-4014 or 1-565.912.5172 (7 a.m. to 7 p.m.)    COVID-19 (Coronavirus) General Information  Because there is currently no vaccine to prevent infection, the best way to protect yourself is to avoid being exposed to this virus. Common symptoms of COVID-19 include but are not limited to fever, cough, and shortness of breath. These symptoms appear 2-14 days after you are exposed to the virus that causes COVID-19. Click here for more information from the CDC on how to protect yourself.  If you are sick with COVID-19 or suspect you are infected with the virus that causes COVID-19, follow the steps here from the CDC to help prevent the disease from spreading to people in your home and community.  Click here for general information from the CDC on testing.  If you develop any of these emergency warning signs for COVID-19, get medical attention immediately:     Trouble breathing    Persistent pain or pressure in the chest    New confusion or inability to arouse    Bluish lips or face      Call your doctor or clinic before going in. Call 911 if you have a medical emergency and notify the  you have or think you may have COVID-19.  For more detailed and up to date information on COVID-19 (Coronavirus), please visit the CDC website.   Diagnosis: Contact with and (suspected) exposure to other viral communicable diseases  Diagnosis ICD: Z20.828

## 2020-11-08 ENCOUNTER — HOSPITAL ENCOUNTER (EMERGENCY)
Facility: CLINIC | Age: 35
Discharge: HOME | End: 2020-11-08
Attending: EMERGENCY MEDICINE | Admitting: EMERGENCY MEDICINE
Payer: COMMERCIAL

## 2020-11-08 VITALS
DIASTOLIC BLOOD PRESSURE: 70 MMHG | HEART RATE: 77 BPM | OXYGEN SATURATION: 98 % | RESPIRATION RATE: 18 BRPM | SYSTOLIC BLOOD PRESSURE: 100 MMHG

## 2020-11-08 DIAGNOSIS — K13.79 ORAL BLEEDING: ICD-10-CM

## 2020-11-08 DIAGNOSIS — K92.0 HEMATEMESIS WITH NAUSEA: ICD-10-CM

## 2020-11-08 DIAGNOSIS — F10.121 ALCOHOL INTOXICATION DELIRIUM (H): ICD-10-CM

## 2020-11-08 LAB
ABO + RH BLD: NORMAL
ABO + RH BLD: NORMAL
ALBUMIN SERPL-MCNC: 4 G/DL (ref 3.4–5)
ALP SERPL-CCNC: 61 U/L (ref 40–150)
ALT SERPL W P-5'-P-CCNC: 17 U/L (ref 0–50)
ANION GAP SERPL CALCULATED.3IONS-SCNC: 8 MMOL/L (ref 3–14)
AST SERPL W P-5'-P-CCNC: 22 U/L (ref 0–45)
BASOPHILS # BLD AUTO: 0.1 10E9/L (ref 0–0.2)
BASOPHILS NFR BLD AUTO: 1.6 %
BILIRUB SERPL-MCNC: 0.2 MG/DL (ref 0.2–1.3)
BLD GP AB SCN SERPL QL: NORMAL
BLOOD BANK CMNT PATIENT-IMP: NORMAL
BUN SERPL-MCNC: 10 MG/DL (ref 7–30)
CALCIUM SERPL-MCNC: 8.6 MG/DL (ref 8.5–10.1)
CHLORIDE SERPL-SCNC: 112 MMOL/L (ref 94–109)
CO2 SERPL-SCNC: 25 MMOL/L (ref 20–32)
CREAT SERPL-MCNC: 0.69 MG/DL (ref 0.52–1.04)
DIFFERENTIAL METHOD BLD: ABNORMAL
EOSINOPHIL # BLD AUTO: 0.1 10E9/L (ref 0–0.7)
EOSINOPHIL NFR BLD AUTO: 2.1 %
ERYTHROCYTE [DISTWIDTH] IN BLOOD BY AUTOMATED COUNT: 12.2 % (ref 10–15)
ETHANOL SERPL-MCNC: 0.28 G/DL
GFR SERPL CREATININE-BSD FRML MDRD: >90 ML/MIN/{1.73_M2}
GLUCOSE SERPL-MCNC: 85 MG/DL (ref 70–99)
HCT VFR BLD AUTO: 37.6 % (ref 35–47)
HGB BLD-MCNC: 12.2 G/DL (ref 11.7–15.7)
IMM GRANULOCYTES # BLD: 0 10E9/L (ref 0–0.4)
IMM GRANULOCYTES NFR BLD: 0.3 %
INR PPP: 0.98 (ref 0.86–1.14)
LIPASE SERPL-CCNC: 63 U/L (ref 73–393)
LYMPHOCYTES # BLD AUTO: 1.1 10E9/L (ref 0.8–5.3)
LYMPHOCYTES NFR BLD AUTO: 29.5 %
MCH RBC QN AUTO: 29.5 PG (ref 26.5–33)
MCHC RBC AUTO-ENTMCNC: 32.4 G/DL (ref 31.5–36.5)
MCV RBC AUTO: 91 FL (ref 78–100)
MONOCYTES # BLD AUTO: 0.4 10E9/L (ref 0–1.3)
MONOCYTES NFR BLD AUTO: 9.3 %
NEUTROPHILS # BLD AUTO: 2.2 10E9/L (ref 1.6–8.3)
NEUTROPHILS NFR BLD AUTO: 57.2 %
NRBC # BLD AUTO: 0 10*3/UL
NRBC BLD AUTO-RTO: 0 /100
PLATELET # BLD AUTO: 285 10E9/L (ref 150–450)
POTASSIUM SERPL-SCNC: 3.8 MMOL/L (ref 3.4–5.3)
PROT SERPL-MCNC: 7.9 G/DL (ref 6.8–8.8)
RBC # BLD AUTO: 4.14 10E12/L (ref 3.8–5.2)
SODIUM SERPL-SCNC: 146 MMOL/L (ref 133–144)
SPECIMEN EXP DATE BLD: NORMAL
WBC # BLD AUTO: 3.9 10E9/L (ref 4–11)

## 2020-11-08 PROCEDURE — 258N000003 HC RX IP 258 OP 636: Performed by: EMERGENCY MEDICINE

## 2020-11-08 PROCEDURE — 86850 RBC ANTIBODY SCREEN: CPT | Performed by: EMERGENCY MEDICINE

## 2020-11-08 PROCEDURE — 96361 HYDRATE IV INFUSION ADD-ON: CPT | Performed by: EMERGENCY MEDICINE

## 2020-11-08 PROCEDURE — 250N000011 HC RX IP 250 OP 636: Performed by: EMERGENCY MEDICINE

## 2020-11-08 PROCEDURE — 86900 BLOOD TYPING SEROLOGIC ABO: CPT | Performed by: EMERGENCY MEDICINE

## 2020-11-08 PROCEDURE — 80320 DRUG SCREEN QUANTALCOHOLS: CPT | Performed by: EMERGENCY MEDICINE

## 2020-11-08 PROCEDURE — 85025 COMPLETE CBC W/AUTO DIFF WBC: CPT | Performed by: EMERGENCY MEDICINE

## 2020-11-08 PROCEDURE — 83690 ASSAY OF LIPASE: CPT | Performed by: EMERGENCY MEDICINE

## 2020-11-08 PROCEDURE — 85610 PROTHROMBIN TIME: CPT | Performed by: EMERGENCY MEDICINE

## 2020-11-08 PROCEDURE — 80053 COMPREHEN METABOLIC PANEL: CPT | Performed by: EMERGENCY MEDICINE

## 2020-11-08 PROCEDURE — 99284 EMERGENCY DEPT VISIT MOD MDM: CPT | Performed by: EMERGENCY MEDICINE

## 2020-11-08 PROCEDURE — 96374 THER/PROPH/DIAG INJ IV PUSH: CPT | Performed by: EMERGENCY MEDICINE

## 2020-11-08 PROCEDURE — 86901 BLOOD TYPING SEROLOGIC RH(D): CPT | Performed by: EMERGENCY MEDICINE

## 2020-11-08 PROCEDURE — 99284 EMERGENCY DEPT VISIT MOD MDM: CPT | Mod: 25 | Performed by: EMERGENCY MEDICINE

## 2020-11-08 RX ORDER — ONDANSETRON 2 MG/ML
8 INJECTION INTRAMUSCULAR; INTRAVENOUS ONCE
Status: COMPLETED | OUTPATIENT
Start: 2020-11-08 | End: 2020-11-08

## 2020-11-08 RX ADMIN — SODIUM CHLORIDE 1000 ML: 9 INJECTION, SOLUTION INTRAVENOUS at 03:00

## 2020-11-08 RX ADMIN — ONDANSETRON 8 MG: 2 INJECTION INTRAMUSCULAR; INTRAVENOUS at 03:01

## 2020-11-08 NOTE — ED AVS SNAPSHOT
[facility]  [address] [facility] [address]  Phone: [phone]                                    Mira Cao   MRN: [id number]    Department: [facility]   Date of Visit: 11/8/2020           After Visit Summary Signature Page    I have received my discharge instructions, and my questions have been answered. I have discussed any challenges I see with this plan with the nurse or doctor.    ..........................................................................................................................................  Patient/Patient Representative Signature      ..........................................................................................................................................  Patient Representative Print Name and Relationship to Patient    ..................................................               ................................................  Date                                   Time    ..........................................................................................................................................  Reviewed by Signature/Title    ...................................................              ..............................................  Date                                               Time          22EPIC Rev 08/18

## 2020-11-08 NOTE — ED PROVIDER NOTES
History     Chief Complaint   Patient presents with     Hematemesis     The history is provided by the patient and medical records.     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 Patient reports tonight she was at a gathering of friends and felt some bleeding from the back-left part of her mouth. No clear injury. Patient has had EtOH. Shortly after the oral bleeding started, she had a few episodes of vomiting, which then also had some blood in it. No abdominal pain, no nausea/vomiting that preceded the bleeding. Patient later stated that she remembered feeling nauseous and used her finger to try to gag herself to induce vomiting before the bleeding started.     Past Medical History  Past Medical History:   Diagnosis Date     Squamous cell carcinoma of neck      Uncomplicated asthma      Past Surgical History:   Procedure Laterality Date     DISSECTION RADICAL NECK MODIFIED Left 11/20/2019    Procedure: DISSECTION, NECK, MODIFIED RADICAL LEFT;  Surgeon: Ruth Tello MD;  Location: [facility] OR     LARYNGOSCOPY WITH BIOPSY(IES) N/A 10/24/2019    Procedure: Direct laryngscopy with biopsy;  Surgeon: Ruth Tello MD;  Location: [facility] OR     TONSILLECTOMY ADULT Left 10/24/2019    Procedure: Left Radical  tonsillectomy;  Surgeon: Ruth Tello MD;  Location: [facility] OR          biotin 1000 MCG TABS tablet       citalopram (CELEXA) 10 MG tablet       ferrous sulfate (KP FERROUS SULFATE) 325 (65 Fe) MG tablet       loratadine (CLARITIN) 10 MG tablet       LORazepam (ATIVAN) 0.5 MG tablet       Milk Thistle-Dand-Fennel-Licor (MILK THISTLE XTRA) CAPS capsule       Omega-3 Fatty Acids (OMEGA 3 PO)       order for DME      No Known Allergies  Family History  Family History   Problem Relation Age of Onset     Breast Cancer Mother      Cancer Mother         Breast cancer     Bone  Cancer Sister      Cancer Sister         Bone cancer     Social History   Social History     Tobacco Use     Smoking status: Never Smoker     Smokeless tobacco: Never Used   Substance Use Topics     Alcohol use: Yes     Drug use: Never      Past medical history, past surgical history, medications, allergies, family history, and social history were reviewed with the patient. No additional pertinent items.      Review of Systems  A complete review of systems was performed with pertinent positives and negatives noted in the HPI, and all other systems negative.    Physical Exam   BP: 121/83  Pulse: 90  Resp: 18  SpO2: 98 %    Physical Exam  General: no acute distress. Appears stated age.   HENT: MMM, ~2x2 mm superficial abrasion on left posterior oropharynx, no other oropharyngeal lesions  Eyes: PERRL, normal sclerae  Neck: non-tender, supple  Cardio: regular rate. Regular rhythm. Extremities well perfused  Resp: Normal work of breathing, clear breath sounds  Abdomen: no tenderness, non-distended, no rebound, no guarding  Neuro: alert and fully oriented. CN II-XII grossly intact. Grossly normal strength and sensation in all extremities.   MSK: no deformities.   Integumentary/Skin: no rash visualized, normal color  Psych: normal affect, normal behavior    ED Course      Procedures           Labs Ordered and Resulted from Time of ED Arrival Up to the Time of Departure from the ED   CBC WITH PLATELETS DIFFERENTIAL - Abnormal; Notable for the following components:       Result Value    WBC 3.9 (*)     All other components within normal limits   COMPREHENSIVE METABOLIC PANEL - Abnormal; Notable for the following components:    Sodium 146 (*)     Chloride 112 (*)     All other components within normal limits   LIPASE - Abnormal; Notable for the following components:    Lipase 63 (*)     All other components within normal limits   ALCOHOL ETHYL - Abnormal; Notable for the following components:    Ethanol g/dL 0.28 (*)     All  other components within normal limits   INR   ABO/RH TYPE AND SCREEN     No orders to display          Assessments & Plan (with Medical Decision Making)   Patient presenting with oral bleeding followed by hematemesis.  Vitals in the ER unremarkable.  Nursing notes reviewed.    Exam with punctate abrasion-like spot on the left posterior oropharynx is likely source of recent bleeding.  No ongoing hemorrhage.  Hematemesis likely due to swallowed blood.  Hemoglobin 12.2.  Type and screen was sent.  Ethanol was elevated 0.28.  Patient's level of altered mental status was consistent with her alcohol level.  She did have a sober friend with her who is willing to take responsibility for getting her home.    Mental status cleared appropriately with monitoring in the ED.    After counseling on the diagnosis, work-up, and treatment plan, the patient was discharged home with her sober friend.  Patient was advised to follow-up with her primary care physician in a few days.  Patient advised to return to ED if worsening symptoms or for any other urgent or life-threatening concerns.    This part of the medical record was transcribed by Theresa Antony, Medical Scribe, from a dictation done by Russ Ruiz MD.      Final diagnoses:   Oral bleeding   Hematemesis with nausea   Alcohol intoxication delirium (H)     Discharge Medication List as of 11/8/2020  4:04 AM          --  Russ Ruiz MD   Emergency Medicine   [facility] EMERGENCY DEPARTMENT  11/8/2020     Russ Ruiz MD  11/09/20 0515

## 2020-11-08 NOTE — ED NOTES
Pt stated that she is feeling much better after receiving fluids and is ready to go. Pt stated that after reflecting on the night, she recalled putting her fingers down her throat to try to make herself vomit and may have cut the back of her throat. Pt stated that she is ready to leave and patient's friend (Chioma) who is bedside stated she is sober and their ride waiting for them outside is also sober.

## 2020-11-08 NOTE — DISCHARGE INSTRUCTIONS
Your lab work-up was reassuring.     Please follow-up with your primary care physician if any ongoing non-urgent concerns.     Return to the ED if you are having any urgent/life-threatening concerns.     DISCHARGE RESOURCES:  -[facility] [phone] (detox), [phone] (outpatient & [facility])    -[facility] - self management for addiction recovery:  www.smartrecovery.org    -[facility] ~ [facility] Crisis [facility] Support [facility]: 703.372.9432.  -[facility] [phone]   -Substance Abuse and Mental Health Services (www.samhsa.gov)  -Harm Reduction Coalition (www. Harmreduction.org)  -[place] Opioid Prevention Coalition: www.opioidcoalition.org  -Poison control 1-383.579.6768       Sober Support Group Information:  AA/NA & Sponsor/Support  -Alcoholics Anonymous (www.alcoholics-anonymous.org): for local information 24 hours/day  -AA Intergroup service office in [place] (http://www.aastpaul.org/) [phone]  -AA Intergroup service office in [facility]: [phone]. (http://www.[place].org/)  -Narcotics Anonymous (www.nam[place].org) (823) 704-8813   -Sober Fun Activities: www.sober-activities.[facility]//[facility] [place] [facility] ([facility])  [facility] connects people seeking recovery to resources that help foster and sustain long-term recovery.  Whether you are seeking resources for treatment, transportation, housing, job training, education, health care or other pathways to recovery, [facility] is a great place to start.   Phone: [phone]. www.[place]-Me.[employer] (Great listing of all types of recovery and non-recovery related resources)

## 2020-11-08 NOTE — ED TRIAGE NOTES
Pt arrived by private car with c/o hematemesis, began tonight. Pt describes emesis as bright red. PMH of throat cancer.

## 2020-11-09 DIAGNOSIS — C10.9 SQUAMOUS CELL CARCINOMA OF OROPHARYNX (H): Primary | ICD-10-CM

## 2020-11-09 DIAGNOSIS — M54.2 NECK PAIN: ICD-10-CM

## 2020-11-10 ENCOUNTER — HOSPITAL ENCOUNTER (OUTPATIENT)
Dept: OCCUPATIONAL THERAPY | Facility: CLINIC | Age: 35
Setting detail: THERAPIES SERIES
End: 2020-11-10
Attending: NURSE PRACTITIONER
Payer: COMMERCIAL

## 2020-11-10 PROCEDURE — 97140 MANUAL THERAPY 1/> REGIONS: CPT | Mod: GO

## 2020-11-17 NOTE — PROGRESS NOTES
I had the pleasure of seeing Mira Cao in follow-up today at the [facility].     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 The patient noticed a left neck mass in June. She thought this was related to a swollen lymph node due to stress as she had just bought a house.  She delayed evaluation until approximately July when she presented to urgent care.  She had an ultrasound obtained on 7/12/2019 which demonstrated a 3.7 x 2.3 x 1.3 cm mass in the left neck.  She then had a CT scan on 8/7/2019 which demonstrated a 1.8 x 1.4 x 3.8 cm partially necrotic/cystic level 2/3 neck mass, concerning for metastatic squamous cell carcinoma.  She was seen by Dr.Todd Cao and had an FNA performed on 8/8/2019.  The pathology from the FNA demonstrated atypical squamous proliferation which was p16-.  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  She elected for removal which was performed by Dr. Cao on 10/2/2019.  The excision was performed without a completion neck dissection.  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  She had a PET CT scan on 2019 locally which demonstrated postop changes without residual disease and no obvious primary malignancy.  On 10/10/2019 she saw Dr. Neely at [facility], [place] for medical oncology consultation where possible chemotherapy was discussed.  She has met with Dr Melendez from radiation oncology at [facility], [place].  She had a dental cleaning.  She was taken to the operating room on 10/24/2019 for direct laryngoscopy with biopsy.  Initial biopsy of the tonsil demonstrated high-grade dysplasia.  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  A radical tonsillectomy was then performed based on  the preoperative discussion with the patient. Final pathology demonstrated a T1 SCC of the tonsil with negative margins. She was taken to the OR on 11/20/2019 for a left neck dissection with resection of her scar. She had 1/38 lymph nodes positive - 0.4 cm deposit of metastatic SCC without STEVE. She had proton beam therapy postoperatively at [facility] with Dr Smith. She completed her treatment on 2/24/2020. She lost about 20 lbs with treatment. She had her 3 month post treatment PET in May 2020 which showed uptake in the right tonsil and right level II node thought to be reactive.       Interval history:  She comes in today for follow-up. She was last seen in clinic in September 2020. She says that things are continuing to improve from her last visit. She continues to do her swallowing exercises and her lymphedema therapy. She goes for in person lymphedema therapy once per month. She is supposed to see PT this month for myofascial release. She saw the dentist this summer. She has no dysphagia, odynophagia, neck masses, ear pain. She did have some sore throat after recent trauma to her throat in which she was drinking alcohol and felt like she tried to induce vomiting. She said that she had some bleeding with this and had some irritation to the throat. She feels like her anxiety is better. She still has some fatigue. She continues to work from home. She has not had any personal issues with COVID.      MEDICATIONS:     Current Outpatient Medications   Medication Sig Dispense Refill     biotin 1000 MCG TABS tablet Take 1,000 mcg by mouth daily       citalopram (CELEXA) 10 MG tablet Take 1 tablet (10 mg) by mouth daily 30 tablet 11     ferrous sulfate (KP FERROUS SULFATE) 325 (65 Fe) MG tablet Take 325 mg by mouth daily (with breakfast)       loratadine (CLARITIN) 10 MG tablet Take 10 mg by mouth daily as needed        LORazepam (ATIVAN) 0.5 MG tablet Take 0.5-1 mg by mouth as needed        Milk Thistle-Dand-Fennel-Licor  (MILK THISTLE XTRA) CAPS capsule Take 1 capsule by mouth daily       Omega-3 Fatty Acids (OMEGA 3 PO) Take 2 capsules by mouth daily       order for DME Equipment being ordered: facioplasty garment 1 each 2       ALLERGIES:  No Known Allergies    HABITS/SOCIAL HISTORY:   Works as a .    She is  with no children.    She has no smoking history.  She has about 10 alcoholic beverages on weekends.  She has no chewing tobacco or vaping history.    Social History     Socioeconomic History     Marital status:      Spouse name: Not on file     Number of children: Not on file     Years of education: Not on file     Highest education level: Not on file   Occupational History     Not on file   Social Needs     Financial resource strain: Not on file     Food insecurity     Worry: Not on file     Inability: Not on file     Transportation needs     Medical: Not on file     Non-medical: Not on file   Tobacco Use     Smoking status: Never Smoker     Smokeless tobacco: Never Used   Substance and Sexual Activity     Alcohol use: Yes     Drug use: Never     Sexual activity: Yes     Partners: Male     Birth control/protection: Condom   Lifestyle     Physical activity     Days per week: Not on file     Minutes per session: Not on file     Stress: Not on file   Relationships     Social connections     Talks on phone: Not on file     Gets together: Not on file     Attends Ep[employer] service: Not on file     Active member of club or organization: Not on file     Attends meetings of clubs or organizations: Not on file     Relationship status: Not on file     Intimate partner violence     Fear of current or ex partner: Not on file     Emotionally abused: Not on file     Physically abused: Not on file     Forced sexual activity: Not on file   Other Topics Concern     Not on file   Social History Narrative     Not on file       PAST MEDICAL HISTORY:   Past Medical History:   Diagnosis Date     Squamous cell carcinoma  "of neck      Uncomplicated asthma         PAST SURGICAL HISTORY:   Past Surgical History:   Procedure Laterality Date     DISSECTION RADICAL NECK MODIFIED Left 11/20/2019    Procedure: DISSECTION, NECK, MODIFIED RADICAL LEFT;  Surgeon: Ruth Tello MD;  [facility]     LARYNGOSCOPY WITH BIOPSY(IES) N/A 10/24/2019    Procedure: Direct laryngscopy with biopsy;  Surgeon: Ruth Tello MD;  Location: [facility] OR     TONSILLECTOMY ADULT Left 10/24/2019    Procedure: Left Radical  tonsillectomy;  Surgeon: Ruth Tello MD;  Location: [facility] OR       FAMILY HISTORY:    Family History   Problem Relation Age of Onset     Breast Cancer Mother      Cancer Mother         Breast cancer     Bone Cancer Sister      Cancer Sister         Bone cancer       REVIEW OF SYSTEMS:  12 point ROS was negative other than the symptoms noted above in the HPI.  Patient Supplied Answers to Review of Systems  [facility] ROS 5/14/2020   Constitutional -   Neurology Headache   Psychology -   Ears, Nose, Throat Ear pain   Musculoskeletal -   Endocrine -         PHYSICAL EXAMINATION:   Pulse 71   Temp 98.3  F (36.8  C) (Temporal)   Ht 1.676 m (5' 6\")   Wt 50.3 kg (111 lb)   SpO2 98%   BMI 17.92 kg/m     Appearance:   normal; NAD, age-appropriate appearance, thin   Communication:   normal; communicates verbally, normal voice quality   Head/Face:   inspection -  Normal; no scars or visible lesions   Salivary glands -  Normal size, no tenderness, swelling, or palpable masses   Facial strength -  Normal and symmetric bilateral   Skin:  normal, no rash   Ocular Motility:  normal occular movements   Ears:  auricle (AD) -  normal  EAC (AD) -  normal  TM (AD) -  Normal, no effusion  auricle (AS) -  normal  EAC (AS) -  normal  TM (AS) -  Normal, no effusion  Normal clinical speech reception   Nose:  Ext. inspection -  Normal  Internal Inspection -  Normal mucosa, septum, and turbinates   Nasopharynx:  normal mucosa, no masses   Oral Cavity:  " lips -  Normal mucosa, oral competence, and stoma size   Age-appropriate dentition, healthy gingival mucosa   Hard palate, buccal, floor of mouth mucosa normal   Tongue - normal movement, no lesions   Oropharynx:  mucosa -  Normal, no lesions  soft palate -  Expected post radical tonsillectomy asymmetry of left soft palate  tonsils -  S/p left radical tonsillectomy, no secretions, no masses, no concerning mucosal lesions, no palpable masses, expected scar tissue; right tonsil with no palpable masses slightly cryptic in appearance, no mucosal lesions  BOT - normal  Vallecula - clear   Hypopharynx:  Normal pyriform sinus and pharyngeal wall mucosa   No pooled secretions    Larynx:  Epiglottis, AE folds, false vocal cords, true vocal cords, arytenoids normal in appearance with no edema of AE folds or arytenoids, continued overall improved appearance   bilaterally mobile cords    Neck: Well healed left neck incision   Some fibrosis of the left SCM sternal head  No significant lymphedema  Normal range of motion   Lymphatic:  no abnormal nodes   Cardiovascular:  warm, pink, well-perfused extremities without swelling, tenderness, or edema   Respiratory:  Normal respiratory effort, no stridor   Neuro/Psych.:  mood/affect -  normal  mental status -  normal       PROCEDURES:   Flexible fiberoptic laryngoscopy: Scope exam was indicated due to oropharyngeal cancer. Verbal consent was obtained. The nasal cavity was prepped with an aerosolized solution of topical anesthetic and vasoconstrictive agent. The scope was passed through the anterior nasal cavity and advanced. Inspection of the nasopharynx revealed no gross abnormality. The base of tongue and vallecula are normal. There are no abnormalities in the area of the left tonsil. The epiglottis, AE folds, false cords, true cords, arytenoids are normal and there is no edema of the arytenoids or AE folds. Overall the airway is improved.  Inspection of the larynx revealed  bilaterally mobile vocal cords. Pyriform sinuses are symmetric. The airway is patent. Procedure tolerated well with no immediate complications noted.        RESULTS REVIEWED:         IMPRESSION AND PLAN:   Mira Cao is a 35-year-old woman with a likely T1N1 p16+ squamous cell carcinoma of the oropharynx. She had an excision of a cystic neck mass by a local ENT which demonstrated a metastatic SCC. She underwent a radical tonsillectomy which showed a small primary tumor in the tonsil which was completely resected. She then had a completion neck dissection on 11/20/2019 with final pathology showing 1/38 nodes. She completed postoperative proton beam therapy at [facility] on 2/24/2020.     She continues to improve from her treatment. She has no concerning findings on exam today. She is doing her swallowing therapy and lymphedema therapy, will start myofascial release with PT. I discussed with her that I would like her to work on massaging the SCM sternal head on the left as it is developing some fibrosis.     She met with our speech therapy team today.  She had a discussion with them about a therabite.    Her TSH recheck is due in January 2021.    She is due for repeat imaging in January 2021.    I will see her back in 2 months with labs and imaging.      Thank you very much for the opportunity to participate in the care of your patient.      Ruth Tello MD, M.D.  Otolaryngology- Head & Neck Surgery      This note was dictated with voice recognition software and then edited. Please excuse any unintentional errors.               CC:  Ifeanyi Soliman MD  Department of Radiation Oncology  [facility]

## 2020-11-18 ENCOUNTER — THERAPY VISIT (OUTPATIENT)
Dept: SPEECH THERAPY | Facility: CLINIC | Age: 35
End: 2020-11-18
Payer: COMMERCIAL

## 2020-11-18 ENCOUNTER — OFFICE VISIT (OUTPATIENT)
Dept: OTOLARYNGOLOGY | Facility: CLINIC | Age: 35
End: 2020-11-18
Payer: COMMERCIAL

## 2020-11-18 VITALS
TEMPERATURE: 98.3 F | BODY MASS INDEX: 17.84 KG/M2 | HEIGHT: 66 IN | HEART RATE: 71 BPM | WEIGHT: 111 LBS | OXYGEN SATURATION: 98 %

## 2020-11-18 DIAGNOSIS — C44.42 SQUAMOUS CELL CARCINOMA OF NECK: ICD-10-CM

## 2020-11-18 DIAGNOSIS — C10.9 SQUAMOUS CELL CARCINOMA OF OROPHARYNX (H): Primary | ICD-10-CM

## 2020-11-18 DIAGNOSIS — R13.12 OROPHARYNGEAL DYSPHAGIA: Primary | ICD-10-CM

## 2020-11-18 PROCEDURE — 99213 OFFICE O/P EST LOW 20 MIN: CPT | Mod: 25 | Performed by: OTOLARYNGOLOGY

## 2020-11-18 PROCEDURE — 92526 ORAL FUNCTION THERAPY: CPT | Mod: GN | Performed by: SPEECH-LANGUAGE PATHOLOGIST

## 2020-11-18 PROCEDURE — 31575 DIAGNOSTIC LARYNGOSCOPY: CPT | Performed by: OTOLARYNGOLOGY

## 2020-11-18 ASSESSMENT — PAIN SCALES - GENERAL: PAINLEVEL: NO PAIN (0)

## 2020-11-18 ASSESSMENT — MIFFLIN-ST. JEOR: SCORE: 1210.24

## 2020-11-18 NOTE — NURSING NOTE
"Chief Complaint   Patient presents with     RECHECK     follow up       Pulse 71, temperature 98.3  F (36.8  C), temperature source Temporal, height 1.676 m (5' 6\"), weight 50.3 kg (111 lb), SpO2 98 %, not currently breastfeeding.    Linnea Dudley, EMT    "

## 2020-11-18 NOTE — LETTER
11/18/2020       RE: Mira Cao  [address]  [place] [address]     Dear Colleague,    Thank you for referring your patient, Mira Cao, to the [facility] [place] at [facility]. Please see a copy of my visit note below.    I had the pleasure of seeing Mira Cao in follow-up today at the [facility].     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 The patient noticed a left neck mass in June. She thought this was related to a swollen lymph node due to stress as she had just bought a house.  She delayed evaluation until approximately July when she presented to urgent care.  She had an ultrasound obtained on 7/12/2019 which demonstrated a 3.7 x 2.3 x 1.3 cm mass in the left neck.  She then had a CT scan on 8/7/2019 which demonstrated a 1.8 x 1.4 x 3.8 cm partially necrotic/cystic level 2/3 neck mass, concerning for metastatic squamous cell carcinoma.  She was seen by Dr.Todd Cao and had an FNA performed on 8/8/2019.  The pathology from the FNA demonstrated atypical squamous proliferation which was p16-.  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  She elected for removal which was performed by Dr. Cao on 10/2/2019.  The excision was performed without a completion neck dissection.  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  She had a PET CT scan on 2019 locally which demonstrated postop changes without residual disease and no obvious primary malignancy.  On 10/10/2019 she saw Dr. Neely at [facility] for medical oncology consultation where possible chemotherapy was discussed.  She has met with Dr Melendez from radiation oncology at [facility].  She had a dental cleaning.  She  was taken to the operating room on 10/24/2019 for direct laryngoscopy with biopsy.  Initial biopsy of the tonsil demonstrated high-grade dysplasia.  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  A radical tonsillectomy was then performed based on the preoperative discussion with the patient. Final pathology demonstrated a T1 SCC of the tonsil with negative margins. She was taken to the OR on 11/20/2019 for a left neck dissection with resection of her scar. She had 1/38 lymph nodes positive - 0.4 cm deposit of metastatic SCC without STEVE. She had proton beam therapy postoperatively at [facility] with Dr Smith. She completed her treatment on 2/24/2020. She lost about 20 lbs with treatment. She had her 3 month post treatment PET in May 2020 which showed uptake in the right tonsil and right level II node thought to be reactive.       Interval history:  She comes in today for follow-up. She was last seen in clinic in September 2020. She says that things are continuing to improve from her last visit. She continues to do her swallowing exercises and her lymphedema therapy. She goes for in person lymphedema therapy once per month. She is supposed to see PT this month for myofascial release. She saw the dentist this summer. She has no dysphagia, odynophagia, neck masses, ear pain. She did have some sore throat after recent trauma to her throat in which she was drinking alcohol and felt like she tried to induce vomiting. She said that she had some bleeding with this and had some irritation to the throat. She feels like her anxiety is better. She still has some fatigue. She continues to work from home. She has not had any personal issues with COVID.      MEDICATIONS:     Current Outpatient Medications   Medication Sig Dispense Refill     biotin 1000 MCG TABS tablet Take 1,000 mcg by mouth daily       citalopram (CELEXA) 10 MG tablet Take 1 tablet (10 mg) by  mouth daily 30 tablet 11     ferrous sulfate ( FERROUS SULFATE) 325 (65 Fe) MG tablet Take 325 mg by mouth daily (with breakfast)       loratadine (CLARITIN) 10 MG tablet Take 10 mg by mouth daily as needed        LORazepam (ATIVAN) 0.5 MG tablet Take 0.5-1 mg by mouth as needed        Milk Thistle-Dand-Fennel-Licor (MILK THISTLE XTRA) CAPS capsule Take 1 capsule by mouth daily       Omega-3 Fatty Acids (OMEGA 3 PO) Take 2 capsules by mouth daily       order for DME Equipment being ordered: facioplasty garment 1 each 2       ALLERGIES:  No Known Allergies    HABITS/SOCIAL HISTORY:   Works as a .    She is  with no children.    She has no smoking history.  She has about 10 alcoholic beverages on weekends.  She has no chewing tobacco or vaping history.    Social History     Socioeconomic History     Marital status:      Spouse name: Not on file     Number of children: Not on file     Years of education: Not on file     Highest education level: Not on file   Occupational History     Not on file   Social Needs     Financial resource strain: Not on file     Food insecurity     Worry: Not on file     Inability: Not on file     Transportation needs     Medical: Not on file     Non-medical: Not on file   Tobacco Use     Smoking status: Never Smoker     Smokeless tobacco: Never Used   Substance and Sexual Activity     Alcohol use: Yes     Drug use: Never     Sexual activity: Yes     Partners: Male     Birth control/protection: Condom   Lifestyle     Physical activity     Days per week: Not on file     Minutes per session: Not on file     Stress: Not on file   Relationships     Social connections     Talks on phone: Not on file     Gets together: Not on file     Attends Denominational service: Not on file     Active member of club or organization: Not on file     Attends meetings of clubs or organizations: Not on file     Relationship status: Not on file     Intimate partner violence     Fear of  "current or ex partner: Not on file     Emotionally abused: Not on file     Physically abused: Not on file     Forced sexual activity: Not on file   Other Topics Concern     Not on file   Social History Narrative     Not on file       PAST MEDICAL HISTORY:   Past Medical History:   Diagnosis Date     Squamous cell carcinoma of neck      Uncomplicated asthma         PAST SURGICAL HISTORY:   Past Surgical History:   Procedure Laterality Date     DISSECTION RADICAL NECK MODIFIED Left 11/20/2019    Procedure: DISSECTION, NECK, MODIFIED RADICAL LEFT;  Surgeon: Ruth Tello MD;  [facility]     LARYNGOSCOPY WITH BIOPSY(IES) N/A 10/24/2019    Procedure: Direct laryngscopy with biopsy;  Surgeon: Ruth Tello MD;  [facility] OR     TONSILLECTOMY ADULT Left 10/24/2019    Procedure: Left Radical  tonsillectomy;  Surgeon: Ruth Tello MD;  Location: [facility] OR       FAMILY HISTORY:    Family History   Problem Relation Age of Onset     Breast Cancer Mother      Cancer Mother         Breast cancer     Bone Cancer Sister      Cancer Sister         Bone cancer       REVIEW OF SYSTEMS:  12 point ROS was negative other than the symptoms noted above in the HPI.  Patient Supplied Answers to Review of Systems  [facility] ROS 5/14/2020   Constitutional -   Neurology Headache   Psychology -   Ears, Nose, Throat Ear pain   Musculoskeletal -   Endocrine -         PHYSICAL EXAMINATION:   Pulse 71   Temp 98.3  F (36.8  C) (Temporal)   Ht 1.676 m (5' 6\")   Wt 50.3 kg (111 lb)   SpO2 98%   BMI 17.92 kg/m     Appearance:   normal; NAD, age-appropriate appearance, thin   Communication:   normal; communicates verbally, normal voice quality   Head/Face:   inspection -  Normal; no scars or visible lesions   Salivary glands -  Normal size, no tenderness, swelling, or palpable masses   Facial strength -  Normal and symmetric bilateral   Skin:  normal, no rash   Ocular Motility:  normal occular movements   Ears:  auricle (AD) -  " normal  EAC (AD) -  normal  TM (AD) -  Normal, no effusion  auricle (AS) -  normal  EAC (AS) -  normal  TM (AS) -  Normal, no effusion  Normal clinical speech reception   Nose:  Ext. inspection -  Normal  Internal Inspection -  Normal mucosa, septum, and turbinates   Nasopharynx:  normal mucosa, no masses   Oral Cavity:  lips -  Normal mucosa, oral competence, and stoma size   Age-appropriate dentition, healthy gingival mucosa   Hard palate, buccal, floor of mouth mucosa normal   Tongue - normal movement, no lesions   Oropharynx:  mucosa -  Normal, no lesions  soft palate -  Expected post radical tonsillectomy asymmetry of left soft palate  tonsils -  S/p left radical tonsillectomy, no secretions, no masses, no concerning mucosal lesions, no palpable masses, expected scar tissue; right tonsil with no palpable masses slightly cryptic in appearance, no mucosal lesions  BOT - normal  Vallecula - clear   Hypopharynx:  Normal pyriform sinus and pharyngeal wall mucosa   No pooled secretions    Larynx:  Epiglottis, AE folds, false vocal cords, true vocal cords, arytenoids normal in appearance with no edema of AE folds or arytenoids, continued overall improved appearance   bilaterally mobile cords    Neck: Well healed left neck incision   Some fibrosis of the left SCM sternal head  No significant lymphedema  Normal range of motion   Lymphatic:  no abnormal nodes   Cardiovascular:  warm, pink, well-perfused extremities without swelling, tenderness, or edema   Respiratory:  Normal respiratory effort, no stridor   Neuro/Psych.:  mood/affect -  normal  mental status -  normal       PROCEDURES:   Flexible fiberoptic laryngoscopy: Scope exam was indicated due to oropharyngeal cancer. Verbal consent was obtained. The nasal cavity was prepped with an aerosolized solution of topical anesthetic and vasoconstrictive agent. The scope was passed through the anterior nasal cavity and advanced. Inspection of the nasopharynx revealed no  gross abnormality. The base of tongue and vallecula are normal. There are no abnormalities in the area of the left tonsil. The epiglottis, AE folds, false cords, true cords, arytenoids are normal and there is no edema of the arytenoids or AE folds. Overall the airway is improved.  Inspection of the larynx revealed bilaterally mobile vocal cords. Pyriform sinuses are symmetric. The airway is patent. Procedure tolerated well with no immediate complications noted.        RESULTS REVIEWED:         IMPRESSION AND PLAN:   Mira Cao is a 35-year-old woman with a likely T1N1 p16+ squamous cell carcinoma of the oropharynx. She had an excision of a cystic neck mass by a local ENT which demonstrated a metastatic SCC. She underwent a radical tonsillectomy which showed a small primary tumor in the tonsil which was completely resected. She then had a completion neck dissection on 11/20/2019 with final pathology showing 1/38 nodes. She completed postoperative proton beam therapy at [facility] on 2/24/2020.     She continues to improve from her treatment. She has no concerning findings on exam today. She is doing her swallowing therapy and lymphedema therapy, will start myofascial release with PT. I discussed with her that I would like her to work on massaging the SCM sternal head on the left as it is developing some fibrosis.     She met with our speech therapy team today.  She had a discussion with them about a therabite.    Her TSH recheck is due in January 2021.    She is due for repeat imaging in January 2021.    I will see her back in 2 months with labs and imaging.      Thank you very much for the opportunity to participate in the care of your patient.      Ruth Tello MD, M.D.  Otolaryngology- Head & Neck Surgery      This note was dictated with voice recognition software and then edited. Please excuse any unintentional errors.       CC:  Ifeanyi Soliman MD  Department of Radiation Oncology  [facility]  [place]

## 2020-12-01 ENCOUNTER — THERAPY VISIT (OUTPATIENT)
Dept: PHYSICAL THERAPY | Facility: CLINIC | Age: 35
End: 2020-12-01
Attending: OTOLARYNGOLOGY
Payer: COMMERCIAL

## 2020-12-01 DIAGNOSIS — C10.9 SQUAMOUS CELL CARCINOMA OF OROPHARYNX (H): ICD-10-CM

## 2020-12-01 DIAGNOSIS — M54.2 NECK PAIN: ICD-10-CM

## 2020-12-01 PROCEDURE — 97161 PT EVAL LOW COMPLEX 20 MIN: CPT | Mod: GP | Performed by: PHYSICAL THERAPIST

## 2020-12-01 PROCEDURE — 97140 MANUAL THERAPY 1/> REGIONS: CPT | Mod: GP | Performed by: PHYSICAL THERAPIST

## 2020-12-01 PROCEDURE — 97110 THERAPEUTIC EXERCISES: CPT | Mod: GP | Performed by: PHYSICAL THERAPIST

## 2020-12-01 NOTE — PROGRESS NOTES
[facility] Initial Evaluation  Subjective:    Patient Health History         Pain is reported as 2/10 on pain scale.  General health as reported by patient is good.  Pertinent medical history includes: cancer.                Current occupation is .   Primary job tasks include:  Prolonged sitting and computer work.                                    Objective:  System    Physical Exam    General     ROS    Assessment/Plan:

## 2020-12-01 NOTE — PROGRESS NOTES
"Physical Therapy Initial Evaluation  December 1, 2020     MD Instructions/Precautions/Restrictions: PT eval and treat. SCM release due to fibrosis    Therapist Impression:   Mira Cao presents with findings consistent with neck pain/muscle tightness and post-operative fibrosis, with related impairments limiting her ability to swallow, drive (rotate neck). Skilled PT services are necessary in order to reduce impairments and improve independent function.     Subjective:   C/C: Neck pain and muscle tightness secondary to history and treatment of squamous cell carcinoma of oropharynx. 10/2/19 mass removal, then 11/20/19 for left neck dissection with resection of scar, then had postoperative proton beam therapy at [place] that lasted until 2/24/20.   Has worked with OT for neck lymphedema, as well as working with speech therapy for dysphagia.     General health as reported by patient: good  Pertinent medical/surgical history: Refer to health history in EMR.   Imaging: Refer to EMR.   Current occupational status: .   Typical Physical Activities: Peloton a couple times per week, yoga 1x/week.   Patient's goals are: decrease pain.   Return to MD:  PRN.       Objective:  CERVICAL EXAMINATION    Posture: Rounded shoulders    Active ROM ROM   Flexion Full   Extension 50% loss, anterior sretch   Protraction na   Retraction na    L R   Rotation Full 75% loss, L stretch   Sidebend Full 75% loss, L stretch     Neurological testing (myotomes, sensation, reflexes, nerve tension) not indicated at this time.    Other:  DNF endurance: 10\"      Assessment/Plan:    The patient is a 35 year old female with chief complaint of neck pain/muscle tightness.    The patient has the following significant findings with corresponding treatment plan.  Diagnosis 1:  Neck pain, muscle tightness/fibrosis secondary to surgery for SCC    Pain -  hot/cold therapy, manual therapy, self management and directional preference " exercise  Decreased ROM/flexibility - manual therapy, therapeutic exercise and home program  Decreased joint mobility - manual therapy, therapeutic exercise and home program  Decreased strength - therapeutic exercise, therapeutic activities and home program  Impaired muscle performance - neuro re-education and home program  Decreased function - therapeutic activities and home program  Impaired posture - neuro re-education, therapeutic activities and home program  Instability -  Therapeutic Activity, Therapeutic Exercise, Neuromuscular Re-education, Splinting/Taping/Bracing/Orthotic, home program      Therapy Evaluation Codes:   1) History comprised of:   Personal factors that impact the plan of care:      Please refer to health history in EMR.    Comorbidity factors that impact the plan of care are:      Please refer to health history in EMR.     Medications impacting care: None.  2) Examination of Body Systems comprised of:   Body structures and functions that impact the plan of care:      Cervical spine.   Activity limitations that impact the plan of care are:      Driving.   Clinical presentation characteristics are:    Stable/Uncomplicated.  3) Presentation comprised of:   Presentation scored as Low complexity with uncomplicated characteristics..  4) Decision-Making    Low complexity using standardized patient assessment instrument and/or measureable assessment of functional outcome.  Cumulative Therapy Evaluation is: Low complexity.    Previous and current functional limitations:  (See Goal Flow Sheet for this information)    Short term and Long term goals: (See Goal Flow Sheet for this information)     Communication ability:  Patient appears to be able to clearly communicate and understand verbal and written communication and follow directions correctly.  Treatment Explanation - The following has been discussed with the patient: RX ordered/plan of care, anticipated outcomes, and possible risks and side  effects.  This patient would benefit from PT intervention to resume normal activities.   Rehab potential is good.    Frequency:  1 X a week, once daily  Duration:  for 5 weeks  Discharge Plan: Achieve all LTGs, be independent in home treatment program, and reach maximal therapeutic benefit.    Please refer to the daily flowsheet for treatment today, total treatment time and time spent performing 1:1 timed codes.

## 2020-12-08 ENCOUNTER — HOSPITAL ENCOUNTER (OUTPATIENT)
Dept: OCCUPATIONAL THERAPY | Facility: CLINIC | Age: 35
Setting detail: THERAPIES SERIES
End: 2020-12-08
Attending: NURSE PRACTITIONER
Payer: COMMERCIAL

## 2020-12-08 PROCEDURE — 97140 MANUAL THERAPY 1/> REGIONS: CPT | Mod: GO

## 2020-12-11 ENCOUNTER — THERAPY VISIT (OUTPATIENT)
Dept: PHYSICAL THERAPY | Facility: CLINIC | Age: 35
End: 2020-12-11
Payer: COMMERCIAL

## 2020-12-11 DIAGNOSIS — M54.2 NECK PAIN: ICD-10-CM

## 2020-12-11 PROCEDURE — 97140 MANUAL THERAPY 1/> REGIONS: CPT | Mod: GP | Performed by: PHYSICAL THERAPIST

## 2020-12-11 PROCEDURE — 97110 THERAPEUTIC EXERCISES: CPT | Mod: GP | Performed by: PHYSICAL THERAPIST

## 2020-12-11 PROCEDURE — 97112 NEUROMUSCULAR REEDUCATION: CPT | Mod: GP | Performed by: PHYSICAL THERAPIST

## 2020-12-17 ENCOUNTER — OFFICE VISIT (OUTPATIENT)
Dept: OBGYN | Facility: CLINIC | Age: 35
End: 2020-12-17
Attending: NURSE PRACTITIONER
Payer: COMMERCIAL

## 2020-12-17 VITALS
BODY MASS INDEX: 18.88 KG/M2 | HEART RATE: 68 BPM | HEIGHT: 64 IN | SYSTOLIC BLOOD PRESSURE: 122 MMHG | WEIGHT: 110.6 LBS | DIASTOLIC BLOOD PRESSURE: 80 MMHG

## 2020-12-17 DIAGNOSIS — Z12.4 SCREENING FOR MALIGNANT NEOPLASM OF CERVIX: ICD-10-CM

## 2020-12-17 DIAGNOSIS — Z00.00 VISIT FOR PREVENTIVE HEALTH EXAMINATION: Primary | ICD-10-CM

## 2020-12-17 DIAGNOSIS — R10.2 PELVIC PAIN IN FEMALE: ICD-10-CM

## 2020-12-17 DIAGNOSIS — Z13.220 SCREENING FOR LIPOID DISORDERS: ICD-10-CM

## 2020-12-17 DIAGNOSIS — N63.0 LUMP OR MASS IN BREAST: ICD-10-CM

## 2020-12-17 DIAGNOSIS — Z11.3 SCREENING EXAMINATION FOR VENEREAL DISEASE: ICD-10-CM

## 2020-12-17 DIAGNOSIS — Z72.51 UNPROTECTED SEXUAL INTERCOURSE: ICD-10-CM

## 2020-12-17 DIAGNOSIS — Z31.69 ENCOUNTER FOR PRECONCEPTION CONSULTATION: ICD-10-CM

## 2020-12-17 DIAGNOSIS — Z01.419 ENCOUNTER FOR GYNECOLOGICAL EXAMINATION WITHOUT ABNORMAL FINDING: ICD-10-CM

## 2020-12-17 LAB
CLUE CELLS: NEGATIVE
HCG UR QL: NEGATIVE
INTERNAL QC OK POCT: YES
TRICHOMONAS (WET PREP): NEGATIVE
YEAST (WET PREP): NEGATIVE

## 2020-12-17 PROCEDURE — 99385 PREV VISIT NEW AGE 18-39: CPT | Performed by: NURSE PRACTITIONER

## 2020-12-17 PROCEDURE — G0145 SCR C/V CYTO,THINLAYER,RESCR: HCPCS | Performed by: NURSE PRACTITIONER

## 2020-12-17 PROCEDURE — 87624 HPV HI-RISK TYP POOLED RSLT: CPT | Performed by: NURSE PRACTITIONER

## 2020-12-17 PROCEDURE — 87491 CHLMYD TRACH DNA AMP PROBE: CPT | Performed by: NURSE PRACTITIONER

## 2020-12-17 PROCEDURE — 87591 N.GONORRHOEAE DNA AMP PROB: CPT | Performed by: NURSE PRACTITIONER

## 2020-12-17 PROCEDURE — 81025 URINE PREGNANCY TEST: CPT | Performed by: NURSE PRACTITIONER

## 2020-12-17 PROCEDURE — 87210 SMEAR WET MOUNT SALINE/INK: CPT | Performed by: NURSE PRACTITIONER

## 2020-12-17 PROCEDURE — G0463 HOSPITAL OUTPT CLINIC VISIT: HCPCS

## 2020-12-17 PROCEDURE — 99212 OFFICE O/P EST SF 10 MIN: CPT | Mod: 25 | Performed by: NURSE PRACTITIONER

## 2020-12-17 RX ORDER — MULTIVIT-MIN/IRON/FOLIC ACID/K 18-600-40
2000 CAPSULE ORAL DAILY
COMMUNITY
End: 2021-11-16

## 2020-12-17 RX ORDER — UBIDECARENONE 75 MG
250 CAPSULE ORAL DAILY
COMMUNITY
End: 2022-07-26

## 2020-12-17 SDOH — HEALTH STABILITY: MENTAL HEALTH: HOW OFTEN DO YOU HAVE A DRINK CONTAINING ALCOHOL?: 2-3 TIMES A WEEK

## 2020-12-17 SDOH — HEALTH STABILITY: MENTAL HEALTH: HOW MANY STANDARD DRINKS CONTAINING ALCOHOL DO YOU HAVE ON A TYPICAL DAY?: 1 OR 2

## 2020-12-17 SDOH — HEALTH STABILITY: MENTAL HEALTH: HOW OFTEN DO YOU HAVE 6 OR MORE DRINKS ON ONE OCCASION?: NEVER

## 2020-12-17 ASSESSMENT — MIFFLIN-ST. JEOR: SCORE: 1176.68

## 2020-12-17 ASSESSMENT — ANXIETY QUESTIONNAIRES
3. WORRYING TOO MUCH ABOUT DIFFERENT THINGS: NOT AT ALL
7. FEELING AFRAID AS IF SOMETHING AWFUL MIGHT HAPPEN: SEVERAL DAYS
1. FEELING NERVOUS, ANXIOUS, OR ON EDGE: SEVERAL DAYS
5. BEING SO RESTLESS THAT IT IS HARD TO SIT STILL: NOT AT ALL
GAD7 TOTAL SCORE: 3
6. BECOMING EASILY ANNOYED OR IRRITABLE: SEVERAL DAYS
2. NOT BEING ABLE TO STOP OR CONTROL WORRYING: NOT AT ALL

## 2020-12-17 ASSESSMENT — PATIENT HEALTH QUESTIONNAIRE - PHQ9
5. POOR APPETITE OR OVEREATING: NOT AT ALL
SUM OF ALL RESPONSES TO PHQ QUESTIONS 1-9: 4

## 2020-12-17 ASSESSMENT — PAIN SCALES - GENERAL: PAINLEVEL: NO PAIN (0)

## 2020-12-17 NOTE — LETTER
2020       RE: Mira Cao  [address]  [place] [address]     Dear Colleague,    Thank you for referring your patient, Mira Cao, to the [facility] [place] at [facility]. Please see a copy of my visit note below.    Progress Note    SUBJECTIVE:  Mira Cao is an 35 year old, , who requests an Annual Preventive Exam.   Mira's medical history is significant for squamous cell carcinoma of neck.      Concerns today include:   1. Cramping: Cramping pain occurring in the anterior pelvic and tailbone regions. Mira feels the pain most when she moves, sits down, and while using the bathroom (when she bears down and squeezes muscles). States these cramps are not new to her. Historically, this cramping only occurred during menses, but it is now occurring at other times not in relation to her cycle, daily since , which is her LMP.  The cramping with this LMP was also much more intense than normal. Mira notes that the cramping varies in intensity from mild to severe (ex. cramps were significant enough to wake her up last night). She reports very painful cramping after sex, but no pain during sex. Mira has no concerns for pregnancy today- patient states she has probably had unprotected intercourse one time since her LMP in November. Mira denies postcoital bleeding, intermenstrual bleeding, pelvic pressure/heaviness, and urinary/bowel sx. She reports not being as physically active d/t cancer treatments earlier in the year and she has been starting to exercise more often. Does cycling  and runs- been doing this since May. She does not think a musculoskeletal injury is the cause of her cramping. She has also lost 20 pounds since her cancer treatments. Patient reports a history of irritable bladder which she takes claritin to control these symptoms. The recent onset of cramping pain is different from the  pain she was experiencing due to the irritable bladder.      Menstrual History: Menses come monthly, heavy first few days then tapers off, changes pad every 6-8 hours. 5-7 days long. Painful cramping with first days of menses.   Menstrual History 10/24/2019 11/20/2019 12/17/2020   LAST MENSTRUAL PERIOD 10/1/2019 11/9/2019 11/26/2020       2. Preventative health:   Last pap in 2016.   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   - TSH was mildly elevated in 07/2020 at 4.18, with normal T4 value. Following up with ENT in January regarding this.  Denies sx of hypothyroidism today.     Mammogram current: not applicable. Mother has history of breast cancer diagnosed in her 60's. Mother had genetic testing done and this cancer was determined not to be genetic in origin.     Last Colonoscopy: not applicable.     - Sexual health: sexually active with . Cramping after intercourse has reduced the frequency in which they have sex lately. Denies any vaginal symptoms. Reports a white vaginal discharge that is normal for her. Denies risk for STIs but is open to getting testing today to rule out causes for cramping.     - Contraception: condoms used 50% of time. Desires pregnancy next year. Not taking a prenatal vitamin    - Diet: adequate intake of fruits and vegetables- eats vegan diet often. Reports increased cramping when she eats dairy so she does not do this often. Calcium intake likely poor.     Exercise: yoga, running, walking, cycling - tries to do something daily.     Mental health: coping well. Has therapist that she sees once per month. Reports some stress and anxiety surrounding COVID and cancer dx.     HISTORY:       biotin 1000 MCG TABS tablet, Take 1,000 mcg by mouth daily       cyanocobalamin (VITAMIN B-12) 100 MCG tablet, Take 250  mcg by mouth daily       ferrous sulfate ( FERROUS SULFATE) 325 (65 Fe) MG tablet, Take 325 mg by mouth daily (with breakfast)       loratadine (CLARITIN) 10 MG tablet, Take 10 mg by mouth daily as needed        LORazepam (ATIVAN) 0.5 MG tablet, Take 0.5-1 mg by mouth as needed        Magnesium Oxide 250 MG TABS, Take 250 mg by mouth daily       Milk Thistle-Dand-Fennel-Licor (MILK THISTLE XTRA) CAPS capsule, Take 1 capsule by mouth daily       Omega-3 Fatty Acids (OMEGA 3 PO), Take 2 capsules by mouth daily       order for DME, Equipment being ordered: facioplasty garment       Vitamin D, Cholecalciferol, 25 MCG (1000 UT) TABS, Take 2,000 Units by mouth daily       citalopram (CELEXA) 10 MG tablet, Take 1 tablet (10 mg) by mouth daily (Patient not taking: Reported on 2020)    No current facility-administered medications on file prior to visit.     No Known Allergies  Immunization History   Administered Date(s) Administered     Influenza Vaccine IM > 6 months Valent IIV4 2019     TDAP Vaccine (Adacel) 10/18/2008       OB History    Para Term  AB Living   1 0 0 0 1 0   SAB TAB Ectopic Multiple Live Births   0 0 0 0 0     Past Medical History:   Diagnosis Date     Irritable bladder      Squamous cell carcinoma of neck      Uncomplicated asthma      Past Surgical History:   Procedure Laterality Date     DISSECTION RADICAL NECK MODIFIED Left 2019    Procedure: DISSECTION, NECK, MODIFIED RADICAL LEFT;  Surgeon: Ruth Tello MD;  Location: [facility] OR     LARYNGOSCOPY WITH BIOPSY(IES) N/A 10/24/2019    Procedure: Direct laryngscopy with biopsy;  Surgeon: Ruth Tello MD;  Location: [facility] OR     TONSILLECTOMY ADULT Left 10/24/2019    Procedure: Left Radical  tonsillectomy;  Surgeon: Ruth Tello MD;  Location: [facility] OR     Family History   Problem Relation Age of Onset     Breast Cancer Mother      Cancer Mother         Breast cancer     Bone Cancer Sister      Cancer Sister        "  Bone cancer     Social History     Socioeconomic History     Marital status:      Spouse name: None     Number of children: None     Years of education: None     Highest education level: None   Occupational History     None   Social Needs     Financial resource strain: None     Food insecurity     Worry: None     Inability: None     Transportation needs     Medical: None     Non-medical: None   Tobacco Use     Smoking status: Never Smoker     Smokeless tobacco: Never Used   Substance and Sexual Activity     Alcohol use: Yes     Frequency: 2-3 times a week     Drinks per session: 1 or 2     Binge frequency: Never     Comment: 2-3 drinks per week     Drug use: Never     Sexual activity: Yes     Partners: Male     Birth control/protection: Condom   Lifestyle     Physical activity     Days per week: None     Minutes per session: None     Stress: None   Relationships     Social connections     Talks on phone: None     Gets together: None     Attends Nondenominational service: None     Active member of club or organization: None     Attends meetings of clubs or organizations: None     Relationship status: None     Intimate partner violence     Fear of current or ex partner: None     Emotionally abused: None     Physically abused: None     Forced sexual activity: None   Other Topics Concern     None   Social History Narrative     None       ROS   ROS: 10 point ROS neg other than the symptoms noted above in the HPI.  PHQ-9 SCORE 12/17/2020   PHQ-9 Total Score 4     SP-7 SCORE 12/17/2020   Total Score 3     EXAM:  Blood pressure 122/80, pulse 68, height 1.626 m (5' 4\"), weight 50.2 kg (110 lb 9.6 oz), last menstrual period 11/26/2020, not currently breastfeeding. Body mass index is 18.98 kg/m .  General - tearful during breast exam due to anxiety with breast lump finding.  Skin - no suspicious lesions or rashes  EENT-  euthyroid with out palpable nodules  Neck - supple without lymphadenopathy. Excisional scar to left neck " where cancer was removed.   Lungs - clear to auscultation bilaterally.  Heart - regular rate and rhythm without murmur.  Abdomen - soft, nontender, nondistended, no masses or organomegaly noted.  Musculoskeletal - no gross deformities.  Neurological - normal strength, sensation, and mental status.    Breast Exam:  Breast: 1.5 cm x 1.5 cm breast mass noted 2 cm from the nipple at the 6 o'clock position on the right breast. Mass is mobile, round, with distinct borders. Tenderness reported on palpation of mass. Left breast findings normal. Both breasts without visible skin changes, no dimpling or lesions seen, breasts supple, no nipple discharge noted bilaterally. Axillary nodes negative.      Pelvic Exam:  EG/BUS: Normal genital architecture without lesions, erythema or abnormal secretions; Bartholin's, Urethra, Palouse's normal  Urethral meatus: normal   Urethra: no masses, tenderness, or scarring   Bladder: no masses or tenderness   Vagina: moist, pink, rugae with creamy, white and odorless secretions. Wet prep collected.   Cervix: Area of erythema with small possible nabothian cyst noted to the left of the os in the 9 o'clock position. Pap smear collected. Nulliparous, and pink, moist, closed, without CMT.   Uterus: anteverted, and small, smooth, firm, mobile w/o pain  Adnexa: Within normal limits and No masses, nodularity, tenderness  Rectum: anus normal     UPT: negative    Wet prep: pH 4.1, negative for yeast, BV, trichomonas     ASSESSMENT:  Encounter Diagnoses   Name Primary?     Visit for preventive health examination Yes     Screening for lipoid disorders      Screening examination for venereal disease      Screening for malignant neoplasm of cervix      Encounter for gynecological examination without abnormal finding      Lump or mass in breast      Pelvic pain in female      Unprotected sexual intercourse       PLAN:   Orders Placed This Encounter   Procedures     Pelvic and Breast Exam Procedure []      Pap Smear Exam [] Do Not Remove     MA Diagnostic Digital Bilateral     US Breast Right Limited 1-3 Quadrants     US Transvaginal Non OB     Pap imaged thin layer screen with HPV - recommended age 30 - 65 years (select HPV order below)     HPV High Risk Types DNA Cervical     Lipid panel reflex to direct LDL Non-fasting     hCG qual urine POCT     Wet Prep POCT     Breast Mass:  - Scheduled appointment for breast ultrasound and mammogram on December 28th.    Preventative health:  - Orders placed for lipids- patient will have lab drawn when she has her TSH rechecked in January with ENT.   - Pap smear collected today due to area of erythema & possible nabothian cyst noted on cervix as patient is due for a pap by 08/2021.   - Encouraged patient to get 1200 mg Calcium through diet or supplementation  - Pt will check records with PCP to see if she has received the Tdap vaccine; per records available today, it appears last was in 2008.  - Encouraged flu vaccine, pt declined.    Preconception Counseling:  - Recommended prenatal vitamin daily  - Offered MFM preconception consultation due to recent hx of carcinoma and radiation. Pt will notify provider when she is ready for this consult.     Pelvic Cramping:  - TVUS order placed to evaluate cause for cramping.  - STI testing for gonorrhea and chlamydia to rule out undiagnosed infection as cause of cramping.   - UPT and wet prep negative today    Additional teaching done at this visit regarding calcium (1200 mg per day), self breast exam, exercise, birth control, mental health and weight/diet.    Patient will be notified of test results via [employer]. If any labs are abnormal we will call with those results. Further plan for care will be determined as results are available. Next preventative health visit due in 1 yr.    I, Joaquina Covarrubias, completed the PFSH and ROS. I then acted as a scribe for OXANA Stapleton, for the remainder of the visit.  Joaquina Covarrubias, BSN, RN,  OXANA, HALLIE Student     I agree with the PFSH and ROS as completed by the OXANA Student, except for changes made by me.  The remainder of the encounter was performed by me and scribed by the OXANA Student.  The scribed note accurately reflects my personal services and decisions made by me.  Beverly Donovan, HALLIE, ÁNGEL, OXANA

## 2020-12-17 NOTE — PATIENT INSTRUCTIONS
Breast ultrasound and mammogram scheduled for: December 28th 9am appointment (check in at 8:45am)    [facility]  [address]  [address]    Gonorrhea & chlamydia tests were done today  Pap smear was done today    Pelvic ultrasound was ordered to evaluate for cause of pelvic pain.     There were no signs of a vaginal infection today.     Request for your lipid profile to be done when you follow-up with ENT in January.     Follow-up with your primary care provider   Leave a urine before leaving for urine pregnancy test      PREVENTIVE HEALTH RECOMMENDATIONS:   Most women need a yearly breast and pelvic exam.    A PAP screen, a test done DURING a pelvic exam, is NO longer recommended yearly.    March 2013, screening guidelines recommended by ACOG for PAP screen are:    1) First pap at age 21.    2) Pap every 3 years until age 30.    3) After age 30, pap every 3 years or Pap with HR HPV screen every 5 years until age 65.  4) Women do NOT need a vaginal Pap screen after a hysterectomy (surgical removal of the uterus) when they have not had cancer.    Exceptions:  1) Yearly pap if HIV+ or immunosuppressed secondary to organ transplant  2) MARSHALL II-III continue routine screening for 20 years.    I encourage you continue looking for opportunities to choose a healthy lifestyle:       * Choose to eat a heart healthy diet. Check out the FOOD PLATE guidelines at: http://www.choosemyplate.gov/ for helpful hints on weight and cholesterol management.  Balance your caloric intake with exercise to maintain a BMI in the 22 to 26 range. For bone health: Eat calcium-rich foods like yogurt, broccoli or take chewable calcium pills (500 to 600 mg) twice a day with food.       * Exercise for at least an average of 30 minutes a day, 5 days of the week. This will help you control your weight, release stress, and help prevent disease.      * Take a Vitamin D3 supplement daily fall through spring and during summer unless you  [id number]' full body sun exposure to skin without sunscreen.      * DO wear sunscreen to prevent skin cancer after the first 15-30 minutes.      * Identify stressors in your life, find ways to release the stress, and, make time for yourself. PLEASE ask for help if mood changes last longer than two weeks.     * Limit alcohol to one drink per day.  No smoking.  Avoid second hand smoke. If you smoke, ask for help to stop.       *  If you are in a sexual relationship, talk with your partner about possible infection risks and take action to protect yourself from exposure to a sexual infection.    Please request an infection screen for STIs (sexually transmitted infections) if you are less than age 26 OR believe that you may be at risk.     Get a flu shot each year. Get a tetanus shot every 10 years. EVERYONE needs a pertussis (Whooping cough) booster.    See your dentist twice a year for an exam and preventive care cleaning.     Consider the following screen tests:    1) cholesterol test every 5 years.     2) yearly mammogram after age 40 unless you have identified risks.    3) colonoscopy every 10 years after age 50 unless you have identified risks.    4) diabetes blood test screening if you are at risk for diabetes.      Additional information that you may also find helpful:  The Internet now gives us access to LOTS of information -- some of it helpful, research documented and also plenty of harmful, anecdotal information that may not pertain to your situtaion. Consider visiting the following websites for accurate health information:    www.vitamindcouncil.org/ : Info and ongoing research re Vitamin D    www.fairview.org : Up to date and easily searchable information on multiple topics.    www.medlineplus.gov : medication info, interactive tutorials, watch real surgeries online    www.cdc.gov : public health info, travel advisories, epidemics (H1N1)    www.bev/std.org: current research re diagnosis, treatment and  prevention of sexually contacted infections.    www.health.[facility].[place].us : [place] dept of heatl, public health issues in [place], N1N1    www.familydoctor.org : good info from the Academy of Family Physicians

## 2020-12-17 NOTE — PROGRESS NOTES
Progress Note    SUBJECTIVE:  Mira Cao is an 35 year old, , who requests an Annual Preventive Exam.   Mira's medical history is significant for squamous cell carcinoma of neck.      Concerns today include:   1. Cramping: Cramping pain occurring in the anterior pelvic and tailbone regions. Mira feels the pain most when she moves, sits down, and while using the bathroom (when she bears down and squeezes muscles). States these cramps are not new to her. Historically, this cramping only occurred during menses, but it is now occurring at other times not in relation to her cycle, daily since , which is her LMP.  The cramping with this LMP was also much more intense than normal. Mira notes that the cramping varies in intensity from mild to severe (ex. cramps were significant enough to wake her up last night). She reports very painful cramping after sex, but no pain during sex. Mira has no concerns for pregnancy today- patient states she has probably had unprotected intercourse one time since her LMP in November. Mira denies postcoital bleeding, intermenstrual bleeding, pelvic pressure/heaviness, and urinary/bowel sx. She reports not being as physically active d/t cancer treatments earlier in the year and she has been starting to exercise more often. Does cycling  and runs- been doing this since May. She does not think a musculoskeletal injury is the cause of her cramping. She has also lost 20 pounds since her cancer treatments. Patient reports a history of irritable bladder which she takes claritin to control these symptoms. The recent onset of cramping pain is different from the pain she was experiencing due to the irritable bladder.      Menstrual History: Menses come monthly, heavy first few days then tapers off, changes pad every 6-8 hours. 5-7 days long. Painful cramping with first days of menses.   Menstrual History 10/24/2019 2019 2020   LAST MENSTRUAL PERIOD  10/1/2019 11/9/2019 11/26/2020       2. Preventative health:   Last pap in 2016.   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   - TSH was mildly elevated in 07/2020 at 4.18, with normal T4 value. Following up with ENT in January regarding this.  Denies sx of hypothyroidism today.     Mammogram current: not applicable. Mother has history of breast cancer diagnosed in her 60's. Mother had genetic testing done and this cancer was determined not to be genetic in origin.     Last Colonoscopy: not applicable.     - Sexual health: sexually active with . Cramping after intercourse has reduced the frequency in which they have sex lately. Denies any vaginal symptoms. Reports a white vaginal discharge that is normal for her. Denies risk for STIs but is open to getting testing today to rule out causes for cramping.     - Contraception: condoms used 50% of time. Desires pregnancy next year. Not taking a prenatal vitamin    - Diet: adequate intake of fruits and vegetables- eats vegan diet often. Reports increased cramping when she eats dairy so she does not do this often. Calcium intake likely poor.     Exercise: yoga, running, walking, cycling - tries to do something daily.     Mental health: coping well. Has therapist that she sees once per month. Reports some stress and anxiety surrounding COVID and cancer dx.     HISTORY:       biotin 1000 MCG TABS tablet, Take 1,000 mcg by mouth daily       cyanocobalamin (VITAMIN B-12) 100 MCG tablet, Take 250 mcg by mouth daily       ferrous sulfate ( FERROUS SULFATE) 325 (65 Fe) MG tablet, Take 325 mg by mouth daily (with breakfast)       loratadine (CLARITIN) 10 MG tablet, Take 10 mg by mouth daily as needed        LORazepam (ATIVAN) 0.5 MG tablet, Take 0.5-1 mg by mouth as needed        Magnesium Oxide  250 MG TABS, Take 250 mg by mouth daily       Milk Thistle-Dand-Fennel-Licor (MILK THISTLE XTRA) CAPS capsule, Take 1 capsule by mouth daily       Omega-3 Fatty Acids (OMEGA 3 PO), Take 2 capsules by mouth daily       order for DME, Equipment being ordered: facioplasty garment       Vitamin D, Cholecalciferol, 25 MCG (1000 UT) TABS, Take 2,000 Units by mouth daily       citalopram (CELEXA) 10 MG tablet, Take 1 tablet (10 mg) by mouth daily (Patient not taking: Reported on 2020)    No current facility-administered medications on file prior to visit.     No Known Allergies  Immunization History   Administered Date(s) Administered     Influenza Vaccine IM > 6 months Valent IIV4 2019     TDAP Vaccine (Adacel) 10/18/2008       OB History    Para Term  AB Living   1 0 0 0 1 0   SAB TAB Ectopic Multiple Live Births   0 0 0 0 0     Past Medical History:   Diagnosis Date     Irritable bladder      Squamous cell carcinoma of neck      Uncomplicated asthma      Past Surgical History:   Procedure Laterality Date     DISSECTION RADICAL NECK MODIFIED Left 2019    Procedure: DISSECTION, NECK, MODIFIED RADICAL LEFT;  Surgeon: Ruth Tello MD;  Location: [facility] OR     LARYNGOSCOPY WITH BIOPSY(IES) N/A 10/24/2019    Procedure: Direct laryngscopy with biopsy;  Surgeon: Ruth Tello MD;  Location: [facility] OR     TONSILLECTOMY ADULT Left 10/24/2019    Procedure: Left Radical  tonsillectomy;  Surgeon: Ruth Tello MD;  Location: [facility] OR     Family History   Problem Relation Age of Onset     Breast Cancer Mother      Cancer Mother         Breast cancer     Bone Cancer Sister      Cancer Sister         Bone cancer     Social History     Socioeconomic History     Marital status:      Spouse name: None     Number of children: None     Years of education: None     Highest education level: None   Occupational History     None   Social Needs     Financial resource strain: None     Food insecurity  "    Worry: None     Inability: None     Transportation needs     Medical: None     Non-medical: None   Tobacco Use     Smoking status: Never Smoker     Smokeless tobacco: Never Used   Substance and Sexual Activity     Alcohol use: Yes     Frequency: 2-3 times a week     Drinks per session: 1 or 2     Binge frequency: Never     Comment: 2-3 drinks per week     Drug use: Never     Sexual activity: Yes     Partners: Male     Birth control/protection: Condom   Lifestyle     Physical activity     Days per week: None     Minutes per session: None     Stress: None   Relationships     Social connections     Talks on phone: None     Gets together: None     Attends Sikh service: None     Active member of club or organization: None     Attends meetings of clubs or organizations: None     Relationship status: None     Intimate partner violence     Fear of current or ex partner: None     Emotionally abused: None     Physically abused: None     Forced sexual activity: None   Other Topics Concern     None   Social History Narrative     None       ROS   ROS: 10 point ROS neg other than the symptoms noted above in the HPI.  PHQ-9 SCORE 12/17/2020   PHQ-9 Total Score 4     SP-7 SCORE 12/17/2020   Total Score 3     EXAM:  Blood pressure 122/80, pulse 68, height 1.626 m (5' 4\"), weight 50.2 kg (110 lb 9.6 oz), last menstrual period 11/26/2020, not currently breastfeeding. Body mass index is 18.98 kg/m .  General - tearful during breast exam due to anxiety with breast lump finding.  Skin - no suspicious lesions or rashes  EENT-  euthyroid with out palpable nodules  Neck - supple without lymphadenopathy. Excisional scar to left neck where cancer was removed.   Lungs - clear to auscultation bilaterally.  Heart - regular rate and rhythm without murmur.  Abdomen - soft, nontender, nondistended, no masses or organomegaly noted.  Musculoskeletal - no gross deformities.  Neurological - normal strength, sensation, and mental " status.    Breast Exam:  Breast: 1.5 cm x 1.5 cm breast mass noted 2 cm from the nipple at the 6 o'clock position on the right breast. Mass is mobile, round, with distinct borders. Tenderness reported on palpation of mass. Left breast findings normal. Both breasts without visible skin changes, no dimpling or lesions seen, breasts supple, no nipple discharge noted bilaterally. Axillary nodes negative.      Pelvic Exam:  EG/BUS: Normal genital architecture without lesions, erythema or abnormal secretions; Bartholin's, Urethra, [place]'s normal  Urethral meatus: normal   Urethra: no masses, tenderness, or scarring   Bladder: no masses or tenderness   Vagina: moist, pink, rugae with creamy, white and odorless secretions. Wet prep collected.   Cervix: Area of erythema with small possible nabothian cyst noted to the left of the os in the 9 o'clock position. Pap smear collected. Nulliparous, and pink, moist, closed, without CMT.   Uterus: anteverted, and small, smooth, firm, mobile w/o pain  Adnexa: Within normal limits and No masses, nodularity, tenderness  Rectum: anus normal     UPT: negative    Wet prep: pH 4.1, negative for yeast, BV, trichomonas     ASSESSMENT:  Encounter Diagnoses   Name Primary?     Visit for preventive health examination Yes     Screening for lipoid disorders      Screening examination for venereal disease      Screening for malignant neoplasm of cervix      Encounter for gynecological examination without abnormal finding      Lump or mass in breast      Pelvic pain in female      Unprotected sexual intercourse       PLAN:   Orders Placed This Encounter   Procedures     Pelvic and Breast Exam Procedure []     Pap Smear Exam [] Do Not Remove     MA Diagnostic Digital Bilateral     US Breast Right Limited 1-3 Quadrants     US Transvaginal Non OB     Pap imaged thin layer screen with HPV - recommended age 30 - 65 years (select HPV order below)     HPV High Risk Types DNA Cervical     Lipid  panel reflex to direct LDL Non-fasting     hCG qual urine POCT     Wet Prep POCT     Breast Mass:  - Scheduled appointment for breast ultrasound and mammogram on December 28th.    Preventative health:  - Orders placed for lipids- patient will have lab drawn when she has her TSH rechecked in January with ENT.   - Pap smear collected today due to area of erythema & possible nabothian cyst noted on cervix as patient is due for a pap by 08/2021.   - Encouraged patient to get 1200 mg Calcium through diet or supplementation  - Pt will check records with PCP to see if she has received the Tdap vaccine; per records available today, it appears last was in 2008.  - Encouraged flu vaccine, pt declined.    Preconception Counseling:  - Recommended prenatal vitamin daily  - Offered MFM preconception consultation due to recent hx of carcinoma and radiation. Pt will notify provider when she is ready for this consult.     Pelvic Cramping:  - TVUS order placed to evaluate cause for cramping.  - STI testing for gonorrhea and chlamydia to rule out undiagnosed infection as cause of cramping.   - UPT and wet prep negative today    Additional teaching done at this visit regarding calcium (1200 mg per day), self breast exam, exercise, birth control, mental health and weight/diet.    Patient will be notified of test results via [employer]. If any labs are abnormal we will call with those results. Further plan for care will be determined as results are available. Next preventative health visit due in 1 yr.    I, Joaquina Covarrubias, completed the PFSH and ROS. I then acted as a scribe for OXANA Stapleton, for the remainder of the visit.  Joaquina Covarrubias, PARUL, RN, NP, DNP Student     I agree with the PFSH and ROS as completed by the NP Student, except for changes made by me.  The remainder of the encounter was performed by me and scribed by the OXANA Student.  The scribed note accurately reflects my personal services and decisions made by  me.  Beverly Donovan, DNP, APRN, WHNP

## 2020-12-18 ENCOUNTER — THERAPY VISIT (OUTPATIENT)
Dept: PHYSICAL THERAPY | Facility: CLINIC | Age: 35
End: 2020-12-18
Payer: COMMERCIAL

## 2020-12-18 DIAGNOSIS — M54.2 NECK PAIN: ICD-10-CM

## 2020-12-18 LAB
C TRACH DNA SPEC QL NAA+PROBE: NEGATIVE
N GONORRHOEA DNA SPEC QL NAA+PROBE: NEGATIVE
SPECIMEN SOURCE: NORMAL
SPECIMEN SOURCE: NORMAL

## 2020-12-18 PROCEDURE — 97110 THERAPEUTIC EXERCISES: CPT | Mod: GP | Performed by: PHYSICAL THERAPIST

## 2020-12-18 PROCEDURE — 97140 MANUAL THERAPY 1/> REGIONS: CPT | Mod: GP | Performed by: PHYSICAL THERAPIST

## 2020-12-18 ASSESSMENT — ANXIETY QUESTIONNAIRES: GAD7 TOTAL SCORE: 3

## 2020-12-22 ENCOUNTER — HOSPITAL ENCOUNTER (OUTPATIENT)
Dept: ULTRASOUND IMAGING | Facility: CLINIC | Age: 35
End: 2020-12-22
Attending: NURSE PRACTITIONER
Payer: COMMERCIAL

## 2020-12-22 ENCOUNTER — HOSPITAL ENCOUNTER (OUTPATIENT)
Dept: MAMMOGRAPHY | Facility: CLINIC | Age: 35
End: 2020-12-22
Attending: NURSE PRACTITIONER
Payer: COMMERCIAL

## 2020-12-22 DIAGNOSIS — N63.0 LUMP OR MASS IN BREAST: ICD-10-CM

## 2020-12-22 DIAGNOSIS — Z11.59 ENCOUNTER FOR SCREENING FOR OTHER VIRAL DISEASES: Primary | ICD-10-CM

## 2020-12-22 LAB
COPATH REPORT: NORMAL
PAP: NORMAL

## 2020-12-22 PROCEDURE — 77066 DX MAMMO INCL CAD BI: CPT

## 2020-12-22 PROCEDURE — 76642 ULTRASOUND BREAST LIMITED: CPT | Mod: RT

## 2020-12-23 LAB
FINAL DIAGNOSIS: NORMAL
HPV HR 12 DNA CVX QL NAA+PROBE: NEGATIVE
HPV16 DNA SPEC QL NAA+PROBE: NEGATIVE
HPV18 DNA SPEC QL NAA+PROBE: NEGATIVE
SPECIMEN DESCRIPTION: NORMAL
SPECIMEN SOURCE CVX/VAG CYTO: NORMAL

## 2020-12-27 DIAGNOSIS — Z11.59 ENCOUNTER FOR SCREENING FOR OTHER VIRAL DISEASES: ICD-10-CM

## 2020-12-27 LAB
SARS-COV-2 RNA SPEC QL NAA+PROBE: NORMAL
SPECIMEN SOURCE: NORMAL

## 2020-12-27 PROCEDURE — U0003 INFECTIOUS AGENT DETECTION BY NUCLEIC ACID (DNA OR RNA); SEVERE ACUTE RESPIRATORY SYNDROME CORONAVIRUS 2 (SARS-COV-2) (CORONAVIRUS DISEASE [COVID-19]), AMPLIFIED PROBE TECHNIQUE, MAKING USE OF HIGH THROUGHPUT TECHNOLOGIES AS DESCRIBED BY CMS-2020-01-R: HCPCS | Performed by: NURSE PRACTITIONER

## 2020-12-28 LAB
LABORATORY COMMENT REPORT: NORMAL
SARS-COV-2 RNA SPEC QL NAA+PROBE: NEGATIVE
SPECIMEN SOURCE: NORMAL

## 2020-12-29 ENCOUNTER — THERAPY VISIT (OUTPATIENT)
Dept: PHYSICAL THERAPY | Facility: CLINIC | Age: 35
End: 2020-12-29
Payer: COMMERCIAL

## 2020-12-29 DIAGNOSIS — M54.2 NECK PAIN: ICD-10-CM

## 2020-12-29 PROCEDURE — 97110 THERAPEUTIC EXERCISES: CPT | Mod: GP | Performed by: PHYSICAL THERAPIST

## 2020-12-29 PROCEDURE — 97140 MANUAL THERAPY 1/> REGIONS: CPT | Mod: GP | Performed by: PHYSICAL THERAPIST

## 2020-12-30 ENCOUNTER — HOSPITAL ENCOUNTER (OUTPATIENT)
Dept: ULTRASOUND IMAGING | Facility: CLINIC | Age: 35
End: 2020-12-30
Attending: NURSE PRACTITIONER
Payer: COMMERCIAL

## 2020-12-30 DIAGNOSIS — N63.0 LUMP OR MASS IN BREAST: ICD-10-CM

## 2020-12-30 PROCEDURE — 272N000031 US BREAST BIOPSY CORE NEEDLE RIGHT

## 2020-12-30 PROCEDURE — 250N000009 HC RX 250: Performed by: RADIOLOGY

## 2020-12-30 PROCEDURE — 88305 TISSUE EXAM BY PATHOLOGIST: CPT | Mod: 26 | Performed by: PATHOLOGY

## 2020-12-30 PROCEDURE — 88305 TISSUE EXAM BY PATHOLOGIST: CPT | Mod: TC | Performed by: NURSE PRACTITIONER

## 2020-12-30 RX ADMIN — LIDOCAINE HYDROCHLORIDE 5 ML: 10 INJECTION, SOLUTION EPIDURAL; INFILTRATION; INTRACAUDAL; PERINEURAL at 11:38

## 2020-12-30 NOTE — DISCHARGE INSTRUCTIONS
Page 1 of 1  For informational purposes only. Not to replace the advice of your health care provider. Copyright   2010 [facility]. All rights reserved. [employer] 435894 - REV 02/16.  After Your Breast Biopsy   Bleeding or bruising  Slight bruising is normal. If you bleed through the bandage, put direct pressure on the breast for 10 minutes.   If the breast begins to swell, or you have a lot of bleeding after 10 minutes of pressure, call the doctor who ordered your exam. Or, go to the emergency room.   Bandages  Keep your bandage in place until tomorrow morning. Do not get it wet.   If you have small pieces of tape on the skin, leave them in place. They will fall off on their own, or you can remove them after 5 days.   Activity  You may shower the morning after the exam. No heavy activity (lifting, vacuuming) on the day of your exam. You may go back to normal activity the next day, unless you had a lot of bleeding or pain.  Discomfort  You may take Tylenol (acetaminophen) today for pain. Tomorrow, you may take an anti-inflammatory medicine (aspirin, ibuprofen, Motrin, Aleve, Advil), unless your doctor tells you not to.  Wear your bra overnight to support the breast. You may also use an ice pack: Place it over the area for 15-20 minutes several times a day.  Infection  Infection is rare. Symptoms include fever, redness, increasing pain and fluid draining from the biopsy site. If you have any of these symptoms, please call the doctor who ordered your exam.  Results  Results may take up to 5 business days. A nurse or doctor from the [facility] Center will call with your results. We will also send the results to the doctor who ordered your biopsy.  If you have not heard your results in 5 days, please call the Breast Center.   Other instructions  ______________________________________________________________________________________________________________________________  Call your doctor if:    You have  bleeding that lasts more than 10 minutes.    You have pain that cannot be controlled.   You have signs of infection (fever, redness, drainage or other signs).   You have not received your results within 5 days.    Please call the [facility] Center nurse navigator at [phone] if you have questions or concerns about your biopsy.

## 2020-12-31 ENCOUNTER — TELEPHONE (OUTPATIENT)
Dept: MAMMOGRAPHY | Facility: CLINIC | Age: 35
End: 2020-12-31

## 2020-12-31 LAB — COPATH REPORT: NORMAL

## 2020-12-31 NOTE — TELEPHONE ENCOUNTER
Pathology report reviewed with our breast radiologist Dr Darby, who confirmed the recent breast imaging is concordant with the final pathology results below.    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   - Negative for atypia and malignancy.     Recommended follow up is routine screening. Discussed with patient that I could assist her in scheduling a surgical consult if she chooses the have this removed.    Patient states no problems or concerns with her biopsy site.   Questions were answered and my phone number given if she has further questions or concerns.  I informed patient I will notify the ordering provider of the results and recommendations for follow up.  Patient verbalized understanding and agrees with the plan of care.     Yadira Segura RN CBCN  Breast Care Nurse Coordinator  [facility]  [phone]

## 2021-01-05 ENCOUNTER — HOSPITAL ENCOUNTER (OUTPATIENT)
Dept: OCCUPATIONAL THERAPY | Facility: CLINIC | Age: 36
Setting detail: THERAPIES SERIES
End: 2021-01-05
Attending: NURSE PRACTITIONER
Payer: COMMERCIAL

## 2021-01-05 PROCEDURE — 97140 MANUAL THERAPY 1/> REGIONS: CPT | Mod: GO

## 2021-01-14 ENCOUNTER — HEALTH MAINTENANCE LETTER (OUTPATIENT)
Age: 36
End: 2021-01-14

## 2021-01-20 ENCOUNTER — OFFICE VISIT (OUTPATIENT)
Dept: OTOLARYNGOLOGY | Facility: CLINIC | Age: 36
End: 2021-01-20
Payer: COMMERCIAL

## 2021-01-20 ENCOUNTER — ANCILLARY PROCEDURE (OUTPATIENT)
Dept: CT IMAGING | Facility: CLINIC | Age: 36
End: 2021-01-20
Attending: OTOLARYNGOLOGY
Payer: COMMERCIAL

## 2021-01-20 ENCOUNTER — PATIENT OUTREACH (OUTPATIENT)
Dept: OTOLARYNGOLOGY | Facility: CLINIC | Age: 36
End: 2021-01-20

## 2021-01-20 VITALS
WEIGHT: 115 LBS | HEART RATE: 76 BPM | TEMPERATURE: 97.9 F | HEIGHT: 66 IN | BODY MASS INDEX: 18.48 KG/M2 | OXYGEN SATURATION: 99 %

## 2021-01-20 DIAGNOSIS — C10.9 SQUAMOUS CELL CARCINOMA OF OROPHARYNX (H): ICD-10-CM

## 2021-01-20 DIAGNOSIS — Z13.220 SCREENING FOR LIPOID DISORDERS: ICD-10-CM

## 2021-01-20 DIAGNOSIS — C10.9 SQUAMOUS CELL CARCINOMA OF OROPHARYNX (H): Primary | ICD-10-CM

## 2021-01-20 LAB
CHOLEST SERPL-MCNC: 209 MG/DL
HDLC SERPL-MCNC: 73 MG/DL
LDLC SERPL CALC-MCNC: 98 MG/DL
NONHDLC SERPL-MCNC: 136 MG/DL
T4 FREE SERPL-MCNC: 0.94 NG/DL (ref 0.76–1.46)
TRIGL SERPL-MCNC: 195 MG/DL
TSH SERPL DL<=0.005 MIU/L-ACNC: 4.23 MU/L (ref 0.4–4)

## 2021-01-20 PROCEDURE — 36415 COLL VENOUS BLD VENIPUNCTURE: CPT | Performed by: PATHOLOGY

## 2021-01-20 PROCEDURE — 99214 OFFICE O/P EST MOD 30 MIN: CPT | Mod: 25 | Performed by: OTOLARYNGOLOGY

## 2021-01-20 PROCEDURE — 70491 CT SOFT TISSUE NECK W/DYE: CPT | Performed by: RADIOLOGY

## 2021-01-20 PROCEDURE — 80061 LIPID PANEL: CPT | Performed by: PATHOLOGY

## 2021-01-20 PROCEDURE — 84443 ASSAY THYROID STIM HORMONE: CPT | Performed by: PATHOLOGY

## 2021-01-20 PROCEDURE — 84439 ASSAY OF FREE THYROXINE: CPT | Performed by: PATHOLOGY

## 2021-01-20 PROCEDURE — 71260 CT THORAX DX C+: CPT | Mod: GC | Performed by: RADIOLOGY

## 2021-01-20 PROCEDURE — 31575 DIAGNOSTIC LARYNGOSCOPY: CPT | Performed by: OTOLARYNGOLOGY

## 2021-01-20 RX ORDER — IOPAMIDOL 755 MG/ML
54 INJECTION, SOLUTION INTRAVASCULAR ONCE
Status: COMPLETED | OUTPATIENT
Start: 2021-01-20 | End: 2021-01-20

## 2021-01-20 RX ADMIN — IOPAMIDOL 54 ML: 755 INJECTION, SOLUTION INTRAVASCULAR at 13:44

## 2021-01-20 ASSESSMENT — MIFFLIN-ST. JEOR: SCORE: 1228.39

## 2021-01-20 ASSESSMENT — PAIN SCALES - GENERAL: PAINLEVEL: NO PAIN (0)

## 2021-01-20 NOTE — LETTER
1/20/2021       RE: Mira Cao  [address]  [place] [address]     Dear Colleague,    Thank you for referring your patient, Mira Cao, to the [facility] [place] [facility]. Please see a copy of my visit note below.    I had the pleasure of seeing Mira Cao in follow-up today at the [facility].     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 The patient noticed a left neck mass in June. She thought this was related to a swollen lymph node due to stress as she had just bought a house.  She delayed evaluation until approximately July when she presented to urgent care.  She had an ultrasound obtained on 7/12/2019 which demonstrated a 3.7 x 2.3 x 1.3 cm mass in the left neck.  She then had a CT scan on 8/7/2019 which demonstrated a 1.8 x 1.4 x 3.8 cm partially necrotic/cystic level 2/3 neck mass, concerning for metastatic squamous cell carcinoma.  She was seen by Dr.Todd Cao and had an FNA performed on 8/8/2019.  The pathology from the FNA demonstrated atypical squamous proliferation which was p16-.  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  She elected for removal which was performed by Dr. Cao on 10/2/2019.  The excision was performed without a completion neck dissection.  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  She had a PET CT scan on 2019 locally which demonstrated postop changes without residual disease and no obvious primary malignancy.  On 10/10/2019 she saw Dr. Neely at [facility] for medical oncology consultation where possible chemotherapy was discussed.  She has met with Dr Melendez from radiation oncology at [facility].  She had a dental cleaning.  She was  taken to the operating room on 10/24/2019 for direct laryngoscopy with biopsy.  Initial biopsy of the tonsil demonstrated high-grade dysplasia.  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  A radical tonsillectomy was then performed based on the preoperative discussion with the patient. Final pathology demonstrated a T1 SCC of the tonsil with negative margins. She was taken to the OR on 11/20/2019 for a left neck dissection with resection of her scar. She had 1/38 lymph nodes positive - 0.4 cm deposit of metastatic SCC without STEVE. She had proton beam therapy postoperatively at [facility] with Dr Smith. She completed her treatment on 2/24/2020. She lost about 20 lbs with treatment. She had her 3 month post treatment PET in May 2020 which showed uptake in the right tonsil and right level II node thought to be reactive.       Interval history:  She comes in today for follow-up. She was last seen in clinic in November 2020. At that time she was overall doing well. 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 She saw Ob/gyn in December 2020 at which time a 1.5 cm breast mass was noted. They did also discuss a MFM referral when she is ready to consider conceiving. She had a mammogram and U/S of the breast in December 2020 which showed a 1.5 cm mass. A biopsy was performed and was consistent with a fibroadenoma. She comes in today with repeat imaging and a TSH. She says that things are overall better. She had anxiety issues after the breast mass was identified but is feeling reassured after the negative biopsy. She has been working with PT and is doing neck stretching on her own and is noticing progress. She says that she is eating without issues, no odynophagia. She is doing her swallowing exercises, though she is worried it is making her gag reflex worse. She has no ear pain, neck masses, hemoptysis. She is gaining a few pounds. She has had a  sore throat for the last few days, no fevers, thinks she may be getting a cold. She feels like her energy is good. She is seeing the dentist. She is doing lymphedema therapy. She is exercising more.        MEDICATIONS:     Current Outpatient Medications   Medication Sig Dispense Refill     biotin 1000 MCG TABS tablet Take 1,000 mcg by mouth daily       cyanocobalamin (VITAMIN B-12) 100 MCG tablet Take 250 mcg by mouth daily       ferrous sulfate (KP FERROUS SULFATE) 325 (65 Fe) MG tablet Take 325 mg by mouth daily (with breakfast)       loratadine (CLARITIN) 10 MG tablet Take 10 mg by mouth daily as needed        LORazepam (ATIVAN) 0.5 MG tablet Take 0.5-1 mg by mouth as needed        Magnesium Oxide 250 MG TABS Take 250 mg by mouth daily       Milk Thistle-Dand-Fennel-Licor (MILK THISTLE XTRA) CAPS capsule Take 1 capsule by mouth daily       Omega-3 Fatty Acids (OMEGA 3 PO) Take 2 capsules by mouth daily       order for DME Equipment being ordered: facioplasty garment 1 each 2     Vitamin D, Cholecalciferol, 25 MCG (1000 UT) TABS Take 2,000 Units by mouth daily       citalopram (CELEXA) 10 MG tablet Take 1 tablet (10 mg) by mouth daily (Patient not taking: Reported on 12/17/2020) 30 tablet 11       ALLERGIES:  No Known Allergies    HABITS/SOCIAL HISTORY:   Works as a .    She is  with no children.    She has no smoking history.  She has about 10 alcoholic beverages on weekends.  She has no chewing tobacco or vaping history.    Social History     Socioeconomic History     Marital status:      Spouse name: Not on file     Number of children: Not on file     Years of education: Not on file     Highest education level: Not on file   Occupational History     Not on file   Social Needs     Financial resource strain: Not on file     Food insecurity     Worry: Not on file     Inability: Not on file     Transportation needs     Medical: Not on file     Non-medical: Not on file   Tobacco Use      Smoking status: Never Smoker     Smokeless tobacco: Never Used   Substance and Sexual Activity     Alcohol use: Yes     Frequency: 2-3 times a week     Drinks per session: 1 or 2     Binge frequency: Never     Comment: 2-3 drinks per week     Drug use: Never     Sexual activity: Yes     Partners: Male     Birth control/protection: Condom   Lifestyle     Physical activity     Days per week: Not on file     Minutes per session: Not on file     Stress: Not on file   Relationships     Social connections     Talks on phone: Not on file     Gets together: Not on file     Attends [employer] service: Not on file     Active member of club or organization: Not on file     Attends meetings of clubs or organizations: Not on file     Relationship status: Not on file     Intimate partner violence     Fear of current or ex partner: Not on file     Emotionally abused: Not on file     Physically abused: Not on file     Forced sexual activity: Not on file   Other Topics Concern     Not on file   Social History Narrative     Not on file       PAST MEDICAL HISTORY:   Past Medical History:   Diagnosis Date     Irritable bladder      Squamous cell carcinoma of neck      Uncomplicated asthma         PAST SURGICAL HISTORY:   Past Surgical History:   Procedure Laterality Date     DISSECTION RADICAL NECK MODIFIED Left 11/20/2019    Procedure: DISSECTION, NECK, MODIFIED RADICAL LEFT;  Surgeon: Ruth Tello MD;  Location: [facility] OR     LARYNGOSCOPY WITH BIOPSY(IES) N/A 10/24/2019    Procedure: Direct laryngscopy with biopsy;  Surgeon: Ruth Tello MD;  Location: [facility] OR     TONSILLECTOMY ADULT Left 10/24/2019    Procedure: Left Radical  tonsillectomy;  Surgeon: Ruth Tello MD;  Location: [facility] OR       FAMILY HISTORY:    Family History   Problem Relation Age of Onset     Breast Cancer Mother      Cancer Mother         Breast cancer     Bone Cancer Sister      Cancer Sister         Bone cancer       REVIEW OF SYSTEMS:  12 point ROS  "was negative other than the symptoms noted above in the HPI.  Patient Supplied Answers to Review of Systems  [facility] ROS 5/14/2020   Constitutional -   Neurology Headache   Psychology -   Ears, Nose, Throat Ear pain   Musculoskeletal -   Endocrine -         PHYSICAL EXAMINATION:   Pulse 76   Temp 97.9  F (36.6  C) (Temporal)   Ht 1.676 m (5' 6\")   Wt 52.2 kg (115 lb)   SpO2 99%   BMI 18.56 kg/m     Appearance:   normal; NAD, age-appropriate appearance, thin   Communication:   normal; communicates verbally, normal voice quality   Head/Face:   inspection -  Normal; no scars or visible lesions   Salivary glands -  Normal size, no tenderness, swelling, or palpable masses   Facial strength -  Normal and symmetric bilateral   Skin:  normal, no rash   Ears:  auricle (AD) -  normal  EAC (AD) -  normal  TM (AD) -  Normal, no effusion  auricle (AS) -  normal  EAC (AS) -  normal  TM (AS) -  Normal, no effusion  Normal clinical speech reception   Nose:  Ext. inspection -  Normal  Internal Inspection -  Normal mucosa, septum, and turbinates   Nasopharynx:  normal mucosa, no masses   Oral Cavity:  lips -  Normal mucosa, oral competence, and stoma size   Age-appropriate dentition, healthy gingival mucosa   Hard palate, buccal, floor of mouth mucosa normal   Tongue - normal movement, no lesions   Oropharynx:  mucosa -  Normal, no lesions  soft palate -  Expected post radical tonsillectomy asymmetry of left soft palate  tonsils -  S/p left radical tonsillectomy, no secretions, no masses, no concerning mucosal lesions, no palpable masses, expected scar tissue; right tonsil with no palpable masses slightly cryptic in appearance, no mucosal lesions  BOT - normal  Vallecula - few secretions   Hypopharynx:  Normal pyriform sinus and pharyngeal wall mucosa   No pooled secretions    Larynx:  Epiglottis, AE folds, false vocal cords, true vocal cords, arytenoids normal in appearance with no edema of AE folds or arytenoids   bilaterally " mobile cords    Neck: Well healed left neck incision   fibrosis of the left SCM sternal head is improved  No significant lymphedema  Normal range of motion   Lymphatic:  no abnormal nodes   Cardiovascular:  warm, pink, well-perfused extremities without swelling, tenderness, or edema   Respiratory:  Normal respiratory effort, no stridor   Neuro/Psych.:  mood/affect -  normal  mental status -  normal       PROCEDURES:   Flexible fiberoptic laryngoscopy: Scope exam was indicated due to oropharyngeal cancer. Verbal consent was obtained. The nasal cavity was prepped with an aerosolized solution of topical anesthetic and vasoconstrictive agent. The scope was passed through the anterior nasal cavity and advanced. Inspection of the nasopharynx revealed no gross abnormality. The base of tongue and vallecula are normal, few secretions present. There are no abnormalities in the area of the left tonsil. The epiglottis, AE folds, false cords, true cords, arytenoids are normal and there is no edema of the arytenoids or AE folds. Overall the airway is improved.  Inspection of the larynx revealed bilaterally mobile vocal cords. Pyriform sinuses are symmetric. The airway is patent. Procedure tolerated well with no immediate complications noted.        RESULTS REVIEWED:   I reviewed the notes from PT and from ob/gyn. I reviewed the mammogram and U/S reports. I reviewed the pathology report from the breast biopsy.       TSH is mildly elevated, normal T4      I independently reviewed the CT neck images which show postoperative changes of the left tonsil and left neck. There is no evidence of lymphadenopathy. There are no pulmonary nodules present on the CT chest and no mediastinal nodes.      IMPRESSION AND PLAN:   Mira Cao is a 35-year-old woman with a likely T1N1 p16+ squamous cell carcinoma of the oropharynx. She had an excision of a cystic neck mass by a local ENT which demonstrated a metastatic SCC. She underwent a radical  tonsillectomy which showed a small primary tumor in the tonsil which was completely resected. She then had a completion neck dissection on 11/20/2019 with final pathology showing 1/38 nodes. She completed postoperative proton beam therapy at [place] on 2/24/2020.     She continues to improve from her treatment. She has no concerning findings on exam today. She is doing her swallowing therapy, lymphedema therapy, and PT for the neck.     She had repeat imaging today, with no evidence of recurrent disease.     She had her TSH rechecked today. This is slightly elevated again but T4 is normal. We will recheck it again in July 2021. I explained to her that it is likely that at some point she may need to go on replacement but her labs are only slightly abnormal. I did encourage her though to discuss this with her Ob if she decides to become pregnant because they may have a different criteria for replacement in the setting of pregnancy.    I will see her back in 3 months.      Thank you very much for the opportunity to participate in the care of your patient.      Ruth Tello MD, M.D.  Otolaryngology- Head & Neck Surgery      This note was dictated with voice recognition software and then edited. Please excuse any unintentional errors.     CC:  Ifeanyi Soliman MD  Department of Radiation Oncology  [facility] [place]

## 2021-01-20 NOTE — PROGRESS NOTES
I had the pleasure of seeing Mira Cao in follow-up today at the [facility].     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 The patient noticed a left neck mass in June. She thought this was related to a swollen lymph node due to stress as she had just bought a house.  She delayed evaluation until approximately July when she presented to urgent care.  She had an ultrasound obtained on 7/12/2019 which demonstrated a 3.7 x 2.3 x 1.3 cm mass in the left neck.  She then had a CT scan on 8/7/2019 which demonstrated a 1.8 x 1.4 x 3.8 cm partially necrotic/cystic level 2/3 neck mass, concerning for metastatic squamous cell carcinoma.  She was seen by Dr.Todd Cao and had an FNA performed on 8/8/2019.  The pathology from the FNA demonstrated atypical squamous proliferation which was p16-.  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  She elected for removal which was performed by Dr. Cao on 10/2/2019.  The excision was performed without a completion neck dissection.  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  She had a PET CT scan on 2019 locally which demonstrated postop changes without residual disease and no obvious primary malignancy.  On 10/10/2019 she saw Dr. Neely at [facility] for medical oncology consultation where possible chemotherapy was discussed.  She has met with Dr Melendez from radiation oncology at [facility].  She had a dental cleaning.  She was taken to the operating room on 10/24/2019 for direct laryngoscopy with biopsy.  Initial biopsy of the tonsil demonstrated high-grade dysplasia.  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  A radical tonsillectomy was then performed based on  the preoperative discussion with the patient. Final pathology demonstrated a T1 SCC of the tonsil with negative margins. She was taken to the OR on 11/20/2019 for a left neck dissection with resection of her scar. She had 1/38 lymph nodes positive - 0.4 cm deposit of metastatic SCC without STEVE. She had proton beam therapy postoperatively at [facility] with Dr Smith. She completed her treatment on 2/24/2020. She lost about 20 lbs with treatment. She had her 3 month post treatment PET in May 2020 which showed uptake in the right tonsil and right level II node thought to be reactive.       Interval history:  She comes in today for follow-up. She was last seen in clinic in November 2020. At that time she was overall doing well. 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 She saw Ob/gyn in December 2020 at which time a 1.5 cm breast mass was noted. They did also discuss a MFM referral when she is ready to consider conceiving. She had a mammogram and U/S of the breast in December 2020 which showed a 1.5 cm mass. A biopsy was performed and was consistent with a fibroadenoma. She comes in today with repeat imaging and a TSH. She says that things are overall better. She had anxiety issues after the breast mass was identified but is feeling reassured after the negative biopsy. She has been working with PT and is doing neck stretching on her own and is noticing progress. She says that she is eating without issues, no odynophagia. She is doing her swallowing exercises, though she is worried it is making her gag reflex worse. She has no ear pain, neck masses, hemoptysis. She is gaining a few pounds. She has had a sore throat for the last few days, no fevers, thinks she may be getting a cold. She feels like her energy is good. She is seeing the dentist. She is doing lymphedema therapy. She is exercising more.        MEDICATIONS:     Current Outpatient Medications   Medication Sig Dispense Refill     biotin 1000 MCG TABS  tablet Take 1,000 mcg by mouth daily       cyanocobalamin (VITAMIN B-12) 100 MCG tablet Take 250 mcg by mouth daily       ferrous sulfate (KP FERROUS SULFATE) 325 (65 Fe) MG tablet Take 325 mg by mouth daily (with breakfast)       loratadine (CLARITIN) 10 MG tablet Take 10 mg by mouth daily as needed        LORazepam (ATIVAN) 0.5 MG tablet Take 0.5-1 mg by mouth as needed        Magnesium Oxide 250 MG TABS Take 250 mg by mouth daily       Milk Thistle-Dand-Fennel-Licor (MILK THISTLE XTRA) CAPS capsule Take 1 capsule by mouth daily       Omega-3 Fatty Acids (OMEGA 3 PO) Take 2 capsules by mouth daily       order for DME Equipment being ordered: facioplasty garment 1 each 2     Vitamin D, Cholecalciferol, 25 MCG (1000 UT) TABS Take 2,000 Units by mouth daily       citalopram (CELEXA) 10 MG tablet Take 1 tablet (10 mg) by mouth daily (Patient not taking: Reported on 12/17/2020) 30 tablet 11       ALLERGIES:  No Known Allergies    HABITS/SOCIAL HISTORY:   Works as a .    She is  with no children.    She has no smoking history.  She has about 10 alcoholic beverages on weekends.  She has no chewing tobacco or vaping history.    Social History     Socioeconomic History     Marital status:      Spouse name: Not on file     Number of children: Not on file     Years of education: Not on file     Highest education level: Not on file   Occupational History     Not on file   Social Needs     Financial resource strain: Not on file     Food insecurity     Worry: Not on file     Inability: Not on file     Transportation needs     Medical: Not on file     Non-medical: Not on file   Tobacco Use     Smoking status: Never Smoker     Smokeless tobacco: Never Used   Substance and Sexual Activity     Alcohol use: Yes     Frequency: 2-3 times a week     Drinks per session: 1 or 2     Binge frequency: Never     Comment: 2-3 drinks per week     Drug use: Never     Sexual activity: Yes     Partners: Male     Birth  control/protection: Condom   Lifestyle     Physical activity     Days per week: Not on file     Minutes per session: Not on file     Stress: Not on file   Relationships     Social connections     Talks on phone: Not on file     Gets together: Not on file     Attends [employer] service: Not on file     Active member of club or organization: Not on file     Attends meetings of clubs or organizations: Not on file     Relationship status: Not on file     Intimate partner violence     Fear of current or ex partner: Not on file     Emotionally abused: Not on file     Physically abused: Not on file     Forced sexual activity: Not on file   Other Topics Concern     Not on file   Social History Narrative     Not on file       PAST MEDICAL HISTORY:   Past Medical History:   Diagnosis Date     Irritable bladder      Squamous cell carcinoma of neck      Uncomplicated asthma         PAST SURGICAL HISTORY:   Past Surgical History:   Procedure Laterality Date     DISSECTION RADICAL NECK MODIFIED Left 11/20/2019    Procedure: DISSECTION, NECK, MODIFIED RADICAL LEFT;  Surgeon: Ruth Tello MD;  Location: [facility] OR     LARYNGOSCOPY WITH BIOPSY(IES) N/A 10/24/2019    Procedure: Direct laryngscopy with biopsy;  Surgeon: Ruth Tello MD;  Location: [facility] OR     TONSILLECTOMY ADULT Left 10/24/2019    Procedure: Left Radical  tonsillectomy;  Surgeon: Ruth Tello MD;  Location: [facility] OR       FAMILY HISTORY:    Family History   Problem Relation Age of Onset     Breast Cancer Mother      Cancer Mother         Breast cancer     Bone Cancer Sister      Cancer Sister         Bone cancer       REVIEW OF SYSTEMS:  12 point ROS was negative other than the symptoms noted above in the HPI.  Patient Supplied Answers to Review of Systems  [facility] ROS 5/14/2020   Constitutional -   Neurology Headache   Psychology -   Ears, Nose, Throat Ear pain   Musculoskeletal -   Endocrine -         PHYSICAL EXAMINATION:   Pulse 76   Temp 97.9  F (36.6  " C) (Temporal)   Ht 1.676 m (5' 6\")   Wt 52.2 kg (115 lb)   SpO2 99%   BMI 18.56 kg/m     Appearance:   normal; NAD, age-appropriate appearance, thin   Communication:   normal; communicates verbally, normal voice quality   Head/Face:   inspection -  Normal; no scars or visible lesions   Salivary glands -  Normal size, no tenderness, swelling, or palpable masses   Facial strength -  Normal and symmetric bilateral   Skin:  normal, no rash   Ears:  auricle (AD) -  normal  EAC (AD) -  normal  TM (AD) -  Normal, no effusion  auricle (AS) -  normal  EAC (AS) -  normal  TM (AS) -  Normal, no effusion  Normal clinical speech reception   Nose:  Ext. inspection -  Normal  Internal Inspection -  Normal mucosa, septum, and turbinates   Nasopharynx:  normal mucosa, no masses   Oral Cavity:  lips -  Normal mucosa, oral competence, and stoma size   Age-appropriate dentition, healthy gingival mucosa   Hard palate, buccal, floor of mouth mucosa normal   Tongue - normal movement, no lesions   Oropharynx:  mucosa -  Normal, no lesions  soft palate -  Expected post radical tonsillectomy asymmetry of left soft palate  tonsils -  S/p left radical tonsillectomy, no secretions, no masses, no concerning mucosal lesions, no palpable masses, expected scar tissue; right tonsil with no palpable masses slightly cryptic in appearance, no mucosal lesions  BOT - normal  Vallecula - few secretions   Hypopharynx:  Normal pyriform sinus and pharyngeal wall mucosa   No pooled secretions    Larynx:  Epiglottis, AE folds, false vocal cords, true vocal cords, arytenoids normal in appearance with no edema of AE folds or arytenoids   bilaterally mobile cords    Neck: Well healed left neck incision   fibrosis of the left SCM sternal head is improved  No significant lymphedema  Normal range of motion   Lymphatic:  no abnormal nodes   Cardiovascular:  warm, pink, well-perfused extremities without swelling, tenderness, or edema   Respiratory:  Normal " respiratory effort, no stridor   Neuro/Psych.:  mood/affect -  normal  mental status -  normal       PROCEDURES:   Flexible fiberoptic laryngoscopy: Scope exam was indicated due to oropharyngeal cancer. Verbal consent was obtained. The nasal cavity was prepped with an aerosolized solution of topical anesthetic and vasoconstrictive agent. The scope was passed through the anterior nasal cavity and advanced. Inspection of the nasopharynx revealed no gross abnormality. The base of tongue and vallecula are normal, few secretions present. There are no abnormalities in the area of the left tonsil. The epiglottis, AE folds, false cords, true cords, arytenoids are normal and there is no edema of the arytenoids or AE folds. Overall the airway is improved.  Inspection of the larynx revealed bilaterally mobile vocal cords. Pyriform sinuses are symmetric. The airway is patent. Procedure tolerated well with no immediate complications noted.        RESULTS REVIEWED:   I reviewed the notes from PT and from ob/gyn. I reviewed the mammogram and U/S reports. I reviewed the pathology report from the breast biopsy.       TSH is mildly elevated, normal T4      I independently reviewed the CT neck images which show postoperative changes of the left tonsil and left neck. There is no evidence of lymphadenopathy. There are no pulmonary nodules present on the CT chest and no mediastinal nodes.      IMPRESSION AND PLAN:   Mira Cao is a 35-year-old woman with a likely T1N1 p16+ squamous cell carcinoma of the oropharynx. She had an excision of a cystic neck mass by a local ENT which demonstrated a metastatic SCC. She underwent a radical tonsillectomy which showed a small primary tumor in the tonsil which was completely resected. She then had a completion neck dissection on 11/20/2019 with final pathology showing 1/38 nodes. She completed postoperative proton beam therapy at [place] on 2/24/2020.     She continues to improve from her  treatment. She has no concerning findings on exam today. She is doing her swallowing therapy, lymphedema therapy, and PT for the neck.     She had repeat imaging today, with no evidence of recurrent disease.     She had her TSH rechecked today. This is slightly elevated again but T4 is normal. We will recheck it again in July 2021. I explained to her that it is likely that at some point she may need to go on replacement but her labs are only slightly abnormal. I did encourage her though to discuss this with her Ob if she decides to become pregnant because they may have a different criteria for replacement in the setting of pregnancy.    I will see her back in 3 months.      Thank you very much for the opportunity to participate in the care of your patient.      Ruth Tello MD, M.D.  Otolaryngology- Head & Neck Surgery      This note was dictated with voice recognition software and then edited. Please excuse any unintentional errors.               CC:  Ifeanyi Soliman MD  Department of Radiation Oncology  [facility]

## 2021-01-20 NOTE — NURSING NOTE
"Chief Complaint   Patient presents with     RECHECK     2 month follow up       Pulse 76, temperature 97.9  F (36.6  C), temperature source Temporal, height 1.676 m (5' 6\"), weight 52.2 kg (115 lb), SpO2 99 %, not currently breastfeeding.    Linnea Dudley, EMT    "

## 2021-01-21 NOTE — PROGRESS NOTES
Called and left message for patient with the following CT neck and chest results:    Impression: Postsurgical and postradiation changes of the neck.  Asymmetric appearance of the right glossotonsillar area, similar to  prior study. No masslike appearance to suggest recurrent tumor. No  cervical lymphadenopathy.    IMPRESSION: No evidence of metastatic disease in the chest.      Patient will continue with plan to follow-up with Dr. Tello in April. Encouraged patient to return call with any further questions or concerns.     Violetta Hurd, RN, BSN

## 2021-01-29 ENCOUNTER — THERAPY VISIT (OUTPATIENT)
Dept: PHYSICAL THERAPY | Facility: CLINIC | Age: 36
End: 2021-01-29
Payer: COMMERCIAL

## 2021-01-29 DIAGNOSIS — M54.2 NECK PAIN: ICD-10-CM

## 2021-01-29 PROCEDURE — 97140 MANUAL THERAPY 1/> REGIONS: CPT | Mod: GP | Performed by: PHYSICAL THERAPIST

## 2021-01-29 PROCEDURE — 97110 THERAPEUTIC EXERCISES: CPT | Mod: GP | Performed by: PHYSICAL THERAPIST

## 2021-01-29 PROCEDURE — 97530 THERAPEUTIC ACTIVITIES: CPT | Mod: GP | Performed by: PHYSICAL THERAPIST

## 2021-01-29 NOTE — PROGRESS NOTES
PROGRESS REPORT    Mira has been in therapy from December 1, 2020 to Jan 29, 2021 for treatment of Neck pain, muscle tightness/fibrosis secondary to surgery for SCC.    Therapist Impression:   Functionally, Mira is doing well.  Her concerned is tightening/stiffening of her neck and making sure she is not losing ROM.  At this point, it is difficult to say as this is the first time I have seen her.  Her ROM demonstrates mild impairments that did not respond to MT today indicating a more frequent stretching program may be indicated.    GOALS: ROM    NEXT: Continue POC    PTRX: NA    Subjective:  Consistent with stretching, but tightness feels about the same, but concerned that it might be getting a little worse.  Seeing OT for lymphedema 1 x month and SLP every few moths.  Reports being at 80% overall.  Still some limitation with movement.  Still having numbness while typing    Objective:  Cervical Range of Motion (measured in % restriction)  Flexion: wnl  Extension: 33  Sidebend Left: 10  Right: 20  Rotation Left: wnl  Right: 20  Protraction/retraction WNL  UE screen WNL for strength (4/5 grossly) and ROM  Negative AROM ULNT tests       ASSESSMENT/PLAN  Updated problem list and treatment plan: The encounter diagnosis was Neck pain. Pain - HEP  Decreased ROM/flexibility - HEP  Impaired posture - HEP  STG/LTGs have been met or progress has been made towards goals:  Yes (See Goal flow sheet completed today.)  Assessment of Progress: The patient's condition is improving.  Self Management Plans:  Patient has been instructed in a home treatment program.  Patient  has been instructed in self management of symptoms.  I have re-evaluated this patient and find that the nature, scope, duration and intensity of the therapy is appropriate for the medical condition of the patient.  Mira continues to require the following intervention to meet STG and LTG's:  PT    Recommendations:  This patient would benefit from continued  therapy.     Frequency:  1 X a month, once daily  Duration:  for 2 months              Please refer to the daily flowsheet for treatment today, total treatment time and time spent performing 1:1 timed codes.

## 2021-02-17 ENCOUNTER — HOSPITAL ENCOUNTER (OUTPATIENT)
Dept: OCCUPATIONAL THERAPY | Facility: CLINIC | Age: 36
Setting detail: THERAPIES SERIES
End: 2021-02-17
Attending: NURSE PRACTITIONER
Payer: COMMERCIAL

## 2021-02-17 PROCEDURE — 97140 MANUAL THERAPY 1/> REGIONS: CPT | Mod: GO

## 2021-02-17 NOTE — PROGRESS NOTES
"   02/17/21 0835   Signing Clinician's Name / Credentials   Signing clinician's name / credentials Denis Bellamy, OTR/L, CLT   Session Number   Session Number DISCHARGE NOTE 2 [facility] 2021   Goal 1   Goal identifier volume   Goal description For decreased risk of infection and progressive soft-tissue fibrosis, volume will be reduced s/p MLD so that L mandible/sub-mandibular area approximates R.   Target date 02/17/21   Date met 02/17/21  (GOAL MET)   Goal 2   Goal identifier neck AROM   Goal description For increased participation in I/ADL, patient will achieve R lat neck flexion and BL extension WFL   Target date 09/06/20   Date met 09/08/20  (GOAL MET)   Goal 3   Goal identifier home program   Goal description For long-term management of long-term lymphedema, patient/caregiver will be independent in home program, including self-MLD, scar massage, HEP, and use of compression garment.   Target date 10/09/20   Date met 10/06/20  (GOAL MET)   Goal 4   Goal identifier precautions   Goal description For decreased risk of infection, skin breakdown, and soft-tissue fibrosis, patient/caregiver will independently verbalize lymphedema precautions.   Target date 09/06/20   Date met 07/06/20  (GOAL MET)   Subjective Report   Subjective Report \"I'm feeling really good, I think I've come a long way.  I was a little more swollen last week when I ate some salty food, but I just massaged it away.  I'm wearing the compression mask every night, and I use the swell pad when I need it, still doing my neck stretches.  Also, my left hand numbness is gone, those (nerve glides) you gave me did the trick right away.  Now I'm doing them for the right arm, too, because I started having problems there\"   Manual Therapy   Manual Therapy Minutes ([id number]) 55   Skilled Intervention MLD; MFR, ed   Patient Response good symmetry BL buccal and sub-mental regions upon presentation, with very mild swelling along L mandible, cleared with MLD.  BL neck mm cont " to be tight, but overall tone on L much improved.  Patient verb understanding of addition of R neck stretching in addition to current L side HEP.  Patient verb understanding of all precautions, feels confident re: HP, verb understanding of how to obtain new referral if needed.  In agreement with POC, d/c this visit   Treatment Detail Supine MLD to b/l terminus, ax, clav and cerv LN, drainage sub-nestor over left ear and to R sub-nestor, drainage to L buccal region; scar mobs, soft tissue mobs and MFR to BL scalenes and SCM.  Provided ed re: incorporating R side stretching into current HEP.  Reinforced all precautions, s/s of exacerbation or infection, how to obtain new referral if needed.   Progress ALL GOALS MET   Plan   Home program self-MLD, scar massage, HEP, compression mask wear, intra-oral tech to drain L buccal/labial region, soft tissue work to masseter mm; swell pad under compression mask; ulnar nerve glides   Updates to plan of care DISCHARGE THIS VISIT   Total Session Time   Timed Code Treatment Minutes 55   Total Treatment Time (sum of timed and untimed services) 55

## 2021-03-24 ENCOUNTER — TELEPHONE (OUTPATIENT)
Dept: OBGYN | Facility: CLINIC | Age: 36
End: 2021-03-24

## 2021-03-24 DIAGNOSIS — Z31.69 ENCOUNTER FOR PRECONCEPTION CONSULTATION: Primary | ICD-10-CM

## 2021-03-30 ENCOUNTER — TRANSCRIBE ORDERS (OUTPATIENT)
Dept: MATERNAL FETAL MEDICINE | Facility: CLINIC | Age: 36
End: 2021-03-30

## 2021-03-30 DIAGNOSIS — Z31.69 ENCOUNTER FOR PRECONCEPTION CONSULTATION: Primary | ICD-10-CM

## 2021-04-14 DIAGNOSIS — E07.9 DISORDER OF THYROID: Primary | ICD-10-CM

## 2021-04-20 NOTE — PROGRESS NOTES
I had the pleasure of seeing Mira Cao in follow-up today at the [facility].     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 The patient noticed a left neck mass in June. She thought this was related to a swollen lymph node due to stress as she had just bought a house.  She delayed evaluation until approximately July when she presented to urgent care.  She had an ultrasound obtained on 7/12/2019 which demonstrated a 3.7 x 2.3 x 1.3 cm mass in the left neck.  She then had a CT scan on 8/7/2019 which demonstrated a 1.8 x 1.4 x 3.8 cm partially necrotic/cystic level 2/3 neck mass, concerning for metastatic squamous cell carcinoma.  She was seen by Dr.Todd Cao and had an FNA performed on 8/8/2019.  The pathology from the FNA demonstrated atypical squamous proliferation which was p16-.  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  She elected for removal which was performed by Dr. Cao on 10/2/2019.  The excision was performed without a completion neck dissection.  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  She had a PET CT scan on 2019 locally which demonstrated postop changes without residual disease and no obvious primary malignancy.  On 10/10/2019 she saw Dr. Neely at [facility] for medical oncology consultation where possible chemotherapy was discussed.  She has met with Dr Melendez from radiation oncology at [facility].  She had a dental cleaning.  She was taken to the operating room on 10/24/2019 for direct laryngoscopy with biopsy.  Initial biopsy of the tonsil demonstrated high-grade dysplasia.  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  A radical tonsillectomy was then performed based on  the preoperative discussion with the patient. Final pathology demonstrated a T1 SCC of the tonsil with negative margins. She was taken to the OR on 11/20/2019 for a left neck dissection with resection of her scar. She had 1/38 lymph nodes positive - 0.4 cm deposit of metastatic SCC without STEVE. She had proton beam therapy postoperatively at [facility] with Dr Smith. She completed her treatment on 2/24/2020. She lost about 20 lbs with treatment. She had her 3 month post treatment PET in May 2020 which showed uptake in the right tonsil and right level II node thought to be reactive.       Interval history:  She comes in today for follow-up. She was last seen in clinic in January 2021. She had repeat labs and imaging at that time. Her TSH was mildly elevated. She had continued to see PT and lymphedema since that time. She had repeat labs today which was normal today. She says today that she is doing well. She says that she was having issues with her focus and fatigue. She says that her symptoms have improved lately. She did recently take a vacation to [place]. She has gained 10 lbs. She is swallowing without difficulty. She notices more phlegm but she also is taking more dairy. She has no pain with swallowing. She has no sore throat. She has no neck masses. She still uses the jaw bra every night and does the lymphedema therapy at home. She did myofascial release recently. Her health otherwise is doing well.      She got her first COVID vaccine.     MEDICATIONS:     Current Outpatient Medications   Medication Sig Dispense Refill     biotin 1000 MCG TABS tablet Take 1,000 mcg by mouth daily       cyanocobalamin (VITAMIN B-12) 100 MCG tablet Take 250 mcg by mouth daily       ferrous sulfate ( FERROUS SULFATE) 325 (65 Fe) MG tablet Take 325 mg by mouth daily (with breakfast)       loratadine (CLARITIN) 10 MG tablet Take 10 mg by mouth daily as needed        LORazepam (ATIVAN) 0.5 MG tablet Take 0.5-1 mg by mouth as needed         Magnesium Oxide 250 MG TABS Take 250 mg by mouth daily       Milk Thistle-Dand-Fennel-Licor (MILK THISTLE XTRA) CAPS capsule Take 1 capsule by mouth daily       Omega-3 Fatty Acids (OMEGA 3 PO) Take 2 capsules by mouth daily       order for DME Equipment being ordered: facioplasty garment 1 each 2     Vitamin D, Cholecalciferol, 25 MCG (1000 UT) TABS Take 2,000 Units by mouth daily       citalopram (CELEXA) 10 MG tablet Take 1 tablet (10 mg) by mouth daily (Patient not taking: Reported on 12/17/2020) 30 tablet 11       ALLERGIES:  No Known Allergies    HABITS/SOCIAL HISTORY:   Works as a .    She is  with no children.    She has no smoking history.  She has about 10 alcoholic beverages on weekends.  She has no chewing tobacco or vaping history.    Social History     Socioeconomic History     Marital status:      Spouse name: Not on file     Number of children: Not on file     Years of education: Not on file     Highest education level: Not on file   Occupational History     Not on file   Social Needs     Financial resource strain: Not on file     Food insecurity     Worry: Not on file     Inability: Not on file     Transportation needs     Medical: Not on file     Non-medical: Not on file   Tobacco Use     Smoking status: Never Smoker     Smokeless tobacco: Never Used   Substance and Sexual Activity     Alcohol use: Yes     Frequency: 2-3 times a week     Drinks per session: 1 or 2     Binge frequency: Never     Comment: 2-3 drinks per week     Drug use: Never     Sexual activity: Yes     Partners: Male     Birth control/protection: Condom   Lifestyle     Physical activity     Days per week: Not on file     Minutes per session: Not on file     Stress: Not on file   Relationships     Social connections     Talks on phone: Not on file     Gets together: Not on file     Attends Rastafarian service: Not on file     Active member of club or organization: Not on file     Attends meetings  "of clubs or organizations: Not on file     Relationship status: Not on file     Intimate partner violence     Fear of current or ex partner: Not on file     Emotionally abused: Not on file     Physically abused: Not on file     Forced sexual activity: Not on file   Other Topics Concern     Not on file   Social History Narrative     Not on file       PAST MEDICAL HISTORY:   Past Medical History:   Diagnosis Date     Irritable bladder      Squamous cell carcinoma of neck      Uncomplicated asthma         PAST SURGICAL HISTORY:   Past Surgical History:   Procedure Laterality Date     DISSECTION RADICAL NECK MODIFIED Left 11/20/2019    Procedure: DISSECTION, NECK, MODIFIED RADICAL LEFT;  Surgeon: Ruth Tello MD;  Location: [facility] OR     LARYNGOSCOPY WITH BIOPSY(IES) N/A 10/24/2019    Procedure: Direct laryngscopy with biopsy;  Surgeon: Ruth Tello MD;  Location: [facility] OR     TONSILLECTOMY ADULT Left 10/24/2019    Procedure: Left Radical  tonsillectomy;  Surgeon: Ruth Tello MD;  Location: [facility] OR       FAMILY HISTORY:    Family History   Problem Relation Age of Onset     Breast Cancer Mother      Cancer Mother         Breast cancer     Bone Cancer Sister      Cancer Sister         Bone cancer       REVIEW OF SYSTEMS:  12 point ROS was negative other than the symptoms noted above in the HPI.  Patient Supplied Answers to Review of Systems  [facility] ROS 5/14/2020   Constitutional -   Neurology Headache   Psychology -   Ears, Nose, Throat Ear pain   Musculoskeletal -   Endocrine -         PHYSICAL EXAMINATION:   Temp 96.9  F (36.1  C) (Temporal)   Ht 1.676 m (5' 6\")   Wt 55.3 kg (122 lb)   BMI 19.69 kg/m     Appearance:   normal; NAD, age-appropriate appearance, thin   Communication:   normal; communicates verbally, normal voice quality   Head/Face:   inspection -  Normal; no scars or visible lesions   Salivary glands -  Normal size, no tenderness, swelling, or palpable masses   Facial strength -  Normal and " symmetric    Skin:  normal, no rash   Ears:  auricle (AD) -  normal  EAC (AD) -  normal  TM (AD) -  Normal, no effusion  auricle (AS) -  normal  EAC (AS) -  normal  TM (AS) -  Normal, no effusion  Normal clinical speech reception   Nose:  Ext. inspection -  Normal  Internal Inspection -  Normal mucosa, septum, and turbinates   Nasopharynx:  normal mucosa, no masses   Oral Cavity:  lips -  Normal mucosa, oral competence, and stoma size   Age-appropriate dentition, healthy gingival mucosa   Hard palate, buccal, floor of mouth mucosa normal   Tongue - normal movement, no lesions   Oropharynx:  mucosa -  Normal, no lesions  soft palate -  Expected post radical tonsillectomy asymmetry of left soft palate  tonsils -  S/p left radical tonsillectomy, no secretions, no masses, no concerning mucosal lesions, no palpable masses, expected scar tissue; right tonsil with no palpable masses slightly cryptic in appearance, no mucosal lesions  BOT - normal  Vallecula - clear   Hypopharynx:  Normal pyriform sinus and pharyngeal wall mucosa   No pooled secretions    Larynx:  Epiglottis, AE folds, false vocal cords, true vocal cords, arytenoids normal in appearance with no edema of AE folds or arytenoids   bilaterally mobile cords    Neck: Well healed left neck incision   fibrosis of the left SCM sternal head with continued improvement  Some skin changes to the neck skin from radiation  No significant lymphedema  Normal range of motion   Lymphatic:  no abnormal nodes   Cardiovascular:  warm, pink, well-perfused extremities without swelling, tenderness, or edema   Respiratory:  Normal respiratory effort, no stridor   Neuro/Psych.:  mood/affect -  normal  mental status -  normal       PROCEDURES:   Flexible fiberoptic laryngoscopy: Scope exam was indicated due to oropharyngeal cancer. Verbal consent was obtained. The nasal cavity was prepped with an aerosolized solution of topical anesthetic and vasoconstrictive agent. The scope was  passed through the anterior nasal cavity and advanced. Inspection of the nasopharynx revealed no gross abnormality. The base of tongue and vallecula are normal. There are no abnormalities in the area of the left tonsil. The epiglottis, AE folds, false cords, true cords, arytenoids are normal and there is no edema of the arytenoids or AE folds. Overall the airway is improved.  Inspection of the larynx revealed bilaterally mobile vocal cords. Pyriform sinuses are symmetric. The airway is patent. Procedure tolerated well with no immediate complications noted.                RESULTS REVIEWED:   I reviewed the notes from PT and OT    TSH normal      IMPRESSION AND PLAN:   Mira Cao is a 35-year-old woman with a likely T1N1 p16+ squamous cell carcinoma of the oropharynx. She had an excision of a cystic neck mass by a local ENT which demonstrated a metastatic SCC. She underwent a radical tonsillectomy which showed a small primary tumor in the tonsil which was completely resected. She then had a completion neck dissection on 11/20/2019 with final pathology showing 1/38 nodes. She completed postoperative proton beam therapy at [place] on 2/24/2020.     She continues to improve from her treatment. She has no concerning findings on exam today. During the visit she did discuss making a noise when talking, sounded similar based on description to possible PVFM. I spoke with our SLP team today and asked them to see the patient. They are recommended a course of voice therapy.     She is due for repeat imaging in January 2022.     She had her TSH rechecked today which was normal. She is due for recheck in October 2021.    I will see her back in 3-4 months.    Thank you very much for the opportunity to participate in the care of your patient.      Ruth Tello MD, M.D.  Otolaryngology- Head & Neck Surgery      This note was dictated with voice recognition software and then edited. Please excuse any unintentional errors.                CC:  Ifeanyi Soliman MD  Department of Radiation Oncology  [facility]

## 2021-04-21 ENCOUNTER — OFFICE VISIT (OUTPATIENT)
Dept: OTOLARYNGOLOGY | Facility: CLINIC | Age: 36
End: 2021-04-21
Payer: COMMERCIAL

## 2021-04-21 ENCOUNTER — ALLIED HEALTH/NURSE VISIT (OUTPATIENT)
Dept: SPEECH THERAPY | Facility: CLINIC | Age: 36
End: 2021-04-21

## 2021-04-21 VITALS — BODY MASS INDEX: 19.61 KG/M2 | WEIGHT: 122 LBS | TEMPERATURE: 96.9 F | HEIGHT: 66 IN

## 2021-04-21 DIAGNOSIS — R49.0 MUSCLE TENSION DYSPHONIA: ICD-10-CM

## 2021-04-21 DIAGNOSIS — C10.9 SQUAMOUS CELL CARCINOMA OF OROPHARYNX (H): Primary | ICD-10-CM

## 2021-04-21 DIAGNOSIS — E07.9 DISORDER OF THYROID: ICD-10-CM

## 2021-04-21 LAB — TSH SERPL DL<=0.005 MIU/L-ACNC: 3.88 MU/L (ref 0.4–4)

## 2021-04-21 PROCEDURE — 36415 COLL VENOUS BLD VENIPUNCTURE: CPT | Performed by: PATHOLOGY

## 2021-04-21 PROCEDURE — 99213 OFFICE O/P EST LOW 20 MIN: CPT | Mod: 25 | Performed by: OTOLARYNGOLOGY

## 2021-04-21 PROCEDURE — 84443 ASSAY THYROID STIM HORMONE: CPT | Performed by: PATHOLOGY

## 2021-04-21 PROCEDURE — 31575 DIAGNOSTIC LARYNGOSCOPY: CPT | Performed by: OTOLARYNGOLOGY

## 2021-04-21 ASSESSMENT — MIFFLIN-ST. JEOR: SCORE: 1260.14

## 2021-04-21 ASSESSMENT — PAIN SCALES - GENERAL: PAINLEVEL: NO PAIN (0)

## 2021-04-21 NOTE — NURSING NOTE
"Chief Complaint   Patient presents with     RECHECK     3 month follow up       Temperature 96.9  F (36.1  C), temperature source Temporal, height 1.676 m (5' 6\"), weight 55.3 kg (122 lb), not currently breastfeeding.    Peg Quezada, EMT  "

## 2021-04-21 NOTE — LETTER
4/21/2021       RE: Mira Cao  [address]  [place] [address]     Dear Colleague,    Thank you for referring your patient, Mira Cao, to the [facility] [place] at [facility]. Please see a copy of my visit note below.    I had the pleasure of seeing Mira Cao in follow-up today at the [facility].     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 The patient noticed a left neck mass in June. She thought this was related to a swollen lymph node due to stress as she had just bought a house.  She delayed evaluation until approximately July when she presented to urgent care.  She had an ultrasound obtained on 7/12/2019 which demonstrated a 3.7 x 2.3 x 1.3 cm mass in the left neck.  She then had a CT scan on 8/7/2019 which demonstrated a 1.8 x 1.4 x 3.8 cm partially necrotic/cystic level 2/3 neck mass, concerning for metastatic squamous cell carcinoma.  She was seen by Dr.Todd Cao and had an FNA performed on 8/8/2019.  The pathology from the FNA demonstrated atypical squamous proliferation which was p16-.  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  She elected for removal which was performed by Dr. Cao on 10/2/2019.  The excision was performed without a completion neck dissection.  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  She had a PET CT scan on 2019 locally which demonstrated postop changes without residual disease and no obvious primary malignancy.  On 10/10/2019 she saw Dr. Neely at [facility] for medical oncology consultation where possible chemotherapy was discussed.  She has met with Dr Melendez from radiation oncology at [facility].  She had a dental cleaning.   She was taken to the operating room on 10/24/2019 for direct laryngoscopy with biopsy.  Initial biopsy of the tonsil demonstrated high-grade dysplasia.  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  A radical tonsillectomy was then performed based on the preoperative discussion with the patient. Final pathology demonstrated a T1 SCC of the tonsil with negative margins. She was taken to the OR on 11/20/2019 for a left neck dissection with resection of her scar. She had 1/38 lymph nodes positive - 0.4 cm deposit of metastatic SCC without STEVE. She had proton beam therapy postoperatively at [facility] with Dr Smith. She completed her treatment on 2/24/2020. She lost about 20 lbs with treatment. She had her 3 month post treatment PET in May 2020 which showed uptake in the right tonsil and right level II node thought to be reactive.       Interval history:  She comes in today for follow-up. She was last seen in clinic in January 2021. She had repeat labs and imaging at that time. Her TSH was mildly elevated. She had continued to see PT and lymphedema since that time. She had repeat labs today which was normal today. She says today that she is doing well. She says that she was having issues with her focus and fatigue. She says that her symptoms have improved lately. She did recently take a vacation to [place]. She has gained 10 lbs. She is swallowing without difficulty. She notices more phlegm but she also is taking more dairy. She has no pain with swallowing. She has no sore throat. She has no neck masses. She still uses the jaw bra every night and does the lymphedema therapy at home. She did myofascial release recently. Her health otherwise is doing well.      She got her first COVID vaccine.     MEDICATIONS:     Current Outpatient Medications   Medication Sig Dispense Refill     biotin 1000 MCG TABS tablet Take 1,000 mcg by mouth daily        cyanocobalamin (VITAMIN B-12) 100 MCG tablet Take 250 mcg by mouth daily       ferrous sulfate (KP FERROUS SULFATE) 325 (65 Fe) MG tablet Take 325 mg by mouth daily (with breakfast)       loratadine (CLARITIN) 10 MG tablet Take 10 mg by mouth daily as needed        LORazepam (ATIVAN) 0.5 MG tablet Take 0.5-1 mg by mouth as needed        Magnesium Oxide 250 MG TABS Take 250 mg by mouth daily       Milk Thistle-Dand-Fennel-Licor (MILK THISTLE XTRA) CAPS capsule Take 1 capsule by mouth daily       Omega-3 Fatty Acids (OMEGA 3 PO) Take 2 capsules by mouth daily       order for DME Equipment being ordered: facioplasty garment 1 each 2     Vitamin D, Cholecalciferol, 25 MCG (1000 UT) TABS Take 2,000 Units by mouth daily       citalopram (CELEXA) 10 MG tablet Take 1 tablet (10 mg) by mouth daily (Patient not taking: Reported on 12/17/2020) 30 tablet 11       ALLERGIES:  No Known Allergies    HABITS/SOCIAL HISTORY:   Works as a .    She is  with no children.    She has no smoking history.  She has about 10 alcoholic beverages on weekends.  She has no chewing tobacco or vaping history.    Social History     Socioeconomic History     Marital status:      Spouse name: Not on file     Number of children: Not on file     Years of education: Not on file     Highest education level: Not on file   Occupational History     Not on file   Social Needs     Financial resource strain: Not on file     Food insecurity     Worry: Not on file     Inability: Not on file     Transportation needs     Medical: Not on file     Non-medical: Not on file   Tobacco Use     Smoking status: Never Smoker     Smokeless tobacco: Never Used   Substance and Sexual Activity     Alcohol use: Yes     Frequency: 2-3 times a week     Drinks per session: 1 or 2     Binge frequency: Never     Comment: 2-3 drinks per week     Drug use: Never     Sexual activity: Yes     Partners: Male     Birth control/protection: Condom   Lifestyle      "Physical activity     Days per week: Not on file     Minutes per session: Not on file     Stress: Not on file   Relationships     Social connections     Talks on phone: Not on file     Gets together: Not on file     Attends Protestant service: Not on file     Active member of club or organization: Not on file     Attends meetings of clubs or organizations: Not on file     Relationship status: Not on file     Intimate partner violence     Fear of current or ex partner: Not on file     Emotionally abused: Not on file     Physically abused: Not on file     Forced sexual activity: Not on file   Other Topics Concern     Not on file   Social History Narrative     Not on file       PAST MEDICAL HISTORY:   Past Medical History:   Diagnosis Date     Irritable bladder      Squamous cell carcinoma of neck      Uncomplicated asthma         PAST SURGICAL HISTORY:   Past Surgical History:   Procedure Laterality Date     DISSECTION RADICAL NECK MODIFIED Left 11/20/2019    Procedure: DISSECTION, NECK, MODIFIED RADICAL LEFT;  Surgeon: Ruth Tello MD;  Location: [facility] OR     LARYNGOSCOPY WITH BIOPSY(IES) N/A 10/24/2019    Procedure: Direct laryngscopy with biopsy;  Surgeon: Ruth Tello MD;  Location: [facility] OR     TONSILLECTOMY ADULT Left 10/24/2019    Procedure: Left Radical  tonsillectomy;  Surgeon: Ruth Tello MD;  Location: [facility] OR       FAMILY HISTORY:    Family History   Problem Relation Age of Onset     Breast Cancer Mother      Cancer Mother         Breast cancer     Bone Cancer Sister      Cancer Sister         Bone cancer       REVIEW OF SYSTEMS:  12 point ROS was negative other than the symptoms noted above in the HPI.  Patient Supplied Answers to Review of Systems   ENT ROS 5/14/2020   Constitutional -   Neurology Headache   Psychology -   Ears, Nose, Throat Ear pain   Musculoskeletal -   Endocrine -         PHYSICAL EXAMINATION:   Temp 96.9  F (36.1  C) (Temporal)   Ht 1.676 m (5' 6\")   Wt 55.3 kg (122 " lb)   BMI 19.69 kg/m     Appearance:   normal; NAD, age-appropriate appearance, thin   Communication:   normal; communicates verbally, normal voice quality   Head/Face:   inspection -  Normal; no scars or visible lesions   Salivary glands -  Normal size, no tenderness, swelling, or palpable masses   Facial strength -  Normal and symmetric    Skin:  normal, no rash   Ears:  auricle (AD) -  normal  EAC (AD) -  normal  TM (AD) -  Normal, no effusion  auricle (AS) -  normal  EAC (AS) -  normal  TM (AS) -  Normal, no effusion  Normal clinical speech reception   Nose:  Ext. inspection -  Normal  Internal Inspection -  Normal mucosa, septum, and turbinates   Nasopharynx:  normal mucosa, no masses   Oral Cavity:  lips -  Normal mucosa, oral competence, and stoma size   Age-appropriate dentition, healthy gingival mucosa   Hard palate, buccal, floor of mouth mucosa normal   Tongue - normal movement, no lesions   Oropharynx:  mucosa -  Normal, no lesions  soft palate -  Expected post radical tonsillectomy asymmetry of left soft palate  tonsils -  S/p left radical tonsillectomy, no secretions, no masses, no concerning mucosal lesions, no palpable masses, expected scar tissue; right tonsil with no palpable masses slightly cryptic in appearance, no mucosal lesions  BOT - normal  Vallecula - clear   Hypopharynx:  Normal pyriform sinus and pharyngeal wall mucosa   No pooled secretions    Larynx:  Epiglottis, AE folds, false vocal cords, true vocal cords, arytenoids normal in appearance with no edema of AE folds or arytenoids   bilaterally mobile cords    Neck: Well healed left neck incision   fibrosis of the left SCM sternal head with continued improvement  Some skin changes to the neck skin from radiation  No significant lymphedema  Normal range of motion   Lymphatic:  no abnormal nodes   Cardiovascular:  warm, pink, well-perfused extremities without swelling, tenderness, or edema   Respiratory:  Normal respiratory effort, no  stridor   Neuro/Psych.:  mood/affect -  normal  mental status -  normal       PROCEDURES:   Flexible fiberoptic laryngoscopy: Scope exam was indicated due to oropharyngeal cancer. Verbal consent was obtained. The nasal cavity was prepped with an aerosolized solution of topical anesthetic and vasoconstrictive agent. The scope was passed through the anterior nasal cavity and advanced. Inspection of the nasopharynx revealed no gross abnormality. The base of tongue and vallecula are normal. There are no abnormalities in the area of the left tonsil. The epiglottis, AE folds, false cords, true cords, arytenoids are normal and there is no edema of the arytenoids or AE folds. Overall the airway is improved.  Inspection of the larynx revealed bilaterally mobile vocal cords. Pyriform sinuses are symmetric. The airway is patent. Procedure tolerated well with no immediate complications noted.                RESULTS REVIEWED:   I reviewed the notes from PT and OT    TSH normal      IMPRESSION AND PLAN:   Mira Cao is a 35-year-old woman with a likely T1N1 p16+ squamous cell carcinoma of the oropharynx. She had an excision of a cystic neck mass by a local ENT which demonstrated a metastatic SCC. She underwent a radical tonsillectomy which showed a small primary tumor in the tonsil which was completely resected. She then had a completion neck dissection on 11/20/2019 with final pathology showing 1/38 nodes. She completed postoperative proton beam therapy at [place] on 2/24/2020.     She continues to improve from her treatment. She has no concerning findings on exam today. During the visit she did discuss making a noise when talking, sounded similar based on description to possible PVFM. I spoke with our SLP team today and asked them to see the patient. They are recommended a course of voice therapy.     She is due for repeat imaging in January 2022.     She had her TSH rechecked today which was normal. She is due for recheck  in October 2021.    I will see her back in 3-4 months.    Thank you very much for the opportunity to participate in the care of your patient.      Ruth Tello MD, M.D.  Otolaryngology- Head & Neck Surgery      This note was dictated with voice recognition software and then edited. Please excuse any unintentional errors.       CC:  Ifeanyi Soliman MD  Department of Radiation Oncology  [facility]

## 2021-04-21 NOTE — PROGRESS NOTES
"Pt seen for brief allied health visit today per MD request. Pt reports making a \"wheezing\" sound when she breathes out. Upon further questioning, the pt complains of vocal fatigue with her high vocal demand profession, especially on long meetings. She reports it feels like mucus accumulates on her vocal folds and she makes the wheezing sound to clear the mucus. Discussed probable diagnosis of muscle tension dysphonia and how voice therapy could be helpful. Pt very interested in voice therapy. This clinician will request a referral for [facility]. Pt will schedule this visit when leaving the clinic today. Pt appreciative of information.    Time spent with pt: 10 minutes    KALYANI Diallo (music), MA, CCC-SLP   Speech Language Pathologist  [place] Trained Vocologist   [facility] [place]  Dept. of Otolaryngology  Department of Rehabilitation Services  [address] [place] [id number]  Email: [email or web address]   Phone: [phone]  Pronouns: she/her/hers    "

## 2021-04-22 NOTE — TELEPHONE ENCOUNTER
FUTURE VISIT INFORMATION      FUTURE VISIT INFORMATION:    Date: 5/26/21    Time: 8:00am    Location: [facility]  REFERRAL INFORMATION:    Referring provider:  Dr. Tello    Referring providers clinic:  [facility]   Reason for visit/diagnosis  Muscle tension dysphonia      RECORDS REQUESTED FROM:       Clinic name Comments Records Status Imaging Status   [facility] ent OV/referral 4/21/21  Ov./notes 10/21/19-4/21/21 EPIC

## 2021-05-26 ENCOUNTER — VIRTUAL VISIT (OUTPATIENT)
Dept: OTOLARYNGOLOGY | Facility: CLINIC | Age: 36
End: 2021-05-26
Payer: COMMERCIAL

## 2021-05-26 ENCOUNTER — TELEPHONE (OUTPATIENT)
Dept: OTOLARYNGOLOGY | Facility: CLINIC | Age: 36
End: 2021-05-26

## 2021-05-26 ENCOUNTER — PRE VISIT (OUTPATIENT)
Dept: MATERNAL FETAL MEDICINE | Facility: CLINIC | Age: 36
End: 2021-05-26

## 2021-05-26 ENCOUNTER — PRE VISIT (OUTPATIENT)
Dept: OTOLARYNGOLOGY | Facility: CLINIC | Age: 36
End: 2021-05-26

## 2021-05-26 DIAGNOSIS — R49.0 MUSCLE TENSION DYSPHONIA: Primary | ICD-10-CM

## 2021-05-26 PROCEDURE — 92507 TX SP LANG VOICE COMM INDIV: CPT | Mod: GN | Performed by: SPEECH-LANGUAGE PATHOLOGIST

## 2021-05-26 PROCEDURE — 92524 BEHAVRAL QUALIT ANALYS VOICE: CPT | Mod: GN | Performed by: SPEECH-LANGUAGE PATHOLOGIST

## 2021-05-26 PROCEDURE — 99207 PR NO CHARGE LOS: CPT | Performed by: SPEECH-LANGUAGE PATHOLOGIST

## 2021-05-26 NOTE — LETTER
5/26/2021       RE: Mira Cao  [address] [place] [address]     Dear Colleague,    Thank you for referring your patient, Mira Cao, to the [facility] [place] at [facility]. Please see a copy of my visit note below.    Mira Cao is a 35 year old female who is being evaluated via a billable video visit.      The patient has been notified and verbally consented to the following:     This video visit will be conducted between you and your provider.    Patient has opted to conduct today's video visit vs an in-person appointment, and is not able to attend due to possible exposure to COVID-19.      If during the course of the call the provider feels a video visit is not appropriate, you will not be charged for this service.    Call initiated at: 8:01  Type of Video Platform Used: [employer]  Location of provider: Residence  Location of patient: [facility]  Evaluation report    Clinician: Graham Schilling M.M., M.A., CCC/SLP  Referring physician:  Dr. Tello  Patient: Mira Cao  Date of Visit: 5/26/2021    HISTORY  Chief complaint: Mira Cao is a 35 year old woman presenting today for evaluation of noise during breatighing and voicing.    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  Prior left neck dissection of what was described to be a benign branchial cleft cyst demonstrated positive metastatic squamous cell carcinoma without STEVE.  She has been working with Dr. Tello for management and was seen recently on 4/21/2021.  Ancillary to her primary area of care she reported noisy sounds when breathing during voice use.  Patient was seen by my colleague Isis Dominguez briefly as part of that visit and Isis felt that her symptoms were most consistent with a muscle tension dysphonia, and there was a possibility of paradoxical vocal fold  "motion associated with the breathing specific symptoms.  Referral to voice therapy was recommended, and patient presents virtually to initiate this today.    Today she reports that voice related symptoms began 4-5 months ago (~January) and this corresponded to an increase in overall vocal demands at work.  She states that this manifested as significant vocal fatigue and rough / low voice quality. Sensation of phlegm on her vocal folds and noise with breathing was initially present, but this has improved recently with increase in overall health (working out more).  Ports symptoms have improved somewhat but have not gone away and she feels that this affects her ability to be effective at work    CURRENT SYMPTOMS INCLUDE  VOICE    Vocal fatigue    Sensation of \"phlegm on her vocal cords\"    Effort with speech was increased    Low raspy voice quality    Voice quality can sound normal at times    Describes her normal voice as more energetic / higher    Can cause a sore throat with her most extensive voice use days    Vocal demand     with significant vocal     Frequent zoom calls    COUGH/THROAT CLEARING    Minimal     Sensation of execess saliva that will sometimes lead to cough    BREATHING    Tightness on exhalation     ADDITIONAL    Sensation of increased saliva    Does feel that her words and speech is less clear than it used to be, this seemed to begin in January of this year as well    Patient denies significant dyspnea, dysphagia, cough and pain.     OTHER PERTINENT HISTORY    Complex medical history: please also refer to Dr. Tello's dictation.     Past Medical History:   Diagnosis Date     Irritable bladder      Squamous cell carcinoma of neck      Uncomplicated asthma      Past Surgical History:   Procedure Laterality Date     DISSECTION RADICAL NECK MODIFIED Left 11/20/2019    Procedure: DISSECTION, NECK, MODIFIED RADICAL LEFT;  Surgeon: Ruth Tello MD;  Location: [facility] OR     LARYNGOSCOPY " WITH BIOPSY(IES) N/A 10/24/2019    Procedure: Direct laryngscopy with biopsy;  Surgeon: Ruth Tello MD;  Location: [facility] OR     TONSILLECTOMY ADULT Left 10/24/2019    Procedure: Left Radical  tonsillectomy;  Surgeon: Ruth Tello MD;  Location: [facility] OR       OBJECTIVE  PATIENT REPORTED MEASURES  Dysponia SLP Goals 5/26/2021   How would you rate your speaking voice quality, if 0 is worst voice quality, and 10 is best voice? 6   How much effort is it to speak, if 0 is no extra effort and 10 is maximum effort? 8   How much does your voice problem bother you? Somewhat     Patient Supplied Answers To VHI Questionnaire  No flowsheet data found.    Patient Supplied Answers To CSI Questionnaire  No flowsheet data found.    Patient Supplied Answers To EAT Questionnaire  No flowsheet data found.    PERCEPTUAL EVALUATION (CPT 97794)  POSTURE / TENSION:     neck and shoulders    BREATHING:     excessive thoracic muscle use pattern    VOICE:    Roughness: Mild to moderate Consistent    Breathiness: Minimal    Strain: Mild to moderate Intermittent     MPT    /s/ - 16 seconds     /z/  - 19 seconds 220 Hz    Loudness    Conversational speech:  Mildly reduced    Laryngeal DDK    Decreased clarity and crsip onset and mildly slow rate    Pitch:    Conversational speech:  Mildly lowered 174 Hz    Pitch glide:     Max F0 - 1200 Hz    No breaks    Resonance:    Conversational speech:  laryngeal pharyngeal resonance    Singing vs. Speech:      Decreased roughness and strain noted in singing versus running speech    CAPE-V Overall Severity:  36/100    SPEECH    Mild imprecision in articulation noted    Not formally assessed    COUGH/THROAT CLEARING:    Not observed    THERAPY PROBES: Improvement was elicited with use of forward resonant stimuli and coordination of respiration and phonation    Laryngeal evaluation  Laryngeal evaluation was performed by Dr. Tello in April of this year and showed no structural or neurologic deficits  at the level of the larynx.  Formal evaluation with video stroboscopy may be necessary in the future to fully characterize the patient's symptoms, and the potential need for this was discussed with the patient today.  She was in agreement.    ASSESSMENT / PLAN  IMPRESSIONS: Mira Cao is presenting today with an approximately 5-month history of voice changes following an increase in work demand and in the context of her complex medical history including squamous cell carcinoma of the oropharynx and neck.  Today's evaluation demonstrates Dysphonia (R49.0) in the context of an imbalance in function of the intrinsic and extrinsic muscles of the larynx and Nonoptimal phonatory respiratory coordination.  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  Perceptual evaluation of voice demonstrates notable roughness, intermittent strain, and decreased projection.  With focus on improved respiratory drive and forward resonant stimuli patient is able to greatly improve voice clarity and strength.    RECOMMENDATIONS:     A course of speech therapy is recommended to optimize vocal technique, improve voice quality and promote reduced discomfort, effort and fatigue.     The potential need for video stroboscopy in the future was discussed, and should the patient fail to make progress as expected with empiric use of therapeutic techniques this will be pursued.    She demonstrates a Excellent prognosis for improvement given adherence to therapeutic recommendations.     Positive indicators: positive response to therapy probes diagnosis is known to respond to treatment    Negative indicators: None    DURATION / FREQUENCY: 4 biweekly and 2 monthly one-hour sessions    First session was accomplished today please see associated note  below    GOALS:  Patient goal:   1. To improve and maintain a healthy voice quality  2. To understand the problem and fix it as much as possible    Short-term goal(s): Within the first 4 sessions, Ms. Cao:  1. will demonstrate assigned laryngeal massage techniques with 80% accuracy or better with no clinician support  2. will be able to independently list key factors in maintenance of good vocal hygiene with 80% accuracy, and report on their use outside the therapy room.  3. will utilize silent inhalation with good low-respiratory engagement 75% of the time during therapy tasks with minimal clinician support  4. will demonstrate semi-occluded vocal tract (SOVT) exercises with at least 80% accuracy with no clinician support  5. will accurately identify target vs. habitual voice quality during therapy tasks in 4 out of 5 trials with no clinician support  6. will demonstrate the ability to alternate between target and habitual voice quality given clinician cue 75% of the time during therapy tasks    Long-term goal(s): In 6 months, Ms. Cao will:  1. Report a week of typical vocal activities, in which dysphonia and Vocal fatigue do not exceed a level of 2 out of 10, 80% of the time   This treatment plan was developed with the patient who agreed with the recommendations.    _______________________________________________________________________  THERAPY NOTE (CPT 59148)  Date of Service: 5/26/2021    SUBJECTIVE / OBJECTIVE:  Please refer to my evaluation report from today's encounter for full details regarding subjective data, patient reported measures, and diagnostic findings.    THERAPEUTIC ACTIVITIES  Counseling and Education    Asked many questions about the nature of her symptoms, and I answered all of these thoroughly.    Instructed concepts and techniques for optimal vocal hygiene including:    Systemic hydration, including strategies for increasing daily water intake    Topical hydration -  Gargling    Exercises to promote optimal respiratory mechanics    She demonstrated excessive upper thoracic engagement during inhalation    Practiced in a forward leaning seated posture to facilitate awareness of low respiratory engagement    With clinician support, patient was able to demonstrate improved abdominal relaxation and engagement on inhalation    Semi-Occluded Vocal Tract (SOVT) exercises instructed to reduce laryngeal tension, promote vocal fold pliability, and coordinate respiration and phonation    Straw with water resistance was found to be most facilitating     Sustained phonation, and voice vs. voiceless productions used to promote easy voicing and raise awareness of laryngeal tension    Ascending and descending glides utilized to promote vocal fold pliability    Instructed to use these exercises as a warm-up / cooldown, and to re-calibrate the voice throughout the day.    Good accuracy with minimal clinician support      Concepts of an optimal regimen for practice were instructed.  o She should use an interval schedule of practice, with brief periods of practice frequently throughout each day  o [place] concepts of volitional practice to facilitate motor learning.    I provided an audio recording and handouts of today's therapeutic activities to facilitate practice.    ASSESSMENT/PLAN  PROGRESS TOWARD LONG TERM GOALS:   Minimal at this point, as this is first session, but good learning today    IMPRESSIONS:  Dysphonia (R49.0) in the context of an imbalance in function of the intrinsic and extrinsic muscles of the larynx and Nonoptimal phonatory respiratory coordination. Mira was able to demonstrate significantly decreased roughness with the use of semiclosed vocal tract exercises.  Both high flow context as well as forward resonant focus were helpful, though high flow was perhaps more so.  She is able to report good understanding of therapeutic rationales and demonstrate exercises with adequate  accuracy.    PLAN: I will see Ms. Cao in 2 weeks as schedule permits at which point we will continue to advance phonatory respiratory coordination.     TOTAL SERVICE TIME: 60 minutes  EVALUATION OF VOICE AND RESONANCE (47659)  TREATMENT (38323)  NO CHARGE FACILITY FEE (38793)    Graham Schilling M.M., M.A., CCC-SLP  Speech-Language Pathologist  Certificate of Vocology  [phone]    *this report was created in part through the use of computerized dictation software, and though reviewed following completion, some typographic errors may persist.  If there is confusion regarding any of this notes contents, please contact me for clarification.*        Again, thank you for allowing me to participate in the care of your patient.      Sincerely,    Naun Schilling, SLP

## 2021-05-26 NOTE — PROGRESS NOTES
Mira Cao is a 35 year old female who is being evaluated via a billable video visit.      The patient has been notified and verbally consented to the following:     This video visit will be conducted between you and your provider.    Patient has opted to conduct today's video visit vs an in-person appointment, and is not able to attend due to possible exposure to COVID-19.      If during the course of the call the provider feels a video visit is not appropriate, you will not be charged for this service.    Call initiated at: 8:01  Type of Video Platform Used: Service Route  Location of provider: Residence  Location of patient: [facility]  Evaluation report    Clinician: Graham Schilling M.M., M.A., CCC/SLP  Referring physician:  Dr. Tello  Patient: Mira Cao  Date of Visit: 5/26/2021    HISTORY  Chief complaint: Mira Cao is a 35 year old woman presenting today for evaluation of noise during breatighing and voicing.    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  Prior left neck dissection of what was described to be a benign branchial cleft cyst demonstrated positive metastatic squamous cell carcinoma without STEVE.  She has been working with Dr. Tello for management and was seen recently on 4/21/2021.  Ancillary to her primary area of care she reported noisy sounds when breathing during voice use.  Patient was seen by my colleague Isis Dominguez briefly as part of that visit and Isis felt that her symptoms were most consistent with a muscle tension dysphonia, and there was a possibility of paradoxical vocal fold motion associated with the breathing specific symptoms.  Referral to voice therapy was recommended, and patient presents virtually to initiate this today.    Today she reports that voice related symptoms began 4-5 months ago (~January) and this corresponded to an increase in overall vocal demands at work.  She  "states that this manifested as significant vocal fatigue and rough / low voice quality. Sensation of phlegm on her vocal folds and noise with breathing was initially present, but this has improved recently with increase in overall health (working out more).  Ports symptoms have improved somewhat but have not gone away and she feels that this affects her ability to be effective at work    CURRENT SYMPTOMS INCLUDE  VOICE    Vocal fatigue    Sensation of \"phlegm on her vocal cords\"    Effort with speech was increased    Low raspy voice quality    Voice quality can sound normal at times    Describes her normal voice as more energetic / higher    Can cause a sore throat with her most extensive voice use days    Vocal demand     with significant vocal     Frequent zoom calls    COUGH/THROAT CLEARING    Minimal     Sensation of execess saliva that will sometimes lead to cough    BREATHING    Tightness on exhalation     ADDITIONAL    Sensation of increased saliva    Does feel that her words and speech is less clear than it used to be, this seemed to begin in January of this year as well    Patient denies significant dyspnea, dysphagia, cough and pain.     OTHER PERTINENT HISTORY    Complex medical history: please also refer to Dr. Tello's dictation.     Past Medical History:   Diagnosis Date     Irritable bladder      Squamous cell carcinoma of neck      Uncomplicated asthma      Past Surgical History:   Procedure Laterality Date     DISSECTION RADICAL NECK MODIFIED Left 11/20/2019    Procedure: DISSECTION, NECK, MODIFIED RADICAL LEFT;  Surgeon: Ruth Tello MD;  Location: [facility] OR     LARYNGOSCOPY WITH BIOPSY(IES) N/A 10/24/2019    Procedure: Direct laryngscopy with biopsy;  Surgeon: Ruth Tello MD;  Location: [facility] OR     TONSILLECTOMY ADULT Left 10/24/2019    Procedure: Left Radical  tonsillectomy;  Surgeon: Ruth Tello MD;  Location: [facility] OR       OBJECTIVE  PATIENT REPORTED " MEASURES  Dysponia SLP Goals 5/26/2021   How would you rate your speaking voice quality, if 0 is worst voice quality, and 10 is best voice? 6   How much effort is it to speak, if 0 is no extra effort and 10 is maximum effort? 8   How much does your voice problem bother you? Somewhat     Patient Supplied Answers To VHI Questionnaire  No flowsheet data found.    Patient Supplied Answers To CSI Questionnaire  No flowsheet data found.    Patient Supplied Answers To EAT Questionnaire  No flowsheet data found.    PERCEPTUAL EVALUATION (CPT 14260)  POSTURE / TENSION:     neck and shoulders    BREATHING:     excessive thoracic muscle use pattern    VOICE:    Roughness: Mild to moderate Consistent    Breathiness: Minimal    Strain: Mild to moderate Intermittent     MPT    /s/ - 16 seconds     /z/  - 19 seconds 220 Hz    Loudness    Conversational speech:  Mildly reduced    Laryngeal DDK    Decreased clarity and crsip onset and mildly slow rate    Pitch:    Conversational speech:  Mildly lowered 174 Hz    Pitch glide:     Max F0 - 1200 Hz    No breaks    Resonance:    Conversational speech:  laryngeal pharyngeal resonance    Singing vs. Speech:      Decreased roughness and strain noted in singing versus running speech    CAPE-V Overall Severity:  36/100    SPEECH    Mild imprecision in articulation noted    Not formally assessed    COUGH/THROAT CLEARING:    Not observed    THERAPY PROBES: Improvement was elicited with use of forward resonant stimuli and coordination of respiration and phonation    Laryngeal evaluation  Laryngeal evaluation was performed by Dr. Tello in April of this year and showed no structural or neurologic deficits at the level of the larynx.  Formal evaluation with video stroboscopy may be necessary in the future to fully characterize the patient's symptoms, and the potential need for this was discussed with the patient today.  She was in agreement.    ASSESSMENT / PLAN  IMPRESSIONS: Mira Cao is  presenting today with an approximately 5-month history of voice changes following an increase in work demand and in the context of her complex medical history including squamous cell carcinoma of the oropharynx and neck.  Today's evaluation demonstrates Dysphonia (R49.0) in the context of an imbalance in function of the intrinsic and extrinsic muscles of the larynx and Nonoptimal phonatory respiratory coordination.  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  Perceptual evaluation of voice demonstrates notable roughness, intermittent strain, and decreased projection.  With focus on improved respiratory drive and forward resonant stimuli patient is able to greatly improve voice clarity and strength.    RECOMMENDATIONS:     A course of speech therapy is recommended to optimize vocal technique, improve voice quality and promote reduced discomfort, effort and fatigue.     The potential need for video stroboscopy in the future was discussed, and should the patient fail to make progress as expected with empiric use of therapeutic techniques this will be pursued.    She demonstrates a Excellent prognosis for improvement given adherence to therapeutic recommendations.     Positive indicators: positive response to therapy probes diagnosis is known to respond to treatment    Negative indicators: None    DURATION / FREQUENCY: 4 biweekly and 2 monthly one-hour sessions    First session was accomplished today please see associated note below    GOALS:  Patient goal:   1. To improve and maintain a healthy voice quality  2. To understand the problem and fix it as much as possible    Short-term goal(s): Within the first 4 sessions, Ms. Cao:  1. will demonstrate assigned laryngeal massage techniques with 80% accuracy or better with no  clinician support  2. will be able to independently list key factors in maintenance of good vocal hygiene with 80% accuracy, and report on their use outside the therapy room.  3. will utilize silent inhalation with good low-respiratory engagement 75% of the time during therapy tasks with minimal clinician support  4. will demonstrate semi-occluded vocal tract (SOVT) exercises with at least 80% accuracy with no clinician support  5. will accurately identify target vs. habitual voice quality during therapy tasks in 4 out of 5 trials with no clinician support  6. will demonstrate the ability to alternate between target and habitual voice quality given clinician cue 75% of the time during therapy tasks    Long-term goal(s): In 6 months, Ms. Cao will:  1. Report a week of typical vocal activities, in which dysphonia and Vocal fatigue do not exceed a level of 2 out of 10, 80% of the time   This treatment plan was developed with the patient who agreed with the recommendations.    _______________________________________________________________________  THERAPY NOTE (CPT 76177)  Date of Service: 5/26/2021    SUBJECTIVE / OBJECTIVE:  Please refer to my evaluation report from today's encounter for full details regarding subjective data, patient reported measures, and diagnostic findings.    THERAPEUTIC ACTIVITIES  Counseling and Education    Asked many questions about the nature of her symptoms, and I answered all of these thoroughly.    Instructed concepts and techniques for optimal vocal hygiene including:    Systemic hydration, including strategies for increasing daily water intake    Topical hydration - Gargling    Exercises to promote optimal respiratory mechanics    She demonstrated excessive upper thoracic engagement during inhalation    Practiced in a forward leaning seated posture to facilitate awareness of low respiratory engagement    With clinician support, patient was able to demonstrate improved abdominal  relaxation and engagement on inhalation    Semi-Occluded Vocal Tract (SOVT) exercises instructed to reduce laryngeal tension, promote vocal fold pliability, and coordinate respiration and phonation    Straw with water resistance was found to be most facilitating     Sustained phonation, and voice vs. voiceless productions used to promote easy voicing and raise awareness of laryngeal tension    Ascending and descending glides utilized to promote vocal fold pliability    Instructed to use these exercises as a warm-up / cooldown, and to re-calibrate the voice throughout the day.    Good accuracy with minimal clinician support      Concepts of an optimal regimen for practice were instructed.  o She should use an interval schedule of practice, with brief periods of practice frequently throughout each day  o [place] concepts of volitional practice to facilitate motor learning.    I provided an audio recording and handouts of today's therapeutic activities to facilitate practice.    ASSESSMENT/PLAN  PROGRESS TOWARD LONG TERM GOALS:   Minimal at this point, as this is first session, but good learning today    IMPRESSIONS:  Dysphonia (R49.0) in the context of an imbalance in function of the intrinsic and extrinsic muscles of the larynx and Nonoptimal phonatory respiratory coordination. Mira was able to demonstrate significantly decreased roughness with the use of semiclosed vocal tract exercises.  Both high flow context as well as forward resonant focus were helpful, though high flow was perhaps more so.  She is able to report good understanding of therapeutic rationales and demonstrate exercises with adequate accuracy.    PLAN: I will see Ms. Cao in 2 weeks as schedule permits at which point we will continue to advance phonatory respiratory coordination.     TOTAL SERVICE TIME: 60 minutes  EVALUATION OF VOICE AND RESONANCE (72372)  TREATMENT (27884)  NO CHARGE FACILITY FEE (95711)    Graham Schilling M.M., M.A.,  CCC-SLP  Speech-Language Pathologist  Certificate of Vocology  [phone]    *this report was created in part through the use of computerized dictation software, and though reviewed following completion, some typographic errors may persist.  If there is confusion regarding any of this notes contents, please contact me for clarification.*

## 2021-05-26 NOTE — TELEPHONE ENCOUNTER
Spoke with patient regarding scheduling 3 appointments with approximately 14 days between each with Naun in Voice Clinic. Scheduled patient accordingly and sent AVS Printout to confirmed address for patient.-Per Patient

## 2021-05-26 NOTE — PATIENT INSTRUCTIONS
Tr Meyers,    It was a pleasure to meet you today, and I think that I would definitely be able to help you with your voice!  I have attached the handouts and audio recording of the exercises we did, as well as a handout that outlines the salt water gargle recipe and other general tips for good vocal health.  We will discuss the latter in more depth the next time I see you, and keep things moving forward.  Someone should reach out regarding scheduling but if you have not heard within the next 48 hours please call [phone] to schedule at your convenience.    Stay in touch with any questions!    -Naun

## 2021-05-31 NOTE — PROGRESS NOTES
Maternal-Fetal Medicine Consultation    Mira Cao  : 1985  MRN: [id number]    REFERRAL:  Mira Cao is a 35 year old sent by Ms. Donovan from [facility] for MFM consultation.    HPI:  Mira Cao is a 35 year old  here for MFM preconception consultation regarding history of squamous cell carcinoma of the oropharynx s/p proton beam radiation therapy.    She is here with her .    Patient has a history of squamous cell carcinoma of the oropharynx first identified in .  In , patient felt a neck mass and monitored until 2019, at which time she underwent ultrasound and subsequent FNA biopsy.  This revealed atypical squamous proliferation that was p16 negative.  This was felt to be due to a benign branchial cyst, though the patient opted for excision which she underwent in 10/2019.  The pathology was consistent with HPV positive metastatic squamous cell carcinoma.  She underwent PET imaging which did not demonstrate any obvious signs of metastasis.  She was seen by oncology as well as radiation oncology, and underwent direct laryngoscopy with biopsies and a left radical tonsillectomy with ENT.  This revealed T1 SCC of the tonsil with negative margins.  In 2019, she underwent left neck dissection, and  lymph nodes were positive.  She was seen at [place] and ultimately opted for proton beam radiation therapy.  From 2019-2020, she completed 30 fractions totaling 6000 cGy.  She has had close follow-up with her ENT, Dr. Tello, whom she sees every several months with appropriate recommended surveillance.  She was last seen in January and did not demonstrate any evidence of disease.    She is here to discuss plans for future pregnancy within the next several months.  She has a history of 1 prior pregnancy in 2017, which resulted in induced . She is not currently taking a prenatal vitamin.    Obstetrics History:  OB History     Para Term  AB Living   1 0 0 0 1 0   SAB TAB Ectopic Multiple Live Births   0 0 0 0 0      # Outcome Date GA Lbr Richard/2nd Weight Sex Delivery Anes PTL Lv   1 AB                Gynecologic History:  - Last Pap: 2020, HPV: HR HPV neg     Past Medical History:  Past Medical History:   Diagnosis Date     Irritable bladder      Squamous cell carcinoma of neck      Uncomplicated asthma        Past Surgical History:  Past Surgical History:   Procedure Laterality Date     DISSECTION RADICAL NECK MODIFIED Left 2019    Procedure: DISSECTION, NECK, MODIFIED RADICAL LEFT;  Surgeon: Rtuh Tello MD;  Location: [facility] OR     LARYNGOSCOPY WITH BIOPSY(IES) N/A 10/24/2019    Procedure: Direct laryngscopy with biopsy;  Surgeon: Ruth Tello MD;  Location: [facility] OR     TONSILLECTOMY ADULT Left 10/24/2019    Procedure: Left Radical  tonsillectomy;  Surgeon: Ruth Tello MD;  Location: [facility] OR       Current Medications:  Prior to Admission medications    Medication Sig Last Dose Taking? Auth Provider   biotin 1000 MCG TABS tablet Take 1,000 mcg by mouth daily   Unknown, Entered By History   citalopram (CELEXA) 10 MG tablet Take 1 tablet (10 mg) by mouth daily  Patient not taking: Reported on 2020   Ruth Tello MD   cyanocobalamin (VITAMIN B-12) 100 MCG tablet Take 250 mcg by mouth daily   Reported, Patient   ferrous sulfate ( FERROUS SULFATE) 325 (65 Fe) MG tablet Take 325 mg by mouth daily (with breakfast)   Unknown, Entered By History   loratadine (CLARITIN) 10 MG tablet Take 10 mg by mouth daily as needed    Reported, Patient   LORazepam (ATIVAN) 0.5 MG tablet Take 0.5-1 mg by mouth as needed    Reported, Patient   Magnesium Oxide 250 MG TABS Take 250 mg by mouth daily   Reported, Patient   Milk Thistle-Dand-Fennel-Licor (MILK THISTLE XTRA) CAPS capsule Take 1 capsule by mouth daily   Unknown, Entered By History   Omega-3 Fatty Acids (OMEGA 3 PO) Take 2 capsules by mouth daily    Unknown, Entered By History   order for DME Equipment being ordered: facioplasty Ruth Crook MD   Vitamin D, Cholecalciferol, 25 MCG (1000 UT) TABS Take 2,000 Units by mouth daily   Reported, Patient       Allergies:  Patient has no known allergies.    Social History:   Status:   Denies use of drugs or smoking. Drinks 1-2 glasses of wine per week and 5-6 glasses on the weekend.    Family History:  Family History   Problem Relation Age of Onset     Breast Cancer Mother      Cancer Mother         Breast cancer     Bone Cancer Sister      Cancer Sister         Bone cancer     Denies history of genetic disorders, preeclampsia, thromboembolic disease, bleeding disorders, developmental delay.    ROS:  10-point ROS negative except as in HPI     PHYSICAL EXAM:  Deferred     Labs:   TSH 21 3.88  fT4  2021 0.94    ASSESSMENT/PLAN:  Mira Cao is a 35 year old  here for [facility] preconception consultation regarding:     History of squamous cell carcinoma of the oropharynx s/p radiation therapy  Woman who have received prior radiation to the neck have a statistically lower birthweight compared to survivors only treated with chemotherapy. Pregnancy in women who have received prior pelvic radiation appears to be associated with complications, such as miscarriage,  labor and delivery, low birth weight, impaired fetal growth, placenta accreta, and stillbirth.  However, these associations have not been found with radiation to anatomic sites other than the pelvis.  Additionally, patients who have undergone radiation therapy to the neck and mediastinum are at risk for hypothyroidism and, therefore, she did have thyroid function studies performed at the initial prenatal visit and intermittently throughout the pregnancy.    Additionally, aside from a history of gestational trophoblastic disease, pregnancy subsequent to cancer treatment is not increase a woman's risk for recurrence or  death.      Recommendations:    Begin taking prenatal vitamins 2-3 months prior to conception to decrease the risk of neural tube defects.    Baseline TSH at initial prenatal visit if not performed recently with ENT, followed by serial TSH every trimester given her increased risk of developing hypothyroidism in the setting of prior neck radiation. If overt hypothyroidism is diagnosed, treatment with Synthroid is recommended.     Defer discussion of optimal timing of pregnancy as it relates to her cancer surveillance to ENT/oncology team.    Upon diagnosis of pregnancy, recommend early dating US    Level II anatomy US at 18-20 weeks    Growth US at 28 and 34 weeks given increased risk of lower birth weight associated with prior radiation therapy.    Close follow-up and surveillance with ENT/oncology team throughout the pregnancy, as recommended.    The patient was seen and evaluated with Dr. Abbasi.    Thank you for allowing us to participate in the care of your patient. Please do not hesitate to contact us if you have further questions regarding the management of your patient.     Kedar Seymour MD  Maternal Fetal Medicine Fellow  6/1/2021    Physician Attestation   I, Tracey Abbasi MD, saw this patient with the resident and agree with the resident/fellow's findings and plan of care as documented in the note.      I personally reviewed vital signs, medications, labs and imaging.    Lim findings: Mira is here for preconception consultation given her history of squamous cell carcinoma of the oropharynx s/p radiation therapy. We discussed that in regards to timing of conception, we discussed that this should ultimately be discussed with her ENT/Oncology team. Considerations should be risk for recurrence, and how surveillance for recurrence would be altered by pregnancy. PET scan is not recommended in pregnancy. Mira states that she has discussed this with her team and they are aware of plans for conception.  Her next follow up is scheduled in July 2021. There are no pregnancy related contraindications to proceeding with conception at this time.  We discussed starting a prenatal vitamin 2-3 months prior to conception, as well as avoidance of alcohol while trying to conceive. We reviewed the management of pregnancy, including close surveillance for hypothyroidism (TSH and FT4 in the first trimester, as well as every trimester and 4-6 weeks postpartum). Comprehensive ultrasound would be recommended at 18-20 weeks gestation, as well as a repeat assessment of fetal growth at 28 and 34 weeks gestation. Once Mira has a positive pregnancy test she should contact [facility] clinic to establish OB care.    Mira also met with our genetic counselor today to discuss her family history, as well as review th genetic risks associated with AMA. A copy of that consultation will be forwarded to you separately.    Tracey Abbasi MD  Date of Service (when I saw the patient): 06/01/21

## 2021-06-01 ENCOUNTER — OFFICE VISIT (OUTPATIENT)
Dept: MATERNAL FETAL MEDICINE | Facility: CLINIC | Age: 36
End: 2021-06-01
Attending: OBSTETRICS & GYNECOLOGY
Payer: COMMERCIAL

## 2021-06-01 ENCOUNTER — OFFICE VISIT (OUTPATIENT)
Dept: MATERNAL FETAL MEDICINE | Facility: CLINIC | Age: 36
End: 2021-06-01
Attending: NURSE PRACTITIONER
Payer: COMMERCIAL

## 2021-06-01 DIAGNOSIS — Z31.69 ENCOUNTER FOR PRECONCEPTION CONSULTATION: ICD-10-CM

## 2021-06-01 DIAGNOSIS — Z84.81 FAMILY HISTORY OF GENETIC DISEASE CARRIER: Primary | ICD-10-CM

## 2021-06-01 PROCEDURE — 99242 OFF/OP CONSLTJ NEW/EST SF 20: CPT | Mod: GC | Performed by: OBSTETRICS & GYNECOLOGY

## 2021-06-01 PROCEDURE — 96040 HC GENETIC COUNSELING, EACH 30 MINUTES: CPT | Performed by: GENETIC COUNSELOR, MS

## 2021-06-01 NOTE — PROGRESS NOTES
[facility]  Genetic Counseling Consult    Patient: Mira Cao YOB: 1985   Date of Service: 21      Mira Cao was seen at [facility] for a preconception genetic counseling appointment as a part of her appointments for [facility] consult for her personal medical history.       Impression/Plan:   1.  Mira had an MFM consult today to discuss her history of squamous cell carcinoma and recommendations for future pregnancy management, please see corresponding documentation for complete details.     2.  Mira has a maternal first cousin with cystic fibrosis.  This history places Mira at increased risk for being a carrier for CF (1/4 probability based on family history).  Mira and her  Ottoniel are potentially interested in pursuing expanded carrier screening to clarify risks for recessive genetic disorders.  The couple was provided with a brochure of information regarding carrier screening and encouraged to contact genetic counseling to discuss further.     3.  Any future pregnancy for the couple would be considered at advanced maternal age, and we discussed that screening for common chromosome abnormalities such as Down syndrome can begin at 10 weeks gestational age.      Pregnancy History:   /Parity:    Age at Delivery: 35 year old  ANN: Not found.  Gestational Age: Unknown    No significant complications or exposures were reported in the current pregnancy.    Mira s pregnancy history is significant for 1 prior elective termination.    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  This history was the primary focus of Mira's consult with maternal fetal medicine physician Dr. Abbasi today, please see corresponding documentation for complete details of risks/recommendations  for future pregnancy management.         Family History:   A three-generation pedigree was obtained, and is scanned under the  Media  tab.   The following significant findings were reported by Mira:    Mira reports that her sister has a history of a childhood bone cancer, and that her mother was diagnosed with breast cancer in her mid 60s.  Mira reports that she has already begun breast cancer screening.     Mira has a maternal first cousin who passed away at 15 from complications of cystic fibrosis.  We reviewed the autosomal recessive nature of CF and discussed that this history places Mira at higher probability (25% or 1/4) for being a carrier of CF. Based on Ottoniel's reported mixed  ethnicity, we discussed a carrier frequency of 1/25 for him.  These values correspond to a 1/400 probability for any pregnancy the couple has to be affected with cystic fibrosis (Mira's carrier risk x Ottoniel's carrier risk x 1/4 chance for an affected pregnancy if both are carriers).  Mira is interested in pursuing carrier screening before pregnancy, but would like to discuss further with Ottoniel before having blood drawn.  The couple was provided with my contact information and encouraged to contact me to discuss further.      Ottoniel reports that his mother was diagnosed with breast cancer in her 40s, and that his maternal grandmother passed away from an unknown cancer in her 50s.  We discussed that this history could be suggestive of an underlying hereditary cancer syndrome, and that genetic counseling specific to cancer risks and cancer risk management would be recommended for his mother and potentially her children.  Ottoniel believes that his mother may have already pursued genetic counseling, but plans to follow up with her on this topic.      Otherwise, the reported family history is negative for multiple miscarriages, stillbirths, birth defects, cognitive impairment, known genetic conditions, and  consanguinity.       Carrier Screening:   The patient reports that she and the father of the pregnancy have  ancestry:      Cystic fibrosis is an autosomal recessive genetic condition that occurs with increased frequency in individuals of  ancestry and carrier screening for this condition is available.  In addition,  screening in the [facility] includes cystic fibrosis.        Expanded carrier screening for mutations in a large panel of genes associated with autosomal recessive conditions including cystic fibrosis, spinal muscular atrophy, and others, is now available.      The patient was interested in carrier screening but opted not to proceed with testing today.  She was provided with a brochure about her testing options, and contact information if she has questions or wishes to pursue screening.       Risk Assessment for Chromosome Conditions:   We explained that the risk for fetal chromosome abnormalities increases with maternal age. We discussed specific features of common chromosome abnormalities, including Down syndrome, trisomy 13, trisomy 18, and sex chromosome trisomies.    We discussed that any future pregnancy for Mira would be considered at advanced maternal age, putting her in a higher risk category for these conditions.  We reviewed the general benefits and limitations of screening vs diagnostic testing, but did not discuss testing options in detail.      We reviewed the benefits and limitations of this testing.  Screening tests provide a risk assessment specific to the pregnancy for certain fetal chromosome abnormalities, but cannot definitively diagnose or exclude a fetal chromosome abnormality.  Follow-up genetic counseling and consideration of diagnostic testing is recommended with any abnormal screening result.     Diagnostic tests carry inherent risks- including risk of miscarriage- that require careful consideration.  These tests can detect fetal chromosome  abnormalities with greater than 99% certainty.  Results can be compromised by maternal cell contamination or mosaicism, and are limited by the resolution of cytogenetic G-banding technology.  There is no screening nor diagnostic test that can detect all forms of birth defects or mental disability.     It was a pleasure to be involved with Mira s care. Face-to-face time of the meeting was 45 minutes.      Ifeanyi Newby MS, [facility]  Licensed Genetic Counselor  Phone: [phone]  Pager: [phone]

## 2021-06-01 NOTE — NURSING NOTE
Patient met with Dr Seymour and Dr Abbasi for MFM consult for history of squamous cell carcinoma and AMA.    Kamini Higuera RN

## 2021-06-28 ENCOUNTER — VIRTUAL VISIT (OUTPATIENT)
Dept: OTOLARYNGOLOGY | Facility: CLINIC | Age: 36
End: 2021-06-28
Payer: COMMERCIAL

## 2021-06-28 DIAGNOSIS — R49.0 MUSCLE TENSION DYSPHONIA: Primary | ICD-10-CM

## 2021-06-28 PROCEDURE — 92507 TX SP LANG VOICE COMM INDIV: CPT | Mod: GN | Performed by: SPEECH-LANGUAGE PATHOLOGIST

## 2021-06-28 PROCEDURE — 99207 PR NO CHARGE LOS: CPT | Performed by: SPEECH-LANGUAGE PATHOLOGIST

## 2021-06-28 NOTE — PROGRESS NOTES
"Mira Cao is a 35 year old female who is being evaluated via a billable video visit.      The patient has been notified and verbally consented to the following:     This video visit will be conducted between you and your provider.    Patient has opted to conduct today's video visit vs an in-person appointment, and is not able to attend due to possible exposure to COVID-19.      If during the course of the call the provider feels a video visit is not appropriate, you will not be charged for this service.    Call initiated at: 12:03  Type of Video Platform Used: iMER  Location of provider: Residence  Location of patient: Residence    [facility] VOICE CLINIC  THERAPY NOTE (CPT 93790)    Patient: Mira Cao  Date of Service: 6/28/2021  Referring physician: Dr. 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  Today's evaluation demonstrates Dysphonia (R49.0) in the context of an imbalance in function of the intrinsic and extrinsic muscles of the larynx and Nonoptimal phonatory respiratory coordination.  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  Perceptual evaluation of voice demonstrates notable roughness, intermittent strain, and decreased projection.  With focus on improved respiratory drive and forward resonant stimuli patient is able to greatly improve voice clarity and strength. \"    SUBJECTIVE:  Since the patient's last session, they report the following:     Overall symptoms are improving    Has been more conscious of how she is speaking during meetings, specifically being " "engaged with her breathing during voice    Finds that fatigue leads to tejada which leads to discomfort     OBJECTIVE:  PATIENT REPORTED MEASURES:  Patient Supplied Answers To SLP QOL Questionnaire  No flowsheet data found.  Patient Specific Goal Metrics:  Dysponia SLP Goals 5/26/2021   How would you rate your speaking voice quality, if 0 is worst voice quality, and 10 is best voice? 6   How much effort is it to speak, if 0 is no extra effort and 10 is maximum effort? 8   How much does your voice problem bother you? Somewhat       THERAPEUTIC ACTIVITIES    Counseling and Education:    Asked many questions about the nature of their symptoms, and I answered all of these thoroughly.    Demonstrated previously assigned exercises    Fair access to low respiratory engagement    Some instability noted with SOVT (straw)    Alternatives including forward resonant /m/ trialed and this was found to be greatly facilitating and improved both ease and stability    Slightly elevated F0 was also found to be helpful    Resonant Voice Therapy (RVT) exercises to promote forward locus of resonance and optimized pattern of laryngeal adduction    Easy descending glide on /m/ utilized in conjunction with relaxed jaw, tongue, and lightly closed lips to facilitate forward resonant sound    Use of the carrier phrase \"mmhmm\" instructed to promote generalization to everyday speech    Word level exercises featuring nasal continuant loaded stimuli    Phrase level exercises featuring nasal continuants in more complex phonemic contexts were employed    Briefly advanced to this level    She demonstrated Good accuracy with moderate clincian support    Negative practice was briefly employed and was facilitative      A regimen for home practice was instructed.    I provided an audio recording and handouts of today's therapeutic activities to facilitate practice.    ASSESSMENT/PLAN  PROGRESS TOWARD LONG TERM GOALS:   Adequate progress; please see " above    IMPRESSIONS:  Dysphonia (R49.0) in the context of an imbalance in function of the intrinsic and extrinsic muscles of the larynx and Nonoptimal phonatory respiratory coordination. Mira reports that exercises have been facilitating, but that she continues to experience fatigue and discomfort that becomes a bit of a vicious cycle during her longer work days.  Techniques were advanced to RVT to offer daily recalibration to good effect.  Slightly elevated F0 and forward resonant focus were greatly facilitating but will require habituation.    PLAN: I will see Mira in ~2 weeks, at which point we will continue to advance RVT.   For practice goals see AVS.       TOTAL SERVICE TIME: 55 minutes  TREATMENT (11237)  NO CHARGE FACILITY FEE (67150)    Graham Schilling M.M., M.A., CCC-SLP  Speech-Language Pathologist  Certificate of Vocology  [phone]    *this report was created in part through the use of computerized dictation software, and though reviewed following completion, some typographic errors may persist.  If there is confusion regarding any of this notes contents, please contact me for clarification.*

## 2021-06-28 NOTE — PATIENT INSTRUCTIONS
Tr Meyers,    It was good to see you today, and I'm glad things have been helping. I have attached the exercises we discussed today (as well as those from last time). Feel free to just the a hum vs. The straw if it is more helpful as they accomplish the same task. After you have used that as a warm-up go on to the Resonant Voice Exercises (handout + audio recording included).  Don't feel you need to do every word or sentence, but pick a few to do with good intention.  You can do this more extensively in the morning as a warm up, but you should also incorporate a quick version of these routines during your day as a recalibration.    Reach out with any questions!!    -Naun

## 2021-06-28 NOTE — LETTER
"6/28/2021       RE: Mira Cao  [address]  [place] [address]     Dear Colleague,    Thank you for referring your patient, Mira Cao, to the [facility] VOICE CLINIC [place] [facility]. Please see a copy of my visit note below.    Mira Cao is a 35 year old female who is being evaluated via a billable video visit.      The patient has been notified and verbally consented to the following:     This video visit will be conducted between you and your provider.    Patient has opted to conduct today's video visit vs an in-person appointment, and is not able to attend due to possible exposure to COVID-19.      If during the course of the call the provider feels a video visit is not appropriate, you will not be charged for this service.    Call initiated at: 12:03  Type of Video Platform Used: WordSentry  Location of provider: Residence  Location of patient: [facility] VOICE CLINIC  THERAPY NOTE (CPT 88226)    Patient: Mira Cao  Date of Service: 6/28/2021  Referring physician: Dr. 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  Today's evaluation demonstrates Dysphonia (R49.0) in the context of an imbalance in function of the intrinsic and extrinsic muscles of the larynx and Nonoptimal phonatory respiratory coordination.  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  Perceptual evaluation of voice demonstrates notable roughness, intermittent strain, and decreased projection.  With focus on " "improved respiratory drive and forward resonant stimuli patient is able to greatly improve voice clarity and strength. \"    SUBJECTIVE:  Since the patient's last session, they report the following:     Overall symptoms are improving    Has been more conscious of how she is speaking during meetings, specifically being engaged with her breathing during voice    Finds that fatigue leads to tejada which leads to discomfort     OBJECTIVE:  PATIENT REPORTED MEASURES:  Patient Supplied Answers To SLP QOL Questionnaire  No flowsheet data found.  Patient Specific Goal Metrics:  Dysponia SLP Goals 5/26/2021   How would you rate your speaking voice quality, if 0 is worst voice quality, and 10 is best voice? 6   How much effort is it to speak, if 0 is no extra effort and 10 is maximum effort? 8   How much does your voice problem bother you? Somewhat       THERAPEUTIC ACTIVITIES    Counseling and Education:    Asked many questions about the nature of their symptoms, and I answered all of these thoroughly.    Demonstrated previously assigned exercises    Fair access to low respiratory engagement    Some instability noted with SOVT (straw)    Alternatives including forward resonant /m/ trialed and this was found to be greatly facilitating and improved both ease and stability    Slightly elevated F0 was also found to be helpful    Resonant Voice Therapy (RVT) exercises to promote forward locus of resonance and optimized pattern of laryngeal adduction    Easy descending glide on /m/ utilized in conjunction with relaxed jaw, tongue, and lightly closed lips to facilitate forward resonant sound    Use of the carrier phrase \"mmhmm\" instructed to promote generalization to everyday speech    Word level exercises featuring nasal continuant loaded stimuli    Phrase level exercises featuring nasal continuants in more complex phonemic contexts were employed    Briefly advanced to this level    She demonstrated Good accuracy with moderate " clincian support    Negative practice was briefly employed and was facilitative      A regimen for home practice was instructed.    I provided an audio recording and handouts of today's therapeutic activities to facilitate practice.    ASSESSMENT/PLAN  PROGRESS TOWARD LONG TERM GOALS:   Adequate progress; please see above    IMPRESSIONS:  Dysphonia (R49.0) in the context of an imbalance in function of the intrinsic and extrinsic muscles of the larynx and Nonoptimal phonatory respiratory coordination. Mira reports that exercises have been facilitating, but that she continues to experience fatigue and discomfort that becomes a bit of a vicious cycle during her longer work days.  Techniques were advanced to RVT to offer daily recalibration to good effect.  Slightly elevated F0 and forward resonant focus were greatly facilitating but will require habituation.    PLAN: I will see Mira in ~2 weeks, at which point we will continue to advance RVT.   For practice goals see AVS.       TOTAL SERVICE TIME: 55 minutes  TREATMENT (60731)  NO CHARGE FACILITY FEE (63131)    Graham Schilling M.M., M.A., CCC-SLP  Speech-Language Pathologist  Certificate of Vocology  [phone]    *this report was created in part through the use of computerized dictation software, and though reviewed following completion, some typographic errors may persist.  If there is confusion regarding any of this notes contents, please contact me for clarification.*        Again, thank you for allowing me to participate in the care of your patient.      Sincerely,    Naun Schilling, SLP

## 2021-07-12 NOTE — PROGRESS NOTES
"Mira Cao is a 35 year old female who is being evaluated via a billable video visit.      The patient has been notified and verbally consented to the following:     This video visit will be conducted between you and your provider.    Patient has opted to conduct today's video visit vs an in-person appointment, and is not able to attend due to possible exposure to COVID-19.      If during the course of the call the provider feels a video visit is not appropriate, you will not be charged for this service.    Call initiated at: 11:01  Type of Video Platform Used: VSoft  Location of provider: Residence  Location of patient: Residence    [facility] VOICE CLINIC  THERAPY NOTE (CPT 05498)    Patient: Mira Cao  Date of Service: 7/13/2021  Referring physician: Dr. 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  Today's evaluation demonstrates Dysphonia (R49.0) in the context of an imbalance in function of the intrinsic and extrinsic muscles of the larynx and Nonoptimal phonatory respiratory coordination.  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  Perceptual evaluation of voice demonstrates notable roughness, intermittent strain, and decreased projection.  With focus on improved respiratory drive and forward resonant stimuli patient is able to greatly improve voice clarity and strength. \"    SUBJECTIVE:  Since the patient's last session, they report the following:     Overall symptoms are improved    Doing more warm-ups in the morning    Resonant voice feels that it helps focus her " "moving into other vocal activities    OBJECTIVE:  PATIENT REPORTED MEASURES:    Patient Specific Goal Metrics:  Dysponia SLP Goals 5/26/2021 7/13/2021   How would you rate your speaking voice quality, if 0 is worst voice quality, and 10 is best voice? 6 9   How much effort is it to speak, if 0 is no extra effort and 10 is maximum effort? 8 5   How much does your voice problem bother you? Somewhat A little bit     THERAPEUTIC ACTIVITIES    Counseling and Education:    Asked many questions about the nature of their symptoms, and I answered all of these thoroughly.    A long term regimen of flakitotice was developed witht he patient  Semi-Occluded Vocal Tract (SOVT) exercises to reduce laryngeal tension, promote vocal fold pliability, and coordinate respiration and phonation  Various SOVT postures were utilized to provide variety of options depending on situation  Key criterion for accuracy redirected   Ease, airflow, and forward locus of resonance were primary points of focus  Sustained phonation, and voice vs. voiceless productions used to promote easy voicing and raise awareness of laryngeal tension  Ascending and descending glides utilized to promote vocal fold pliability   Patient was encouraged to move into loft registration within these glides and this was noted to be facilitating both within the exercises and outside of them  Resonant Voice Therapy (RVT) exercises to promote forward locus of resonance and optimized pattern of laryngeal adduction    Easy descending glide on /m/ utilized in conjunction with relaxed jaw, tongue, and lightly closed lips to facilitate forward resonant sound    Use of the carrier phrase \"mmhmm\" instructed to promote generalization to everyday speech    Word level exercises featuring nasal continuant loaded stimuli    Good accuracy with minimal to moderate support    Phrase level exercises featuring nasal continuants in more complex phonemic contexts were employed    Fair accuracy with " moderate to maximal support    Carryover into everyday phrases/conversational context    Decreased laryngeal pharyngeal resonance, but less naturalistic inflection appreciated    Clinician support was able to address this to some extent, but ultimately patient achieved best results with inherent carryover from word level to running speech    Negative practice was employed and was facilitative    This was encouraged as a way of redefining accuracy during independent practice and patient expressed understanding      A regimen for home practice was instructed.    I provided an audio recording and handouts of today's therapeutic activities to facilitate practice.    ASSESSMENT/PLAN  PROGRESS TOWARD LONG TERM GOALS:   Good progress; please see report above for objective measures    IMPRESSIONS:  Dysphonia (R49.0) in the context of an imbalance in function of the intrinsic and extrinsic muscles of the larynx and Nonoptimal phonatory respiratory coordination. Mira feels that her voice is almost back to its previous levels, and she feels confident in her ability to address it should it become more gravelly moment to moment.  She does feel that she gets dizzy if she is using her voice extensively in a focused way, but this is consistent with other activities such as exertion, and she ties this to her ongoing overall recovery.  Given her improvement today's therapeutic session focused on developing a long-term regimen for her to continue progressing independently.  Ultimately carryover of resonant voice therapy techniques into phrase level was less fruitful than allowing for naturalistic carryover from word level tasks, though negative practice was useful in bolstering her awareness both at the word and phrase level.    PLAN: Mira feels confident moving forward independently at this time, and her intake metrics support this.  She was encouraged to maintain contact with clinic and reengage if her needs or status change  in the future.    For practice goals see AVS.       TOTAL SERVICE TIME: 50 minutes  TREATMENT (96932)  NO CHARGE FACILITY FEE (20098)    Graham Schilling M.M., M.A., CCC-SLP  Speech-Language Pathologist  Certificate of Vocology  [phone]    *this report was created in part through the use of computerized dictation software, and though reviewed following completion, some typographic errors may persist.  If there is confusion regarding any of this notes contents, please contact me for clarification.*

## 2021-07-13 ENCOUNTER — VIRTUAL VISIT (OUTPATIENT)
Dept: OTOLARYNGOLOGY | Facility: CLINIC | Age: 36
End: 2021-07-13
Payer: COMMERCIAL

## 2021-07-13 DIAGNOSIS — R49.0 MUSCLE TENSION DYSPHONIA: Primary | ICD-10-CM

## 2021-07-13 PROCEDURE — 92507 TX SP LANG VOICE COMM INDIV: CPT | Mod: GN | Performed by: SPEECH-LANGUAGE PATHOLOGIST

## 2021-07-13 NOTE — PATIENT INSTRUCTIONS
"Tr Meyers,    It was nice to see you today and I am glad that things are going so well!  Our big focus was on determining what should be done on a long-term basis.  Our exercises are predominantly about recalibration and finding good form, and so nothing needs to be done categorically every day for X number of repetitions the way you might expect with a physiologic exercise like physical therapy.  It is more important that you have a regimen in mind to maintain the awareness that you have built over time, and if you exercises that you can use to help recenter your accuracy with those tasks.  The straw type exercises as well as the resonant voice (\"m sound\") words were very helpful in this regard today, and he will have audio recordings to help support accuracy should you return to them in the future.  When you brought the technique into phrase and sentence length tasks it tended to lose some of the ease and natural as a my here in your running speech, so I would not worry as much about that and allow it to naturally carryover from the way it feels with the words and straw exercises.  If you are ever not sure if you are doing it right you can always alternate between doing it intentionally right and intentionally wrong so that you can feel the difference and accentuated.    Lastly stay in touch with me and let me know how things are going.  If things take a turn for the worse I am always happy to see you again, but I am pretty confident that you will be able to keep moving in the right direction!  Enjoy your time in [place]!    -Naun  "

## 2021-07-13 NOTE — LETTER
"7/13/2021       RE: Mira Cao  [address]  [place] [address]     Dear Colleague,    Thank you for referring your patient, Mira Cao, to the [facility] VOICE CLINIC [place] [facility]. Please see a copy of my visit note below.    Mira Cao is a 35 year old female who is being evaluated via a billable video visit.      The patient has been notified and verbally consented to the following:     This video visit will be conducted between you and your provider.    Patient has opted to conduct today's video visit vs an in-person appointment, and is not able to attend due to possible exposure to COVID-19.      If during the course of the call the provider feels a video visit is not appropriate, you will not be charged for this service.    Call initiated at: 11:01  Type of Video Platform Used: [employer]om  Location of provider: Residence  Location of patient: [facility] VOICE CLINIC  THERAPY NOTE (CPT 93041)    Patient: Mira Cao  Date of Service: 7/13/2021  Referring physician: Dr. 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  Today's evaluation demonstrates Dysphonia (R49.0) in the context of an imbalance in function of the intrinsic and extrinsic muscles of the larynx and Nonoptimal phonatory respiratory coordination.  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  Perceptual evaluation of voice demonstrates notable roughness, intermittent strain, and decreased projection.  With focus on " "improved respiratory drive and forward resonant stimuli patient is able to greatly improve voice clarity and strength. \"    SUBJECTIVE:  Since the patient's last session, they report the following:     Overall symptoms are improved    Doing more warm-ups in the morning    Resonant voice feels that it helps focus her moving into other vocal activities    OBJECTIVE:  PATIENT REPORTED MEASURES:    Patient Specific Goal Metrics:  Dysponia SLP Goals 5/26/2021 7/13/2021   How would you rate your speaking voice quality, if 0 is worst voice quality, and 10 is best voice? 6 9   How much effort is it to speak, if 0 is no extra effort and 10 is maximum effort? 8 5   How much does your voice problem bother you? Somewhat A little bit     THERAPEUTIC ACTIVITIES    Counseling and Education:    Asked many questions about the nature of their symptoms, and I answered all of these thoroughly.    A long term regimen of maximino was developed witht he patient  Semi-Occluded Vocal Tract (SOVT) exercises to reduce laryngeal tension, promote vocal fold pliability, and coordinate respiration and phonation  Various SOVT postures were utilized to provide variety of options depending on situation  Key criterion for accuracy redirected   Ease, airflow, and forward locus of resonance were primary points of focus  Sustained phonation, and voice vs. voiceless productions used to promote easy voicing and raise awareness of laryngeal tension  Ascending and descending glides utilized to promote vocal fold pliability   Patient was encouraged to move into loft registration within these glides and this was noted to be facilitating both within the exercises and outside of them  Resonant Voice Therapy (RVT) exercises to promote forward locus of resonance and optimized pattern of laryngeal adduction    Easy descending glide on /m/ utilized in conjunction with relaxed jaw, tongue, and lightly closed lips to facilitate forward resonant sound    Use of the " "carrier phrase \"mmhmm\" instructed to promote generalization to everyday speech    Word level exercises featuring nasal continuant loaded stimuli    Good accuracy with minimal to moderate support    Phrase level exercises featuring nasal continuants in more complex phonemic contexts were employed    Fair accuracy with moderate to maximal support    Carryover into everyday phrases/conversational context    Decreased laryngeal pharyngeal resonance, but less naturalistic inflection appreciated    Clinician support was able to address this to some extent, but ultimately patient achieved best results with inherent carryover from word level to running speech    Negative practice was employed and was facilitative    This was encouraged as a way of redefining accuracy during independent practice and patient expressed understanding      A regimen for home practice was instructed.    I provided an audio recording and handouts of today's therapeutic activities to facilitate practice.    ASSESSMENT/PLAN  PROGRESS TOWARD LONG TERM GOALS:   Good progress; please see report above for objective measures    IMPRESSIONS:  Dysphonia (R49.0) in the context of an imbalance in function of the intrinsic and extrinsic muscles of the larynx and Nonoptimal phonatory respiratory coordination. Mira feels that her voice is almost back to its previous levels, and she feels confident in her ability to address it should it become more gravelly moment to moment.  She does feel that she gets dizzy if she is using her voice extensively in a focused way, but this is consistent with other activities such as exertion, and she ties this to her ongoing overall recovery.  Given her improvement today's therapeutic session focused on developing a long-term regimen for her to continue progressing independently.  Ultimately carryover of resonant voice therapy techniques into phrase level was less fruitful than allowing for naturalistic carryover from word " level tasks, though negative practice was useful in bolstering her awareness both at the word and phrase level.    PLAN: Mira feels confident moving forward independently at this time, and her intake metrics support this.  She was encouraged to maintain contact with clinic and reengage if her needs or status change in the future.    For practice goals see AVS.       TOTAL SERVICE TIME: 50 minutes  TREATMENT (81598)  NO CHARGE FACILITY FEE (57244)    Graham Schilling M.M., M.A., CCC-SLP  Speech-Language Pathologist  Certificate of Vocology  [phone]    *this report was created in part through the use of computerized dictation software, and though reviewed following completion, some typographic errors may persist.  If there is confusion regarding any of this notes contents, please contact me for clarification.*

## 2021-07-28 ENCOUNTER — THERAPY VISIT (OUTPATIENT)
Dept: SPEECH THERAPY | Facility: CLINIC | Age: 36
End: 2021-07-28
Payer: COMMERCIAL

## 2021-07-28 ENCOUNTER — OFFICE VISIT (OUTPATIENT)
Dept: OTOLARYNGOLOGY | Facility: CLINIC | Age: 36
End: 2021-07-28
Payer: COMMERCIAL

## 2021-07-28 VITALS
HEART RATE: 68 BPM | OXYGEN SATURATION: 99 % | HEIGHT: 66 IN | BODY MASS INDEX: 20.09 KG/M2 | TEMPERATURE: 97.4 F | WEIGHT: 125 LBS

## 2021-07-28 DIAGNOSIS — C10.9 SQUAMOUS CELL CARCINOMA OF OROPHARYNX (H): Primary | ICD-10-CM

## 2021-07-28 DIAGNOSIS — R13.12 OROPHARYNGEAL DYSPHAGIA: Primary | ICD-10-CM

## 2021-07-28 DIAGNOSIS — C44.42 SQUAMOUS CELL CARCINOMA OF NECK: ICD-10-CM

## 2021-07-28 PROCEDURE — 92526 ORAL FUNCTION THERAPY: CPT | Mod: GN | Performed by: SPEECH-LANGUAGE PATHOLOGIST

## 2021-07-28 PROCEDURE — 99213 OFFICE O/P EST LOW 20 MIN: CPT | Mod: 25 | Performed by: OTOLARYNGOLOGY

## 2021-07-28 PROCEDURE — 31575 DIAGNOSTIC LARYNGOSCOPY: CPT | Performed by: OTOLARYNGOLOGY

## 2021-07-28 ASSESSMENT — PAIN SCALES - GENERAL: PAINLEVEL: NO PAIN (0)

## 2021-07-28 ASSESSMENT — MIFFLIN-ST. JEOR: SCORE: 1273.75

## 2021-07-28 NOTE — LETTER
7/28/2021       RE: Mira Cao  [address]  [place] [address]     Dear Colleague,    Thank you for referring your patient, Mira Cao, to the [facility] [place] at [facility]. Please see a copy of my visit note below.    I had the pleasure of seeing Mira Cao in follow-up today at the [facility].     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 The patient noticed a left neck mass in June. She thought this was related to a swollen lymph node due to stress as she had just bought a house.  She delayed evaluation until approximately July when she presented to urgent care.  She had an ultrasound obtained on 7/12/2019 which demonstrated a 3.7 x 2.3 x 1.3 cm mass in the left neck.  She then had a CT scan on 8/7/2019 which demonstrated a 1.8 x 1.4 x 3.8 cm partially necrotic/cystic level 2/3 neck mass, concerning for metastatic squamous cell carcinoma.  She was seen by Dr.Todd Cao and had an FNA performed on 8/8/2019.  The pathology from the FNA demonstrated atypical squamous proliferation which was p16-.  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  She elected for removal which was performed by Dr. Cao on 10/2/2019.  The excision was performed without a completion neck dissection.  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  She had a PET CT scan on 2019 locally which demonstrated postop changes without residual disease and no obvious primary malignancy.  On 10/10/2019 she saw Dr. Neely at [facility] for medical oncology consultation where possible chemotherapy was discussed.  She has met with Dr Melendez from radiation oncology at [facility].  She had a dental cleaning.   She was taken to the operating room on 10/24/2019 for direct laryngoscopy with biopsy.  Initial biopsy of the tonsil demonstrated high-grade dysplasia.  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  A radical tonsillectomy was then performed based on the preoperative discussion with the patient. Final pathology demonstrated a T1 SCC of the tonsil with negative margins. She was taken to the OR on 11/20/2019 for a left neck dissection with resection of her scar. She had 1/38 lymph nodes positive - 0.4 cm deposit of metastatic SCC without STEVE. She had proton beam therapy postoperatively at [facility] with Dr Smith. She completed her treatment on 2/24/2020. She lost about 20 lbs with treatment. She had her 3 month post treatment PET in May 2020 which showed uptake in the right tonsil and right level II node thought to be reactive.       Interval history:  She comes in today for follow-up. She was last seen in clinic in April 2021. She has been working with [facility] for voice therapy.  She is seeing maternal fetal medicine due to plans for pregnancy in the future. She says that things are overall going well. She notes that she has been busy and as a result is doing her exercises and stretching less. She says her throat feels good, no pain. She did recently have a filling crack on her molar, had to have repair done. She notes that they had a hard time with injecting for anesthesia and she had sensitivity and pain after for about 3-4 days, has since resolved. She is not having any major issues with her swallowing. She has no dysphagia. She says that her larger prenatal vitamins do sometimes get caught, clear with drinking liquids. She did have to 1 time cough up a pill. She is doing some of her swallowing exercises. She is using the jaw bra maybe once in the last 2 weeks. She is still doing myofascial release. She does do neck massage. She has gained some  weight, up to 125 lbs. She has no other changes in her health.        MEDICATIONS:     Current Outpatient Medications   Medication Sig Dispense Refill     biotin 1000 MCG TABS tablet Take 1,000 mcg by mouth daily       cyanocobalamin (VITAMIN B-12) 100 MCG tablet Take 250 mcg by mouth daily       ferrous sulfate (KP FERROUS SULFATE) 325 (65 Fe) MG tablet Take 325 mg by mouth daily (with breakfast)       loratadine (CLARITIN) 10 MG tablet Take 10 mg by mouth daily as needed        LORazepam (ATIVAN) 0.5 MG tablet Take 0.5-1 mg by mouth as needed        Magnesium Oxide 250 MG TABS Take 250 mg by mouth daily       Milk Thistle-Dand-Fennel-Licor (MILK THISTLE XTRA) CAPS capsule Take 1 capsule by mouth daily       Omega-3 Fatty Acids (OMEGA 3 PO) Take 2 capsules by mouth daily       order for DME Equipment being ordered: facioplasty garment 1 each 2     Vitamin D, Cholecalciferol, 25 MCG (1000 UT) TABS Take 2,000 Units by mouth daily       citalopram (CELEXA) 10 MG tablet Take 1 tablet (10 mg) by mouth daily (Patient not taking: Reported on 12/17/2020) 30 tablet 11       ALLERGIES:  No Known Allergies    HABITS/SOCIAL HISTORY:   Works as a .    She is  with no children.    She has no smoking history.  She has about 10 alcoholic beverages on weekends.  She has no chewing tobacco or vaping history.    Social History     Socioeconomic History     Marital status:      Spouse name: Not on file     Number of children: Not on file     Years of education: Not on file     Highest education level: Not on file   Occupational History     Not on file   Tobacco Use     Smoking status: Never Smoker     Smokeless tobacco: Never Used   Substance and Sexual Activity     Alcohol use: Yes     Comment: 2-3 drinks per week     Drug use: Never     Sexual activity: Yes     Partners: Male     Birth control/protection: Condom   Other Topics Concern     Not on file   Social History Narrative     Not on file     Social  Determinants of Health     Financial Resource Strain:      Difficulty of Paying Living Expenses:    Food Insecurity:      Worried About Running Out of Food in the Last Year:      Ran Out of Food in the Last Year:    Transportation Needs:      Lack of Transportation (Medical):      Lack of Transportation (Non-Medical):    Physical Activity:      Days of Exercise per Week:      Minutes of Exercise per Session:    Stress:      Feeling of Stress :    Social Connections:      Frequency of Communication with Friends and Family:      Frequency of Social Gatherings with Friends and Family:      Attends Temple Services:      Active Member of Clubs or Organizations:      Attends Club or Organization Meetings:      Marital Status:    Intimate Partner Violence:      Fear of Current or Ex-Partner:      Emotionally Abused:      Physically Abused:      Sexually Abused:        PAST MEDICAL HISTORY:   Past Medical History:   Diagnosis Date     Irritable bladder      Squamous cell carcinoma of neck      Uncomplicated asthma         PAST SURGICAL HISTORY:   Past Surgical History:   Procedure Laterality Date     DISSECTION RADICAL NECK MODIFIED Left 11/20/2019    Procedure: DISSECTION, NECK, MODIFIED RADICAL LEFT;  Surgeon: Ruth Tello MD;  Location: [facility] OR     LARYNGOSCOPY WITH BIOPSY(IES) N/A 10/24/2019    Procedure: Direct laryngscopy with biopsy;  Surgeon: Ruth Tello MD;  Location: [facility] OR     TONSILLECTOMY ADULT Left 10/24/2019    Procedure: Left Radical  tonsillectomy;  Surgeon: Ruth Tello MD;  Location: [facility] OR       FAMILY HISTORY:    Family History   Problem Relation Age of Onset     Breast Cancer Mother      Cancer Mother         Breast cancer     Bone Cancer Sister      Cancer Sister         Bone cancer       REVIEW OF SYSTEMS:  12 point ROS was negative other than the symptoms noted above in the HPI.  Patient Supplied Answers to Review of Systems  [facility] ROS 5/14/2020   Constitutional -   Neurology  "Headache   Psychology -   Ears, Nose, Throat Ear pain   Musculoskeletal -   Endocrine -         PHYSICAL EXAMINATION:   Pulse 68   Temp 97.4  F (36.3  C) (Temporal)   Ht 1.676 m (5' 6\")   Wt 56.7 kg (125 lb)   SpO2 99%   BMI 20.18 kg/m     Appearance:   normal; NAD, age-appropriate appearance, thin   Communication:   normal; communicates verbally, normal voice quality   Head/Face:   inspection -  Normal; no scars or visible lesions   Salivary glands -  Normal size, no tenderness, swelling, or palpable masses   Facial strength -  Normal and symmetric    Skin:  normal, no rash   Ears:  auricle (AD) -  normal  EAC (AD) -  normal  TM (AD) -  Normal, no effusion  auricle (AS) -  normal  EAC (AS) -  normal  TM (AS) -  Normal, no effusion  Normal clinical speech reception   Nose:  Ext. inspection -  Normal  Internal Inspection -  Normal mucosa, septum, and turbinates   Nasopharynx:  normal mucosa, no masses   Oral Cavity:  lips -  Normal mucosa, oral competence, and stoma size   Age-appropriate dentition, healthy gingival mucosa   Hard palate, buccal, floor of mouth mucosa normal   Tongue - normal movement, no lesions   Oropharynx:  mucosa -  Normal, no lesions  soft palate -  Expected post radical tonsillectomy asymmetry of left soft palate  tonsils -  S/p left radical tonsillectomy, no secretions, no masses, no concerning mucosal lesions, no palpable masses, expected scar tissue; right tonsil with no palpable masses slightly cryptic in appearance, no mucosal lesions  BOT - normal  Vallecula - clear   Hypopharynx:  Normal pyriform sinus and pharyngeal wall mucosa   No pooled secretions    Larynx:  Epiglottis, AE folds, false vocal cords, true vocal cords, arytenoids normal in appearance with no edema of AE folds or arytenoids   bilaterally mobile cords    Neck: Well healed left neck incision   fibrosis of the left SCM sternal head is improved  Some skin changes to the neck skin from radiation  No significant " lymphedema  Normal range of motion   Lymphatic:  no abnormal nodes   Cardiovascular:  warm, pink, well-perfused extremities without swelling, tenderness, or edema   Respiratory:  Normal respiratory effort, no stridor   Neuro/Psych.:  mood/affect -  normal  mental status -  normal       PROCEDURES:   Flexible fiberoptic laryngoscopy: Scope exam was indicated due to oropharyngeal cancer. Verbal consent was obtained. The nasal cavity was prepped with an aerosolized solution of topical anesthetic and vasoconstrictive agent. The scope was passed through the anterior nasal cavity and advanced. Inspection of the nasopharynx revealed no gross abnormality. The base of tongue and vallecula are normal. There are no abnormalities in the area of the left tonsil. The epiglottis, AE folds, false cords, true cords, arytenoids are normal and there is no edema of the arytenoids or AE folds. Overall the airway is improved.  Inspection of the larynx revealed bilaterally mobile vocal cords. Pyriform sinuses are symmetric. The airway is patent. Procedure tolerated well with no immediate complications noted.                  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 She had an excision of a cystic neck mass by a local ENT which demonstrated a metastatic SCC. She underwent a radical tonsillectomy which showed a small primary tumor in the tonsil which was completely resected. She then had a completion neck dissection on 11/20/2019 with final pathology showing 1/38 nodes. She completed postoperative proton beam therapy at [place] on 2/24/2020.     She has no concerning findings on exam today    She is due for repeat imaging in January 2022. We discussed that if she is pregnant that we would defer imaging to reduce risk of radiation exposure to the fetus.     She is due for TSH recheck in October 2021. We  discussed that if she gets pregnant that we will defer thyroid management during pregnancy to OB team, since criteria for thyroid replacement may be different in pregnancy.    I will see her back in 3-4 months with thyroid labs.    Thank you very much for the opportunity to participate in the care of your patient.      Ruth Tello MD, M.D.  Otolaryngology- Head & Neck Surgery      This note was dictated with voice recognition software and then edited. Please excuse any unintentional errors.         CC:  Ifeanyi Soliman MD  Department of Radiation Oncology  [facility]

## 2021-07-28 NOTE — NURSING NOTE
"Chief Complaint   Patient presents with     RECHECK     3 month follow up       Pulse 68, temperature 97.4  F (36.3  C), temperature source Temporal, height 1.676 m (5' 6\"), weight 56.7 kg (125 lb), SpO2 99 %, not currently breastfeeding.    Linnea Dudley, EMT    "

## 2021-07-28 NOTE — PROGRESS NOTES
I had the pleasure of seeing Mira Cao in follow-up today at the [facility].     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 The patient noticed a left neck mass in June. She thought this was related to a swollen lymph node due to stress as she had just bought a house.  She delayed evaluation until approximately July when she presented to urgent care.  She had an ultrasound obtained on 7/12/2019 which demonstrated a 3.7 x 2.3 x 1.3 cm mass in the left neck.  She then had a CT scan on 8/7/2019 which demonstrated a 1.8 x 1.4 x 3.8 cm partially necrotic/cystic level 2/3 neck mass, concerning for metastatic squamous cell carcinoma.  She was seen by Dr.Todd Cao and had an FNA performed on 8/8/2019.  The pathology from the FNA demonstrated atypical squamous proliferation which was p16-.  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  She elected for removal which was performed by Dr. Cao on 10/2/2019.  The excision was performed without a completion neck dissection.  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  She had a PET CT scan on 2019 locally which demonstrated postop changes without residual disease and no obvious primary malignancy.  On 10/10/2019 she saw Dr. Neely at [facility] for medical oncology consultation where possible chemotherapy was discussed.  She has met with Dr Melendez from radiation oncology at [facility].  She had a dental cleaning.  She was taken to the operating room on 10/24/2019 for direct laryngoscopy with biopsy.  Initial biopsy of the tonsil demonstrated high-grade dysplasia.  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  A radical tonsillectomy was then performed based on  the preoperative discussion with the patient. Final pathology demonstrated a T1 SCC of the tonsil with negative margins. She was taken to the OR on 11/20/2019 for a left neck dissection with resection of her scar. She had 1/38 lymph nodes positive - 0.4 cm deposit of metastatic SCC without STEVE. She had proton beam therapy postoperatively at [facility] with Dr Smith. She completed her treatment on 2/24/2020. She lost about 20 lbs with treatment. She had her 3 month post treatment PET in May 2020 which showed uptake in the right tonsil and right level II node thought to be reactive.       Interval history:  She comes in today for follow-up. She was last seen in clinic in April 2021. She has been working with SLP for voice therapy.  She is seeing maternal fetal medicine due to plans for pregnancy in the future. She says that things are overall going well. She notes that she has been busy and as a result is doing her exercises and stretching less. She says her throat feels good, no pain. She did recently have a filling crack on her molar, had to have repair done. She notes that they had a hard time with injecting for anesthesia and she had sensitivity and pain after for about 3-4 days, has since resolved. She is not having any major issues with her swallowing. She has no dysphagia. She says that her larger prenatal vitamins do sometimes get caught, clear with drinking liquids. She did have to 1 time cough up a pill. She is doing some of her swallowing exercises. She is using the jaw bra maybe once in the last 2 weeks. She is still doing myofascial release. She does do neck massage. She has gained some weight, up to 125 lbs. She has no other changes in her health.        MEDICATIONS:     Current Outpatient Medications   Medication Sig Dispense Refill     biotin 1000 MCG TABS tablet Take 1,000 mcg by mouth daily       cyanocobalamin (VITAMIN B-12) 100 MCG tablet Take 250 mcg by mouth daily       ferrous sulfate (KP FERROUS  SULFATE) 325 (65 Fe) MG tablet Take 325 mg by mouth daily (with breakfast)       loratadine (CLARITIN) 10 MG tablet Take 10 mg by mouth daily as needed        LORazepam (ATIVAN) 0.5 MG tablet Take 0.5-1 mg by mouth as needed        Magnesium Oxide 250 MG TABS Take 250 mg by mouth daily       Milk Thistle-Dand-Fennel-Licor (MILK THISTLE XTRA) CAPS capsule Take 1 capsule by mouth daily       Omega-3 Fatty Acids (OMEGA 3 PO) Take 2 capsules by mouth daily       order for DME Equipment being ordered: facioplasty garment 1 each 2     Vitamin D, Cholecalciferol, 25 MCG (1000 UT) TABS Take 2,000 Units by mouth daily       citalopram (CELEXA) 10 MG tablet Take 1 tablet (10 mg) by mouth daily (Patient not taking: Reported on 12/17/2020) 30 tablet 11       ALLERGIES:  No Known Allergies    HABITS/SOCIAL HISTORY:   Works as a .    She is  with no children.    She has no smoking history.  She has about 10 alcoholic beverages on weekends.  She has no chewing tobacco or vaping history.    Social History     Socioeconomic History     Marital status:      Spouse name: Not on file     Number of children: Not on file     Years of education: Not on file     Highest education level: Not on file   Occupational History     Not on file   Tobacco Use     Smoking status: Never Smoker     Smokeless tobacco: Never Used   Substance and Sexual Activity     Alcohol use: Yes     Comment: 2-3 drinks per week     Drug use: Never     Sexual activity: Yes     Partners: Male     Birth control/protection: Condom   Other Topics Concern     Not on file   Social History Narrative     Not on file     Social Determinants of Health     Financial Resource Strain:      Difficulty of Paying Living Expenses:    Food Insecurity:      Worried About Running Out of Food in the Last Year:      Ran Out of Food in the Last Year:    Transportation Needs:      Lack of Transportation (Medical):      Lack of Transportation (Non-Medical):   "  Physical Activity:      Days of Exercise per Week:      Minutes of Exercise per Session:    Stress:      Feeling of Stress :    Social Connections:      Frequency of Communication with Friends and Family:      Frequency of Social Gatherings with Friends and Family:      Attends [employer] Services:      Active Member of Clubs or Organizations:      Attends Club or Organization Meetings:      Marital Status:    Intimate Partner Violence:      Fear of Current or Ex-Partner:      Emotionally Abused:      Physically Abused:      Sexually Abused:        PAST MEDICAL HISTORY:   Past Medical History:   Diagnosis Date     Irritable bladder      Squamous cell carcinoma of neck      Uncomplicated asthma         PAST SURGICAL HISTORY:   Past Surgical History:   Procedure Laterality Date     DISSECTION RADICAL NECK MODIFIED Left 11/20/2019    Procedure: DISSECTION, NECK, MODIFIED RADICAL LEFT;  Surgeon: Ruth Tello MD;  Location: [facility] OR     LARYNGOSCOPY WITH BIOPSY(IES) N/A 10/24/2019    Procedure: Direct laryngscopy with biopsy;  Surgeon: Ruth Tello MD;  Location: [facility] OR     TONSILLECTOMY ADULT Left 10/24/2019    Procedure: Left Radical  tonsillectomy;  Surgeon: Ruth Tello MD;  Location: [facility] OR       FAMILY HISTORY:    Family History   Problem Relation Age of Onset     Breast Cancer Mother      Cancer Mother         Breast cancer     Bone Cancer Sister      Cancer Sister         Bone cancer       REVIEW OF SYSTEMS:  12 point ROS was negative other than the symptoms noted above in the HPI.  Patient Supplied Answers to Review of Systems   ENT ROS 5/14/2020   Constitutional -   Neurology Headache   Psychology -   Ears, Nose, Throat Ear pain   Musculoskeletal -   Endocrine -         PHYSICAL EXAMINATION:   Pulse 68   Temp 97.4  F (36.3  C) (Temporal)   Ht 1.676 m (5' 6\")   Wt 56.7 kg (125 lb)   SpO2 99%   BMI 20.18 kg/m     Appearance:   normal; NAD, age-appropriate appearance, thin   Communication:  "  normal; communicates verbally, normal voice quality   Head/Face:   inspection -  Normal; no scars or visible lesions   Salivary glands -  Normal size, no tenderness, swelling, or palpable masses   Facial strength -  Normal and symmetric    Skin:  normal, no rash   Ears:  auricle (AD) -  normal  EAC (AD) -  normal  TM (AD) -  Normal, no effusion  auricle (AS) -  normal  EAC (AS) -  normal  TM (AS) -  Normal, no effusion  Normal clinical speech reception   Nose:  Ext. inspection -  Normal  Internal Inspection -  Normal mucosa, septum, and turbinates   Nasopharynx:  normal mucosa, no masses   Oral Cavity:  lips -  Normal mucosa, oral competence, and stoma size   Age-appropriate dentition, healthy gingival mucosa   Hard palate, buccal, floor of mouth mucosa normal   Tongue - normal movement, no lesions   Oropharynx:  mucosa -  Normal, no lesions  soft palate -  Expected post radical tonsillectomy asymmetry of left soft palate  tonsils -  S/p left radical tonsillectomy, no secretions, no masses, no concerning mucosal lesions, no palpable masses, expected scar tissue; right tonsil with no palpable masses slightly cryptic in appearance, no mucosal lesions  BOT - normal  Vallecula - clear   Hypopharynx:  Normal pyriform sinus and pharyngeal wall mucosa   No pooled secretions    Larynx:  Epiglottis, AE folds, false vocal cords, true vocal cords, arytenoids normal in appearance with no edema of AE folds or arytenoids   bilaterally mobile cords    Neck: Well healed left neck incision   fibrosis of the left SCM sternal head is improved  Some skin changes to the neck skin from radiation  No significant lymphedema  Normal range of motion   Lymphatic:  no abnormal nodes   Cardiovascular:  warm, pink, well-perfused extremities without swelling, tenderness, or edema   Respiratory:  Normal respiratory effort, no stridor   Neuro/Psych.:  mood/affect -  normal  mental status -  normal       PROCEDURES:   Flexible fiberoptic  laryngoscopy: Scope exam was indicated due to oropharyngeal cancer. Verbal consent was obtained. The nasal cavity was prepped with an aerosolized solution of topical anesthetic and vasoconstrictive agent. The scope was passed through the anterior nasal cavity and advanced. Inspection of the nasopharynx revealed no gross abnormality. The base of tongue and vallecula are normal. There are no abnormalities in the area of the left tonsil. The epiglottis, AE folds, false cords, true cords, arytenoids are normal and there is no edema of the arytenoids or AE folds. Overall the airway is improved.  Inspection of the larynx revealed bilaterally mobile vocal cords. Pyriform sinuses are symmetric. The airway is patent. Procedure tolerated well with no immediate complications noted.                  RESULTS REVIEWED:   I reviewed the notes from [facility]      IMPRESSION AND PLAN:   Mira Cao is a 35-year-old woman with a likely T1N1 p16+ squamous cell carcinoma of the oropharynx. She had an excision of a cystic neck mass by a local ENT which demonstrated a metastatic SCC. She underwent a radical tonsillectomy which showed a small primary tumor in the tonsil which was completely resected. She then had a completion neck dissection on 11/20/2019 with final pathology showing 1/38 nodes. She completed postoperative proton beam therapy at [place] on 2/24/2020.     She has no concerning findings on exam today    She is due for repeat imaging in January 2022. We discussed that if she is pregnant that we would defer imaging to reduce risk of radiation exposure to the fetus.     She is due for TSH recheck in October 2021. We discussed that if she gets pregnant that we will defer thyroid management during pregnancy to OB team, since criteria for thyroid replacement may be different in pregnancy.    I will see her back in 3-4 months with thyroid labs.    Thank you very much for the opportunity to participate in the care of your patient.       Ruth Tello MD, M.D.  Otolaryngology- Head & Neck Surgery      This note was dictated with voice recognition software and then edited. Please excuse any unintentional errors.               CC:  Ifeanyi Soliman MD  Department of Radiation Oncology  [facility]

## 2021-08-03 DIAGNOSIS — I89.0 LYMPHEDEMA: Primary | ICD-10-CM

## 2021-08-16 ENCOUNTER — HOSPITAL ENCOUNTER (OUTPATIENT)
Dept: OCCUPATIONAL THERAPY | Facility: CLINIC | Age: 36
Setting detail: THERAPIES SERIES
End: 2021-08-16
Attending: OTOLARYNGOLOGY
Payer: COMMERCIAL

## 2021-08-16 DIAGNOSIS — I89.0 LYMPHEDEMA: ICD-10-CM

## 2021-08-16 PROCEDURE — 97140 MANUAL THERAPY 1/> REGIONS: CPT | Mod: GO

## 2021-08-16 PROCEDURE — 97165 OT EVAL LOW COMPLEX 30 MIN: CPT | Mod: GO

## 2021-08-16 NOTE — PROGRESS NOTES
"   08/16/21 0800   Rehab Discipline   Discipline OT   Type of Visit   Type of visit Initial Edema Evaluation   General Information   Start of care 08/16/21   Referring physician Dr. Ruth Tello   Order date 08/03/21   Medical diagnosis lymphedema   Edema onset 08/03/21   Affected body parts Head / Neck   Edema etiology Cancer with lymph node dissection;Radiation   Edema etiology comments squamous cell CA of oropharynx with L radical tonsillectomy, complete neck dissection 11/20/2019 followed by proton beam therapy at [facility]; 1/38 LN +   Pertinent history of current problem (PT: include personal factors and/or comorbidities that impact the POC; OT: include additional occupational profile info) Patient known to this clinic and therapist through prior episode of care, no new health hx changes (PMH remarkable only for asthma); patient reports increased buccal and L neck swelling \"it was worse when I got the referral back in July, I've been working on it a lot\"; patient attributes this to decreased compression mask wear \"I had a cracked filling and was really painful from the dental work, and I also have to wear a mouthguard at night (2/2 bruxism)... and I guess I just felt like things had been holding pretty steady\".  Patient reports returning to use of compression mask, 5 nights/week with swell pad use.  Patient has been seeing someone for MFR to left neck.   Surgical / medical history reviewed Yes   Prior level of functional mobility ind   Prior treatment Compression garments;Exercise;MLD;Other  (MFR, soft tissue mobs)   Patient role / employment history Employed  (working from home)   [place]   Living environment comments lives with spouse   Fall Risk Screen   Fall screen completed by OT   Have you fallen 2 or more times in the past year? No   Have you fallen and had an injury in the past year? No   Is patient a fall risk? No   Abuse Screen (yes response referral indicated)   Feels " Unsafe at Home or Work/School no   Feels Threatened by Someone no   Does Anyone Try to Keep You From Having Contact with Others or Doing Things Outside Your Home? no   Physical Signs of Abuse Present no   System Outcome Measures   Outcome Measures   (NA - no limb swelling)   Subjective Report   Patient report of symptoms cheek and left neck swelling   Patient / Family Goals   Patient / family goals statement decrease swelling   Pain   Patient currently in pain No   Cognitive Status   Orientation Orientation to person, place and time   Level of consciousness Alert   Follows commands and answers questions 100% of the time   Personal safety and judgement Intact   Memory Intact   Edema Exam / Assessment   Skin condition comments skin in good condition with mild swelling sup L neck scar and radiation fibrosis, L SCM very tight ugo at sternal head.  Some loss of contour L buccal region, mild swelling sup/inf L labial region.   Range of Motion   ROM comments decreased R neck lat flex and ext   Activities of Daily Living   Activities of Daily Living ind   Planned Edema Interventions   Planned edema interventions Manual lymph drainage;Exercises;Precautions to prevent infection / exacerbation;Education;Skin care / precautions;Scar mobilization;Soft tissue mobilization;Myofascial release;Home management program development   Planned edema intervention comments recommend 1x/week/6   Clinical Impression   Criteria for skilled therapeutic intervention met Yes   Therapy diagnosis lymphedema of head and neck secondary to CA   Influenced by the following impairments / conditions Stage 2  (does not reverse with elevation, soft tissue fibrosis)   Assessment of Occupational Performance 1-3 Performance Deficits   Identified Performance Deficits impaired neck AROM, needs reinforcement of ed re: long-term lymphedema symptom management   Clinical Decision Making (Complexity) Low complexity   Treatment Frequency 1x/week   Treatment duration  6 weeks   Patient / family and/or staff in agreement with plan of care Yes   Risks and benefits of therapy have been explained Yes   Goals   Edema Eval Goals 1;2;3;4   Goal 1   Goal identifier volume   Goal description For decreased risk of infection and progressive soft tissue fibrosis, volume L buccal region and L neck will approximate that of R s/p MLD   Target date 10/15/21   Goal 2   Goal identifier neck AROM   Goal description For increased participation in I/ADL, patient will achieve R lat neck flex and BL neck ext WFL   Target date 10/15/21   Goal 3   Goal identifier home program   Goal description For long-term management of lymphedema, patient will be independent in HP, including self-MLD, scar massage, HEP, and use of compression.   Target date 10/15/21   Goal 4   Goal identifier precautions   Goal description For decreased risk of infection, skin breakdown and progression of soft tissue fibrosis, patient will independently verbalize lymphedema precautions.   Target date 10/15/21   Total Evaluation Time   OT Margaret, Low Complexity Minutes ([id number]) 15

## 2021-08-26 ENCOUNTER — HOSPITAL ENCOUNTER (OUTPATIENT)
Dept: OCCUPATIONAL THERAPY | Facility: CLINIC | Age: 36
Setting detail: THERAPIES SERIES
End: 2021-08-26
Attending: OTOLARYNGOLOGY
Payer: COMMERCIAL

## 2021-08-26 PROCEDURE — 97140 MANUAL THERAPY 1/> REGIONS: CPT | Mod: GO

## 2021-09-03 ENCOUNTER — HOSPITAL ENCOUNTER (OUTPATIENT)
Dept: OCCUPATIONAL THERAPY | Facility: CLINIC | Age: 36
Setting detail: THERAPIES SERIES
End: 2021-09-03
Attending: OTOLARYNGOLOGY
Payer: COMMERCIAL

## 2021-09-03 PROCEDURE — 97140 MANUAL THERAPY 1/> REGIONS: CPT | Mod: GO

## 2021-09-10 ENCOUNTER — HOSPITAL ENCOUNTER (OUTPATIENT)
Dept: OCCUPATIONAL THERAPY | Facility: CLINIC | Age: 36
Setting detail: THERAPIES SERIES
End: 2021-09-10
Attending: OTOLARYNGOLOGY
Payer: COMMERCIAL

## 2021-09-10 PROCEDURE — 97140 MANUAL THERAPY 1/> REGIONS: CPT | Mod: GO

## 2021-10-01 ENCOUNTER — HOSPITAL ENCOUNTER (OUTPATIENT)
Dept: OCCUPATIONAL THERAPY | Facility: CLINIC | Age: 36
Setting detail: THERAPIES SERIES
End: 2021-10-01
Attending: OTOLARYNGOLOGY
Payer: COMMERCIAL

## 2021-10-01 PROCEDURE — 97140 MANUAL THERAPY 1/> REGIONS: CPT | Mod: GO

## 2021-10-23 ENCOUNTER — HEALTH MAINTENANCE LETTER (OUTPATIENT)
Age: 36
End: 2021-10-23

## 2021-10-25 ENCOUNTER — HOSPITAL ENCOUNTER (OUTPATIENT)
Dept: OCCUPATIONAL THERAPY | Facility: CLINIC | Age: 36
Setting detail: THERAPIES SERIES
End: 2021-10-25
Attending: OTOLARYNGOLOGY
Payer: COMMERCIAL

## 2021-10-25 PROCEDURE — 97140 MANUAL THERAPY 1/> REGIONS: CPT | Mod: GO

## 2021-10-27 ENCOUNTER — THERAPY VISIT (OUTPATIENT)
Dept: SPEECH THERAPY | Facility: CLINIC | Age: 36
End: 2021-10-27
Payer: COMMERCIAL

## 2021-10-27 ENCOUNTER — LAB (OUTPATIENT)
Dept: LAB | Facility: CLINIC | Age: 36
End: 2021-10-27
Payer: COMMERCIAL

## 2021-10-27 ENCOUNTER — OFFICE VISIT (OUTPATIENT)
Dept: OTOLARYNGOLOGY | Facility: CLINIC | Age: 36
End: 2021-10-27
Payer: COMMERCIAL

## 2021-10-27 VITALS
BODY MASS INDEX: 20.25 KG/M2 | OXYGEN SATURATION: 99 % | SYSTOLIC BLOOD PRESSURE: 111 MMHG | TEMPERATURE: 98.1 F | WEIGHT: 126 LBS | HEART RATE: 76 BPM | DIASTOLIC BLOOD PRESSURE: 74 MMHG | HEIGHT: 66 IN

## 2021-10-27 DIAGNOSIS — R13.12 OROPHARYNGEAL DYSPHAGIA: Primary | ICD-10-CM

## 2021-10-27 DIAGNOSIS — C10.9 SQUAMOUS CELL CARCINOMA OF OROPHARYNX (H): ICD-10-CM

## 2021-10-27 DIAGNOSIS — C10.9 SQUAMOUS CELL CARCINOMA OF OROPHARYNX (H): Primary | ICD-10-CM

## 2021-10-27 DIAGNOSIS — C44.42 SQUAMOUS CELL CARCINOMA OF NECK: ICD-10-CM

## 2021-10-27 LAB — TSH SERPL DL<=0.005 MIU/L-ACNC: 2.95 MU/L (ref 0.4–4)

## 2021-10-27 PROCEDURE — 92526 ORAL FUNCTION THERAPY: CPT | Mod: GN | Performed by: SPEECH-LANGUAGE PATHOLOGIST

## 2021-10-27 PROCEDURE — 84443 ASSAY THYROID STIM HORMONE: CPT | Performed by: PATHOLOGY

## 2021-10-27 PROCEDURE — 36415 COLL VENOUS BLD VENIPUNCTURE: CPT | Performed by: PATHOLOGY

## 2021-10-27 PROCEDURE — 99213 OFFICE O/P EST LOW 20 MIN: CPT | Mod: 25 | Performed by: OTOLARYNGOLOGY

## 2021-10-27 PROCEDURE — 31575 DIAGNOSTIC LARYNGOSCOPY: CPT | Performed by: OTOLARYNGOLOGY

## 2021-10-27 ASSESSMENT — PAIN SCALES - GENERAL: PAINLEVEL: NO PAIN (0)

## 2021-10-27 ASSESSMENT — MIFFLIN-ST. JEOR: SCORE: 1273.28

## 2021-10-27 NOTE — PROGRESS NOTES
I had the pleasure of seeing Mira Cao in follow-up today at the [facility].     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 The patient noticed a left neck mass in June. She thought this was related to a swollen lymph node due to stress as she had just bought a house.  She delayed evaluation until approximately July when she presented to urgent care.  She had an ultrasound obtained on 7/12/2019 which demonstrated a 3.7 x 2.3 x 1.3 cm mass in the left neck.  She then had a CT scan on 8/7/2019 which demonstrated a 1.8 x 1.4 x 3.8 cm partially necrotic/cystic level 2/3 neck mass, concerning for metastatic squamous cell carcinoma.  She was seen by Dr.Todd Cao and had an FNA performed on 8/8/2019.  The pathology from the FNA demonstrated atypical squamous proliferation which was p16-.  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  She elected for removal which was performed by Dr. Cao on 10/2/2019.  The excision was performed without a completion neck dissection.  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  She had a PET CT scan on 2019 locally which demonstrated postop changes without residual disease and no obvious primary malignancy.  On 10/10/2019 she saw Dr. Neely at [facility] for medical oncology consultation where possible chemotherapy was discussed.  She has met with Dr Melendez from radiation oncology at [facility].  She had a dental cleaning.  She was taken to the operating room on 10/24/2019 for direct laryngoscopy with biopsy.  Initial biopsy of the tonsil demonstrated high-grade dysplasia.  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  A radical tonsillectomy was then performed based on  the preoperative discussion with the patient. Final pathology demonstrated a T1 SCC of the tonsil with negative margins. She was taken to the OR on 11/20/2019 for a left neck dissection with resection of her scar. She had 1/38 lymph nodes positive - 0.4 cm deposit of metastatic SCC without STEVE. She had proton beam therapy postoperatively at [facility] with Dr Smith. She completed her treatment on 2/24/2020. She lost about 20 lbs with treatment. She had her 3 month post treatment PET in May 2020 which showed uptake in the right tonsil and right level II node thought to be reactive.       Interval history:  She comes in today for follow-up. She was last seen in clinic in July 2021. She has been seeing the lymphedema therapist. She had her TSH checked today which was normal.  She says that things are overall going well.  She did find out earlier this month that she is pregnant and she is struggling with nausea from this.  She is seeing a midwife later this week.  Otherwise things are fairly stable.  She does notice that it is tighter in her neck.  She is trying to do her exercises for the lymphedema, neck stretching, swallowing but finds it hard to work them well and during the day.  She says that her eating and drinking is fine, still not normal.  She does feel like some things will sometimes get caught up but then she is able to clear with repeat swallow.  She has no odynophagia or sore throat.  She has gained some weight.  She is working.        MEDICATIONS:     Current Outpatient Medications   Medication Sig Dispense Refill     biotin 1000 MCG TABS tablet Take 1,000 mcg by mouth daily       cyanocobalamin (VITAMIN B-12) 100 MCG tablet Take 250 mcg by mouth daily       ferrous sulfate (KP FERROUS SULFATE) 325 (65 Fe) MG tablet Take 325 mg by mouth daily (with breakfast)       loratadine (CLARITIN) 10 MG tablet Take 10 mg by mouth daily as needed        LORazepam (ATIVAN) 0.5 MG tablet Take 0.5-1 mg by mouth as needed         Magnesium Oxide 250 MG TABS Take 250 mg by mouth daily       Milk Thistle-Dand-Fennel-Licor (MILK THISTLE XTRA) CAPS capsule Take 1 capsule by mouth daily       Omega-3 Fatty Acids (OMEGA 3 PO) Take 2 capsules by mouth daily       order for DME Equipment being ordered: facioplasty garment 1 each 2     Vitamin D, Cholecalciferol, 25 MCG (1000 UT) TABS Take 2,000 Units by mouth daily       citalopram (CELEXA) 10 MG tablet Take 1 tablet (10 mg) by mouth daily (Patient not taking: Reported on 12/17/2020) 30 tablet 11       ALLERGIES:  No Known Allergies    HABITS/SOCIAL HISTORY:   Works as a .    She is  with no children.    She has no smoking history.  She has about 10 alcoholic beverages on weekends.  She has no chewing tobacco or vaping history.    Social History     Socioeconomic History     Marital status:      Spouse name: Not on file     Number of children: Not on file     Years of education: Not on file     Highest education level: Not on file   Occupational History     Not on file   Tobacco Use     Smoking status: Never Smoker     Smokeless tobacco: Never Used   Substance and Sexual Activity     Alcohol use: Yes     Comment: 2-3 drinks per week     Drug use: Never     Sexual activity: Yes     Partners: Male     Birth control/protection: Condom   Other Topics Concern     Not on file   Social History Narrative     Not on file     Social Determinants of Health     Financial Resource Strain:      Difficulty of Paying Living Expenses:    Food Insecurity:      Worried About Running Out of Food in the Last Year:      Ran Out of Food in the Last Year:    Transportation Needs:      Lack of Transportation (Medical):      Lack of Transportation (Non-Medical):    Physical Activity:      Days of Exercise per Week:      Minutes of Exercise per Session:    Stress:      Feeling of Stress :    Social Connections:      Frequency of Communication with Friends and Family:      Frequency of Social  "Gatherings with Friends and Family:      Attends [employer] Services:      Active Member of Clubs or Organizations:      Attends Club or Organization Meetings:      Marital Status:    Intimate Partner Violence:      Fear of Current or Ex-Partner:      Emotionally Abused:      Physically Abused:      Sexually Abused:        PAST MEDICAL HISTORY:   Past Medical History:   Diagnosis Date     Irritable bladder      Squamous cell carcinoma of neck      Uncomplicated asthma         PAST SURGICAL HISTORY:   Past Surgical History:   Procedure Laterality Date     DISSECTION RADICAL NECK MODIFIED Left 11/20/2019    Procedure: DISSECTION, NECK, MODIFIED RADICAL LEFT;  Surgeon: Ruth Tello MD;  Location: [facility] OR     LARYNGOSCOPY WITH BIOPSY(IES) N/A 10/24/2019    Procedure: Direct laryngscopy with biopsy;  Surgeon: Ruth Tello MD;  Location: [facility] OR     TONSILLECTOMY ADULT Left 10/24/2019    Procedure: Left Radical  tonsillectomy;  Surgeon: Ruth Tello MD;  Location: [facility] OR       FAMILY HISTORY:    Family History   Problem Relation Age of Onset     Breast Cancer Mother      Cancer Mother         Breast cancer     Bone Cancer Sister      Cancer Sister         Bone cancer       REVIEW OF SYSTEMS:  12 point ROS was negative other than the symptoms noted above in the HPI.  Patient Supplied Answers to Review of Systems  [facility] ROS 5/14/2020   Constitutional -   Neurology Headache   Psychology -   Ears, Nose, Throat Ear pain   Musculoskeletal -   Endocrine -         PHYSICAL EXAMINATION:   /74 (BP Location: Right arm, Patient Position: Sitting, Cuff Size: Adult Regular)   Pulse 76   Temp 98.1  F (36.7  C) (Temporal)   Ht 1.676 m (5' 6\")   Wt 57.2 kg (126 lb)   SpO2 99%   BMI 20.34 kg/m     Appearance:   normal; NAD, age-appropriate appearance, thin   Communication:   normal; communicates verbally, normal voice quality   Head/Face:   inspection -  Normal; no scars or visible lesions   Salivary glands -  " Normal size, no tenderness, swelling, or palpable masses   Facial strength -  Normal and symmetric    Skin:  normal, no rash   Ears:  auricle (AD) -  normal  EAC (AD) -  normal  TM (AD) -  Normal, no effusion  auricle (AS) -  normal  EAC (AS) -  normal  TM (AS) -  Normal, no effusion  Normal clinical speech reception   Nose:  Ext. inspection -  Normal  Internal Inspection -  Normal mucosa, septum, and turbinates   Nasopharynx:  normal mucosa, no masses   Oral Cavity:  lips -  Normal mucosa, oral competence, and stoma size   Age-appropriate dentition, healthy gingival mucosa   Hard palate, buccal, floor of mouth mucosa normal   Tongue - normal movement, no lesions   Oropharynx:  mucosa -  Normal, no lesions  soft palate -  Expected post radical tonsillectomy asymmetry of left soft palate  tonsils -  S/p left radical tonsillectomy, no secretions, no masses, no concerning mucosal lesions, no palpable masses, expected scar tissue; right tonsil with no palpable masses slightly cryptic in appearance which is stable, no mucosal lesions  BOT - normal  Vallecula - clear   Hypopharynx:  Normal pyriform sinus and pharyngeal wall mucosa   No pooled secretions    Larynx:  Epiglottis, AE folds, false vocal cords, true vocal cords, arytenoids normal in appearance with no edema of AE folds or arytenoids   bilaterally mobile cords    Neck: Well healed left neck incision   Some radiation fibrosis of the neck  Some skin changes to the neck skin from radiation  No significant lymphedema  Normal range of motion   Lymphatic:  no abnormal nodes   Cardiovascular:  warm, pink, well-perfused extremities without swelling, tenderness, or edema   Respiratory:  Normal respiratory effort, no stridor   Neuro/Psych.:  mood/affect -  normal  mental status -  normal       PROCEDURES:   Flexible fiberoptic laryngoscopy: Scope exam was indicated due to oropharyngeal cancer. Verbal consent was obtained. The nasal cavity was prepped with an aerosolized  solution of topical anesthetic and vasoconstrictive agent. The scope was passed through the anterior nasal cavity and advanced. Inspection of the nasopharynx revealed no gross abnormality. The base of tongue and vallecula are normal. There are no abnormalities in the area of the left tonsil. The epiglottis, AE folds, false cords, true cords, arytenoids are normal and there is no edema of the arytenoids or AE folds.  Inspection of the larynx revealed bilaterally mobile vocal cords. Pyriform sinuses are symmetric. The airway is patent. Procedure tolerated well with no immediate complications noted.          RESULTS REVIEWED:   TSH normal      IMPRESSION AND PLAN:   Mira Cao is a 36-year-old woman with a likely T1N1 p16+ squamous cell carcinoma of the oropharynx. She had an excision of a cystic neck mass by a local ENT which demonstrated a metastatic SCC. She underwent a radical tonsillectomy which showed a small primary tumor in the tonsil which was completely resected. She then had a completion neck dissection on 11/20/2019 with final pathology showing 1/38 nodes. She completed postoperative proton beam therapy at [place] on 2/24/2020.     She has no concerning findings on exam today.    We discussed that she typically would have repeat imaging in January 2022.  She is pregnant at this time so we will defer imaging to avoid the risk of radiation to the fetus.  We discussed that if she has any concerning masses that develop in the neck we can start with an ultrasound.  We could otherwise consider an MRI with careful discussion with her OB team pending the relative level of concern.  I discussed with her that we will not plan on scanning the chest during her pregnancy given that the best imaging modality is a CT and I do not want to risk the radiation exposure given her low risk of lung metastasis.    She had her TSH checked today.  Her repeat would be due in April 2022.  We discussed that she may have this  checked more frequently or adjusted by the OB team given her pregnancy.    I will see her back in 3-4 months.    Thank you very much for the opportunity to participate in the care of your patient.      Ruth Tello MD, M.D.  Otolaryngology- Head & Neck Surgery      This note was dictated with voice recognition software and then edited. Please excuse any unintentional errors.       I spent a total of 20 minutes in patient care activities on 10/27/2021 including review of results, patient visit, charting activities.        CC:  Ifeanyi Soliman MD  Department of Radiation Oncology  [facility]

## 2021-10-27 NOTE — LETTER
10/27/2021        RE: Mira Cao  [address] [address]     Dear Colleague,    Thank you for referring your patient, Mira Cao, to the [facility] EAR NOSE AND THROAT [facility]IN[facility] [place] at [facility]. Please see a copy of my visit note below.    I had the pleasure of seeing Mira Cao in follow-up today at the [facility] Ot[facility]aryng[facility].     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 The patient noticed a left neck mass in June. She thought this was related to a swollen lymph node due to stress as she had just bought a house.  She delayed evaluation until approximately July when she presented to urgent care.  She had an ultrasound obtained on 7/12/2019 which demonstrated a 3.7 x 2.3 x 1.3 cm mass in the left neck.  She then had a CT scan on 8/7/2019 which demonstrated a 1.8 x 1.4 x 3.8 cm partially necrotic/cystic level 2/3 neck mass, concerning for metastatic squamous cell carcinoma.  She was seen by Dr.Todd Cao and had an FNA performed on 8/8/2019.  The pathology from the FNA demonstrated atypical squamous proliferation which was p16-.  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  She elected for removal which was performed by Dr. Cao on 10/2/2019.  The excision was performed without a completion neck dissection.  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  She had a PET CT scan on 2019 locally which demonstrated postop changes without residual disease and no obvious primary malignancy.  On 10/10/2019 she saw Dr. Neely at [facility] for medical oncology consultation where possible chemotherapy was discussed.  She has met with Dr Melendez from radiation oncology at [facility].  She had a dental  cleaning.  She was taken to the operating room on 10/24/2019 for direct laryngoscopy with biopsy.  Initial biopsy of the tonsil demonstrated high-grade dysplasia.  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  A radical tonsillectomy was then performed based on the preoperative discussion with the patient. Final pathology demonstrated a T1 SCC of the tonsil with negative margins. She was taken to the OR on 11/20/2019 for a left neck dissection with resection of her scar. She had 1/38 lymph nodes positive - 0.4 cm deposit of metastatic SCC without STEVE. She had proton beam therapy postoperatively at [facility] with Dr Smith. She completed her treatment on 2/24/2020. She lost about 20 lbs with treatment. She had her 3 month post treatment PET in May 2020 which showed uptake in the right tonsil and right level II node thought to be reactive.       Interval history:  She comes in today for follow-up. She was last seen in clinic in July 2021. She has been seeing the lymphedema therapist. She had her TSH checked today which was normal.  She says that things are overall going well.  She did find out earlier this month that she is pregnant and she is struggling with nausea from this.  She is seeing a midwife later this week.  Otherwise things are fairly stable.  She does notice that it is tighter in her neck.  She is trying to do her exercises for the lymphedema, neck stretching, swallowing but finds it hard to work them well and during the day.  She says that her eating and drinking is fine, still not normal.  She does feel like some things will sometimes get caught up but then she is able to clear with repeat swallow.  She has no odynophagia or sore throat.  She has gained some weight.  She is working.        MEDICATIONS:     Current Outpatient Medications   Medication Sig Dispense Refill     biotin 1000 MCG TABS tablet Take 1,000 mcg by mouth daily        cyanocobalamin (VITAMIN B-12) 100 MCG tablet Take 250 mcg by mouth daily       ferrous sulfate (KP FERROUS SULFATE) 325 (65 Fe) MG tablet Take 325 mg by mouth daily (with breakfast)       loratadine (CLARITIN) 10 MG tablet Take 10 mg by mouth daily as needed        LORazepam (ATIVAN) 0.5 MG tablet Take 0.5-1 mg by mouth as needed        Magnesium Oxide 250 MG TABS Take 250 mg by mouth daily       Milk Thistle-Dand-Fennel-Licor (MILK THISTLE XTRA) CAPS capsule Take 1 capsule by mouth daily       Omega-3 Fatty Acids (OMEGA 3 PO) Take 2 capsules by mouth daily       order for DME Equipment being ordered: facioplasty garment 1 each 2     Vitamin D, Cholecalciferol, 25 MCG (1000 UT) TABS Take 2,000 Units by mouth daily       citalopram (CELEXA) 10 MG tablet Take 1 tablet (10 mg) by mouth daily (Patient not taking: Reported on 12/17/2020) 30 tablet 11       ALLERGIES:  No Known Allergies    HABITS/SOCIAL HISTORY:   Works as a .    She is  with no children.    She has no smoking history.  She has about 10 alcoholic beverages on weekends.  She has no chewing tobacco or vaping history.    Social History     Socioeconomic History     Marital status:      Spouse name: Not on file     Number of children: Not on file     Years of education: Not on file     Highest education level: Not on file   Occupational History     Not on file   Tobacco Use     Smoking status: Never Smoker     Smokeless tobacco: Never Used   Substance and Sexual Activity     Alcohol use: Yes     Comment: 2-3 drinks per week     Drug use: Never     Sexual activity: Yes     Partners: Male     Birth control/protection: Condom   Other Topics Concern     Not on file   Social History Narrative     Not on file     Social Determinants of Health     Financial Resource Strain:      Difficulty of Paying Living Expenses:    Food Insecurity:      Worried About Running Out of Food in the Last Year:      Ran Out of Food in the Last Year:     Transportation Needs:      Lack of Transportation (Medical):      Lack of Transportation (Non-Medical):    Physical Activity:      Days of Exercise per Week:      Minutes of Exercise per Session:    Stress:      Feeling of Stress :    Social Connections:      Frequency of Communication with Friends and Family:      Frequency of Social Gatherings with Friends and Family:      Attends Orthodoxy Services:      Active Member of Clubs or Organizations:      Attends Club or Organization Meetings:      Marital Status:    Intimate Partner Violence:      Fear of Current or Ex-Partner:      Emotionally Abused:      Physically Abused:      Sexually Abused:        PAST MEDICAL HISTORY:   Past Medical History:   Diagnosis Date     Irritable bladder      Squamous cell carcinoma of neck      Uncomplicated asthma         PAST SURGICAL HISTORY:   Past Surgical History:   Procedure Laterality Date     DISSECTION RADICAL NECK MODIFIED Left 11/20/2019    Procedure: DISSECTION, NECK, MODIFIED RADICAL LEFT;  Surgeon: Ruth Tello MD;  Location: [facility] OR     LARYNGOSCOPY WITH BIOPSY(IES) N/A 10/24/2019    Procedure: Direct laryngscopy with biopsy;  Surgeon: Ruth Tello MD;  Location: [facility] OR     TONSILLECTOMY ADULT Left 10/24/2019    Procedure: Left Radical  tonsillectomy;  Surgeon: Ruth Tello MD;  Location: [facility] OR       FAMILY HISTORY:    Family History   Problem Relation Age of Onset     Breast Cancer Mother      Cancer Mother         Breast cancer     Bone Cancer Sister      Cancer Sister         Bone cancer       REVIEW OF SYSTEMS:  12 point ROS was negative other than the symptoms noted above in the HPI.  Patient Supplied Answers to Review of Systems   ENT ROS 5/14/2020   Constitutional -   Neurology Headache   Psychology -   Ears, Nose, Throat Ear pain   Musculoskeletal -   Endocrine -         PHYSICAL EXAMINATION:   /74 (BP Location: Right arm, Patient Position: Sitting, Cuff Size: Adult Regular)    "Pulse 76   Temp 98.1  F (36.7  C) (Temporal)   Ht 1.676 m (5' 6\")   Wt 57.2 kg (126 lb)   SpO2 99%   BMI 20.34 kg/m     Appearance:   normal; NAD, age-appropriate appearance, thin   Communication:   normal; communicates verbally, normal voice quality   Head/Face:   inspection -  Normal; no scars or visible lesions   Salivary glands -  Normal size, no tenderness, swelling, or palpable masses   Facial strength -  Normal and symmetric    Skin:  normal, no rash   Ears:  auricle (AD) -  normal  EAC (AD) -  normal  TM (AD) -  Normal, no effusion  auricle (AS) -  normal  EAC (AS) -  normal  TM (AS) -  Normal, no effusion  Normal clinical speech reception   Nose:  Ext. inspection -  Normal  Internal Inspection -  Normal mucosa, septum, and turbinates   Nasopharynx:  normal mucosa, no masses   Oral Cavity:  lips -  Normal mucosa, oral competence, and stoma size   Age-appropriate dentition, healthy gingival mucosa   Hard palate, buccal, floor of mouth mucosa normal   Tongue - normal movement, no lesions   Oropharynx:  mucosa -  Normal, no lesions  soft palate -  Expected post radical tonsillectomy asymmetry of left soft palate  tonsils -  S/p left radical tonsillectomy, no secretions, no masses, no concerning mucosal lesions, no palpable masses, expected scar tissue; right tonsil with no palpable masses slightly cryptic in appearance which is stable, no mucosal lesions  BOT - normal  Vallecula - clear   Hypopharynx:  Normal pyriform sinus and pharyngeal wall mucosa   No pooled secretions    Larynx:  Epiglottis, AE folds, false vocal cords, true vocal cords, arytenoids normal in appearance with no edema of AE folds or arytenoids   bilaterally mobile cords    Neck: Well healed left neck incision   Some radiation fibrosis of the neck  Some skin changes to the neck skin from radiation  No significant lymphedema  Normal range of motion   Lymphatic:  no abnormal nodes   Cardiovascular:  warm, pink, well-perfused extremities " without swelling, tenderness, or edema   Respiratory:  Normal respiratory effort, no stridor   Neuro/Psych.:  mood/affect -  normal  mental status -  normal       PROCEDURES:   Flexible fiberoptic laryngoscopy: Scope exam was indicated due to oropharyngeal cancer. Verbal consent was obtained. The nasal cavity was prepped with an aerosolized solution of topical anesthetic and vasoconstrictive agent. The scope was passed through the anterior nasal cavity and advanced. Inspection of the nasopharynx revealed no gross abnormality. The base of tongue and vallecula are normal. There are no abnormalities in the area of the left tonsil. The epiglottis, AE folds, false cords, true cords, arytenoids are normal and there is no edema of the arytenoids or AE folds.  Inspection of the larynx revealed bilaterally mobile vocal cords. Pyriform sinuses are symmetric. The airway is patent. Procedure tolerated well with no immediate complications noted.          RESULTS REVIEWED:   TSH normal      IMPRESSION AND PLAN:   Mira Cao is a 36-year-old woman with a likely T1N1 p16+ squamous cell carcinoma of the oropharynx. She had an excision of a cystic neck mass by a local ENT which demonstrated a metastatic SCC. She underwent a radical tonsillectomy which showed a small primary tumor in the tonsil which was completely resected. She then had a completion neck dissection on 11/20/2019 with final pathology showing 1/38 nodes. She completed postoperative proton beam therapy at [place] on 2/24/2020.     She has no concerning findings on exam today.    We discussed that she typically would have repeat imaging in January 2022.  She is pregnant at this time so we will defer imaging to avoid the risk of radiation to the fetus.  We discussed that if she has any concerning masses that develop in the neck we can start with an ultrasound.  We could otherwise consider an MRI with careful discussion with her OB team pending the relative level of  concern.  I discussed with her that we will not plan on scanning the chest during her pregnancy given that the best imaging modality is a CT and I do not want to risk the radiation exposure given her low risk of lung metastasis.    She had her TSH checked today.  Her repeat would be due in April 2022.  We discussed that she may have this checked more frequently or adjusted by the OB team given her pregnancy.    I will see her back in 3-4 months.    Thank you very much for the opportunity to participate in the care of your patient.      Ruth Tello MD, M.D.  Otolaryngology- Head & Neck Surgery      This note was dictated with voice recognition software and then edited. Please excuse any unintentional errors.       I spent a total of 20 minutes in patient care activities on 10/27/2021 including review of results, patient visit, charting activities.        CC:  Ifeanyi Soliman MD  Department of Radiation Oncology  [facility]

## 2021-10-27 NOTE — NURSING NOTE
"Chief Complaint   Patient presents with     RECHECK     3 month follow up with labs prior       Blood pressure 111/74, pulse 76, temperature 98.1  F (36.7  C), temperature source Temporal, height 1.676 m (5' 6\"), weight 57.2 kg (126 lb), SpO2 99 %, not currently breastfeeding.    Linnea Dudley, EMT    "

## 2021-10-29 ENCOUNTER — ANCILLARY PROCEDURE (OUTPATIENT)
Dept: ULTRASOUND IMAGING | Facility: CLINIC | Age: 36
End: 2021-10-29
Attending: ADVANCED PRACTICE MIDWIFE
Payer: COMMERCIAL

## 2021-10-29 ENCOUNTER — OFFICE VISIT (OUTPATIENT)
Dept: OBGYN | Facility: CLINIC | Age: 36
End: 2021-10-29
Attending: REGISTERED NURSE
Payer: COMMERCIAL

## 2021-10-29 ENCOUNTER — LAB (OUTPATIENT)
Dept: LAB | Facility: CLINIC | Age: 36
End: 2021-10-29
Attending: REGISTERED NURSE
Payer: COMMERCIAL

## 2021-10-29 VITALS
BODY MASS INDEX: 20.63 KG/M2 | DIASTOLIC BLOOD PRESSURE: 77 MMHG | SYSTOLIC BLOOD PRESSURE: 109 MMHG | HEART RATE: 69 BPM | HEIGHT: 66 IN | WEIGHT: 128.4 LBS

## 2021-10-29 DIAGNOSIS — Z67.91 RH NEGATIVE STATE IN ANTEPARTUM PERIOD: ICD-10-CM

## 2021-10-29 DIAGNOSIS — C44.42 SQUAMOUS CELL CARCINOMA OF NECK: ICD-10-CM

## 2021-10-29 DIAGNOSIS — Z34.91 ENCOUNTER FOR SUPERVISION OF NORMAL PREGNANCY IN FIRST TRIMESTER, UNSPECIFIED GRAVIDITY: ICD-10-CM

## 2021-10-29 DIAGNOSIS — O09.511 PRIMIGRAVIDA OF ADVANCED MATERNAL AGE IN FIRST TRIMESTER: ICD-10-CM

## 2021-10-29 DIAGNOSIS — O09.90 HIGH-RISK PREGNANCY, UNSPECIFIED TRIMESTER: ICD-10-CM

## 2021-10-29 DIAGNOSIS — O09.90 HIGH-RISK PREGNANCY, UNSPECIFIED TRIMESTER: Primary | ICD-10-CM

## 2021-10-29 DIAGNOSIS — O26.899 RH NEGATIVE STATE IN ANTEPARTUM PERIOD: ICD-10-CM

## 2021-10-29 LAB
ABO/RH(D): NORMAL
ANTIBODY SCREEN: NEGATIVE
ERYTHROCYTE [DISTWIDTH] IN BLOOD BY AUTOMATED COUNT: 11.4 % (ref 10–15)
HCT VFR BLD AUTO: 33.1 % (ref 35–47)
HGB BLD-MCNC: 11.5 G/DL (ref 11.7–15.7)
MCH RBC QN AUTO: 30.8 PG (ref 26.5–33)
MCHC RBC AUTO-ENTMCNC: 34.7 G/DL (ref 31.5–36.5)
MCV RBC AUTO: 89 FL (ref 78–100)
PLATELET # BLD AUTO: 244 10E3/UL (ref 150–450)
RBC # BLD AUTO: 3.73 10E6/UL (ref 3.8–5.2)
SPECIMEN EXPIRATION DATE: NORMAL
T4 FREE SERPL-MCNC: 0.95 NG/DL (ref 0.76–1.46)
WBC # BLD AUTO: 6.6 10E3/UL (ref 4–11)

## 2021-10-29 PROCEDURE — 87086 URINE CULTURE/COLONY COUNT: CPT | Performed by: REGISTERED NURSE

## 2021-10-29 PROCEDURE — 86706 HEP B SURFACE ANTIBODY: CPT

## 2021-10-29 PROCEDURE — 86901 BLOOD TYPING SEROLOGIC RH(D): CPT | Performed by: REGISTERED NURSE

## 2021-10-29 PROCEDURE — 87389 HIV-1 AG W/HIV-1&-2 AB AG IA: CPT

## 2021-10-29 PROCEDURE — 76817 TRANSVAGINAL US OBSTETRIC: CPT | Mod: 26 | Performed by: OBSTETRICS & GYNECOLOGY

## 2021-10-29 PROCEDURE — 82306 VITAMIN D 25 HYDROXY: CPT

## 2021-10-29 PROCEDURE — 84439 ASSAY OF FREE THYROXINE: CPT

## 2021-10-29 PROCEDURE — G0463 HOSPITAL OUTPT CLINIC VISIT: HCPCS | Mod: 25

## 2021-10-29 PROCEDURE — 99207 PR PRENATAL VISIT: CPT | Performed by: REGISTERED NURSE

## 2021-10-29 PROCEDURE — 86787 VARICELLA-ZOSTER ANTIBODY: CPT

## 2021-10-29 PROCEDURE — 87340 HEPATITIS B SURFACE AG IA: CPT

## 2021-10-29 PROCEDURE — 86777 TOXOPLASMA ANTIBODY: CPT

## 2021-10-29 PROCEDURE — 86803 HEPATITIS C AB TEST: CPT

## 2021-10-29 PROCEDURE — 36415 COLL VENOUS BLD VENIPUNCTURE: CPT

## 2021-10-29 PROCEDURE — 76817 TRANSVAGINAL US OBSTETRIC: CPT

## 2021-10-29 PROCEDURE — 86762 RUBELLA ANTIBODY: CPT

## 2021-10-29 PROCEDURE — 86780 TREPONEMA PALLIDUM: CPT

## 2021-10-29 PROCEDURE — 85027 COMPLETE CBC AUTOMATED: CPT

## 2021-10-29 RX ORDER — PYRIDOXINE HCL (VITAMIN B6) 25 MG
25 TABLET ORAL DAILY
Qty: 120 TABLET | Refills: 3 | Status: SHIPPED | OUTPATIENT
Start: 2021-10-29 | End: 2022-04-15

## 2021-10-29 RX ORDER — PRENATAL VIT/IRON FUM/FOLIC AC 27MG-0.8MG
1 TABLET ORAL DAILY
COMMUNITY

## 2021-10-29 ASSESSMENT — PATIENT HEALTH QUESTIONNAIRE - PHQ9
5. POOR APPETITE OR OVEREATING: NOT AT ALL
SUM OF ALL RESPONSES TO PHQ QUESTIONS 1-9: 1

## 2021-10-29 ASSESSMENT — ANXIETY QUESTIONNAIRES
5. BEING SO RESTLESS THAT IT IS HARD TO SIT STILL: NOT AT ALL
IF YOU CHECKED OFF ANY PROBLEMS ON THIS QUESTIONNAIRE, HOW DIFFICULT HAVE THESE PROBLEMS MADE IT FOR YOU TO DO YOUR WORK, TAKE CARE OF THINGS AT HOME, OR GET ALONG WITH OTHER PEOPLE: NOT DIFFICULT AT ALL
3. WORRYING TOO MUCH ABOUT DIFFERENT THINGS: NOT AT ALL
1. FEELING NERVOUS, ANXIOUS, OR ON EDGE: SEVERAL DAYS
7. FEELING AFRAID AS IF SOMETHING AWFUL MIGHT HAPPEN: NOT AT ALL
GAD7 TOTAL SCORE: 1
6. BECOMING EASILY ANNOYED OR IRRITABLE: NOT AT ALL
2. NOT BEING ABLE TO STOP OR CONTROL WORRYING: NOT AT ALL

## 2021-10-29 ASSESSMENT — MIFFLIN-ST. JEOR: SCORE: 1283.92

## 2021-10-29 ASSESSMENT — PAIN SCALES - GENERAL: PAINLEVEL: NO PAIN (0)

## 2021-10-29 NOTE — LETTER
10/29/2021       RE: Mira Cao  [address]  [place] [address]     Dear Colleague,    Thank you for referring your patient, Mira Cao, to the [facility] WOMEN'S CLINIC [place] at [facility]. Please see a copy of my visit note below.    S OB Intake note  Subjective   36 year old female presents to clinic for initiation of OB care. Patient's last menstrual period was 2021.  at 8w2d by Estimated Date of Delivery: 2022 based on LMP. Reviewed dating ultrasound. Pregnancy is planned.    Partner name - Ottoniel.        Symptoms since LMP include cramping, nausea, breast tenderness and fatigue. Patient has tried these relief measures: diet modification, small frequent meals, increased rest and increased fluids. Interested in B6 and unisom.    Radiation to throat last 2021. Has oral scan every 3 months and plans to continue this during pregnancy. If any abnormal results will be followed up with imaging, but NO PET scan.     - Genetic/Infection questionnaire completed, risks include hx varicella, cousin born with cystic fibrosis and hx HPV. Had one glass of wine prior to being aware she was pregnant. Pt  does not have a recent known exposure to Parvo or CMV so IgG/IgM testing WILL NOT be ordered.   Recommended Flu Vaccine.  Patient declined, will consider next visit    - Current Medications    Current Outpatient Medications   Medication Sig Dispense Refill     biotin 1000 MCG TABS tablet Take 1,000 mcg by mouth daily       Magnesium Oxide 250 MG TABS Take 250 mg by mouth daily       Prenatal Vit-Fe Fumarate-FA (PRENATAL MULTIVITAMIN W/IRON) 27-0.8 MG tablet Take 1 tablet by mouth daily       pyridOXINE (VITAMIN B6) 25 MG tablet Take 1 tablet (25 mg) by mouth daily 120 tablet 3     cyanocobalamin (VITAMIN B-12) 100 MCG tablet Take 250 mcg by mouth daily       loratadine (CLARITIN) 10 MG tablet Take 10 mg by mouth daily as needed   (Patient not taking: Reported on 10/29/2021)       Milk Thistle-Dand-Fennel-Licor (MILK THISTLE XTRA) CAPS capsule Take 1 capsule by mouth daily       Omega-3 Fatty Acids (OMEGA 3 PO) Take 2 capsules by mouth daily (Patient not taking: Reported on 10/29/2021)       Vitamin D, Cholecalciferol, 25 MCG (1000 UT) TABS Take 2,000 Units by mouth daily (Patient not taking: Reported on 10/29/2021)           - Co-morbids    Past Medical History:   Diagnosis Date     Irritable bladder      Squamous cell carcinoma of neck      Uncomplicated asthma      - Risk for GDM : No known risk factors for GDM so  WILL NOT have an early GCT and Hgb A1C    - High risk factors for Pre E-  No known risk factors of High risk for Pre E       - Moderate risk factor for Pre E Nulliparity  and Age =35 years or older   Meets one high risk factors or two  of the moderate risk factors so WILL NOT consider starting low dose aspirin (81mg) starting between 12 and 28 weeks to prevent early onset preeclampsia    - The patient  does not have a history of spontaneous  birth so  WILL NOT consider progesterone starting at 16-20 weeks and/or serial transvaginal cervical length ultrasounds from 16-24 weeks.     -The patient does have a history of immunosuppresion or HIV so Toxoplasma IgG/IgM WILL be ordered.           PERSONAL/SOCIAL HISTORY    with partner.  Employment: Full time as a .  Job involves sedentary activity. Rides [employer] or does yoga for 20 minutes per day. Still been walking daily.   Her partner works as a  for a family business.   History of anxiety or depression: yes, in the context of treatment for cancer. Mood stable now.   Additional items: None    Objective  -VS: reviewed and within normal limits   -General appearance: no acute distress, patient is comfortable   NEUROLOGICAL/PSYCHIATRIC   - Orientated x3,   -Mood and affect: : normal     Assessment/Plan  Mira was seen today for prenatal  care.    Diagnoses and all orders for this visit:    High-risk pregnancy, unspecified trimester  -     Hepatitis B Surface Antibody; Future  -     Rubella Antibody IgG; Future  -     Varicella Zoster Virus Antibody IgG; Future  -     Urine Culture  -     Hepatitis C antibody; Future  -     ABO/Rh type and screen  -     CBC with platelets; Future  -     Hepatitis B surface antigen; Future  -     HIV Antigen Antibody Combo; Future  -     Treponema Abs w Reflex to RPR and Titer; Future  -     Vitamin D Deficiency; Future  -     Thyroxine, Free; Future  -     pyridOXINE (VITAMIN B6) 25 MG tablet; Take 1 tablet (25 mg) by mouth daily  -     Mat Fetal Med Ctr Referral - Pregnancy; Future  -     Toxoplasma gondii abys IgG and IgM; Future    Rh negative state in antepartum period    Primigravida of advanced maternal age in first trimester    Squamous cell carcinoma of neck        36 year old  8w2d weeks of pregnancy with ANN of 2022 by LMP of Patient's last menstrual period was 2021.. Ultrasound confirms.   Outpatient Encounter Medications as of 10/29/2021   Medication Sig Dispense Refill     biotin 1000 MCG TABS tablet Take 1,000 mcg by mouth daily       Magnesium Oxide 250 MG TABS Take 250 mg by mouth daily       Prenatal Vit-Fe Fumarate-FA (PRENATAL MULTIVITAMIN W/IRON) 27-0.8 MG tablet Take 1 tablet by mouth daily       pyridOXINE (VITAMIN B6) 25 MG tablet Take 1 tablet (25 mg) by mouth daily 120 tablet 3     cyanocobalamin (VITAMIN B-12) 100 MCG tablet Take 250 mcg by mouth daily       loratadine (CLARITIN) 10 MG tablet Take 10 mg by mouth daily as needed  (Patient not taking: Reported on 10/29/2021)       Milk Thistle-Dand-Fennel-Licor (MILK THISTLE XTRA) CAPS capsule Take 1 capsule by mouth daily       Omega-3 Fatty Acids (OMEGA 3 PO) Take 2 capsules by mouth daily (Patient not taking: Reported on 10/29/2021)       Vitamin D, Cholecalciferol, 25 MCG (1000 UT) TABS Take 2,000 Units by mouth daily  (Patient not taking: Reported on 10/29/2021)       [DISCONTINUED] citalopram (CELEXA) 10 MG tablet Take 1 tablet (10 mg) by mouth daily (Patient not taking: Reported on 12/17/2020) 30 tablet 11     [DISCONTINUED] ferrous sulfate (KP FERROUS SULFATE) 325 (65 Fe) MG tablet Take 325 mg by mouth daily (with breakfast)       [DISCONTINUED] LORazepam (ATIVAN) 0.5 MG tablet Take 0.5-1 mg by mouth as needed        [DISCONTINUED] order for DME Equipment being ordered: facioplasty garment 1 each 2     No facility-administered encounter medications on file as of 10/29/2021.      Orders Placed This Encounter   Procedures     Hepatitis B Surface Antibody     Rubella Antibody IgG     Varicella Zoster Virus Antibody IgG     Hepatitis C antibody     CBC with platelets     Hepatitis B surface antigen     HIV Antigen Antibody Combo     Treponema Abs w Reflex to RPR and Titer     Vitamin D Deficiency     Thyroxine, Free     Toxoplasma gondii abys IgG and IgM     Mat Fetal Med Ctr Referral - Pregnancy     Adult Type and Screen     Orders Placed This Encounter   Procedures     Hepatitis B Surface Antibody     Rubella Antibody IgG     Varicella Zoster Virus Antibody IgG     Hepatitis C antibody     CBC with platelets     Hepatitis B surface antigen     HIV Antigen Antibody Combo     Treponema Abs w Reflex to RPR and Titer     Vitamin D Deficiency     Thyroxine, Free     Toxoplasma gondii abys IgG and IgM     Mat Fetal Med Ctr Referral - Pregnancy     Adult Type and Screen       - Oriented to Practice, types of care, and how to reach a provider.  Pt prefers Undecided between CNM and MD, will continue to consider.   - Patient received 1st trimester new OB education packet complete with aide of The Expectant Family booklet including information on genetic screening test options.  - Patient desires 1st trimester screening and desires level II ultrasound which were ordered.  - Educational handout on the prevention of infections diseases  during pregnancy provided.  - Patient was encouraged to start prenatal vitamins as tolerated.    - Patient was sent to lab for routine OB labs including varicella, free T4.   - Reviewed recommendation for 17P, pt not a candidate.     - Reviewed risk for diabetes in pregnancy, pt not a candidate  - Reviewed recommendation for low dose aspirin daily to prevent pre eclampsia, pt agrees, follow up at NOB visit.   - Pregnancy concerns to be addressed by provider at new OB exam include:     ASA recommendation  AMA recommendations for monitoring and timing of delivery  TSH elevated in context of pregnancy, Free T4 added to intake labs. F/u re: need for medication.  Nausea (prescription sent for B6, has unisom at home)    Pt to RTO for NOB visit in 2-3 weeks and prn if questions or concerns    ÁNGEL Covington CNM

## 2021-10-29 NOTE — PROGRESS NOTES
[facility] OB Intake note  Subjective   36 year old female presents to clinic for initiation of OB care. Patient's last menstrual period was 2021.  at 8w2d by Estimated Date of Delivery: 2022 based on LMP. Reviewed dating ultrasound. Pregnancy is planned.    Partner name - Ottoniel.        Symptoms since LMP include cramping, nausea, breast tenderness and fatigue. Patient has tried these relief measures: diet modification, small frequent meals, increased rest and increased fluids. Interested in B6 and unisom.    Radiation to throat last 2021. Has oral scan every 3 months and plans to continue this during pregnancy. If any abnormal results will be followed up with imaging, but NO PET scan.     - Genetic/Infection questionnaire completed, risks include hx varicella, cousin born with cystic fibrosis and hx HPV. Had one glass of wine prior to being aware she was pregnant. Pt  does not have a recent known exposure to Parvo or CMV so IgG/IgM testing WILL NOT be ordered.   Recommended Flu Vaccine.  Patient declined, will consider next visit    - Current Medications    Current Outpatient Medications   Medication Sig Dispense Refill     biotin 1000 MCG TABS tablet Take 1,000 mcg by mouth daily       Magnesium Oxide 250 MG TABS Take 250 mg by mouth daily       Prenatal Vit-Fe Fumarate-FA (PRENATAL MULTIVITAMIN W/IRON) 27-0.8 MG tablet Take 1 tablet by mouth daily       pyridOXINE (VITAMIN B6) 25 MG tablet Take 1 tablet (25 mg) by mouth daily 120 tablet 3     cyanocobalamin (VITAMIN B-12) 100 MCG tablet Take 250 mcg by mouth daily       loratadine (CLARITIN) 10 MG tablet Take 10 mg by mouth daily as needed  (Patient not taking: Reported on 10/29/2021)       Milk Thistle-Dand-Fennel-Licor (MILK THISTLE XTRA) CAPS capsule Take 1 capsule by mouth daily       Omega-3 Fatty Acids (OMEGA 3 PO) Take 2 capsules by mouth daily (Patient not taking: Reported on 10/29/2021)       Vitamin D, Cholecalciferol, 25 MCG (1000 UT)  TABS Take 2,000 Units by mouth daily (Patient not taking: Reported on 10/29/2021)           - Co-morbids    Past Medical History:   Diagnosis Date     Irritable bladder      Squamous cell carcinoma of neck      Uncomplicated asthma      - Risk for GDM : No known risk factors for GDM so  WILL NOT have an early GCT and Hgb A1C    - High risk factors for Pre E-  No known risk factors of High risk for Pre E       - Moderate risk factor for Pre E Nulliparity  and Age =35 years or older   Meets one high risk factors or two  of the moderate risk factors so WILL NOT consider starting low dose aspirin (81mg) starting between 12 and 28 weeks to prevent early onset preeclampsia    - The patient  does not have a history of spontaneous  birth so  WILL NOT consider progesterone starting at 16-20 weeks and/or serial transvaginal cervical length ultrasounds from 16-24 weeks.     -The patient does have a history of immunosuppresion or HIV so Toxoplasma IgG/IgM WILL be ordered.           PERSONAL/SOCIAL HISTORY    with partner.  Employment: Full time as a .  Job involves sedentary activity. Rides [employer] or does yoga for 20 minutes per day. Still been walking daily.   Her partner works as a  for a family business.   History of anxiety or depression: yes, in the context of treatment for cancer. Mood stable now.   Additional items: None    Objective  -VS: reviewed and within normal limits   -General appearance: no acute distress, patient is comfortable   NEUROLOGICAL/PSYCHIATRIC   - Orientated x3,   -Mood and affect: : normal     Assessment/Plan  Mira was seen today for prenatal care.    Diagnoses and all orders for this visit:    High-risk pregnancy, unspecified trimester  -     Hepatitis B Surface Antibody; Future  -     Rubella Antibody IgG; Future  -     Varicella Zoster Virus Antibody IgG; Future  -     Urine Culture  -     Hepatitis C antibody; Future  -     ABO/Rh type and screen  -      CBC with platelets; Future  -     Hepatitis B surface antigen; Future  -     HIV Antigen Antibody Combo; Future  -     Treponema Abs w Reflex to RPR and Titer; Future  -     Vitamin D Deficiency; Future  -     Thyroxine, Free; Future  -     pyridOXINE (VITAMIN B6) 25 MG tablet; Take 1 tablet (25 mg) by mouth daily  -     [facility] Referral - Pregnancy; Future  -     Toxoplasma gondii abys IgG and IgM; Future    Rh negative state in antepartum period    Primigravida of advanced maternal age in first trimester    Squamous cell carcinoma of neck        36 year old  8w2d weeks of pregnancy with ANN of 2022 by LMP of Patient's last menstrual period was 2021.. Ultrasound confirms.   Outpatient Encounter Medications as of 10/29/2021   Medication Sig Dispense Refill     biotin 1000 MCG TABS tablet Take 1,000 mcg by mouth daily       Magnesium Oxide 250 MG TABS Take 250 mg by mouth daily       Prenatal Vit-Fe Fumarate-FA (PRENATAL MULTIVITAMIN W/IRON) 27-0.8 MG tablet Take 1 tablet by mouth daily       pyridOXINE (VITAMIN B6) 25 MG tablet Take 1 tablet (25 mg) by mouth daily 120 tablet 3     cyanocobalamin (VITAMIN B-12) 100 MCG tablet Take 250 mcg by mouth daily       loratadine (CLARITIN) 10 MG tablet Take 10 mg by mouth daily as needed  (Patient not taking: Reported on 10/29/2021)       Milk Thistle-Dand-Fennel-Licor (MILK THISTLE XTRA) CAPS capsule Take 1 capsule by mouth daily       Omega-3 Fatty Acids (OMEGA 3 PO) Take 2 capsules by mouth daily (Patient not taking: Reported on 10/29/2021)       Vitamin D, Cholecalciferol, 25 MCG (1000 UT) TABS Take 2,000 Units by mouth daily (Patient not taking: Reported on 10/29/2021)       [DISCONTINUED] citalopram (CELEXA) 10 MG tablet Take 1 tablet (10 mg) by mouth daily (Patient not taking: Reported on 2020) 30 tablet 11     [DISCONTINUED] ferrous sulfate ( FERROUS SULFATE) 325 (65 Fe) MG tablet Take 325 mg by mouth daily (with breakfast)        [DISCONTINUED] LORazepam (ATIVAN) 0.5 MG tablet Take 0.5-1 mg by mouth as needed        [DISCONTINUED] order for DME Equipment being ordered: facioplasty garment 1 each 2     No facility-administered encounter medications on file as of 10/29/2021.      Orders Placed This Encounter   Procedures     Hepatitis B Surface Antibody     Rubella Antibody IgG     Varicella Zoster Virus Antibody IgG     Hepatitis C antibody     CBC with platelets     Hepatitis B surface antigen     HIV Antigen Antibody Combo     Treponema Abs w Reflex to RPR and Titer     Vitamin D Deficiency     Thyroxine, Free     Toxoplasma gondii abys IgG and IgM     [facility] Referral - Pregnancy     Adult Type and Screen     Orders Placed This Encounter   Procedures     Hepatitis B Surface Antibody     Rubella Antibody IgG     Varicella Zoster Virus Antibody IgG     Hepatitis C antibody     CBC with platelets     Hepatitis B surface antigen     HIV Antigen Antibody Combo     Treponema Abs w Reflex to RPR and Titer     Vitamin D Deficiency     Thyroxine, Free     Toxoplasma gondii abys IgG and IgM     [facility] F[facility] Referral - Pregnancy     Adult Type and Screen       - Oriented to Practice, types of care, and how to reach a provider.  Pt prefers Undecided between CNM and MD, will continue to consider.   - Patient received 1st trimester new OB education packet complete with aide of [facility] Expectant Family booklet including information on genetic screening test options.  - Patient desires 1st trimester screening and desires level II ultrasound which were ordered.  - Educational handout on the prevention of infections diseases during pregnancy provided.  - Patient was encouraged to start prenatal vitamins as tolerated.    - Patient was sent to lab for routine OB labs including varicella, free T4.   - Reviewed recommendation for 17P, pt not a candidate.     - Reviewed risk for diabetes in pregnancy, pt not a candidate  - Reviewed recommendation  for low dose aspirin daily to prevent pre eclampsia, pt agrees, follow up at NOB visit.   - Pregnancy concerns to be addressed by provider at new OB exam include:     ASA recommendation  AMA recommendations for monitoring and timing of delivery  TSH elevated in context of pregnancy, Free T4 added to intake labs. F/u re: need for medication.  Nausea (prescription sent for B6, has unisom at home)    Pt to RTO for NOB visit in 2-3 weeks and prn if questions or concerns    ÁNGEL CovingtonM

## 2021-10-30 LAB
BACTERIA UR CULT: NORMAL
T PALLIDUM AB SER QL: NONREACTIVE

## 2021-10-30 ASSESSMENT — ANXIETY QUESTIONNAIRES: GAD7 TOTAL SCORE: 1

## 2021-10-31 LAB
T GONDII IGG SER-ACNC: <3 IU/ML
T GONDII IGM SER-ACNC: 3.9 AU/ML

## 2021-11-01 ENCOUNTER — TELEPHONE (OUTPATIENT)
Dept: OBGYN | Facility: CLINIC | Age: 36
End: 2021-11-01

## 2021-11-01 DIAGNOSIS — R12 HEARTBURN: Primary | ICD-10-CM

## 2021-11-01 DIAGNOSIS — O21.9 NAUSEA AND VOMITING DURING PREGNANCY: ICD-10-CM

## 2021-11-01 LAB
DEPRECATED CALCIDIOL+CALCIFEROL SERPL-MC: 47 UG/L (ref 20–75)
HBV SURFACE AB SERPL IA-ACNC: 25.24 M[IU]/ML
HBV SURFACE AG SERPL QL IA: NONREACTIVE
HCV AB SERPL QL IA: NONREACTIVE
HIV 1+2 AB+HIV1 P24 AG SERPL QL IA: NONREACTIVE
RUBV IGG SERPL QL IA: 2.62 INDEX
RUBV IGG SERPL QL IA: POSITIVE
VZV IGG SER QL IA: 1463 INDEX
VZV IGG SER QL IA: POSITIVE

## 2021-11-01 RX ORDER — ONDANSETRON 4 MG/1
4 TABLET, FILM COATED ORAL EVERY 8 HOURS PRN
Qty: 20 TABLET | Refills: 0 | Status: SHIPPED | OUTPATIENT
Start: 2021-11-01 | End: 2021-11-22

## 2021-11-01 NOTE — TELEPHONE ENCOUNTER
Called and spoke with patient. She reported heartburn that began this morning with no relief. Has had many episodes of vomiting throughout the day. Has tried Tums with no relief. Pt states she has had daily nausea for several days, this is first time she has vomited this pregnancy, feels is d/t heartburn.     Spoke with SILVINO Simental CNM. VOR[facility] for Prilosec 20 mg daily and zofran 4mg Q8 hrs prn.     Informed patient will send Rxs for Prilosec and Zofran to her preferred pharmacy. Reviewed administration instructions.     Discussed eating several small meals and sipping on water throughout the day and avoiding lying down within 1-2 hours after eating or drinking.    Instructed patient to reach out if symptoms are not relieved or with any other questions or concerns.     Pt verbalized understanding and agreement, denied further questions/concerns.   
[facility] Call Center    Phone Message    May a detailed message be left on voicemail: yes     Reason for Call: Symptoms or Concerns     If patient has red-flag symptoms, warm transfer to triage line    Current symptom or concern: heart burn since 4am and vomiting and unables to eat and drink  tums does not work    Symptoms have been present for: 12 hour(s)    Has patient previously been seen for this? No    By :     Date:     Are there any new or worsening symptoms? Yes: Worsewning      Action Taken: Other: [facility]    Travel Screening: Not Applicable                                                                      
Never smoker

## 2021-11-02 ENCOUNTER — TRANSCRIBE ORDERS (OUTPATIENT)
Dept: MATERNAL FETAL MEDICINE | Facility: CLINIC | Age: 36
End: 2021-11-02

## 2021-11-02 DIAGNOSIS — O26.90 PREGNANCY RELATED CONDITION, ANTEPARTUM: Primary | ICD-10-CM

## 2021-11-12 ENCOUNTER — TELEPHONE (OUTPATIENT)
Dept: OBGYN | Facility: CLINIC | Age: 36
End: 2021-11-12
Payer: COMMERCIAL

## 2021-11-12 PROBLEM — M54.2 NECK PAIN: Status: RESOLVED | Noted: 2020-12-01 | Resolved: 2021-11-12

## 2021-11-12 NOTE — TELEPHONE ENCOUNTER
----- Message from Nini June RN sent at 11/12/2021  4:01 PM CST -----  Regarding: Constipated  What can I take

## 2021-11-12 NOTE — PROGRESS NOTES
Patient did not return to physical therapy and was lost follow up.  Current status of patient is unknown at this point.  Please see last therapy session for last reported subjective and/or objective information.  Patient is formally discharged from physical therapy.

## 2021-11-15 NOTE — PROGRESS NOTES
Outpatient Speech Language Pathology Discharge Note     Patient: Mira Cao  : 1985    Beginning/End Dates of Reporting Period:  10/25/2019 to 10/27/21    Referring Provider: Dr. Ruth Tello    Therapy Diagnosis: Dysphagia    Client Self Report: Patient with a history of T1N1 p16+ SCC of the oropharynx s/p left radical tonsillectomy on 10/24/19 and modified radical left neck dissection 19 with one positive node. She then completed post-operative proton beam radiation therapy at [facility] on 20. She reported she did not have a PEG during her treatment. She was seen in conjunction with ENT clinic visit to follow up on pill taking and exercises.      Goals:  Goal Identifier Diet   Goal Description 1. Pt will tolerate regular textures with thin liquids with no overt clinical s/sx of penetration/aspiration in the 100% of PO trials when independently using swallowing strategies.    Target Date 22   Date Met  10/27/21   Progress (detail required for progress note):  Pt reports she is swallowing better. She is able to take pills with better success. She feels like she can eat most foods with increased moisture. She is swallowing the vitamins better. She was educated on changing to gummies again or taking meds with puree consistency. She will try this at home.       Goal Identifier Exercises   Goal Description 2. Pt will complete 10 repetitions 3-5x/day of 5/5 oropharyngeal and jaw strengthening/ROM exercises with minimal written and/or verbal cues.    Target Date 22   Date Met  10/27/21   Progress (detail required for progress note):  She had been completing the exercises more regularly and this seems to have improved her swallow function. She will continue to complete the exercises at least once daily to maximize swallow function long term. Encouraged her to continue exercises. She will notify Dr. Tello if there are any changes however we anticipate her to do well for some time.          Plan:  Discharge from therapy.    Discharge:    Reason for Discharge: Patient has met all goals.    Discharge Plan: Patient to continue home program. She will notify Dr. Tello if there are changes in her swallowing function or with her jaw ROM.

## 2021-11-16 ENCOUNTER — OFFICE VISIT (OUTPATIENT)
Dept: OBGYN | Facility: CLINIC | Age: 36
End: 2021-11-16
Attending: OBSTETRICS & GYNECOLOGY
Payer: COMMERCIAL

## 2021-11-16 VITALS
WEIGHT: 128 LBS | BODY MASS INDEX: 20.57 KG/M2 | HEART RATE: 71 BPM | DIASTOLIC BLOOD PRESSURE: 78 MMHG | SYSTOLIC BLOOD PRESSURE: 114 MMHG | HEIGHT: 66 IN

## 2021-11-16 DIAGNOSIS — O26.899 RH NEGATIVE STATE IN ANTEPARTUM PERIOD: ICD-10-CM

## 2021-11-16 DIAGNOSIS — Z67.91 RH NEGATIVE STATE IN ANTEPARTUM PERIOD: ICD-10-CM

## 2021-11-16 DIAGNOSIS — O09.511 PRIMIGRAVIDA OF ADVANCED MATERNAL AGE IN FIRST TRIMESTER: ICD-10-CM

## 2021-11-16 PROCEDURE — G0463 HOSPITAL OUTPT CLINIC VISIT: HCPCS

## 2021-11-16 PROCEDURE — 99212 OFFICE O/P EST SF 10 MIN: CPT | Mod: GE | Performed by: OBSTETRICS & GYNECOLOGY

## 2021-11-16 ASSESSMENT — MIFFLIN-ST. JEOR: SCORE: 1282.35

## 2021-11-16 ASSESSMENT — PAIN SCALES - GENERAL: PAINLEVEL: NO PAIN (0)

## 2021-11-16 NOTE — PROGRESS NOTES
[facility]  New Obstetrics Visit    HPI: 36 year old  at 10w6d by LMP c/w 8w2d US here today for initial OB visit. Her LMP was 21. This was a planned pregnancy, and is desired.     She has been taking intermittent zofran, B6, and Unisom for nausea. Finally starting to feel a bit better. She has intermittent nausea but rare vomiting. Takes miralax for constipation.   Denies any issues with vaginal bleeding or discharge. She has had some pain along her coccyx that feels sharp in certain positions.       OBHx  OB History    Para Term  AB Living   2 0 0 0 1 0   SAB IAB Ectopic Multiple Live Births   0 0 0 0 0      # Outcome Date GA Lbr Richard/2nd Weight Sex Delivery Anes PTL Lv   2 Current            1 AB              - Pap Smears:      Lab Results   Component Value Date    PAP NIL 2020       PMHx:   Past Medical History:   Diagnosis Date     Irritable bladder      Squamous cell carcinoma of neck      Uncomplicated asthma      PSHx:   Past Surgical History:   Procedure Laterality Date     DISSECTION RADICAL NECK MODIFIED Left 2019    Procedure: DISSECTION, NECK, MODIFIED RADICAL LEFT;  Surgeon: Ruth Tello MD;  Location: [facility] OR     LARYNGOSCOPY WITH BIOPSY(IES) N/A 10/24/2019    Procedure: Direct laryngscopy with biopsy;  Surgeon: Ruth Tello MD;  Location: [facility] OR     TONSILLECTOMY ADULT Left 10/24/2019    Procedure: Left Radical  tonsillectomy;  Surgeon: Ruth Tello MD;  Location: [facility] OR     Meds:   Current Outpatient Medications   Medication     aspirin (ASA) 81 MG EC tablet     cyanocobalamin (VITAMIN B-12) 100 MCG tablet     Magnesium Oxide 250 MG TABS     omeprazole (PRILOSEC) 20 MG DR capsule     ondansetron (ZOFRAN) 4 MG tablet     Prenatal Vit-Fe Fumarate-FA (PRENATAL MULTIVITAMIN W/IRON) 27-0.8 MG tablet     pyridOXINE (VITAMIN B6) 25 MG tablet     biotin 1000 MCG TABS tablet     Milk Thistle-Dand-Fennel-Licor (MILK THISTLE XTRA) CAPS capsule     No  "current facility-administered medications for this visit.     Allergies:  NKDA    SocHx: Lives with . 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 Mira Cao is a 36 year old  at 10w6d by LMP c/w 8w2d US, here for new OB visit. Pregnancy is complicated by AMA, history of SCC of the oropharynx, subclinical hypothyroidism.    AMA  - With additional risk factor of nulliparity, plan to start ASA at 12 weeks, prescribed today  - NT with MFM scheduled     Prenatal Care  - Rh negative, will require Rhogam at 28 weeks or with any vaginal bleeding  - Rubella immune, varicella immune, RPR NR, HIV NR, Hep C NR, Hep B immune, Toxoplasma nl, UCx nl, pap nl 20  - Level II US with MFM scheduled     History of SCC oropharynx s/p left neck dissection and proton beam radiation therapy.  - s/p preconception counseling with MFM  - last radiation .  - Growth US at 28 weeks and 34 weeks due to increased risk of growth restriction  - Close follow-up and surveillance with ENT/oncology team throughout the pregnancy, as recommended    History of  Subclinical hypothyroidism  - Increased risk of overt hypothyroidism due to radiation history. TSH now normal   - TSH every trimester    Asthma  - Listed in chart. Hasn't had an inhaler since childhood    Follow up in 4 weeks.    Staffed with Dr. Keagan Landeros MD  Obstetrics & Gynecology, PGY-4  2021 2:11 PM  I agree with note as above. The patient was seen in continuity clinic by the resident doctor.  Assessment and plan were jointly made.  Breanna Boogie MD        "

## 2021-11-16 NOTE — LETTER
2021       RE: Mira Cao  [address]  [place] [address]     Dear Colleague,    Thank you for referring your patient, Mira Cao, to the [facility] [place] at [facility]. Please see a copy of my visit note below.    [facility]  New Obstetrics Visit    HPI: 36 year old  at 10w6d by LMP c/w 8w2d US here today for initial OB visit. Her LMP was 21. This was a planned pregnancy, and is desired.     She has been taking intermittent zofran, B6, and Unisom for nausea. Finally starting to feel a bit better. She has intermittent nausea but rare vomiting. Takes miralax for constipation.   Denies any issues with vaginal bleeding or discharge. She has had some pain along her coccyx that feels sharp in certain positions.       OBHx  OB History    Para Term  AB Living   2 0 0 0 1 0   SAB IAB Ectopic Multiple Live Births   0 0 0 0 0      # Outcome Date GA Lbr Richard/2nd Weight Sex Delivery Anes PTL Lv   2 Current            1 AB              - Pap Smears:      Lab Results   Component Value Date    PAP NIL 2020       PMHx:   Past Medical History:   Diagnosis Date     Irritable bladder      Squamous cell carcinoma of neck      Uncomplicated asthma      PSHx:   Past Surgical History:   Procedure Laterality Date     DISSECTION RADICAL NECK MODIFIED Left 2019    Procedure: DISSECTION, NECK, MODIFIED RADICAL LEFT;  Surgeon: Ruth Tello MD;  Location: [facility] OR     LARYNGOSCOPY WITH BIOPSY(IES) N/A 10/24/2019    Procedure: Direct laryngscopy with biopsy;  Surgeon: Ruth Tello MD;  Location: [facility] OR     TONSILLECTOMY ADULT Left 10/24/2019    Procedure: Left Radical  tonsillectomy;  Surgeon: Ruth Tello MD;  Location: [facility] OR     Meds:   Current Outpatient Medications   Medication     aspirin (ASA) 81 MG EC tablet     cyanocobalamin (VITAMIN B-12) 100 MCG tablet     Magnesium Oxide 250 MG  "TABS     omeprazole (PRILOSEC) 20 MG DR capsule     ondansetron (ZOFRAN) 4 MG tablet     Prenatal Vit-Fe Fumarate-FA (PRENATAL MULTIVITAMIN W/IRON) 27-0.8 MG tablet     pyridOXINE (VITAMIN B6) 25 MG tablet     biotin 1000 MCG TABS tablet     Milk Thistle-Dand-Fennel-Licor (MILK THISTLE XTRA) CAPS capsule     No current facility-administered medications for this visit.     Allergies:  NKDA    SocHx: Lives with . 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 Mira Cao is a 36 year old  at 10w6d by LMP c/w 8w2d US, here for new OB visit. Pregnancy is complicated by AMA, history of SCC of the oropharynx, subclinical hypothyroidism.    AMA  - With additional risk factor of nulliparity, plan to start ASA at 12 weeks, prescribed today  - NT with MFM scheduled     Prenatal Care  - Rh negative, will require Rhogam at 28 weeks or with any vaginal bleeding  - Rubella immune, varicella immune, RPR NR, HIV NR, Hep C NR, Hep B immune, Toxoplasma nl, UCx nl, pap nl 20  - Level II US with MFM scheduled     History of SCC oropharynx s/p left neck dissection and proton beam radiation therapy.  - s/p preconception counseling with MFM  - last radiation .  - Growth US at 28 weeks and 34 weeks due to increased risk of growth restriction  - Close follow-up and surveillance with ENT/oncology team throughout the pregnancy, as recommended    History of  Subclinical hypothyroidism  - Increased risk of overt hypothyroidism due to radiation history. TSH now normal   - TSH every trimester    Asthma  - Listed in chart. Hasn't had an inhaler since childhood    Follow up in 4 weeks.    Staffed with Dr. Keagan Landeros MD  Obstetrics & Gynecology, " PGY-4  11/16/2021 2:11 PM  I agree with note as above. The patient was seen in continuity clinic by the resident doctor.  Assessment and plan were jointly made.  Breanna Boogie MD

## 2021-11-16 NOTE — NURSING NOTE
Chief Complaint   Patient presents with     Prenatal Care     MAI  10 weeks and 6 days   Estrellita Em LPN

## 2021-11-22 ENCOUNTER — TELEPHONE (OUTPATIENT)
Dept: OBGYN | Facility: CLINIC | Age: 36
End: 2021-11-22
Payer: COMMERCIAL

## 2021-11-22 DIAGNOSIS — R12 HEARTBURN: ICD-10-CM

## 2021-11-22 DIAGNOSIS — O21.9 NAUSEA AND VOMITING DURING PREGNANCY: ICD-10-CM

## 2021-11-22 RX ORDER — ONDANSETRON 4 MG/1
4 TABLET, FILM COATED ORAL EVERY 8 HOURS PRN
Qty: 20 TABLET | Refills: 0 | Status: SHIPPED | OUTPATIENT
Start: 2021-11-22 | End: 2022-04-15

## 2021-11-22 NOTE — TELEPHONE ENCOUNTER
----- Message from Leonie Hay RN sent at 11/22/2021  3:54 PM CST -----  Regarding: Refill of Zofran

## 2021-11-22 NOTE — TELEPHONE ENCOUNTER
Tried to reach Mira,  but received voicemail.  Left message that refill was sent.  To call back if questions.

## 2021-11-23 ENCOUNTER — PRE VISIT (OUTPATIENT)
Dept: MATERNAL FETAL MEDICINE | Facility: CLINIC | Age: 36
End: 2021-11-23
Payer: COMMERCIAL

## 2021-11-29 ENCOUNTER — LAB (OUTPATIENT)
Dept: LAB | Facility: CLINIC | Age: 36
End: 2021-11-29
Attending: REGISTERED NURSE
Payer: COMMERCIAL

## 2021-11-29 ENCOUNTER — OFFICE VISIT (OUTPATIENT)
Dept: MATERNAL FETAL MEDICINE | Facility: CLINIC | Age: 36
End: 2021-11-29
Attending: REGISTERED NURSE
Payer: COMMERCIAL

## 2021-11-29 ENCOUNTER — HOSPITAL ENCOUNTER (OUTPATIENT)
Dept: ULTRASOUND IMAGING | Facility: CLINIC | Age: 36
End: 2021-11-29
Attending: REGISTERED NURSE
Payer: COMMERCIAL

## 2021-11-29 DIAGNOSIS — Z83.49 FAMILY HISTORY OF CYSTIC FIBROSIS: ICD-10-CM

## 2021-11-29 DIAGNOSIS — Z67.91 RH NEGATIVE STATE IN ANTEPARTUM PERIOD: ICD-10-CM

## 2021-11-29 DIAGNOSIS — Z15.89 GENETIC SUSCEPTIBILITY TO DISEASE: ICD-10-CM

## 2021-11-29 DIAGNOSIS — Z84.81 FAMILY HISTORY OF CARRIER OF GENETIC DISEASE: ICD-10-CM

## 2021-11-29 DIAGNOSIS — Z36.9 PRENATAL SCREENING ENCOUNTER: Primary | ICD-10-CM

## 2021-11-29 DIAGNOSIS — O26.899 RH NEGATIVE STATE IN ANTEPARTUM PERIOD: ICD-10-CM

## 2021-11-29 DIAGNOSIS — Z84.81 FHX: GENETIC DISEASE CARRIER: ICD-10-CM

## 2021-11-29 DIAGNOSIS — O26.90 PREGNANCY RELATED CONDITION, ANTEPARTUM: ICD-10-CM

## 2021-11-29 DIAGNOSIS — O09.511 PRIMIGRAVIDA OF ADVANCED MATERNAL AGE IN FIRST TRIMESTER: ICD-10-CM

## 2021-11-29 DIAGNOSIS — O09.521 AMA (ADVANCED MATERNAL AGE) MULTIGRAVIDA 35+, FIRST TRIMESTER: ICD-10-CM

## 2021-11-29 DIAGNOSIS — Z36.9 PRENATAL SCREENING ENCOUNTER: ICD-10-CM

## 2021-11-29 DIAGNOSIS — Z36.9 FIRST TRIMESTER SCREENING: Primary | ICD-10-CM

## 2021-11-29 PROCEDURE — 36415 COLL VENOUS BLD VENIPUNCTURE: CPT

## 2021-11-29 PROCEDURE — 76801 OB US < 14 WKS SINGLE FETUS: CPT | Mod: 26 | Performed by: OBSTETRICS & GYNECOLOGY

## 2021-11-29 PROCEDURE — 76801 OB US < 14 WKS SINGLE FETUS: CPT

## 2021-11-29 PROCEDURE — 96040 HC GENETIC COUNSELING, EACH 30 MINUTES: CPT | Performed by: GENETIC COUNSELOR, MS

## 2021-11-29 NOTE — PROGRESS NOTES
"[facility] [place]  Genetic Counseling Consult    Patient: Mira Cao YOB: 1985   Date of Service: 11/29/21      Mira Cao was seen at [facility] [place] for genetic consultation to discuss the options for screening and testing for fetal chromosome abnormalities.  The indication for genetic counseling is advanced maternal age and patient's family history of cystic fibrosis. Mira was accompanied to today's consult by her , Ottoniel.        Impression/Plan:   1.  Mira had an ultrasound and blood draw for NIPT (NIPS test through [facility]).  Results are expected within 7-10 days, and will be available in EPIC.  We will contact her to discuss the results, and a copy will be forwarded to the office of the referring OB provider. In the event we are unable to contact the patient once results become available, she has requested we leave a detailed voicemail fully disclosing the results, including the predicted fetal sex, at her mobile telephone number. Mira reports she did a direct to consumer \"gender\" test earlier in pregnancy that reported baby is expected to be female. She wishes to confirm this information via NIPT.    2.  Mira had an NT ultrasound today. Please see the ultrasound report for additional details.     3.  Maternal serum AFP (single marker screen) is recommended after 15 weeks to screen for open neural tube defects. A quad screen should not be performed.    4.  An 18-20 week comprehensive ultrasound is standard of care for all women 35 or older at delivery.    5.  Mira and Ottoniel elected to proceed with expanded carrier screening during today's consult ([facility] comprehensive carrier screening, 289 conditions) due to Mira's family history of cystic fibrosis. Results are expected in 2-3 weeks from today and their combined results will be called out to Mira for review per couple request. " Consent to communicate forms were completed today and are scanned into their respective charts.    Pregnancy History:   /Parity:    Age at Delivery: 36 year old  ANN: 2022, by Last Menstrual Period  Gestational Age: 12w5d    No significant complications or exposures were reported in the current pregnancy.    Mira s pregnancy history is significant for 1 prior elective termination.    Medical History:   Mira gallego medical history (squamous cell carcinoma) was previously reviewed with LUIS Abbasi and genetic counselor Ifeanyi Newby MS, [facility] in 2021. Please see their documentation from 21 for details.       Family History:   A three-generation pedigree was obtained in 2021, and is scanned under the  Media  tab.     The following significant findings were reported by Mira on 21:    Mira reports that her sister has a history of a childhood bone cancer, and that her mother was diagnosed with breast cancer in her mid 60s.  Mira reports that she has already begun breast cancer screening.   ? Mira has a maternal first cousin who passed away at 15 from complications of cystic fibrosis.  We reviewed the autosomal recessive nature of CF and discussed that this history places Mira at higher probability (25% or 1/4) for being a carrier of CF. Based on Ottoniel's reported mixed  ethnicity, we discussed a carrier frequency of 1/25 for him.  These values correspond to a 1/400 probability for any pregnancy the couple has to be affected with cystic fibrosis (Mira's carrier risk x Ottoniel's carrier risk x 1/4 chance for an affected pregnancy if both are carriers).  Mira is interested in pursuing carrier screening before pregnancy, but would like to discuss further with Ottoniel before having blood drawn.  The couple was provided with my contact information and encouraged to contact me to discuss further.    ? Ottoniel reports that his mother was diagnosed  with breast cancer in her 40s, and that his maternal grandmother passed away from an unknown cancer in her 50s.  We discussed that this history could be suggestive of an underlying hereditary cancer syndrome, and that genetic counseling specific to cancer risks and cancer risk management would be recommended for his mother and potentially her children.  Ottoniel believes that his mother may have already pursued genetic counseling, but plans to follow up with her on this topic.       Otherwise, the reported family history is negative for multiple miscarriages, stillbirths, birth defects, cognitive impairment, known genetic conditions, and consanguinity.    No new significant updates to the family history were reported by the couple today.     Carrier Screening:   The patient reports that she and the father of the pregnancy have  ancestry:      Cystic fibrosis is an autosomal recessive genetic condition that occurs with increased frequency in individuals of  ancestry and carrier screening for this condition is available.  In addition,  screening in the [facility] includes cystic fibrosis.    Comprehensive carrier screening for mutations in a large panel of genes associated with autosomal recessive conditions including cystic fibrosis, Thalassemias, hearing loss, spinal muscular atrophy, and others, is now available. We also reviewed that comprehensive carrier screening can assess for common X-linked recessive disorders such as Fragile X syndrome.     We discussed that comprehensive carrier screening is designed to identify carrier status for conditions that are primarily childhood or adolescent onset. Comprehensive carrier screening does not evaluate for adult-onset conditions such as hereditary cancer syndromes, dementia/ Alzheimer's disease, or cardiovascular disease risk factors. Additionally, comprehensive carrier screening is not comprehensive for all known genetic diseases or inherited  conditions. It does not screen for autosomal dominant hereditary conditions. This is a screening test, and residual carrier status risk figures will be provided to the patient after results become available. Carrier screening is not meant to diagnose the patient with a condition, and generally carriers are asymptomatic. However, certain genes may confer increased risks for various health concerns in carriers (for example, but not limited to: EVAN, FMR1, DMD, etc.).    We reviewed availability of comprehensive carrier screening through [facility] for up to 289 conditions. [facility] can also screen for fewer conditions via a variety of panels. [facility] laboratory will report on pseudodeficiency alleles, which are benign variants that are not known to be associated with disease and are not thought to impact the individuals risk to be a carrier for these conditions.  However, the presence of pseudodeficiency alleles can exhibit false positive results on biochemical tests such as  screen. This will be addressed with the patient should a pseudodeficiency allele be identified in Mira or Ottoniel's sample. [facility] laboratory will report the common 5T CFTR allele in isolation.     The patient and her partner elected to pursue comprehensive carrier screening (03683, 289 conditions) via [facility] today. The couple have provided permission for [facility] genetic counseling to call Mira with the couple's combined carrier screening results once available in 2-3 weeks from today. Coverage for carrier screening can be variable by insurance companies. We reviewed the self-pay option that will be available to the patient and her partner ($250 for one test, $100 for partner testing) should insurance not cover the cost of testing, or, if with coverage, cost of testing is greater than $350 combined.        Risk Assessment for Chromosome Conditions:   We explained that the risk for fetal chromosome abnormalities increases with maternal age. We  discussed specific features of common chromosome abnormalities, including Down syndrome, trisomy 13, trisomy 18, and sex chromosome trisomies.      At age 36 at midtrimester, the risk to have a baby with Down syndrome is 1 in 168.     At age 36 at midtrimester, the risk to have a baby with any chromosome abnormality is 1 in 82.     At age 36 at delivery, the risk to have a baby with Down syndrome is 1 in 227.    At age 36 at delivery, the risk to have a baby with any chromosome abnormality is 1 in 129.        Testing Options:   We discussed the following options:   First trimester screening    First trimester ultrasound with nuchal translucency and nasal bone assessments, maternal plasma hCG, ALLISON-A, and AFP measurement    Screens for fetal trisomy 21, trisomy 13, and trisomy 18    Cannot screen for open neural tube defects; maternal serum AFP after 15 weeks is recommended       Non-invasive Prenatal Testing (NIPT)    Maternal plasma cell-free DNA testing; first trimester ultrasound with nuchal translucency and nasal bone assessment is recommended, when appropriate    Screens for fetal trisomy 21, trisomy 13, trisomy 18, and sex chromosome aneuploidy    Cannot screen for open neural tube defects; maternal serum AFP after 15 weeks is recommended       Chorionic villus sampling (CVS)    Invasive procedure typically performed in the first trimester by which placental villi are obtained for the purpose of chromosome analysis and/or other prenatal genetic analysis    Diagnostic results; >99% sensitivity for fetal chromosome abnormalities    Cannot test for open neural tube defects; maternal serum AFP after 15 weeks is recommended       Genetic Amniocentesis    Invasive procedure typically performed in the second trimester by which amniotic fluid is obtained for the purpose of chromosome analysis and/or other prenatal genetic analysis    Diagnostic results; >99% sensitivity for fetal chromosome abnormalities    AFAFP  measurement tests for open neural tube defects      NT Ultrasound     Assessment for the thickness of the nuchal translucency and the fetus' nasal bone performed between [id number] gestation    ~70% aneuploidy detection      We reviewed the benefits and limitations of this testing.  Screening tests provide a risk assessment specific to the pregnancy for certain fetal chromosome abnormalities, but cannot definitively diagnose or exclude a fetal chromosome abnormality.  Follow-up genetic counseling and consideration of diagnostic testing is recommended with any abnormal screening result.     Diagnostic tests carry inherent risks- including risk of miscarriage- that require careful consideration.  These tests can detect fetal chromosome abnormalities with greater than 99% certainty.  Results can be compromised by maternal cell contamination or mosaicism, and are limited by the resolution of cytogenetic G-banding technology.  There is no screening nor diagnostic test that can detect all forms of birth defects or mental disability.     It was a pleasure to be involved with Mira s care. Face-to-face time of the meeting was 40 minutes.    Denisse Meadows MS, [facility]  Genetic Counselor  [facility]  Maternal Fetal Medicine  Ph: [phone]   Pager: [phone]

## 2021-11-29 NOTE — PROGRESS NOTES
Please refer to ultrasound report under 'Imaging' Studies of 'Chart Review' tabs.    Jarrod Noguera M.D.

## 2021-12-06 ENCOUNTER — TELEPHONE (OUTPATIENT)
Dept: MATERNAL FETAL MEDICINE | Facility: CLINIC | Age: 36
End: 2021-12-06
Payer: COMMERCIAL

## 2021-12-06 LAB — SCANNED LAB RESULT: NORMAL

## 2021-12-06 NOTE — TELEPHONE ENCOUNTER
December 6, 2021    I spoke with Mira regarding her NIPT results.     [facility] indicate NO ANEUPLOIDY DETECTED for chromosomes 21, 18, 13, or sex chromosomes (XX). Per the patient's request during our initial genetic counseling consult, the predicted genetic sex of the pregnancy was confirmed over the phone today (female).    These results put the patient's current pregnancy at low risk for Down syndrome, trisomy 18, trisomy 13 and sex chromosome abnormalities.This test is reported to have the following sensitivities: Down syndrome- 99.99%, trisomy 18- 99.99%, trisomy 13- 99.99%, XX sex chromosomes- 98.33%, XY sex chromosomes- 99.99%.  Although these results are reassuring, this does not replace a standard chromosome analysis from a chorionic villus sampling or amniocentesis. The patient has declined proceeding with diagnostic invasive prenatal testing at this time.    MSAFP is the appropriate second trimester screening test for open neural tube defects; the maternal quad screen is not recommended. Her results are available in her Epic chart for review and will be forwarded to her primary OB.       The patient and her partner Ottoniel's expanded carrier screening results are pending at [facility] as of toady, and are expected to be available by 12/22/21. They will be called to Mira for review once available. All of the patient's questions were answered to her apparent satisfaction today.       Denisse Meadows MS, [facility]  Genetic Counselor  [facility]  Maternal Fetal Medicine  Ph: [phone]

## 2021-12-16 ENCOUNTER — TELEPHONE (OUTPATIENT)
Dept: MATERNAL FETAL MEDICINE | Facility: CLINIC | Age: 36
End: 2021-12-16
Payer: COMMERCIAL

## 2021-12-16 DIAGNOSIS — Z67.91 RH NEGATIVE STATE IN ANTEPARTUM PERIOD: ICD-10-CM

## 2021-12-16 DIAGNOSIS — O09.511 PRIMIGRAVIDA OF ADVANCED MATERNAL AGE IN FIRST TRIMESTER: ICD-10-CM

## 2021-12-16 DIAGNOSIS — O26.899 RH NEGATIVE STATE IN ANTEPARTUM PERIOD: ICD-10-CM

## 2021-12-16 LAB — SCANNED LAB RESULT: NORMAL

## 2021-12-16 NOTE — TELEPHONE ENCOUNTER
December 16, 2021    I called the patient to discuss her and her  Ottoniel's [facility] genetics carrier screening results. 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    Mira was not identified to be a carrier for any of the 289 conditions his sample was screened for. Therefore the couple's reproductive risks for having a child with any one of the conditions screened for on this panel have been significantly reduced.    We reviewed the autosomal recessive inheritance pattern of VLCAD. Every individual has two copies of the gene that is responsible for this condition, and if someone has a change or mutation that impacts how one copy of the gene functions, they are called a carrier for the condition.  If someone has two copies of the gene that have a harmful change, they are affected with the condition.  If two people who are carriers for the same condition have children, there is a chance for their children to be affected.  Each parent has a 50% chance for passing on their copy of the gene with a mutation, so there is a 25% chance for each pregnancy to get two copies of the mutation and be affected, a 50% chance for each pregnancy to be an unaffected carrier, and a 25% chance to be unaffected with two normal copies of the gene.    For each condition, [facility] provides a reproductive risk. This risk is based on Ottoniel's carrier status, the chance that Mira is a carrier (based on negative carrier screening results), and the 1 in 4 (25%) chance of both parents passing on the allele with a mutation or deletion. Presently, the couple's reproductive risk for having a child with this condition is: ~ 1 in 40,000. Mira verbalized understanding, and shared that she is relieved the couple were not identified to be a  carrier couple for any of the autosomal recessive conditions.     We also reviewed that a pseudodeficiency allele was identified in the patient's sample, specifically in the GALC gene. The presence of a pseudodeficiency allele does NOT impact this individual's risk for being a carrier. Individuals with a pseudodeficiency allele(s) may exhibit false positive results on related biochemical tests, such as  screening. However, pseudodeficiency alleles are not known to cause disease, including Krabbe disease. Carrier testing for the reproductive partner is not indicated.  Mira verbalized understanding and had no additional questions today.     Mira wished for the results to be released to her her email and provided permission for me to send both her and Ottoniel's results to the email address we have on file. Mira felt comfortable with receiving these results through e-mail. Per patient request I released the results.       Denisse Meadows MS, [facility]  Genetic Counselor  [facility]  Maternal Fetal Medicine  Ph: [phone]

## 2022-01-05 ENCOUNTER — HOSPITAL ENCOUNTER (OUTPATIENT)
Dept: ULTRASOUND IMAGING | Facility: CLINIC | Age: 37
End: 2022-01-05
Attending: REGISTERED NURSE
Payer: COMMERCIAL

## 2022-01-05 ENCOUNTER — OFFICE VISIT (OUTPATIENT)
Dept: MATERNAL FETAL MEDICINE | Facility: CLINIC | Age: 37
End: 2022-01-05
Attending: REGISTERED NURSE
Payer: COMMERCIAL

## 2022-01-05 DIAGNOSIS — O09.522 MULTIGRAVIDA OF ADVANCED MATERNAL AGE IN SECOND TRIMESTER: Primary | ICD-10-CM

## 2022-01-05 DIAGNOSIS — O26.90 PREGNANCY RELATED CONDITION, ANTEPARTUM: ICD-10-CM

## 2022-01-05 PROCEDURE — 76811 OB US DETAILED SNGL FETUS: CPT

## 2022-01-05 PROCEDURE — 76811 OB US DETAILED SNGL FETUS: CPT | Mod: 26 | Performed by: OBSTETRICS & GYNECOLOGY

## 2022-01-05 NOTE — PROGRESS NOTES
Please see full imaging report from [facility] program under imaging tab.    Rohan Hay MD  Maternal Fetal Medicine

## 2022-01-10 ENCOUNTER — OFFICE VISIT (OUTPATIENT)
Dept: OBGYN | Facility: CLINIC | Age: 37
End: 2022-01-10
Attending: MIDWIFE
Payer: COMMERCIAL

## 2022-01-10 VITALS
BODY MASS INDEX: 22.11 KG/M2 | SYSTOLIC BLOOD PRESSURE: 101 MMHG | HEART RATE: 76 BPM | WEIGHT: 137 LBS | DIASTOLIC BLOOD PRESSURE: 70 MMHG

## 2022-01-10 DIAGNOSIS — Z67.91 RH NEGATIVE STATE IN ANTEPARTUM PERIOD: ICD-10-CM

## 2022-01-10 DIAGNOSIS — O09.511 PRIMIGRAVIDA OF ADVANCED MATERNAL AGE IN FIRST TRIMESTER: ICD-10-CM

## 2022-01-10 DIAGNOSIS — O26.899 RH NEGATIVE STATE IN ANTEPARTUM PERIOD: ICD-10-CM

## 2022-01-10 PROBLEM — Z90.89 S/P TONSILLECTOMY: Status: RESOLVED | Noted: 2019-10-25 | Resolved: 2022-01-10

## 2022-01-10 PROCEDURE — 99207 PR PRENATAL VISIT: CPT | Performed by: MIDWIFE

## 2022-01-10 PROCEDURE — 87086 URINE CULTURE/COLONY COUNT: CPT | Performed by: MIDWIFE

## 2022-01-10 PROCEDURE — G0463 HOSPITAL OUTPT CLINIC VISIT: HCPCS

## 2022-01-10 ASSESSMENT — PAIN SCALES - GENERAL: PAINLEVEL: MODERATE PAIN (4)

## 2022-01-10 NOTE — LETTER
1/10/2022       RE: Mira Cao  [address]  [place] [address]     Dear Colleague,    Thank you for referring your patient, Mira Cao, to the [facility] [place] [facility]. Please see a copy of my visit note below.    Chief Complaint   Patient presents with     Prenatal Care     MAI 18 weeks and 5 days   Estrellita Em LPN    Subjective:      36 year old  at 18w5d presents for a routine prenatal appointment.       no vaginal bleeding or leakage of fluid.  no contractions pt is noting some lower abd  Cramping. Left sided discomfort  Starting at 4 am  Comes and goes uses heat and position changes to resolve   This is a familiar discomfort that she has had prior to pregnancy.  She denies flank pain hematuria, ? slight dysuria ,  No sx of vaginitis no vaginal bleeding   No vomiting, diarrhea or fever   Feeling FM x 2 weeks           No HA, visual changes, RUQ or epigastric pain.     Level II US  Results reviewed normal scan.      Objective:  Vitals:    01/10/22 1000   BP: 101/70   Pulse: 76   Weight: 62.1 kg (137 lb)     See OB flowsheet    Assessment/Plan     Encounter Diagnoses   Name Primary?     Rh negative state in antepartum period      Primigravida of advanced maternal age in first trimester      No orders of the defined types were placed in this encounter.    No orders of the defined types were placed in this encounter.      - Reviewed total weight gain, encouraged continued healthy diet and exercise.      - Reviewed why/how to contact provider.    Patient education/orders or handouts today:  PTL signs/symptoms and pt to call with worsening pain, N,V fever, Diahrrea or flank pain   Reviewed after hours oncall line to reach provider prn   Return to clinic in 5 weeks and prn if questions or concerns.   ÁNGEL Sanchez CNM

## 2022-01-10 NOTE — PROGRESS NOTES
Subjective:      36 year old  at 18w5d presents for a routine prenatal appointment.       no vaginal bleeding or leakage of fluid.  no contractions pt is noting some lower abd  Cramping. Left sided discomfort  Starting at 4 am  Comes and goes uses heat and position changes to resolve   This is a familiar discomfort that she has had prior to pregnancy.  She denies flank pain hematuria, ? slight dysuria ,  No sx of vaginitis no vaginal bleeding   No vomiting, diarrhea or fever   Feeling FM x 2 weeks           No HA, visual changes, RUQ or epigastric pain.     Level II US  Results reviewed normal scan.      Objective:  Vitals:    01/10/22 1000   BP: 101/70   Pulse: 76   Weight: 62.1 kg (137 lb)     See OB flowsheet    Assessment/Plan     Encounter Diagnoses   Name Primary?     Rh negative state in antepartum period      Primigravida of advanced maternal age in first trimester      No orders of the defined types were placed in this encounter.    No orders of the defined types were placed in this encounter.      - Reviewed total weight gain, encouraged continued healthy diet and exercise.      - Reviewed why/how to contact provider.    Patient education/orders or handouts today:  PTL signs/symptoms and pt to call with worsening pain, N,V fever, Diahrrea or flank pain   Reviewed after hours oncall line to reach provider prn   Return to clinic in 5 weeks and prn if questions or concerns.   ÁNGEL Sanchez CNM

## 2022-01-10 NOTE — PROGRESS NOTES
Chief Complaint   Patient presents with     Prenatal Care     MAI 18 weeks and 5 days   Estrellita Em LPN

## 2022-01-11 LAB — BACTERIA UR CULT: NORMAL

## 2022-01-24 DIAGNOSIS — R12 HEARTBURN: ICD-10-CM

## 2022-01-24 NOTE — TELEPHONE ENCOUNTER
Refill request for omeprazole. Sent 11/1/21 for heartburn. Patent still taking at 1/10/22 clinic visit. Refill sent.

## 2022-01-28 ENCOUNTER — OFFICE VISIT (OUTPATIENT)
Dept: OTOLARYNGOLOGY | Facility: CLINIC | Age: 37
End: 2022-01-28
Payer: COMMERCIAL

## 2022-01-28 VITALS
SYSTOLIC BLOOD PRESSURE: 99 MMHG | DIASTOLIC BLOOD PRESSURE: 63 MMHG | BODY MASS INDEX: 22.34 KG/M2 | WEIGHT: 139 LBS | OXYGEN SATURATION: 82 % | HEIGHT: 66 IN | TEMPERATURE: 97.5 F

## 2022-01-28 DIAGNOSIS — C10.9 SQUAMOUS CELL CARCINOMA OF OROPHARYNX (H): Primary | ICD-10-CM

## 2022-01-28 PROCEDURE — 31575 DIAGNOSTIC LARYNGOSCOPY: CPT | Performed by: OTOLARYNGOLOGY

## 2022-01-28 PROCEDURE — 99213 OFFICE O/P EST LOW 20 MIN: CPT | Mod: 25 | Performed by: OTOLARYNGOLOGY

## 2022-01-28 ASSESSMENT — PAIN SCALES - GENERAL: PAINLEVEL: NO PAIN (0)

## 2022-01-28 ASSESSMENT — MIFFLIN-ST. JEOR: SCORE: 1332.25

## 2022-01-28 NOTE — NURSING NOTE
"Chief Complaint   Patient presents with     RECHECK     3-4 month follow up       Blood pressure 99/63, temperature 97.5  F (36.4  C), temperature source Temporal, height 1.676 m (5' 6\"), weight 63 kg (139 lb), last menstrual period 09/01/2021, SpO2 (!) 82 %, not currently breastfeeding.    Linnea Dudley, EMT    "

## 2022-01-28 NOTE — LETTER
1/28/2022        RE: Mira Cao  [address]  [place] [address]     Dear Colleague,    Thank you for referring your patient, Mira Cao, to the [facility] EAR NOSE AND THROAT [facility]INIC [place] at [facility]. Please see a copy of my visit note below.    I had the pleasure of seeing Mira Cao in follow-up today at the [facility] Ot[facility].     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 The patient noticed a left neck mass in June. She thought this was related to a swollen lymph node due to stress as she had just bought a house.  She delayed evaluation until approximately July when she presented to urgent care.  She had an ultrasound obtained on 7/12/2019 which demonstrated a 3.7 x 2.3 x 1.3 cm mass in the left neck.  She then had a CT scan on 8/7/2019 which demonstrated a 1.8 x 1.4 x 3.8 cm partially necrotic/cystic level 2/3 neck mass, concerning for metastatic squamous cell carcinoma.  She was seen by Dr.Todd Cao and had an FNA performed on 8/8/2019.  The pathology from the FNA demonstrated atypical squamous proliferation which was p16-.  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  She elected for removal which was performed by Dr. Cao on 10/2/2019.  The excision was performed without a completion neck dissection.  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  She had a PET CT scan on 2019 locally which demonstrated postop changes without residual disease and no obvious primary malignancy.  On 10/10/2019 she saw Dr. Neely at [facility] for medical oncology consultation where possible chemotherapy was discussed.  She has met with Dr Melendez from radiation oncology at [facility].  She had a dental  cleaning.  She was taken to the operating room on 10/24/2019 for direct laryngoscopy with biopsy.  Initial biopsy of the tonsil demonstrated high-grade dysplasia.  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  A radical tonsillectomy was then performed based on the preoperative discussion with the patient. Final pathology demonstrated a T1 SCC of the tonsil with negative margins. She was taken to the OR on 11/20/2019 for a left neck dissection with resection of her scar. She had 1/38 lymph nodes positive - 0.4 cm deposit of metastatic SCC without STEVE. She had proton beam therapy postoperatively at [facility] with Dr Smith. She completed her treatment on 2/24/2020. She lost about 20 lbs with treatment. She had her 3 month post treatment PET in May 2020 which showed uptake in the right tonsil and right level II node thought to be reactive.       Interval history:  She comes in today for follow-up. She was last seen in clinic in October 2021. She is currently in her second trimester. She last saw OB on 1/10/2022. She says things are going well. She has no problems or concerns. She is trying to do her exercises still. She is doing myofascial release. She feels like things feel different after the massage, helps her with doing her exercises. She sees her every 3 weeks. She says eating is doing well. The morning sickness has resolved. She has nothing she can't eat or swallow. She feels like her throat is more dry on the left side. She has not been using a humidifier. She has no coughing or choking. She may have some issues with dry foods but can still swallow. She is gaining weight. She has no sore throat, odynophagia, otalgia, neck masses. She feels like her energy is ok. She says work is going ok. She is working from home. She is due in June.        MEDICATIONS:     Current Outpatient Medications   Medication Sig Dispense Refill     aspirin (ASA) 81  MG EC tablet Take 1 tablet (81 mg) by mouth daily 60 tablet 3     biotin 1000 MCG TABS tablet Take 1,000 mcg by mouth daily       cyanocobalamin (VITAMIN B-12) 100 MCG tablet Take 250 mcg by mouth daily       Magnesium Oxide 250 MG TABS Take 250 mg by mouth daily       Milk Thistle-Dand-Fennel-Licor (MILK THISTLE XTRA) CAPS capsule Take 1 capsule by mouth daily       omeprazole (PRILOSEC) 20 MG DR capsule Take 1 capsule (20 mg) by mouth daily 30 capsule 0     ondansetron (ZOFRAN) 4 MG tablet Take 1 tablet (4 mg) by mouth every 8 hours as needed for nausea 20 tablet 0     Prenatal Vit-Fe Fumarate-FA (PRENATAL MULTIVITAMIN W/IRON) 27-0.8 MG tablet Take 1 tablet by mouth daily       pyridOXINE (VITAMIN B6) 25 MG tablet Take 1 tablet (25 mg) by mouth daily 120 tablet 3       ALLERGIES:  No Known Allergies    HABITS/SOCIAL HISTORY:   Works as a .    She is  with no children.    She has no smoking history.  She has about 10 alcoholic beverages on weekends.  She has no chewing tobacco or vaping history.    Social History     Socioeconomic History     Marital status:      Spouse name: Ottoniel     Number of children: Not on file     Years of education: Not on file     Highest education level: Not on file   Occupational History     Not on file   Tobacco Use     Smoking status: Never Smoker     Smokeless tobacco: Never Used   Vaping Use     Vaping Use: Never used   Substance and Sexual Activity     Alcohol use: Not Currently     Comment: 2-3 drinks per week     Drug use: Never     Sexual activity: Yes     Partners: Male   Other Topics Concern     Not on file   Social History Narrative     Not on file     Social Determinants of Health     Financial Resource Strain: Not on file   Food Insecurity: Not on file   Transportation Needs: Not on file   Physical Activity: Not on file   Stress: Not on file   Social Connections: Not on file   Intimate Partner Violence: Not on file   Housing Stability: Not on file  "      PAST MEDICAL HISTORY:   Past Medical History:   Diagnosis Date     Irritable bladder      Squamous cell carcinoma of neck      Uncomplicated asthma         PAST SURGICAL HISTORY:   Past Surgical History:   Procedure Laterality Date     DISSECTION RADICAL NECK MODIFIED Left 11/20/2019    Procedure: DISSECTION, NECK, MODIFIED RADICAL LEFT;  Surgeon: Ruth Tello MD;  Location: [facility] OR     LARYNGOSCOPY WITH BIOPSY(IES) N/A 10/24/2019    Procedure: Direct laryngscopy with biopsy;  Surgeon: Ruth Tello MD;  Location: [facility] OR     TONSILLECTOMY ADULT Left 10/24/2019    Procedure: Left Radical  tonsillectomy;  Surgeon: Ruth Tello MD;  Location: [facility] OR       FAMILY HISTORY:    Family History   Problem Relation Age of Onset     Breast Cancer Mother      Cancer Mother         Breast cancer     Hyperlipidemia Father      Bone Cancer Sister      Cancer Sister         Bone cancer     Diabetes No family hx of        REVIEW OF SYSTEMS:  12 point ROS was negative other than the symptoms noted above in the HPI.  Patient Supplied Answers to Review of Systems  [facility] ENT ROS 5/14/2020   Constitutional -   Neurology Headache   Psychology -   Ears, Nose, Throat Ear pain   Musculoskeletal -   Endocrine -         PHYSICAL EXAMINATION:   BP 99/63 (BP Location: Right arm, Patient Position: Sitting, Cuff Size: Adult Regular)   Temp 97.5  F (36.4  C) (Temporal)   Ht 1.676 m (5' 6\")   Wt 63 kg (139 lb)   LMP 09/01/2021   SpO2 (!) 82%   BMI 22.44 kg/m     Appearance:   normal; NAD, age-appropriate appearance   Communication:   normal; communicates verbally, normal voice quality   Head/Face:   inspection -  Normal; no scars or visible lesions   Salivary glands -  Normal size, no tenderness, swelling, or palpable masses   Facial strength -  Normal and symmetric    Skin:  normal, no rash   Ears:  auricle (AD) -  normal  EAC (AD) -  normal  TM (AD) -  Normal, no effusion  auricle (AS) -  normal  EAC (AS) -  normal  TM (AS) " -  Normal, no effusion  Normal clinical speech reception   Nose:  Ext. inspection -  Normal  Internal Inspection -  Normal mucosa, septum, and turbinates   Nasopharynx:  normal mucosa, no masses   Oral Cavity:  lips -  Normal mucosa, oral competence, and stoma size   Age-appropriate dentition, healthy gingival mucosa   Hard palate, buccal, floor of mouth mucosa normal   Tongue - normal movement, no lesions   Oropharynx:  mucosa -  Normal, no lesions  soft palate -  Expected post radical tonsillectomy asymmetry of left soft palate  tonsils -  S/p left radical tonsillectomy, no secretions, no masses, no concerning mucosal lesions, no palpable masses, expected scar tissue; right tonsil with no palpable masses slightly cryptic in appearance which is stable, no mucosal lesions  BOT - normal  Vallecula - clear   Hypopharynx:  Normal pyriform sinus and pharyngeal wall mucosa   No pooled secretions    Larynx:  Epiglottis, AE folds, false vocal cords, true vocal cords, arytenoids normal in appearance with no edema of AE folds or arytenoids   bilaterally mobile cords    Neck: Well healed left neck incision   Radiation fibrosis of the neck  Some skin changes to the neck skin from radiation  No significant lymphedema  Normal range of motion   Lymphatic:  no abnormal nodes   Cardiovascular:  warm, pink, well-perfused extremities without swelling, tenderness, or edema   Respiratory:  Normal respiratory effort, no stridor   Neuro/Psych.:  mood/affect -  normal  mental status -  normal       PROCEDURES:   Flexible fiberoptic laryngoscopy: Scope exam was indicated due to oropharyngeal cancer. Verbal consent was obtained. The nasal cavity was prepped with an aerosolized solution of topical anesthetic and vasoconstrictive agent. The scope was passed through the anterior nasal cavity and advanced. Inspection of the nasopharynx revealed no gross abnormality. The base of tongue and vallecula are normal. There are no abnormalities in the  area of the left tonsil. The epiglottis, AE folds, false cords, true cords, arytenoids are normal and there is no edema of the arytenoids or AE folds.  Inspection of the larynx revealed bilaterally mobile vocal cords. Pyriform sinuses are symmetric. The airway is patent. Procedure tolerated well with no immediate complications noted.      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 She had an excision of a cystic neck mass by a local ENT which demonstrated a metastatic SCC. She underwent a radical tonsillectomy which showed a small primary tumor in the tonsil which was completely resected. She then had a completion neck dissection on 11/20/2019 with final pathology showing 1/38 nodes. She completed postoperative proton beam therapy at [facility] on 2/24/2020.     She has no concerning findings on exam today.    She is pregnant so we will defer imaging to avoid the risk of radiation to the fetus. We can repeat her imaging after her delivery.    She is having her TSH checked every trimester during her pregnancy by OB.    I will see her back in 4 months. She can readjust pending her pregnancy/delivery.    Thank you very much for the opportunity to participate in the care of your patient.      Ruth Tello MD  Otolaryngology- Head & Neck Surgery      This note was dictated with voice recognition software and then edited. Please excuse any unintentional errors.         CC:  Ifeanyi Soliman MD  Department of Radiation Oncology  [facility]

## 2022-02-12 ENCOUNTER — HEALTH MAINTENANCE LETTER (OUTPATIENT)
Age: 37
End: 2022-02-12

## 2022-02-15 DIAGNOSIS — R12 HEARTBURN: ICD-10-CM

## 2022-02-17 PROBLEM — O09.90 HIGH-RISK PREGNANCY, UNSPECIFIED TRIMESTER: Status: ACTIVE | Noted: 2021-10-29

## 2022-02-17 PROBLEM — C44.42 SQUAMOUS CELL CARCINOMA OF NECK: Status: ACTIVE | Noted: 2019-10-02

## 2022-04-01 ENCOUNTER — OFFICE VISIT (OUTPATIENT)
Dept: OBGYN | Facility: CLINIC | Age: 37
End: 2022-04-01
Attending: ADVANCED PRACTICE MIDWIFE
Payer: COMMERCIAL

## 2022-04-01 ENCOUNTER — TELEPHONE (OUTPATIENT)
Dept: OBGYN | Facility: CLINIC | Age: 37
End: 2022-04-01

## 2022-04-01 ENCOUNTER — TELEPHONE (OUTPATIENT)
Dept: OBGYN | Facility: CLINIC | Age: 37
End: 2022-04-01
Payer: COMMERCIAL

## 2022-04-01 VITALS
HEART RATE: 85 BPM | SYSTOLIC BLOOD PRESSURE: 93 MMHG | WEIGHT: 142 LBS | DIASTOLIC BLOOD PRESSURE: 64 MMHG | BODY MASS INDEX: 22.92 KG/M2

## 2022-04-01 DIAGNOSIS — Z67.91 RH NEGATIVE STATE IN ANTEPARTUM PERIOD: ICD-10-CM

## 2022-04-01 DIAGNOSIS — O09.90 HIGH-RISK PREGNANCY, UNSPECIFIED TRIMESTER: Primary | ICD-10-CM

## 2022-04-01 DIAGNOSIS — O26.899 RH NEGATIVE STATE IN ANTEPARTUM PERIOD: ICD-10-CM

## 2022-04-01 DIAGNOSIS — Z29.13 NEED FOR RHOGAM DUE TO RH NEGATIVE MOTHER: ICD-10-CM

## 2022-04-01 DIAGNOSIS — C44.42 SQUAMOUS CELL CARCINOMA OF NECK: ICD-10-CM

## 2022-04-01 DIAGNOSIS — Z23 NEED FOR DIPHTHERIA-TETANUS-PERTUSSIS (TDAP) VACCINE: ICD-10-CM

## 2022-04-01 LAB
DEPRECATED CALCIDIOL+CALCIFEROL SERPL-MC: 45 UG/L (ref 20–75)
ERYTHROCYTE [DISTWIDTH] IN BLOOD BY AUTOMATED COUNT: 12.5 % (ref 10–15)
GLUCOSE 1H P 50 G GLC PO SERPL-MCNC: 143 MG/DL (ref 70–129)
HCT VFR BLD AUTO: 31.3 % (ref 35–47)
HCV AB SERPL QL IA: NONREACTIVE
HGB BLD-MCNC: 11.2 G/DL (ref 11.7–15.7)
MCH RBC QN AUTO: 31.4 PG (ref 26.5–33)
MCHC RBC AUTO-ENTMCNC: 35.8 G/DL (ref 31.5–36.5)
MCV RBC AUTO: 88 FL (ref 78–100)
PLATELET # BLD AUTO: 265 10E3/UL (ref 150–450)
RBC # BLD AUTO: 3.57 10E6/UL (ref 3.8–5.2)
T PALLIDUM AB SER QL: NONREACTIVE
TSH SERPL DL<=0.005 MIU/L-ACNC: 2.75 MU/L (ref 0.4–4)
WBC # BLD AUTO: 8.7 10E3/UL (ref 4–11)

## 2022-04-01 PROCEDURE — 250N000011 HC RX IP 250 OP 636

## 2022-04-01 PROCEDURE — 86803 HEPATITIS C AB TEST: CPT | Performed by: ADVANCED PRACTICE MIDWIFE

## 2022-04-01 PROCEDURE — 84443 ASSAY THYROID STIM HORMONE: CPT | Performed by: ADVANCED PRACTICE MIDWIFE

## 2022-04-01 PROCEDURE — 99207 PR PRENATAL VISIT: CPT | Performed by: ADVANCED PRACTICE MIDWIFE

## 2022-04-01 PROCEDURE — 82950 GLUCOSE TEST: CPT | Performed by: ADVANCED PRACTICE MIDWIFE

## 2022-04-01 PROCEDURE — 90471 IMMUNIZATION ADMIN: CPT

## 2022-04-01 PROCEDURE — 85027 COMPLETE CBC AUTOMATED: CPT | Performed by: ADVANCED PRACTICE MIDWIFE

## 2022-04-01 PROCEDURE — 96372 THER/PROPH/DIAG INJ SC/IM: CPT | Performed by: ADVANCED PRACTICE MIDWIFE

## 2022-04-01 PROCEDURE — G0463 HOSPITAL OUTPT CLINIC VISIT: HCPCS | Mod: 25

## 2022-04-01 PROCEDURE — 36415 COLL VENOUS BLD VENIPUNCTURE: CPT | Performed by: ADVANCED PRACTICE MIDWIFE

## 2022-04-01 PROCEDURE — 86780 TREPONEMA PALLIDUM: CPT | Performed by: ADVANCED PRACTICE MIDWIFE

## 2022-04-01 PROCEDURE — 82306 VITAMIN D 25 HYDROXY: CPT | Performed by: ADVANCED PRACTICE MIDWIFE

## 2022-04-01 PROCEDURE — 250N000011 HC RX IP 250 OP 636: Performed by: ADVANCED PRACTICE MIDWIFE

## 2022-04-01 PROCEDURE — 90715 TDAP VACCINE 7 YRS/> IM: CPT

## 2022-04-01 RX ORDER — FAMOTIDINE 20 MG
TABLET ORAL DAILY
COMMUNITY

## 2022-04-01 RX ORDER — OMEGA-3 FATTY ACIDS/FISH OIL 300-1000MG
CAPSULE ORAL DAILY
COMMUNITY

## 2022-04-01 RX ORDER — LACTOBACILLUS RHAMNOSUS GG 10B CELL
1 CAPSULE ORAL DAILY
COMMUNITY

## 2022-04-01 RX ORDER — NEOMYCIN SULFATE, POLYMYXIN B SULFATE AND DEXAMETHASONE 3.5; 10000; 1 MG/ML; [USP'U]/ML; MG/ML
SUSPENSION/ DROPS OPHTHALMIC
COMMUNITY
Start: 2021-08-17 | End: 2022-04-15

## 2022-04-01 RX ADMIN — HUMAN RHO(D) IMMUNE GLOBULIN 300 MCG: 300 INJECTION, SOLUTION INTRAMUSCULAR at 09:24

## 2022-04-01 ASSESSMENT — PAIN SCALES - GENERAL: PAINLEVEL: NO PAIN (0)

## 2022-04-01 ASSESSMENT — PATIENT HEALTH QUESTIONNAIRE - PHQ9: SUM OF ALL RESPONSES TO PHQ QUESTIONS 1-9: 8

## 2022-04-01 NOTE — TELEPHONE ENCOUNTER
----- Message from Mirna Francisco CNM sent at 4/1/2022  9:23 AM CDT -----  Regarding: needs ultrasound scheduled  Can you please call the patient and schedule her for a growth ultrasound. She needs one now and then at 36 weeks.  Thanks,  Nargis

## 2022-04-01 NOTE — PROGRESS NOTES
36 year old, , 30w2d,   The patient presents with the following concerns: none  PHQ-9 SCORE 2020 10/29/2021 2022   PHQ-9 Total Score 4 1 8   Feeling more overwhelmed lately with pregnancy and new promotion at her job. Has a therapist and continues to work with the therapist. Discussed  mood disorders and changes people may experience in postpartum period. Offered continuing support and medication as needed.     Education completed today includes breast feeding, [facility] hand out, contraception, counting movements, signs of pre-term labor, when to present to birthplace, post partum depression, GBS, getting enough iron, labor induction, nitrous oxide, doulas and vitamin K.  Birth preferences reviewed: considering a , considering all options for birth. Does not want to be lying on back for birth  Labor support:   and potentially a     Feeding plans :    Contraception planned:  Unsure, likely condoms  The following labs were ordered today:       GCT, CBC w platelets, Vitamin D, Hep C, Anti-treponema and TSH with reflex     Growth usn ordered per [facility] note.   Blood type:   ABO   Date Value Ref Range Status   2020 O  Final     RH(D)   Date Value Ref Range Status   2020 Neg  Final     Antibody Screen   Date Value Ref Range Status   10/29/2021 Negative Negative Final   2020 Neg  Final     Rhogam  Was given.  TDAP  Was given.    A/P:  Encounter Diagnoses   Name Primary?     High-risk pregnancy, unspecified trimester Yes     Rh negative state in antepartum period      Squamous cell carcinoma of neck      Need for diphtheria-tetanus-pertussis (Tdap) vaccine      Need for rhogam due to Rh negative mother      Orders Placed This Encounter   Procedures     US OB >14 Weeks Follow Up     TDAP VACCINE (Adacel, Boostrix)  [[id number]]     TDAP VACCINE (Adacel, Boostrix)  [[id number]]     Glucose tolerance gest screen 1 hour     CBC with platelets      Treponema Abs w Reflex to RPR and Titer     Vitamin D Deficiency     Hepatitis C antibody     TSH with free T4 reflex     Orders Placed This Encounter   Medications     neomycin-polymyxin-dexamethasone (MAXITROL) 3.5-79670-8.1 SUSP ophthalmic susp     Sig: INSTILL 1 DROP INTO RIGHT EYE 4 TIMES A DAY     lactobacillus rhamnosus, GG, (CULTURELL) capsule     Sig: Take 1 capsule by mouth daily     Vitamin D, Cholecalciferol, 25 MCG (1000 UT) CAPS     Sig: Take by mouth daily     omega 3 1000 MG CAPS     Sig: Take by mouth daily     rho,D, immune globulin (RHOGAM) 300 MCG injection     Sig: Inject 300 mcg into the muscle once for 1 dose     Dispense:  300 mcg     Refill:  0     rho(D) immune globulin (RHOGAM) injection 300 mcg     Continue scheduled prenatal care  Mirna Francisco CNM

## 2022-04-01 NOTE — LETTER
2022       RE: Mira Cao  [address]  [place] [address]     Dear Colleague,    Thank you for referring your patient, Mira Cao, to the [facility] WOMEN'S CLINIC [place] at [facility]. Please see a copy of my visit note below.     36 year old, , 30w2d,   The patient presents with the following concerns: none  PHQ-9 SCORE 2020 10/29/2021 2022   PHQ-9 Total Score 4 1 8   Feeling more overwhelmed lately with pregnancy and new promotion at her job. Has a therapist and continues to work with the therapist. Discussed  mood disorders and changes people may experience in postpartum period. Offered continuing support and medication as needed.     Education completed today includes breast feeding, [facility] hand out, contraception, counting movements, signs of pre-term labor, when to present to birthplace, post partum depression, GBS, getting enough iron, labor induction, nitrous oxide, doulas and vitamin K.  Birth preferences reviewed: considering a , considering all options for birth. Does not want to be lying on back for birth  Labor support:   and potentially a    [place] Feeding plans :    Contraception planned:  Unsure, likely condoms  The following labs were ordered today:       GCT, CBC w platelets, Vitamin D, Hep C, Anti-treponema and TSH with reflex     Growth usn ordered per [facility] note.   Blood type:   ABO   Date Value Ref Range Status   2020 O  Final     RH(D)   Date Value Ref Range Status   2020 Neg  Final     Antibody Screen   Date Value Ref Range Status   10/29/2021 Negative Negative Final   2020 Neg  Final     Rhogam  Was given.  TDAP  Was given.    A/P:  Encounter Diagnoses   Name Primary?     High-risk pregnancy, unspecified trimester Yes     Rh negative state in antepartum period      Squamous cell carcinoma of neck      Need for  diphtheria-tetanus-pertussis (Tdap) vaccine      Need for rhogam due to Rh negative mother      Orders Placed This Encounter   Procedures     US OB >14 Weeks Follow Up     TDAP VACCINE (Adacel, Boostrix)  [[id number]]     TDAP VACCINE (Adacel, Boostrix)  [[id number]]     Glucose tolerance gest screen 1 hour     CBC with platelets     Treponema Abs w Reflex to RPR and Titer     Vitamin D Deficiency     Hepatitis C antibody     TSH with free T4 reflex     Orders Placed This Encounter   Medications     neomycin-polymyxin-dexamethasone (MAXITROL) 3.5-47476-6.1 SUSP ophthalmic susp     Sig: INSTILL 1 DROP INTO RIGHT EYE 4 TIMES A DAY     lactobacillus rhamnosus, GG, (CULTURELL) capsule     Sig: Take 1 capsule by mouth daily     Vitamin D, Cholecalciferol, 25 MCG (1000 UT) CAPS     Sig: Take by mouth daily     omega 3 1000 MG CAPS     Sig: Take by mouth daily     rho,D, immune globulin (RHOGAM) 300 MCG injection     Sig: Inject 300 mcg into the muscle once for 1 dose     Dispense:  300 mcg     Refill:  0     rho(D) immune globulin (RHOGAM) injection 300 mcg     Continue scheduled prenatal care  Mirna Francisco CNM

## 2022-04-01 NOTE — NURSING NOTE
Clinic Administered Medication Documentation    Administrations This Visit     rho(D) immune globulin (RHOGAM) injection 300 mcg     Admin Date  04/01/2022 Action  Given Dose  300 mcg Route  Intramuscular Site  Right Gluteus Dinesh Administered By  Moon Santos CMA    Ordering Provider: Mirna Francisco CNM    NDC: 2761-1577-06    Lot#: [id number]    : [employer]    Patient Supplied?: No                  Injectable Medication Documentation    Patient was given Rhogam. Prior to medication administration, verified patients identity using patient s name and date of birth. Please see MAR and medication order for additional information. Patient instructed to remain in clinic for 15 minutes.      Was entire vial of medication used? Yes  Vial/Syringe: Syringe  Expiration Date:  08/27/2023  Was this medication supplied by the patient? No

## 2022-04-04 ENCOUNTER — LAB (OUTPATIENT)
Dept: LAB | Facility: CLINIC | Age: 37
End: 2022-04-04
Payer: COMMERCIAL

## 2022-04-04 DIAGNOSIS — O09.90 HIGH-RISK PREGNANCY, UNSPECIFIED TRIMESTER: ICD-10-CM

## 2022-04-04 LAB
GESTATIONAL GTT 1 HR POST DOSE: 156 MG/DL (ref 60–179)
GESTATIONAL GTT 2 HR POST DOSE: 141 MG/DL (ref 60–154)
GESTATIONAL GTT 3 HR POST DOSE: 110 MG/DL (ref 60–139)
GLUCOSE P FAST SERPL-MCNC: 82 MG/DL (ref 60–94)

## 2022-04-04 PROCEDURE — 82950 GLUCOSE TEST: CPT

## 2022-04-04 PROCEDURE — 36415 COLL VENOUS BLD VENIPUNCTURE: CPT

## 2022-04-04 PROCEDURE — 82947 ASSAY GLUCOSE BLOOD QUANT: CPT

## 2022-04-04 PROCEDURE — 82951 GLUCOSE TOLERANCE TEST (GTT): CPT

## 2022-04-13 PROBLEM — N32.89 IRRITABLE BLADDER: Status: ACTIVE | Noted: 2019-03-01

## 2022-04-13 PROBLEM — O09.511 PRIMIGRAVIDA OF ADVANCED MATERNAL AGE IN FIRST TRIMESTER: Status: RESOLVED | Noted: 2021-10-29 | Resolved: 2022-04-13

## 2022-04-13 PROBLEM — O09.529 SUPERVISION OF HIGH-RISK PREGNANCY OF ELDERLY MULTIGRAVIDA: Status: ACTIVE | Noted: 2021-10-29

## 2022-04-13 NOTE — PROGRESS NOTES
"Subjective:      36 year old  at 32w2d present for a routine prenatal appointment. Ottoniel is present with patient.    Mira has a history of throat cancer and radiation. Has a growth US following today's visit and another growth US in 4 weeks.  Last US: 22: 18/0 weeks: Normal anatomy, EFW 67%, girl!, cervix 3.9cm    Rh neg, Mira did receive her Rhogam 22  Mira had an elevated 1 hour GCT, normal 3 hour GTT    No vaginal bleeding or leakage of fluid.  No contractions. Positive fetal movement.       No HA, visual changes, RUQ or epigastric pain.     Patient concerns: Feeling well overall.  -Having foot cramping that happens at night. Had cramping in the past before. Taking Magnesium, she is not sure how much. Currently drinking 1 liter of water a day.   -Taking clartin daily for irritable bladder for a year. Was told it was safe during pregnancy by specialist.  -Feeling mentally better and in a better mind space compared to last appointment.    Reviewed EOB labs with patient.  Reviewed TDAP Previously given  Objective:  Vitals:    04/15/22 0811   BP: 99/69   Pulse: 68   Weight: 65 kg (143 lb 6.4 oz)   Height: 1.676 m (5' 6\")   , see ob flowsheet  Assessment/Plan     Encounter Diagnosis   Name Primary?     Supervision of high-risk pregnancy of elderly multigravida Yes     No orders of the defined types were placed in this encounter.    No orders of the defined types were placed in this encounter.    ABO   Date Value Ref Range Status   2020 O  Final     RH(D)   Date Value Ref Range Status   2020 Neg  Final     Antibody Screen   Date Value Ref Range Status   10/29/2021 Negative Negative Final   2020 Neg  Final   Rhogam was not given.    - Reviewed total weight gain, encouraged continued healthy diet and exercise. Reviewed importance of daily fetal kick count and why/how to contact provider.    - Reviewed why/how to contact provider if headache/visual changes/RUQ or epigastric pain, " decreased fetal movement, vaginal bleeding, leakage of fluid or more than 4 contractions in an hour.    - Discussed taking magnesium 300-400 mg for muscle cramps. Reviewed side effects and stretching exercises. Encouraged drinking 3-4 liters of water a day     - Reviewed GBS screening at 35-36 wks.    Return to clinic in 2 weeks and prn if questions or concerns.     I, Agus TERAN, RN HALLIE WHNP Student, completed the PFSH and ROS. I then acted as a scribe for CNM for the remainder of the visit. CNM    I agree with the PFSH and ROS as completed by Agus TERAN, RN DNP WHNP Student, except for changes made by me. The remainder of the encounter was performed by me and scribed by the WHNP student. The scribed note accurately reflects my personal services and decisions made by me.    Fartun Moise, KYLEE, CNM

## 2022-04-15 ENCOUNTER — OFFICE VISIT (OUTPATIENT)
Dept: OBGYN | Facility: CLINIC | Age: 37
End: 2022-04-15
Attending: ADVANCED PRACTICE MIDWIFE
Payer: COMMERCIAL

## 2022-04-15 ENCOUNTER — ANCILLARY PROCEDURE (OUTPATIENT)
Dept: ULTRASOUND IMAGING | Facility: CLINIC | Age: 37
End: 2022-04-15
Attending: MIDWIFE
Payer: COMMERCIAL

## 2022-04-15 VITALS
HEART RATE: 68 BPM | SYSTOLIC BLOOD PRESSURE: 99 MMHG | DIASTOLIC BLOOD PRESSURE: 69 MMHG | HEIGHT: 66 IN | WEIGHT: 143.4 LBS | BODY MASS INDEX: 23.05 KG/M2

## 2022-04-15 DIAGNOSIS — O09.90 HIGH-RISK PREGNANCY, UNSPECIFIED TRIMESTER: ICD-10-CM

## 2022-04-15 DIAGNOSIS — O09.529 SUPERVISION OF HIGH-RISK PREGNANCY OF ELDERLY MULTIGRAVIDA: Primary | ICD-10-CM

## 2022-04-15 PROCEDURE — 76816 OB US FOLLOW-UP PER FETUS: CPT | Mod: 26 | Performed by: OBSTETRICS & GYNECOLOGY

## 2022-04-15 PROCEDURE — 99207 PR PRENATAL VISIT: CPT | Performed by: ADVANCED PRACTICE MIDWIFE

## 2022-04-15 PROCEDURE — G0463 HOSPITAL OUTPT CLINIC VISIT: HCPCS

## 2022-04-15 PROCEDURE — 76816 OB US FOLLOW-UP PER FETUS: CPT

## 2022-04-15 ASSESSMENT — PAIN SCALES - GENERAL: PAINLEVEL: NO PAIN (0)

## 2022-04-15 NOTE — LETTER
"4/15/2022       RE: Mira Cao  [address] [place]  [place] [address]     Dear Colleague,    Thank you for referring your patient, Mira Cao, to the [facility] [place] at [facility]. Please see a copy of my visit note below.    Subjective:      36 year old  at 32w2d present for a routine prenatal appointment. Ottoniel is present with patient.    Mira has a history of throat cancer and radiation. Has a growth US following today's visit and another growth US in 4 weeks.  Last US: 22: 18/0 weeks: Normal anatomy, EFW 67%, girl!, cervix 3.9cm    Rh neg, Mira did receive her Rhogam 22  Mira had an elevated 1 hour GCT, normal 3 hour GTT    No vaginal bleeding or leakage of fluid.  No contractions. Positive fetal movement.       No HA, visual changes, RUQ or epigastric pain.     Patient concerns: Feeling well overall.  -Having foot cramping that happens at night. Had cramping in the past before. Taking Magnesium, she is not sure how much. Currently drinking 1 liter of water a day.   -Taking clartin daily for irritable bladder for a year. Was told it was safe during pregnancy by specialist.  -Feeling mentally better and in a better mind space compared to last appointment.    Reviewed EOB labs with patient.  Reviewed TDAP Previously given  Objective:  Vitals:    04/15/22 0811   BP: 99/69   Pulse: 68   Weight: 65 kg (143 lb 6.4 oz)   Height: 1.676 m (5' 6\")   , see ob flowsheet  Assessment/Plan     Encounter Diagnosis   Name Primary?     Supervision of high-risk pregnancy of elderly multigravida Yes     No orders of the defined types were placed in this encounter.    No orders of the defined types were placed in this encounter.    ABO   Date Value Ref Range Status   2020 O  Final     RH(D)   Date Value Ref Range Status   2020 Neg  Final     Antibody Screen   Date Value Ref Range Status   10/29/2021 Negative " Negative Final   11/08/2020 Neg  Final   Rhogam was not given.    - Reviewed total weight gain, encouraged continued healthy diet and exercise. Reviewed importance of daily fetal kick count and why/how to contact provider.    - Reviewed why/how to contact provider if headache/visual changes/RUQ or epigastric pain, decreased fetal movement, vaginal bleeding, leakage of fluid or more than 4 contractions in an hour.    - Discussed taking magnesium 300-400 mg for muscle cramps. Reviewed side effects and stretching exercises. Encouraged drinking 3-4 liters of water a day     - Reviewed GBS screening at 35-36 wks.    Return to clinic in 2 weeks and prn if questions or concerns.     I, Agus TERAN, RN HALLIE WHNP Student, completed the PFSH and ROS. I then acted as a scribe for CNM for the remainder of the visit. CNM    I agree with the PFSH and ROS as completed by Agus TERAN, RN DNP WHNP Student, except for changes made by me. The remainder of the encounter was performed by me and scribed by the WHNP student. The scribed note accurately reflects my personal services and decisions made by me.    Fartun Moise, KYLEE, CNM

## 2022-04-26 NOTE — PROGRESS NOTES
"Subjective:      36 year old  at 34w2d presents with Ottoniel for a routine prenatal appointment.     Mira has a history of throat cancer and radiation. Had a growth US at 32 weeks and another growth US scheduled at 36 weeks.   Last US 4/15/22: 33/3 weeks EFW 80%, cephalic, normal fluid.     Mira is planning to use the midwifery team for her birth    Accepts GBS, CBC next visit, thyroid function tests next visit    Planning to utilize Blooma  at her birth    No vaginal bleeding or leakage of fluid.  No contractions. + fetal movement.      No HA, visual changes, RUQ or epigastric pain.     Patient concerns: Feels bladder pressure early morning and when she drives to work. Then she feels like her urethra stretches after she has emptied her bladder.   Denies dysuria, frequency or blood in the urine. Feeling well overall.    Reviewed EOB labs with patient.  Reviewed TDAP Previously given     Objective:  Vitals:    22 0811   BP: 112/74   Pulse: 73   Weight: 65.9 kg (145 lb 3.2 oz)   Height: 1.676 m (5' 6\")   see ob flowsheet      Assessment/Plan  Encounter Diagnosis   Name Primary?     Supervision of high-risk pregnancy of elderly multigravida Yes     No orders of the defined types were placed in this encounter.    No orders of the defined types were placed in this encounter.    ABO   Date Value Ref Range Status   2020 O  Final     RH(D)   Date Value Ref Range Status   2020 Neg  Final     Antibody Screen   Date Value Ref Range Status   10/29/2021 Negative Negative Final   2020 Neg  Final     Rhogam Was given 2022.     - Reviewed total weight gain, encouraged continued healthy diet and exercise. Reviewed importance of daily fetal kick count and why/how to contact provider.    - Reviewed why/how to contact provider if headache/visual changes/RUQ or epigastric pain, decreased fetal movement, vaginal bleeding, leakage of fluid or more than 4 contractions in an hour.     Patient " education/orders or handouts today:  PTL signs/symptoms  Reviewed GBS screening at 35-36 wks.    -Plan for Growth US, CBC, and thyroid screening at next visit.       Return to clinic in 2 weeks and prn if questions or concerns.     I, Karen Cortes, am serving as a scribe; to document services personally performed by  Fartun Moise CNM based on data collection and the provider's statements to me.   Karen Cortes, P student    I, Fartun Moise, was present with the medical/FERNANDA student who participated in the service and in the documentation of the note.  I have verified the history and personally performed the physical exam and medical decision making.  I agree with the assessment and plan of care as documented in the note.    ÁNGEL Holden CNM

## 2022-04-29 ENCOUNTER — OFFICE VISIT (OUTPATIENT)
Dept: OBGYN | Facility: CLINIC | Age: 37
End: 2022-04-29
Attending: ADVANCED PRACTICE MIDWIFE
Payer: COMMERCIAL

## 2022-04-29 VITALS
WEIGHT: 145.2 LBS | DIASTOLIC BLOOD PRESSURE: 74 MMHG | BODY MASS INDEX: 23.33 KG/M2 | HEIGHT: 66 IN | SYSTOLIC BLOOD PRESSURE: 112 MMHG | HEART RATE: 73 BPM

## 2022-04-29 DIAGNOSIS — O09.529 SUPERVISION OF HIGH-RISK PREGNANCY OF ELDERLY MULTIGRAVIDA: Primary | ICD-10-CM

## 2022-04-29 PROCEDURE — G0463 HOSPITAL OUTPT CLINIC VISIT: HCPCS

## 2022-04-29 PROCEDURE — 99207 PR PRENATAL VISIT: CPT | Performed by: ADVANCED PRACTICE MIDWIFE

## 2022-04-29 NOTE — LETTER
"2022       RE: Mira Cao  [address] [place]  [place] [address]     Dear Colleague,    Thank you for referring your patient, Mira Cao, to the [facility] [place] at [facility]. Please see a copy of my visit note below.    Subjective:      36 year old  at 34w2d presents with Ottoniel for a routine prenatal appointment.     Mira has a history of throat cancer and radiation. Had a growth US at 32 weeks and another growth US scheduled at 36 weeks.   Last US 4/15/22: 33/3 weeks EFW 80%, cephalic, normal fluid.     Mira is planning to use the midwifery team for her birth    Accepts GBS, CBC next visit, thyroid function tests next visit    Planning to utilize Blooma  at her birth    No vaginal bleeding or leakage of fluid.  No contractions. + fetal movement.      No HA, visual changes, RUQ or epigastric pain.     Patient concerns: Feels bladder pressure early morning and when she drives to work. Then she feels like her urethra stretches after she has emptied her bladder.   Denies dysuria, frequency or blood in the urine. Feeling well overall.    Reviewed EOB labs with patient.  Reviewed TDAP Previously given     Objective:  Vitals:    22 0811   BP: 112/74   Pulse: 73   Weight: 65.9 kg (145 lb 3.2 oz)   Height: 1.676 m (5' 6\")   see ob flowsheet      Assessment/Plan  Encounter Diagnosis   Name Primary?     Supervision of high-risk pregnancy of elderly multigravida Yes     No orders of the defined types were placed in this encounter.    No orders of the defined types were placed in this encounter.    ABO   Date Value Ref Range Status   2020 O  Final     RH(D)   Date Value Ref Range Status   2020 Neg  Final     Antibody Screen   Date Value Ref Range Status   10/29/2021 Negative Negative Final   2020 Neg  Final     Rhogam Was given 2022.     - Reviewed total weight gain, encouraged continued " healthy diet and exercise. Reviewed importance of daily fetal kick count and why/how to contact provider.    - Reviewed why/how to contact provider if headache/visual changes/RUQ or epigastric pain, decreased fetal movement, vaginal bleeding, leakage of fluid or more than 4 contractions in an hour.     Patient education/orders or handouts today:  PTL signs/symptoms  Reviewed GBS screening at 35-36 wks.    -Plan for Growth US, CBC, and thyroid screening at next visit.     Return to clinic in 2 weeks and prn if questions or concerns.     I, Karen Cortes, am serving as a scribe; to document services personally performed by  Fartun Moise CNM based on data collection and the provider's statements to me.   LEANNE NullP student    I, Fartun Moise, was present with the medical/FERNANDA student who participated in the service and in the documentation of the note.  I have verified the history and personally performed the physical exam and medical decision making.  I agree with the assessment and plan of care as documented in the note.    ÁNGEL Holden CNM

## 2022-05-13 ENCOUNTER — ANCILLARY PROCEDURE (OUTPATIENT)
Dept: ULTRASOUND IMAGING | Facility: CLINIC | Age: 37
End: 2022-05-13
Attending: ADVANCED PRACTICE MIDWIFE
Payer: COMMERCIAL

## 2022-05-13 DIAGNOSIS — C44.42 SQUAMOUS CELL CARCINOMA OF NECK: ICD-10-CM

## 2022-05-13 DIAGNOSIS — O09.90 HIGH-RISK PREGNANCY, UNSPECIFIED TRIMESTER: ICD-10-CM

## 2022-05-13 PROCEDURE — 76816 OB US FOLLOW-UP PER FETUS: CPT

## 2022-05-13 PROCEDURE — 76816 OB US FOLLOW-UP PER FETUS: CPT | Mod: 26 | Performed by: OBSTETRICS & GYNECOLOGY

## 2022-05-16 NOTE — PROGRESS NOTES
I had the pleasure of seeing Mira Cao in follow-up today at the [facility].     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 The patient noticed a left neck mass in June. She thought this was related to a swollen lymph node due to stress as she had just bought a house.  She delayed evaluation until approximately July when she presented to urgent care.  She had an ultrasound obtained on 7/12/2019 which demonstrated a 3.7 x 2.3 x 1.3 cm mass in the left neck.  She then had a CT scan on 8/7/2019 which demonstrated a 1.8 x 1.4 x 3.8 cm partially necrotic/cystic level 2/3 neck mass, concerning for metastatic squamous cell carcinoma.  She was seen by Dr.Todd Cao and had an FNA performed on 8/8/2019.  The pathology from the FNA demonstrated atypical squamous proliferation which was p16-.  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  She elected for removal which was performed by Dr. Cao on 10/2/2019.  The excision was performed without a completion neck dissection.  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  She had a PET CT scan on 2019 locally which demonstrated postop changes without residual disease and no obvious primary malignancy.  On 10/10/2019 she saw Dr. Neely at [facility] for medical oncology consultation where possible chemotherapy was discussed.  She has met with Dr Melendez from radiation oncology at [facility].  She had a dental cleaning.  She was taken to the operating room on 10/24/2019 for direct laryngoscopy with biopsy.  Initial biopsy of the tonsil demonstrated high-grade dysplasia.  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  A radical tonsillectomy was then performed based on  the preoperative discussion with the patient. Final pathology demonstrated a T1 SCC of the tonsil with negative margins. She was taken to the OR on 11/20/2019 for a left neck dissection with resection of her scar. She had 1/38 lymph nodes positive - 0.4 cm deposit of metastatic SCC without STEVE. She had proton beam therapy postoperatively at [facility] with Dr Smith. She completed her treatment on 2/24/2020. She lost about 20 lbs with treatment. She had her 3 month post treatment PET in May 2020 which showed uptake in the right tonsil and right level II node thought to be reactive.       Interval history:  She comes in today for follow-up. She was last seen in clinic in January 2022. Her pregnancy is uncomplicated. She is feeling more tired this week and having issues with memory. She says that she is less diligent about the neck lymphedema therapy. She has been getting myofascial massage about once per month. She has some throat soreness. She has no neck masses. She has no hemoptysis. She is wearing the jaw bra at night. She feels like her saliva is more dry on the left side. She has more bleeding with flossing lately. She is swallowing without issue. She is taking tums for some heartburn. Her weight is increasing. She is due on June 8.       MEDICATIONS:     Current Outpatient Medications   Medication Sig Dispense Refill     aspirin (ASA) 81 MG EC tablet Take 1 tablet (81 mg) by mouth daily 60 tablet 3     lactobacillus rhamnosus, GG, (CULTURELL) capsule Take 1 capsule by mouth daily       Magnesium Oxide 250 MG TABS Take 250 mg by mouth daily       omega 3 1000 MG CAPS Take by mouth daily       omeprazole (PRILOSEC) 20 MG DR capsule Take 1 capsule (20 mg) by mouth daily 90 capsule 1     Prenatal Vit-Fe Fumarate-FA (PRENATAL MULTIVITAMIN W/IRON) 27-0.8 MG tablet Take 1 tablet by mouth daily       Vitamin D, Cholecalciferol, 25 MCG (1000 UT) CAPS Take by mouth daily       cyanocobalamin (VITAMIN B-12) 100 MCG tablet  Take 250 mcg by mouth daily (Patient not taking: No sig reported)         ALLERGIES:  No Known Allergies    HABITS/SOCIAL HISTORY:   Works as a .    She is  with no children.    She has no smoking history.  She has about 10 alcoholic beverages on weekends.  She has no chewing tobacco or vaping history.    Social History     Socioeconomic History     Marital status:      Spouse name: Ottoniel     Number of children: Not on file     Years of education: Not on file     Highest education level: Not on file   Occupational History     Not on file   Tobacco Use     Smoking status: Never Smoker     Smokeless tobacco: Never Used   Vaping Use     Vaping Use: Never used   Substance and Sexual Activity     Alcohol use: Not Currently     Comment: 2-3 drinks per week     Drug use: Never     Sexual activity: Yes     Partners: Male   Other Topics Concern     Not on file   Social History Narrative     Not on file     Social Determinants of Health     Financial Resource Strain: Not on file   Food Insecurity: Not on file   Transportation Needs: Not on file   Physical Activity: Not on file   Stress: Not on file   Social Connections: Not on file   Intimate Partner Violence: Not on file   Housing Stability: Not on file       PAST MEDICAL HISTORY:   Past Medical History:   Diagnosis Date     Irritable bladder      Squamous cell carcinoma of neck      Uncomplicated asthma         PAST SURGICAL HISTORY:   Past Surgical History:   Procedure Laterality Date     DISSECTION RADICAL NECK MODIFIED Left 11/20/2019    Procedure: DISSECTION, NECK, MODIFIED RADICAL LEFT;  Surgeon: Ruth Tello MD;  Location: [facility] OR     LARYNGOSCOPY WITH BIOPSY(IES) N/A 10/24/2019    Procedure: Direct laryngscopy with biopsy;  Surgeon: Ruth Tello MD;  Location: [facility] OR     TONSILLECTOMY ADULT Left 10/24/2019    Procedure: Left Radical  tonsillectomy;  Surgeon: Ruth Tello MD;  Location: [facility] OR       FAMILY HISTORY:   "  Family History   Problem Relation Age of Onset     Breast Cancer Mother      Cancer Mother         Breast cancer     Hyperlipidemia Father      Bone Cancer Sister      Cancer Sister         Bone cancer     Diabetes No family hx of        REVIEW OF SYSTEMS:  12 point ROS was negative other than the symptoms noted above in the HPI.  Patient Supplied Answers to Review of Systems   ENT ROS 5/14/2020   Constitutional: -   Neurology: Headache   Psychology: -   Ears, Nose, Throat: Ear pain   Musculoskeletal: -   Endocrine: -         PHYSICAL EXAMINATION:   /81 (BP Location: Right arm, Patient Position: Sitting, Cuff Size: Adult Regular)   Pulse 83   Temp 97.6  F (36.4  C) (Temporal)   Ht 1.676 m (5' 6\")   Wt 67.6 kg (149 lb)   LMP 09/01/2021   SpO2 97%   BMI 24.05 kg/m     Appearance:   normal; NAD, age-appropriate appearance   Communication:   normal; communicates verbally, normal voice quality   Head/Face:   inspection -  Normal; no scars or visible lesions   Salivary glands -  Normal size, no tenderness, swelling, or palpable masses   Facial strength -  Normal and symmetric    Skin:  normal, no rash   Ears:  auricle (AD) -  normal  EAC (AD) -  normal  TM (AD) -  Normal, no effusion  auricle (AS) -  normal  EAC (AS) -  normal  TM (AS) -  Normal, no effusion  Normal clinical speech reception   Nose:  Ext. inspection -  Normal  Internal Inspection -  Normal mucosa, septum, and turbinates   Nasopharynx:  normal mucosa, no masses   Oral Cavity:  lips -  Normal mucosa, oral competence, and stoma size   Age-appropriate dentition, healthy gingival mucosa   Hard palate, buccal, floor of mouth mucosa normal   Tongue - normal movement, no lesions   Oropharynx:  mucosa -  Normal, no lesions  soft palate -  Expected post radical tonsillectomy asymmetry of left soft palate  tonsils -  S/p left radical tonsillectomy, no secretions, no masses, no concerning mucosal lesions, no palpable masses, expected scar tissue; " right tonsil with no palpable masses slightly cryptic in appearance which is stable, no mucosal lesions  BOT - normal  Vallecula - clear   Hypopharynx:  Normal pyriform sinus and pharyngeal wall mucosa   No pooled secretions    Larynx:  Epiglottis, AE folds, false vocal cords, true vocal cords, arytenoids normal in appearance with no edema of AE folds or arytenoids   bilaterally mobile cords    Neck: Well healed left neck incision   Radiation fibrosis of the neck  Some skin changes to the neck skin from radiation  No significant lymphedema  Normal range of motion   Lymphatic:  no abnormal nodes   Cardiovascular:  warm, pink, well-perfused extremities without swelling, tenderness, or edema   Respiratory:  Normal respiratory effort, no stridor   Neuro/Psych.:  mood/affect -  normal  mental status -  normal       PROCEDURES:   Flexible fiberoptic laryngoscopy: Scope exam was indicated due to oropharyngeal cancer. Verbal consent was obtained. The nasal cavity was prepped with an aerosolized solution of topical anesthetic and vasoconstrictive agent. The scope was passed through the anterior nasal cavity and advanced. Inspection of the nasopharynx revealed no gross abnormality. The base of tongue and vallecula are normal. There are no abnormalities in the area of the left tonsil. The epiglottis, AE folds, false cords, true cords, arytenoids are normal and there is no edema of the arytenoids or AE folds.  Inspection of the larynx revealed bilaterally mobile vocal cords. Pyriform sinuses are symmetric. The airway is patent. Procedure tolerated well with no immediate complications noted.                          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 She had an excision of a cystic neck mass by a local ENT which demonstrated a metastatic SCC. She underwent a radical  tonsillectomy which showed a small primary tumor in the tonsil which was completely resected. She then had a completion neck dissection on 11/20/2019 with final pathology showing 1/38 nodes. She completed postoperative proton beam therapy at [facility] on 2/24/2020.     She has no concerning findings on exam today. We discussed use of sugar free lemon drops/gum to help with some of her dry mouth symptoms.    We can repeat her imaging after her delivery, last imaging was in January 2021, deferred due to her pregnancy.    She is having her TSH checked every trimester during her pregnancy by OB, normal in April 2022. We will recheck in 6 months unless continuing to be checked by OB/PCP.    I will see her back in  6 months with scans and labs.    Thank you very much for the opportunity to participate in the care of your patient.      Ruth Tello MD  Otolaryngology- Head & Neck Surgery      This note was dictated with voice recognition software and then edited. Please excuse any unintentional errors.             CC:  Ifeanyi Soliman MD  Department of Radiation Oncology  [facility]       WDL

## 2022-05-18 ENCOUNTER — OFFICE VISIT (OUTPATIENT)
Dept: OTOLARYNGOLOGY | Facility: CLINIC | Age: 37
End: 2022-05-18
Payer: COMMERCIAL

## 2022-05-18 VITALS
HEART RATE: 83 BPM | WEIGHT: 149 LBS | SYSTOLIC BLOOD PRESSURE: 122 MMHG | BODY MASS INDEX: 23.95 KG/M2 | OXYGEN SATURATION: 97 % | DIASTOLIC BLOOD PRESSURE: 81 MMHG | TEMPERATURE: 97.6 F | HEIGHT: 66 IN

## 2022-05-18 DIAGNOSIS — C10.9 SQUAMOUS CELL CARCINOMA OF OROPHARYNX (H): Primary | ICD-10-CM

## 2022-05-18 PROCEDURE — 31575 DIAGNOSTIC LARYNGOSCOPY: CPT | Performed by: OTOLARYNGOLOGY

## 2022-05-18 PROCEDURE — 99213 OFFICE O/P EST LOW 20 MIN: CPT | Mod: 25 | Performed by: OTOLARYNGOLOGY

## 2022-05-18 ASSESSMENT — PAIN SCALES - GENERAL: PAINLEVEL: NO PAIN (0)

## 2022-05-18 NOTE — LETTER
5/18/2022       RE: Mira Cao  [address]  [place] [address]     Dear Colleague,    Thank you for referring your patient, Mira Cao, to the [facility] [place] at [facility]. Please see a copy of my visit note below.    I had the pleasure of seeing Mira Cao in follow-up today at the [facility].     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 The patient noticed a left neck mass in June. She thought this was related to a swollen lymph node due to stress as she had just bought a house.  She delayed evaluation until approximately July when she presented to urgent care.  She had an ultrasound obtained on 7/12/2019 which demonstrated a 3.7 x 2.3 x 1.3 cm mass in the left neck.  She then had a CT scan on 8/7/2019 which demonstrated a 1.8 x 1.4 x 3.8 cm partially necrotic/cystic level 2/3 neck mass, concerning for metastatic squamous cell carcinoma.  She was seen by Dr.Todd Cao and had an FNA performed on 8/8/2019.  The pathology from the FNA demonstrated atypical squamous proliferation which was p16-.  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  She elected for removal which was performed by Dr. Cao on 10/2/2019.  The excision was performed without a completion neck dissection.  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  She had a PET CT scan on 2019 locally which demonstrated postop changes without residual disease and no obvious primary malignancy.  On 10/10/2019 she saw Dr. Neely at [facility] for medical oncology consultation where possible chemotherapy was discussed.  She has met with Dr Melendez from radiation oncology at [facility].  She had a dental cleaning.   She was taken to the operating room on 10/24/2019 for direct laryngoscopy with biopsy.  Initial biopsy of the tonsil demonstrated high-grade dysplasia.  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  A radical tonsillectomy was then performed based on the preoperative discussion with the patient. Final pathology demonstrated a T1 SCC of the tonsil with negative margins. She was taken to the OR on 11/20/2019 for a left neck dissection with resection of her scar. She had 1/38 lymph nodes positive - 0.4 cm deposit of metastatic SCC without STEVE. She had proton beam therapy postoperatively at [facility] with Dr Smith. She completed her treatment on 2/24/2020. She lost about 20 lbs with treatment. She had her 3 month post treatment PET in May 2020 which showed uptake in the right tonsil and right level II node thought to be reactive.       Interval history:  She comes in today for follow-up. She was last seen in clinic in January 2022. Her pregnancy is uncomplicated. She is feeling more tired this week and having issues with memory. She says that she is less diligent about the neck lymphedema therapy. She has been getting myofascial massage about once per month. She has some throat soreness. She has no neck masses. She has no hemoptysis. She is wearing the jaw bra at night. She feels like her saliva is more dry on the left side. She has more bleeding with flossing lately. She is swallowing without issue. She is taking tums for some heartburn. Her weight is increasing. She is due on June 8.       MEDICATIONS:     Current Outpatient Medications   Medication Sig Dispense Refill     aspirin (ASA) 81 MG EC tablet Take 1 tablet (81 mg) by mouth daily 60 tablet 3     lactobacillus rhamnosus, GG, (CULTURELL) capsule Take 1 capsule by mouth daily       Magnesium Oxide 250 MG TABS Take 250 mg by mouth daily       omega 3 1000 MG CAPS Take by mouth daily        omeprazole (PRILOSEC) 20 MG DR capsule Take 1 capsule (20 mg) by mouth daily 90 capsule 1     Prenatal Vit-Fe Fumarate-FA (PRENATAL MULTIVITAMIN W/IRON) 27-0.8 MG tablet Take 1 tablet by mouth daily       Vitamin D, Cholecalciferol, 25 MCG (1000 UT) CAPS Take by mouth daily       cyanocobalamin (VITAMIN B-12) 100 MCG tablet Take 250 mcg by mouth daily (Patient not taking: No sig reported)         ALLERGIES:  No Known Allergies    HABITS/SOCIAL HISTORY:   Works as a .    She is  with no children.    She has no smoking history.  She has about 10 alcoholic beverages on weekends.  She has no chewing tobacco or vaping history.    Social History     Socioeconomic History     Marital status:      Spouse name: Ottoniel     Number of children: Not on file     Years of education: Not on file     Highest education level: Not on file   Occupational History     Not on file   Tobacco Use     Smoking status: Never Smoker     Smokeless tobacco: Never Used   Vaping Use     Vaping Use: Never used   Substance and Sexual Activity     Alcohol use: Not Currently     Comment: 2-3 drinks per week     Drug use: Never     Sexual activity: Yes     Partners: Male   Other Topics Concern     Not on file   Social History Narrative     Not on file     Social Determinants of Health     Financial Resource Strain: Not on file   Food Insecurity: Not on file   Transportation Needs: Not on file   Physical Activity: Not on file   Stress: Not on file   Social Connections: Not on file   Intimate Partner Violence: Not on file   Housing Stability: Not on file       PAST MEDICAL HISTORY:   Past Medical History:   Diagnosis Date     Irritable bladder      Squamous cell carcinoma of neck      Uncomplicated asthma         PAST SURGICAL HISTORY:   Past Surgical History:   Procedure Laterality Date     DISSECTION RADICAL NECK MODIFIED Left 11/20/2019    Procedure: DISSECTION, NECK, MODIFIED RADICAL LEFT;  Surgeon: Ruth Tello MD;   "Location: [facility] OR     LARYNGOSCOPY WITH BIOPSY(IES) N/A 10/24/2019    Procedure: Direct laryngscopy with biopsy;  Surgeon: Ruth Tello MD;  Location: [facility] OR     TONSILLECTOMY ADULT Left 10/24/2019    Procedure: Left Radical  tonsillectomy;  Surgeon: Ruth Tello MD;  Location: [facility] OR       FAMILY HISTORY:    Family History   Problem Relation Age of Onset     Breast Cancer Mother      Cancer Mother         Breast cancer     Hyperlipidemia Father      Bone Cancer Sister      Cancer Sister         Bone cancer     Diabetes No family hx of        REVIEW OF SYSTEMS:  12 point ROS was negative other than the symptoms noted above in the HPI.  Patient Supplied Answers to Review of Systems  [facility] ROS 5/14/2020   Constitutional: -   Neurology: Headache   Psychology: -   Ears, Nose, Throat: Ear pain   Musculoskeletal: -   Endocrine: -         PHYSICAL EXAMINATION:   /81 (BP Location: Right arm, Patient Position: Sitting, Cuff Size: Adult Regular)   Pulse 83   Temp 97.6  F (36.4  C) (Temporal)   Ht 1.676 m (5' 6\")   Wt 67.6 kg (149 lb)   LMP 09/01/2021   SpO2 97%   BMI 24.05 kg/m     Appearance:   normal; NAD, age-appropriate appearance   Communication:   normal; communicates verbally, normal voice quality   Head/Face:   inspection -  Normal; no scars or visible lesions   Salivary glands -  Normal size, no tenderness, swelling, or palpable masses   Facial strength -  Normal and symmetric    Skin:  normal, no rash   Ears:  auricle (AD) -  normal  EAC (AD) -  normal  TM (AD) -  Normal, no effusion  auricle (AS) -  normal  EAC (AS) -  normal  TM (AS) -  Normal, no effusion  Normal clinical speech reception   Nose:  Ext. inspection -  Normal  Internal Inspection -  Normal mucosa, septum, and turbinates   Nasopharynx:  normal mucosa, no masses   Oral Cavity:  lips -  Normal mucosa, oral competence, and stoma size   Age-appropriate dentition, healthy gingival mucosa   Hard palate, buccal, floor of mouth mucosa " normal   Tongue - normal movement, no lesions   Oropharynx:  mucosa -  Normal, no lesions  soft palate -  Expected post radical tonsillectomy asymmetry of left soft palate  tonsils -  S/p left radical tonsillectomy, no secretions, no masses, no concerning mucosal lesions, no palpable masses, expected scar tissue; right tonsil with no palpable masses slightly cryptic in appearance which is stable, no mucosal lesions  BOT - normal  Vallecula - clear   Hypopharynx:  Normal pyriform sinus and pharyngeal wall mucosa   No pooled secretions    Larynx:  Epiglottis, AE folds, false vocal cords, true vocal cords, arytenoids normal in appearance with no edema of AE folds or arytenoids   bilaterally mobile cords    Neck: Well healed left neck incision   Radiation fibrosis of the neck  Some skin changes to the neck skin from radiation  No significant lymphedema  Normal range of motion   Lymphatic:  no abnormal nodes   Cardiovascular:  warm, pink, well-perfused extremities without swelling, tenderness, or edema   Respiratory:  Normal respiratory effort, no stridor   Neuro/Psych.:  mood/affect -  normal  mental status -  normal       PROCEDURES:   Flexible fiberoptic laryngoscopy: Scope exam was indicated due to oropharyngeal cancer. Verbal consent was obtained. The nasal cavity was prepped with an aerosolized solution of topical anesthetic and vasoconstrictive agent. The scope was passed through the anterior nasal cavity and advanced. Inspection of the nasopharynx revealed no gross abnormality. The base of tongue and vallecula are normal. There are no abnormalities in the area of the left tonsil. The epiglottis, AE folds, false cords, true cords, arytenoids are normal and there is no edema of the arytenoids or AE folds.  Inspection of the larynx revealed bilaterally mobile vocal cords. Pyriform sinuses are symmetric. The airway is patent. Procedure tolerated well with no immediate complications  noted.                          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 She had an excision of a cystic neck mass by a local ENT which demonstrated a metastatic SCC. She underwent a radical tonsillectomy which showed a small primary tumor in the tonsil which was completely resected. She then had a completion neck dissection on 11/20/2019 with final pathology showing 1/38 nodes. She completed postoperative proton beam therapy at [place] on 2/24/2020.     She has no concerning findings on exam today. We discussed use of sugar free lemon drops/gum to help with some of her dry mouth symptoms.    We can repeat her imaging after her delivery, last imaging was in January 2021, deferred due to her pregnancy.    She is having her TSH checked every trimester during her pregnancy by OB, normal in April 2022. We will recheck in 6 months unless continuing to be checked by OB/PCP.    I will see her back in  6 months with scans and labs.    Thank you very much for the opportunity to participate in the care of your patient.      Ruth Tello MD  Otolaryngology- Head & Neck Surgery      This note was dictated with voice recognition software and then edited. Please excuse any unintentional errors.     CC:  Ifeanyi Soliman MD  Department of Radiation Oncology  [facility]

## 2022-05-18 NOTE — PROGRESS NOTES
"Subjective:     36 year old  at 37w1d presents for a routine prenatal appointment.    No vaginal bleeding,  leakage of fluid, or change in vaginal discharge.  No contractions.  Good fetal movement.     No HA, visual changes, RUQ or epigastric pain.     - Reviewed visit with Otolaryngology. TSH lab previously ordered and can complete today.   - Reviewed recent growth ultrasound due to history of radiation: EFW: 3,174g, 78% normal growth. Cephalic presentation, normal amount of amniotic fluid. Anterior placenta not previa.   - Reviewed OTC meds for postpartum period including Tylenol, ibuprofen, and stool softener. Pt would like .    Patient concerns: none. Feeling well overall.       Objective:  Vitals:    22 1633   BP: 114/80   Pulse: 80   Weight: 67.6 kg (149 lb)   Height: 1.676 m (5' 5.98\")    See OB flowsheet    Assessment/Plan     Encounter Diagnoses   Name Primary?     Supervision of high-risk pregnancy of elderly multigravida Yes     Rh negative state in antepartum period      Squamous cell carcinoma of oropharynx (H)      Orders Placed This Encounter   Procedures     CBC with platelets     No orders of the defined types were placed in this encounter.      PHQ-9 SCORE 2020 10/29/2021 2022   PHQ-9 Total Score 4 1 8     PHQ 2020 10/29/2021 2022   PHQ-9 Total Score 4 1 8   Q9: Thoughts of better off dead/self-harm past 2 weeks Not at all Not at all Not at all     GBS screening: Obtained.  Reviewed recommended postpartum OTC medication. Reviewed no use of motrin during pregnancy. Has at home.   Labor signs discussed. Reinforced daily fetal movement counts.  Reviewed why/how to contact provider if headache/visual changes/RUQ or epigastric pain, decreased fetal movement, vaginal bleeding, leakage of fluid.   Return to clinic in 1 week and prn if questions or concerns.     ÁNGEL Renee CNM    "

## 2022-05-18 NOTE — NURSING NOTE
"No chief complaint on file.      Blood pressure 122/81, pulse 83, temperature 97.6  F (36.4  C), temperature source Temporal, height 1.676 m (5' 6\"), weight 67.6 kg (149 lb), last menstrual period 09/01/2021, SpO2 97 %, not currently breastfeeding.    Linnea Dudley, EMT    "

## 2022-05-19 ENCOUNTER — OFFICE VISIT (OUTPATIENT)
Dept: OBGYN | Facility: CLINIC | Age: 37
End: 2022-05-19
Attending: REGISTERED NURSE
Payer: COMMERCIAL

## 2022-05-19 VITALS
HEART RATE: 80 BPM | DIASTOLIC BLOOD PRESSURE: 80 MMHG | SYSTOLIC BLOOD PRESSURE: 114 MMHG | BODY MASS INDEX: 23.95 KG/M2 | HEIGHT: 66 IN | WEIGHT: 149 LBS

## 2022-05-19 DIAGNOSIS — Z67.91 RH NEGATIVE STATE IN ANTEPARTUM PERIOD: ICD-10-CM

## 2022-05-19 DIAGNOSIS — O26.899 RH NEGATIVE STATE IN ANTEPARTUM PERIOD: ICD-10-CM

## 2022-05-19 DIAGNOSIS — O09.529 SUPERVISION OF HIGH-RISK PREGNANCY OF ELDERLY MULTIGRAVIDA: Primary | ICD-10-CM

## 2022-05-19 DIAGNOSIS — C10.9 SQUAMOUS CELL CARCINOMA OF OROPHARYNX (H): ICD-10-CM

## 2022-05-19 LAB
ERYTHROCYTE [DISTWIDTH] IN BLOOD BY AUTOMATED COUNT: 12.6 % (ref 10–15)
HCT VFR BLD AUTO: 36.2 % (ref 35–47)
HGB BLD-MCNC: 12.3 G/DL (ref 11.7–15.7)
MCH RBC QN AUTO: 30.5 PG (ref 26.5–33)
MCHC RBC AUTO-ENTMCNC: 34 G/DL (ref 31.5–36.5)
MCV RBC AUTO: 90 FL (ref 78–100)
PLATELET # BLD AUTO: 302 10E3/UL (ref 150–450)
RBC # BLD AUTO: 4.03 10E6/UL (ref 3.8–5.2)
TSH SERPL DL<=0.005 MIU/L-ACNC: 2.67 MU/L (ref 0.4–4)
WBC # BLD AUTO: 11 10E3/UL (ref 4–11)

## 2022-05-19 PROCEDURE — G0463 HOSPITAL OUTPT CLINIC VISIT: HCPCS

## 2022-05-19 PROCEDURE — 84443 ASSAY THYROID STIM HORMONE: CPT | Performed by: MIDWIFE

## 2022-05-19 PROCEDURE — 87653 STREP B DNA AMP PROBE: CPT | Performed by: MIDWIFE

## 2022-05-19 PROCEDURE — 85027 COMPLETE CBC AUTOMATED: CPT | Performed by: MIDWIFE

## 2022-05-19 PROCEDURE — 36415 COLL VENOUS BLD VENIPUNCTURE: CPT | Performed by: MIDWIFE

## 2022-05-19 PROCEDURE — 99207 PR PRENATAL VISIT: CPT | Performed by: MIDWIFE

## 2022-05-19 ASSESSMENT — PAIN SCALES - GENERAL: PAINLEVEL: NO PAIN (0)

## 2022-05-19 NOTE — LETTER
"2022       RE: Mira Cao  [address]  [place] [address]     Dear Colleague,    Thank you for referring your patient, Mira Cao, to the [facility]OMEN[facility] CLINIC [place] at [facility]. Please see a copy of my visit note below.    Subjective:     36 year old  at 37w1d presents for a routine prenatal appointment.    No vaginal bleeding,  leakage of fluid, or change in vaginal discharge.  No contractions.  Good fetal movement.     No HA, visual changes, RUQ or epigastric pain.     - Reviewed visit with Otolaryngology. TSH lab previously ordered and can complete today.   - Reviewed recent growth ultrasound due to history of radiation: EFW: 3,174g, 78% normal growth. Cephalic presentation, normal amount of amniotic fluid. Anterior placenta not previa.   - Reviewed OTC meds for postpartum period including Tylenol, ibuprofen, and stool softener. Pt would like .    Patient concerns: none. Feeling well overall.       Objective:  Vitals:    22 1633   BP: 114/80   Pulse: 80   Weight: 67.6 kg (149 lb)   Height: 1.676 m (5' 5.98\")    See OB flowsheet    Assessment/Plan     Encounter Diagnoses   Name Primary?     Supervision of high-risk pregnancy of elderly multigravida Yes     Rh negative state in antepartum period      Squamous cell carcinoma of oropharynx (H)      Orders Placed This Encounter   Procedures     CBC with platelets     No orders of the defined types were placed in this encounter.      PHQ-9 SCORE 2020 10/29/2021 2022   PHQ-9 Total Score 4 1 8     PHQ 2020 10/29/2021 2022   PHQ-9 Total Score 4 1 8   Q9: Thoughts of better off dead/self-harm past 2 weeks Not at all Not at all Not at all     GBS screening: Obtained.  Reviewed recommended postpartum OTC medication. Reviewed no use of motrin during pregnancy. Has at home.   Labor signs discussed. Reinforced daily fetal movement counts.  Reviewed " why/how to contact provider if headache/visual changes/RUQ or epigastric pain, decreased fetal movement, vaginal bleeding, leakage of fluid.   Return to clinic in 1 week and prn if questions or concerns.     ÁNGEL ReneeM

## 2022-05-20 LAB — GP B STREP DNA SPEC QL NAA+PROBE: NEGATIVE

## 2022-05-27 ENCOUNTER — OFFICE VISIT (OUTPATIENT)
Dept: OBGYN | Facility: CLINIC | Age: 37
End: 2022-05-27
Attending: ADVANCED PRACTICE MIDWIFE
Payer: COMMERCIAL

## 2022-05-27 VITALS
SYSTOLIC BLOOD PRESSURE: 120 MMHG | WEIGHT: 149.1 LBS | HEART RATE: 89 BPM | BODY MASS INDEX: 24.84 KG/M2 | DIASTOLIC BLOOD PRESSURE: 77 MMHG | HEIGHT: 65 IN

## 2022-05-27 DIAGNOSIS — O09.93 HIGH-RISK PREGNANCY IN THIRD TRIMESTER: Primary | ICD-10-CM

## 2022-05-27 PROCEDURE — 99207 PR PRENATAL VISIT: CPT | Performed by: ADVANCED PRACTICE MIDWIFE

## 2022-05-27 PROCEDURE — G0463 HOSPITAL OUTPT CLINIC VISIT: HCPCS

## 2022-05-27 ASSESSMENT — PAIN SCALES - GENERAL: PAINLEVEL: NO PAIN (0)

## 2022-05-27 NOTE — PROGRESS NOTES
"Subjective:     36 year old  at 38w2d presents for routine prenatal visit.            Denies vaginal bleeding or leakage of fluid.  Denies contractions.  Reports fetal movement.        No HA, visual changes, RUQ or epigastric pain.   Patient concerns: No significant concerns today.  Feeling well overall.  Objective:  Vitals:    22 1631   BP: 120/77   Pulse: 89   Weight: 67.6 kg (149 lb 1.6 oz)   Height: 1.651 m (5' 5\")    See OB flowsheet  Assessment/Plan     Encounter Diagnosis   Name Primary?     High-risk pregnancy in third trimester Yes         - Reviewed postdates testing including BPP => 40 weeks and rationale for induction of labor based on results.   Patient desires postdates testing.  - Reviewed why/how to contact provider if headache/visual changes/RUQ or epigastric pain, decreased fetal movement, vaginal bleeding, leakage of fluid or strong/regular contractions.   Patient education/orders or handouts today:  Sign/symptoms of labor, When to call for labor or other concerns and Postdates testing discussion  Return to clinic in 1 week and prn if questions or concerns.   Hillary Hernandes, APRN CNM    "

## 2022-05-27 NOTE — LETTER
"2022       RE: Mira Cao  [address]  [place] [address]     Dear Colleague,    Thank you for referring your patient, Mira Cao, to the [facility]OMEN'S CLINIC [place] at [facility]. Please see a copy of my visit note below.    Subjective:     36 year old  at 38w2d presents for routine prenatal visit.            Denies vaginal bleeding or leakage of fluid.  Denies contractions.  Reports fetal movement.        No HA, visual changes, RUQ or epigastric pain.   Patient concerns: No significant concerns today.  Feeling well overall.  Objective:  Vitals:    22 1631   BP: 120/77   Pulse: 89   Weight: 67.6 kg (149 lb 1.6 oz)   Height: 1.651 m (5' 5\")    See OB flowsheet  Assessment/Plan     Encounter Diagnosis   Name Primary?     High-risk pregnancy in third trimester Yes         - Reviewed postdates testing including BPP => 40 weeks and rationale for induction of labor based on results.   Patient desires postdates testing.  - Reviewed why/how to contact provider if headache/visual changes/RUQ or epigastric pain, decreased fetal movement, vaginal bleeding, leakage of fluid or strong/regular contractions.   Patient education/orders or handouts today:  Sign/symptoms of labor, When to call for labor or other concerns and Postdates testing discussion  Return to clinic in 1 week and prn if questions or concerns.   ÁNGEL Keyes CNM      "

## 2022-06-01 NOTE — PROGRESS NOTES
"Subjective:     36 year old  at 39w1d presents for routine prenatal visit.           Patient concerns: Feeling well overall. Has been experiencing some occasional \"lightning crotch,\" increased pelvic heaviness. Feels she is moving a little slower than previously.   Denies regular cramping/contractions, vaginal bleeding, discharge or leakage of fluid. Reports +fetal movement.  No HA, vision changes, ruq/epigastric pain.      Hoping for spontaneous labor.    Objective:  Vitals:    22 1624   BP: 115/79   Pulse: 80   Weight: 68.9 kg (151 lb 14.4 oz)   Height: 1.651 m (5' 5\")      See OB flowsheet    Assessment/Plan     Encounter Diagnosis   Name Primary?     Supervision of high-risk pregnancy of elderly multigravida Yes     Orders Placed This Encounter   Procedures     US OB Fetal Biophys Prf wo NonStrs Singls Sgl     - Reviewed why/how to contact provider if headache/visual changes/RUQ or epigastric pain, decreased fetal movement, vaginal bleeding, leakage of fluid or strong/regular contractions.   Patient education/orders or handouts today:  Sign/symptoms of labor, When to call for labor or other concerns, Postdates testing discussion, BPP, NST and Induction of labor    - Reviewed late term testing including BPP => 41 weeks and rationale for induction of labor based on results.   Patient desires  late term testing.    - Scheduled for BPP at 41 weeks on 6/15/22 ([facility] radiology d/t no [facility] availability) followed by CNM appointment.    Next MAI on 22.    ÁNGEL Lopez, FRANCISCO J  "

## 2022-06-02 ENCOUNTER — OFFICE VISIT (OUTPATIENT)
Dept: OBGYN | Facility: CLINIC | Age: 37
End: 2022-06-02
Attending: ADVANCED PRACTICE MIDWIFE
Payer: COMMERCIAL

## 2022-06-02 VITALS
BODY MASS INDEX: 25.31 KG/M2 | WEIGHT: 151.9 LBS | HEIGHT: 65 IN | HEART RATE: 80 BPM | DIASTOLIC BLOOD PRESSURE: 79 MMHG | SYSTOLIC BLOOD PRESSURE: 115 MMHG

## 2022-06-02 DIAGNOSIS — O09.529 SUPERVISION OF HIGH-RISK PREGNANCY OF ELDERLY MULTIGRAVIDA: Primary | ICD-10-CM

## 2022-06-02 PROCEDURE — 99207 PR PRENATAL VISIT: CPT | Performed by: ADVANCED PRACTICE MIDWIFE

## 2022-06-02 PROCEDURE — G0463 HOSPITAL OUTPT CLINIC VISIT: HCPCS

## 2022-06-07 ENCOUNTER — OFFICE VISIT (OUTPATIENT)
Dept: OBGYN | Facility: CLINIC | Age: 37
End: 2022-06-07
Attending: ADVANCED PRACTICE MIDWIFE
Payer: COMMERCIAL

## 2022-06-07 VITALS
HEART RATE: 76 BPM | DIASTOLIC BLOOD PRESSURE: 80 MMHG | BODY MASS INDEX: 25.41 KG/M2 | HEIGHT: 65 IN | SYSTOLIC BLOOD PRESSURE: 125 MMHG | WEIGHT: 152.5 LBS

## 2022-06-07 DIAGNOSIS — C44.42 SQUAMOUS CELL CARCINOMA OF NECK: ICD-10-CM

## 2022-06-07 DIAGNOSIS — O09.529 SUPERVISION OF HIGH-RISK PREGNANCY OF ELDERLY MULTIGRAVIDA: Primary | ICD-10-CM

## 2022-06-07 DIAGNOSIS — O26.899 RH NEGATIVE STATE IN ANTEPARTUM PERIOD: ICD-10-CM

## 2022-06-07 DIAGNOSIS — Z92.3 HISTORY OF RADIATION THERAPY: ICD-10-CM

## 2022-06-07 DIAGNOSIS — Z67.91 RH NEGATIVE STATE IN ANTEPARTUM PERIOD: ICD-10-CM

## 2022-06-07 PROCEDURE — 59426 ANTEPARTUM CARE ONLY: CPT | Performed by: ADVANCED PRACTICE MIDWIFE

## 2022-06-07 PROCEDURE — G0463 HOSPITAL OUTPT CLINIC VISIT: HCPCS

## 2022-06-07 NOTE — LETTER
"2022       RE: Mira Cao  [address]  [place] [address]     Dear Colleague,    Thank you for referring your patient, Mira Cao, to the [facility]OMEN'S CLINIC [place] at [facility]. Please see a copy of my visit note below.    Subjective:     36 year old  at 39w6d presents for routine prenatal visit.            no vaginal bleeding or leakage of fluid.  no contractions.  pos fetal movement.        No HA, visual changes, RUQ or epigastric pain.   Patient concerns:   Feeling well overall.  Objective:  Vitals:    22 1631   BP: 125/80   Pulse: 76   Weight: 69.2 kg (152 lb 8 oz)   Height: 1.651 m (5' 5\")    See OB flowsheet  Assessment/Plan     Encounter Diagnoses   Name Primary?     Supervision of high-risk pregnancy of elderly multigravida Yes     Squamous cell carcinoma of neck      Rh negative state in antepartum period      History of radiation therapy      Orders Placed This Encounter   Procedures     US OB Biophys Single Gestation Measure     No orders of the defined types were placed in this encounter.    - Reviewed postdates testing including BPP => 41 weeks and rationale for induction of labor based on results.   Pt is open to recommendation. Discussed hx of radiation and growth US, good growth to date. Will get BPP scheduled this Friday rather than waiting until 6/15.  May desire IOL at 41 wks   - Reviewed why/how to contact provider if headache/visual changes/RUQ or epigastric pain, decreased fetal movement, vaginal bleeding, leakage of fluid or strong/regular contractions.   Patient education/orders or handouts today:  Sign/symptoms of labor, When to call for labor or other concerns, Postdates testing discussion, BPP and Induction of labor  Return to clinic in 1 week and prn if questions or concerns.   Yareli Menon, APRN CNM      "

## 2022-06-07 NOTE — PROGRESS NOTES
"Subjective:     36 year old  at 39w6d presents for routine prenatal visit.            no vaginal bleeding or leakage of fluid.  no contractions.  pos fetal movement.        No HA, visual changes, RUQ or epigastric pain.   Patient concerns:   Feeling well overall.  Objective:  Vitals:    22 1631   BP: 125/80   Pulse: 76   Weight: 69.2 kg (152 lb 8 oz)   Height: 1.651 m (5' 5\")    See OB flowsheet  Assessment/Plan     Encounter Diagnoses   Name Primary?     Supervision of high-risk pregnancy of elderly multigravida Yes     Squamous cell carcinoma of neck      Rh negative state in antepartum period      History of radiation therapy      Orders Placed This Encounter   Procedures     US OB Biophys Single Gestation Measure     No orders of the defined types were placed in this encounter.    - Reviewed postdates testing including BPP => 41 weeks and rationale for induction of labor based on results.   Pt is open to recommendation. Discussed hx of radiation and growth US, good growth to date. Will get BPP scheduled this Friday rather than waiting until 6/15.  May desire IOL at 41 wks   - Reviewed why/how to contact provider if headache/visual changes/RUQ or epigastric pain, decreased fetal movement, vaginal bleeding, leakage of fluid or strong/regular contractions.   Patient education/orders or handouts today:  Sign/symptoms of labor, When to call for labor or other concerns, Postdates testing discussion, BPP and Induction of labor  Return to clinic in 1 week and prn if questions or concerns.   Yareli Menon, APRN CNM      "

## 2022-06-08 PROBLEM — Z92.3 HISTORY OF RADIATION THERAPY: Status: ACTIVE | Noted: 2022-06-08

## 2022-06-10 ENCOUNTER — HOSPITAL ENCOUNTER (OUTPATIENT)
Facility: CLINIC | Age: 37
Setting detail: OBSERVATION
Discharge: HOME | End: 2022-06-10
Attending: REGISTERED NURSE | Admitting: ADVANCED PRACTICE MIDWIFE
Payer: COMMERCIAL

## 2022-06-10 ENCOUNTER — TELEPHONE (OUTPATIENT)
Dept: OBGYN | Facility: CLINIC | Age: 37
End: 2022-06-10
Payer: COMMERCIAL

## 2022-06-10 ENCOUNTER — ANCILLARY PROCEDURE (OUTPATIENT)
Dept: ULTRASOUND IMAGING | Facility: CLINIC | Age: 37
End: 2022-06-10
Attending: ADVANCED PRACTICE MIDWIFE
Payer: COMMERCIAL

## 2022-06-10 ENCOUNTER — HOSPITAL ENCOUNTER (OUTPATIENT)
Facility: CLINIC | Age: 37
Setting detail: OBSERVATION
End: 2022-06-10
Admitting: ADVANCED PRACTICE MIDWIFE
Payer: COMMERCIAL

## 2022-06-10 VITALS
DIASTOLIC BLOOD PRESSURE: 80 MMHG | WEIGHT: 152 LBS | SYSTOLIC BLOOD PRESSURE: 115 MMHG | BODY MASS INDEX: 24.43 KG/M2 | TEMPERATURE: 97.4 F | HEART RATE: 73 BPM | HEIGHT: 66 IN | RESPIRATION RATE: 18 BRPM

## 2022-06-10 DIAGNOSIS — C44.42 SQUAMOUS CELL CARCINOMA OF NECK: ICD-10-CM

## 2022-06-10 DIAGNOSIS — O41.03X0 OLIGOHYDRAMNIOS IN THIRD TRIMESTER, SINGLE OR UNSPECIFIED FETUS: ICD-10-CM

## 2022-06-10 DIAGNOSIS — O09.529 SUPERVISION OF HIGH-RISK PREGNANCY OF ELDERLY MULTIGRAVIDA: ICD-10-CM

## 2022-06-10 DIAGNOSIS — O35.EXX0 KIDNEY ABNORMALITY OF FETUS ON PRENATAL ULTRASOUND: ICD-10-CM

## 2022-06-10 PROCEDURE — 76816 OB US FOLLOW-UP PER FETUS: CPT

## 2022-06-10 PROCEDURE — 76819 FETAL BIOPHYS PROFIL W/O NST: CPT

## 2022-06-10 PROCEDURE — 59025 FETAL NON-STRESS TEST: CPT

## 2022-06-10 PROCEDURE — 99214 OFFICE O/P EST MOD 30 MIN: CPT | Mod: 25 | Performed by: ADVANCED PRACTICE MIDWIFE

## 2022-06-10 PROCEDURE — G0463 HOSPITAL OUTPT CLINIC VISIT: HCPCS | Mod: 25

## 2022-06-10 PROCEDURE — 59025 FETAL NON-STRESS TEST: CPT | Mod: 26 | Performed by: ADVANCED PRACTICE MIDWIFE

## 2022-06-10 RX ORDER — LORATADINE 10 MG/1
10 TABLET ORAL DAILY
COMMUNITY

## 2022-06-10 ASSESSMENT — ACTIVITIES OF DAILY LIVING (ADL)
DRESSING/BATHING_DIFFICULTY: NO
DOING_ERRANDS_INDEPENDENTLY_DIFFICULTY: NO
CHANGE_IN_FUNCTIONAL_STATUS_SINCE_ONSET_OF_CURRENT_ILLNESS/INJURY: NO
FALL_HISTORY_WITHIN_LAST_SIX_MONTHS: NO
WEAR_GLASSES_OR_BLIND: YES
WALKING_OR_CLIMBING_STAIRS_DIFFICULTY: NO
DIFFICULTY_EATING/SWALLOWING: NO
VISION_MANAGEMENT: GLASSES
CONCENTRATING,_REMEMBERING_OR_MAKING_DECISIONS_DIFFICULTY: NO
TOILETING_ISSUES: NO

## 2022-06-10 NOTE — CONFIDENTIAL NOTE
Spoke with Rsoetta.  She states that BPP 6/8 with points deducted for oligohydramnios. Largest pocket was 1.6 cm and HILDA is 3.9.  S/D 2.1.  EFW 9 pounds 12 ounces.    Discussed with FRANCISCO J Aburto.  She recommends that Mira go to [facility] for induction.    Called Mira with recommendation.  She wishes to discuss further     Lamon[place] notified

## 2022-06-10 NOTE — TELEPHONE ENCOUNTER
"TC:   Reviewed BPP result from today 6/8. Discussed -2pts for fluid ( HILDA 3.9cm) and new diagnosis of oligohydramnios. Discussed recommendation for induction based on these results at term.  Also reviewed EFW 90%ile w/ fetal weight 4419g (9lbs 12oz) with AC \"too large for dating\". She is wondering if she could wait until the morning for induction. Offered for her to come to triage for NST and if reactive for a total BPP score 8/10 she may be able to go home for the night and come back in the morning, but to plan for likely staying overnight to begin induction. She is agreeable to present this evening around 6pm. All questions answered. Encouraged to call if further questions or concerns.     ÁNGEL Covington CNM    "

## 2022-06-10 NOTE — TELEPHONE ENCOUNTER
[facility] Call Center    Phone Message    May a detailed message be left on voicemail: yes     Reason for Call: Other: . Rosetta from [facility] is calling, pt failed her biophysical and her fluid is low, rosetta stated she wanted somebody to review the results before the weekend, thank you    Action Taken: Message routed to:  Other: eddie rn    Travel Screening: Not Applicable

## 2022-06-11 ENCOUNTER — HOSPITAL ENCOUNTER (OUTPATIENT)
Facility: CLINIC | Age: 37
Discharge: HOME | End: 2022-06-11
Attending: ADVANCED PRACTICE MIDWIFE | Admitting: ADVANCED PRACTICE MIDWIFE
Payer: COMMERCIAL

## 2022-06-11 VITALS
TEMPERATURE: 97.9 F | DIASTOLIC BLOOD PRESSURE: 77 MMHG | SYSTOLIC BLOOD PRESSURE: 120 MMHG | HEART RATE: 85 BPM | RESPIRATION RATE: 18 BRPM

## 2022-06-11 PROBLEM — Z36.89 ENCOUNTER FOR TRIAGE IN PREGNANT PATIENT: Status: ACTIVE | Noted: 2022-06-11

## 2022-06-11 LAB — SARS-COV-2 RNA RESP QL NAA+PROBE: NEGATIVE

## 2022-06-11 PROCEDURE — 59025 FETAL NON-STRESS TEST: CPT | Mod: 26 | Performed by: ADVANCED PRACTICE MIDWIFE

## 2022-06-11 PROCEDURE — 99214 OFFICE O/P EST MOD 30 MIN: CPT | Mod: 25 | Performed by: ADVANCED PRACTICE MIDWIFE

## 2022-06-11 PROCEDURE — 999N000105 HC STATISTIC NO DOCUMENTATION TO SUPPORT CHARGE

## 2022-06-11 PROCEDURE — 59025 FETAL NON-STRESS TEST: CPT

## 2022-06-11 PROCEDURE — 87635 SARS-COV-2 COVID-19 AMP PRB: CPT | Performed by: ADVANCED PRACTICE MIDWIFE

## 2022-06-11 PROCEDURE — G0463 HOSPITAL OUTPT CLINIC VISIT: HCPCS | Mod: 25

## 2022-06-11 RX ORDER — LIDOCAINE 40 MG/G
CREAM TOPICAL
Status: DISCONTINUED | OUTPATIENT
Start: 2022-06-11 | End: 2022-06-11 | Stop reason: HOSPADM

## 2022-06-11 NOTE — PLAN OF CARE
Data: [facility] at 1330.   Reason for maternal/fetal assessment per patient is Non Stress Test  . Patient is a . Prenatal record reviewed.      OB History    Para Term  AB Living   2 0 0 0 1 0   SAB IAB Ectopic Multiple Live Births   0 0 0 0 0      # Outcome Date GA Lbr Richard/2nd Weight Sex Delivery Anes PTL Lv   2 Current            1 AB               Medical History:   Past Medical History:   Diagnosis Date    Irritable bladder     Squamous cell carcinoma of neck     Uncomplicated asthma    . Gestational Age 40w3d. VSS. Cervix: not examined.  Fetal movement present. Patient denies cramping, backache, vaginal discharge, pelvic pressure, UTI symptoms, GI problems, bloody show, vaginal bleeding, edema, headache, visual disturbances, epigastric or URQ pain, abdominal pain, rupture of membranes. Support persons , Ottoniel present.  Action: Verbal consent for EFM. Triage assessment completed. EFM applied for fetal well being. Uterine assessment shows occasional contraction, palpating mild, patient states they are not painful at this time. Fetal assessment: Presumed adequate fetal oxygenation documented (see flow record). Patient education given on fetal movement counts, patient states she has kick count paperwork at home from yesterday and doesn't need them printed today. Patient instructed to report change in fetal movement, vaginal leaking of fluid or bleeding, abdominal pain, or any concerns related to the pregnancy to her nurse/physician.   Response: Hillary Hernandes CNM informed of patient arrival and assessment. Plan per provider is to recommend IOL, patient declined at this time and would like to wait until tomorrow, see provider's notes. Patient verbalized understanding of education on fetal movement, kick counting and concerns or changes to notify provider or come back to the [facility], patient verbalized agreement with plan. Discharged ambulatory at 1420.

## 2022-06-11 NOTE — PROGRESS NOTES
[facility] Labor and Delivery Triage Note    Mira Cao MRN# [id number]   Age: 36 year old YOB: 1985     Date of Triage Visit:  6/10/2022    Primary care provider: Leonie Aguilera           Chief Complaint:   Mira Cao is a 36 year old female  who is 40w2d pregnant who presents for fetal nonstress test and discussion of induction of labor. Had BPP today and was 6/8, off 2 points for low fluid. SDP: 1.6 cm. HILDA: 3.9 cm with diagnosis of oligohydramnios. FRANCISCO J Preston called to discuss with patient and recommended induction of labor due to oligohydramnios. Mira and , Ottoniel, wish to discuss options. Last day of work was today and strongly wishes to go home to rest/sleep. Occasional brianna-diaz contractions, not strong or painful. Denies LOF, bleeding, change in discharge. Endorses good fetal movement.           Pregnancy history:   Reviewed prenatal record and problem list.   OBSTETRIC HISTORY:    OB History    Para Term  AB Living   2 0 0 0 1 0   SAB IAB Ectopic Multiple Live Births   0 0 0 0 0      # Outcome Date GA Lbr Richard/2nd Weight Sex Delivery Anes PTL Lv   2 Current            1 AB                EDC: Estimated Date of Delivery: 22    Prenatal Labs:   Lab Results   Component Value Date    ABO O 2020    RH Neg 2020    AS Negative 10/29/2021    HEPBANG Nonreactive 10/29/2021    CHPCRT Negative 2020    GCPCRT Negative 2020    HGB 12.3 2022     BPP today 6/10/2022:   HEART RATE: 146 bpm.  SDP: 1.6 cm.  HILDA: 3.9 cm.  PLACENTA: Anterior. No previa.  CERVIX: Not visualized, possibly related to positioning.     CORD DOPPLER: S/D ratio: 2.1.     OTHER FINDINGS: The four-chamber heart, stomach, and bladder are unremarkable where seen. Dilated renal pelves are noted bilaterally, measuring 0.8 cm in the right and 0.4 cm on the left.     2/2 fetal breathing  2/2 fetal movements  2/2 fetal tone  0/2  amniotic fluid      Total biophysical profile 6/8     BIOMETRY:  Biparietal Diameter: 9.6 cm, 39 weeks 1 day  Head Circumference: 34.6 cm, 40 weeks 1 day  Abdominal Circumference: 39 cm, too large for dating  Femur Length: 7.8 cm, 40 weeks     Estimated Fetal Weight: 4419 g  EFW Percentile: 90th percentile  Cervical Length: Not visualized, possibly due to positioning     Composite Age First US exam: 40 weeks 2 days  Composite Age This US exam: Calculated at 39 weeks 6 days, but likely skewed due to abdominal circumference being too large for dating.    GBS Status:   Negative     Active Problem List  Patient Active Problem List   Diagnosis     Squamous cell carcinoma of neck     Rh negative state in antepartum period     Supervision of high-risk pregnancy of elderly multigravida     Abnormal glandular Papanicolaou smear of cervix     Irritable bladder     History of radiation therapy     Oligohydramnios in third trimester     Kidney abnormality of fetus on prenatal ultrasound     Labor and delivery, indication for care       Medication Prior to Admission  Medications Prior to Admission   Medication Sig Dispense Refill Last Dose     aspirin (ASA) 81 MG EC tablet Take 1 tablet (81 mg) by mouth daily 60 tablet 3 6/9/2022 at Unknown time     cyanocobalamin (VITAMIN B-12) 100 MCG tablet Take 250 mcg by mouth daily   Unknown at Unknown time     lactobacillus rhamnosus, GG, (CULTURELL) capsule Take 1 capsule by mouth daily   6/9/2022 at Unknown time     loratadine (CLARITIN) 10 MG tablet Take 10 mg by mouth daily   6/10/2022 at Unknown time     Magnesium Oxide 250 MG TABS Take 250 mg by mouth daily   Unknown at Unknown time     omega 3 1000 MG CAPS Take by mouth daily   Unknown at Unknown time     omeprazole (PRILOSEC) 20 MG DR capsule Take 1 capsule (20 mg) by mouth daily 90 capsule 1 Unknown at Unknown time     Prenatal Vit-Fe Fumarate-FA (PRENATAL MULTIVITAMIN W/IRON) 27-0.8 MG tablet Take 1 tablet by mouth daily    "2022 at Unknown time     Vitamin D, Cholecalciferol, 25 MCG (1000 UT) CAPS Take by mouth daily   Unknown at Unknown time   .        Maternal Past Medical History:     Past Medical History:   Diagnosis Date     Irritable bladder      Squamous cell carcinoma of neck      Uncomplicated asthma                        Social History:    to Ottoniel, works full time in finance.   Social History     Tobacco Use     Smoking status: Never Smoker     Smokeless tobacco: Never Used   Substance Use Topics     Alcohol use: Not Currently     Comment: 2-3 drinks per week            Review of Systems:   The Review of Systems is negative other than noted in the HPI          Physical Exam:   /80 (BP Location: Right arm, Patient Position: Semi-Stinson's, Cuff Size: Adult Regular)   Pulse 73   Temp 97.4  F (36.3  C) (Oral)   Resp 18   Ht 1.676 m (5' 6\")   Wt 68.9 kg (152 lb)   LMP 2021   Breastfeeding No   BMI 24.53 kg/m      Cervix:Discussed risks, benefits and alternatives to cervical exam at this time. Declined.   Abdomen:   Leopolds: Presentation - Cephalic. EFW 8-2#  Fetal Heart Rate Tracing: Fetal heart rate 140 with moderate variability. Accelerations present, no decelerations.   Tocometer: Occasional contractions noted on EFM. Mild to palpation, soft resting tone.                        Assessment:   Mira Cao is a 40w2d pregnant female  with Oligohydramnios  NST Reactive  GBS Negative         Plan:   Recommended admission to hospital for labor induction due to diagnosis of oligohydramnios. Reviewed risks, benefits and alternatives of expectant management versus induction of labor, including fetal compromise. Also discussed process of induction of labor/cervical ripening. Mira would like to go home tonight in order to sleep and de-stress and will continue to consider induction of labor.  Discussed with Dr. Greenberg who recommends daily fetal non stress tests until the time that Mira " is agreeable to induction of labor. Fetal NST organized for 6/11/22 at 1300. Recommended fetal movement counts. Mira to call if decreased fetal movement, labor signs/symptoms, or with any additional questions or concerns. Verbalized understanding of plan.     ÁNGEL Ríos CNM

## 2022-06-11 NOTE — PROVIDER NOTIFICATION
06/11/22 1400   Provider Notification   Provider Name/Title Greta MARX   Method of Notification At Bedside   Notification Reason Status Update   FHT reviewed with provider at bedside. Accelerations present, moderate variability present, baseline 145bpm, and no decels present. NST is reactive. Greta MARX recommended IOL, see provider's notes, patient declines at this time and would like to come back for IOL tomorrow at 1600. Provider okayed this plan, charge RN scheduled IOL for tomorrow 6/12/22 at 1600.

## 2022-06-11 NOTE — PROVIDER NOTIFICATION
22 1341   Provider Notification   Provider Name/Title Greta MARX   Method of Notification In Department   Request Evaluate in Person   Notification Reason Patient Arrived   Patient here for her scheduled NST. . 40w3d.

## 2022-06-11 NOTE — DISCHARGE INSTRUCTIONS
Discharge Instruction for Undelivered Patients      You were seen for: Fetal Assessment  We Consulted: [facility]  You had (Test or Medicine):NST     Diet:   Drink 8 to 12 glasses of liquids (milk, juice, water) every day.  You may eat meals and snacks.     Activity:  Count fetal kicks everyday (see handout)  Call your doctor or nurse midwife if your baby is moving less than usual.     Call your provider if you notice:  Swelling in your face or increased swelling in your hands or legs.  Headaches that are not relieved by Tylenol (acetaminophen).  Changes in your vision (blurring: seeing spots or stars.)  Nausea (sick to your stomach) and vomiting (throwing up).   Weight gain of 5 pounds or more per week.  Heartburn that doesn't go away.  Signs of bladder infection: pain when you urinate (use the toilet), need to go more often and more urgently.  The bag of yanez (rupture of membranes) breaks, or you notice leaking in your underwear.  Bright red blood in your underwear.  Abdominal (lower belly) or stomach pain.  For first baby: Contractions (tightening) less than 5 minutes apart for one hour or more.  Second (plus) baby: Contractions (tightening) less than 10 minutes apart and getting stronger.  *If less than 34 weeks: Contractions (tightening) more than 6 times in one hour.  Increase or change in vaginal discharge (note the color and amount)  Other:     Follow-up:  Induction scheduled at the [facility] tomorrow, 6/12/22 at 1600

## 2022-06-11 NOTE — DISCHARGE INSTRUCTIONS
Kick Counts  It s normal to worry about your baby s health. One way you can know your baby s doing well is to record the baby s movements once a day. This is called a kick count.   Remember to take your kick count records to all your appointments with your healthcare provider.  How to count kicks    Time how long it takes you to feel 10 kicks, flutters, swishes, or rolls. Ideally, you want to feel at least 10 movements in 2 hours. You will likely feel 10 movements in less time than that.  Starting at 28 weeks, count your baby's movements daily. Follow your healthcare provider's instructions for kick counting. Here are tips for counting kicks:  Choose a time when the baby is active, such as after a meal.   Sit comfortably or lie on your side.   The first time the baby moves, write down the time.   Count each movement until the baby has moved  10 times. This can take from 20 minutes to 2 hours.   If you haven't felt 10 kicks by the end of the second hour, wait a few hours. Then try again.  Try to do it at the same time each day.  When to call your healthcare provider  Call your healthcare provider  right away if:  You do a couple sets of kick counts during the day and your baby moves fewer than 10 times in 2 hours.  Your baby moves much less often than on the days before.  You haven't felt your baby move all day.  [employer] last reviewed this educational content on 8/1/2020 2000-2021 [employer]. All rights reserved. This information is not intended as a substitute for professional medical care. Always follow your healthcare professional's instructions.

## 2022-06-11 NOTE — PROGRESS NOTES
CHIEF COMPLAINT  ========================  Miraherlinda Cao is a 36 year old patient presenting today at 40w3d for non-stress test.     Patient's last menstrual period was 09/01/2021.  Estimated Date of Delivery: Jun 8, 2022     HPI  ==================   Patient had ultrasound on 6/10/22 and was diagnosed with oligohydramnios.  HILDA 3.9cm, SDP 1.6cm.  Patient was evaluated in triage and recommendation was to proceed with induction, patient declined induction, consented to daily non-stress testing.  CONTRACTIONS: rare  ABDOMINAL PAIN: none  FETAL MOVEMENT: active  VAGINAL BLEEDING: none  RUPTURE OF MEMBRANES: no    REVIEW OF SYSTEMS  =====================  C: NEGATIVE for fever, chills  I: NEGATIVE for worrisome rashes, moles or lesions  E: NEGATIVE for vision changes or irritation  R: NEGATIVE for significant cough or SOB  CV: NEGATIVE for chest pain, palpitations or varicosities  GI: NEGATIVE for nausea, abdominal pain, heartburn, or change in bowel habits  : NEGATIVE for frequency, dysuria, or hematuria  M: NEGATIVE for significant arthralgias or myalgia  N: NEGATIVE for headache, weakness, dizziness or paresthesias  P: NEGATIVE for changes in mood or affect  HISTORIES  ==============  ALLERGIES:  No Known Allergies  PAST MEDICAL HISTORY  Past Medical History:   Diagnosis Date     Irritable bladder      Squamous cell carcinoma of neck      Uncomplicated asthma      PARTNER: at bedside  FAMILY HISTORY  Family History   Problem Relation Age of Onset     Breast Cancer Mother      Cancer Mother         Breast cancer     Hyperlipidemia Father      Bone Cancer Sister      Cancer Sister         Bone cancer     Diabetes No family hx of      OB HISTORY  Reviewed in EMR  EXAM  ============  Vital signs stable, afebrile and BP is normotensive  GENERAL APPEARANCE: healthy, alert and no distress  RESP: lungs clear to auscultation - no rales, rhonchi or wheezes  CV: regular rates and rhythm, normal S1 S2, no S3 or S4 and no  murmur,and no varicosities  ABDOMEN:  soft, nontender,non-tender between contractions no epigastric pain  NEURO: Denies headache, blurred vision  PSYCH: mentation appears normal. and affect normal/bright  LEGS: Reflexes normal bilaterally and No edema  CONTRACTIONS: rare  FETAL HEART TONES:  145 with moderate long term variability, accelerations-yes, decels none.  NST: REACTIVE  EFW:8lbs  PELVIC EXAM: deferred    DIAGNOSIS  ============  40w3d, Category 1 tracing  Reactive non-stress test  Oligohydramnios    PLAN  ============  1.  Induction of labor was recommended again today.  Patient continues to decline.  Reports that she is monitoring fetal movement.    2.  Considering induction of labor tomorrow  3.  Covid testing ordered today  4.  Reviewed with patient that she may present for  Induction of labor at any time.  Patient prefers to return around 1600 tomorrow for evaluation and probable induction of labor.    ÁNGEL Keyes CNM

## 2022-06-12 ENCOUNTER — HOSPITAL ENCOUNTER (INPATIENT)
Facility: CLINIC | Age: 37
LOS: 4 days | Discharge: HOME | End: 2022-06-16
Attending: ADVANCED PRACTICE MIDWIFE | Admitting: ADVANCED PRACTICE MIDWIFE
Payer: COMMERCIAL

## 2022-06-12 DIAGNOSIS — Z98.891 S/P CESAREAN SECTION: Primary | ICD-10-CM

## 2022-06-12 LAB
ABO/RH(D): ABNORMAL
ANTIBODY ID: NORMAL
ANTIBODY SCREEN: POSITIVE
ERYTHROCYTE [DISTWIDTH] IN BLOOD BY AUTOMATED COUNT: 12.7 % (ref 10–15)
HCT VFR BLD AUTO: 34.1 % (ref 35–47)
HGB BLD-MCNC: 11.8 G/DL (ref 11.7–15.7)
MCH RBC QN AUTO: 30.6 PG (ref 26.5–33)
MCHC RBC AUTO-ENTMCNC: 34.6 G/DL (ref 31.5–36.5)
MCV RBC AUTO: 89 FL (ref 78–100)
PLATELET # BLD AUTO: 284 10E3/UL (ref 150–450)
RBC # BLD AUTO: 3.85 10E6/UL (ref 3.8–5.2)
SPECIMEN EXPIRATION DATE: ABNORMAL
SPECIMEN EXPIRATION DATE: NORMAL
WBC # BLD AUTO: 10.2 10E3/UL (ref 4–11)

## 2022-06-12 PROCEDURE — 86850 RBC ANTIBODY SCREEN: CPT | Performed by: ADVANCED PRACTICE MIDWIFE

## 2022-06-12 PROCEDURE — 86780 TREPONEMA PALLIDUM: CPT | Performed by: ADVANCED PRACTICE MIDWIFE

## 2022-06-12 PROCEDURE — 250N000013 HC RX MED GY IP 250 OP 250 PS 637: Performed by: ADVANCED PRACTICE MIDWIFE

## 2022-06-12 PROCEDURE — 120N000002 HC R&B MED SURG/OB UMMC

## 2022-06-12 PROCEDURE — 85027 COMPLETE CBC AUTOMATED: CPT | Performed by: ADVANCED PRACTICE MIDWIFE

## 2022-06-12 PROCEDURE — 86901 BLOOD TYPING SEROLOGIC RH(D): CPT | Performed by: ADVANCED PRACTICE MIDWIFE

## 2022-06-12 PROCEDURE — 86870 RBC ANTIBODY IDENTIFICATION: CPT | Performed by: ADVANCED PRACTICE MIDWIFE

## 2022-06-12 RX ORDER — MISOPROSTOL 200 UG/1
400 TABLET ORAL
Status: DISCONTINUED | OUTPATIENT
Start: 2022-06-12 | End: 2022-06-14 | Stop reason: HOSPADM

## 2022-06-12 RX ORDER — MISOPROSTOL 200 UG/1
800 TABLET ORAL
Status: DISCONTINUED | OUTPATIENT
Start: 2022-06-12 | End: 2022-06-14 | Stop reason: HOSPADM

## 2022-06-12 RX ORDER — FENTANYL CITRATE 50 UG/ML
100 INJECTION, SOLUTION INTRAMUSCULAR; INTRAVENOUS
Status: DISCONTINUED | OUTPATIENT
Start: 2022-06-12 | End: 2022-06-14 | Stop reason: HOSPADM

## 2022-06-12 RX ORDER — NALOXONE HYDROCHLORIDE 0.4 MG/ML
0.4 INJECTION, SOLUTION INTRAMUSCULAR; INTRAVENOUS; SUBCUTANEOUS
Status: DISCONTINUED | OUTPATIENT
Start: 2022-06-12 | End: 2022-06-14 | Stop reason: HOSPADM

## 2022-06-12 RX ORDER — OXYTOCIN/0.9 % SODIUM CHLORIDE 30/500 ML
100-340 PLASTIC BAG, INJECTION (ML) INTRAVENOUS CONTINUOUS PRN
Status: DISCONTINUED | OUTPATIENT
Start: 2022-06-12 | End: 2022-06-14

## 2022-06-12 RX ORDER — KETOROLAC TROMETHAMINE 30 MG/ML
30 INJECTION, SOLUTION INTRAMUSCULAR; INTRAVENOUS
Status: DISCONTINUED | OUTPATIENT
Start: 2022-06-12 | End: 2022-06-14

## 2022-06-12 RX ORDER — MISOPROSTOL 100 UG/1
25 TABLET ORAL
Status: DISCONTINUED | OUTPATIENT
Start: 2022-06-12 | End: 2022-06-13

## 2022-06-12 RX ORDER — NALOXONE HYDROCHLORIDE 0.4 MG/ML
0.2 INJECTION, SOLUTION INTRAMUSCULAR; INTRAVENOUS; SUBCUTANEOUS
Status: DISCONTINUED | OUTPATIENT
Start: 2022-06-12 | End: 2022-06-14 | Stop reason: HOSPADM

## 2022-06-12 RX ORDER — TRANEXAMIC ACID 10 MG/ML
1 INJECTION, SOLUTION INTRAVENOUS EVERY 30 MIN PRN
Status: DISCONTINUED | OUTPATIENT
Start: 2022-06-12 | End: 2022-06-14 | Stop reason: HOSPADM

## 2022-06-12 RX ORDER — TERBUTALINE SULFATE 1 MG/ML
0.25 INJECTION, SOLUTION SUBCUTANEOUS
Status: DISCONTINUED | OUTPATIENT
Start: 2022-06-12 | End: 2022-06-13

## 2022-06-12 RX ORDER — CITRIC ACID/SODIUM CITRATE 334-500MG
30 SOLUTION, ORAL ORAL
Status: DISCONTINUED | OUTPATIENT
Start: 2022-06-12 | End: 2022-06-14 | Stop reason: HOSPADM

## 2022-06-12 RX ORDER — MORPHINE SULFATE 10 MG/ML
10 INJECTION, SOLUTION INTRAMUSCULAR; INTRAVENOUS ONCE
Status: COMPLETED | OUTPATIENT
Start: 2022-06-13 | End: 2022-06-13

## 2022-06-12 RX ORDER — METOCLOPRAMIDE 10 MG/1
10 TABLET ORAL EVERY 6 HOURS PRN
Status: DISCONTINUED | OUTPATIENT
Start: 2022-06-12 | End: 2022-06-14 | Stop reason: HOSPADM

## 2022-06-12 RX ORDER — HYDROXYZINE HYDROCHLORIDE 50 MG/1
100 TABLET, FILM COATED ORAL EVERY 6 HOURS PRN
Status: DISCONTINUED | OUTPATIENT
Start: 2022-06-12 | End: 2022-06-14

## 2022-06-12 RX ORDER — IBUPROFEN 800 MG/1
800 TABLET, FILM COATED ORAL
Status: DISCONTINUED | OUTPATIENT
Start: 2022-06-12 | End: 2022-06-14

## 2022-06-12 RX ORDER — SODIUM CHLORIDE, SODIUM LACTATE, POTASSIUM CHLORIDE, CALCIUM CHLORIDE 600; 310; 30; 20 MG/100ML; MG/100ML; MG/100ML; MG/100ML
INJECTION, SOLUTION INTRAVENOUS CONTINUOUS
Status: DISCONTINUED | OUTPATIENT
Start: 2022-06-12 | End: 2022-06-14 | Stop reason: HOSPADM

## 2022-06-12 RX ORDER — METOCLOPRAMIDE HYDROCHLORIDE 5 MG/ML
10 INJECTION INTRAMUSCULAR; INTRAVENOUS EVERY 6 HOURS PRN
Status: DISCONTINUED | OUTPATIENT
Start: 2022-06-12 | End: 2022-06-14 | Stop reason: HOSPADM

## 2022-06-12 RX ORDER — OXYTOCIN 10 [USP'U]/ML
10 INJECTION, SOLUTION INTRAMUSCULAR; INTRAVENOUS
Status: DISCONTINUED | OUTPATIENT
Start: 2022-06-12 | End: 2022-06-14 | Stop reason: HOSPADM

## 2022-06-12 RX ORDER — METHYLERGONOVINE MALEATE 0.2 MG/ML
200 INJECTION INTRAVENOUS
Status: DISCONTINUED | OUTPATIENT
Start: 2022-06-12 | End: 2022-06-14 | Stop reason: HOSPADM

## 2022-06-12 RX ORDER — OXYTOCIN 10 [USP'U]/ML
10 INJECTION, SOLUTION INTRAMUSCULAR; INTRAVENOUS
Status: DISCONTINUED | OUTPATIENT
Start: 2022-06-12 | End: 2022-06-14

## 2022-06-12 RX ORDER — ONDANSETRON 4 MG/1
4 TABLET, ORALLY DISINTEGRATING ORAL EVERY 6 HOURS PRN
Status: DISCONTINUED | OUTPATIENT
Start: 2022-06-12 | End: 2022-06-14 | Stop reason: HOSPADM

## 2022-06-12 RX ORDER — PROCHLORPERAZINE 25 MG
25 SUPPOSITORY, RECTAL RECTAL EVERY 12 HOURS PRN
Status: DISCONTINUED | OUTPATIENT
Start: 2022-06-12 | End: 2022-06-14 | Stop reason: HOSPADM

## 2022-06-12 RX ORDER — ONDANSETRON 2 MG/ML
4 INJECTION INTRAMUSCULAR; INTRAVENOUS EVERY 6 HOURS PRN
Status: DISCONTINUED | OUTPATIENT
Start: 2022-06-12 | End: 2022-06-14 | Stop reason: HOSPADM

## 2022-06-12 RX ORDER — OXYTOCIN/0.9 % SODIUM CHLORIDE 30/500 ML
340 PLASTIC BAG, INJECTION (ML) INTRAVENOUS CONTINUOUS PRN
Status: DISCONTINUED | OUTPATIENT
Start: 2022-06-12 | End: 2022-06-14 | Stop reason: HOSPADM

## 2022-06-12 RX ORDER — CARBOPROST TROMETHAMINE 250 UG/ML
250 INJECTION, SOLUTION INTRAMUSCULAR
Status: DISCONTINUED | OUTPATIENT
Start: 2022-06-12 | End: 2022-06-14 | Stop reason: HOSPADM

## 2022-06-12 RX ORDER — PROCHLORPERAZINE MALEATE 10 MG
10 TABLET ORAL EVERY 6 HOURS PRN
Status: DISCONTINUED | OUTPATIENT
Start: 2022-06-12 | End: 2022-06-14 | Stop reason: HOSPADM

## 2022-06-12 RX ADMIN — MISOPROSTOL 25 MCG: 100 TABLET ORAL at 21:15

## 2022-06-12 RX ADMIN — MISOPROSTOL 25 MCG: 100 TABLET ORAL at 18:46

## 2022-06-12 ASSESSMENT — ACTIVITIES OF DAILY LIVING (ADL)
ADLS_ACUITY_SCORE: 35
ADLS_ACUITY_SCORE: 20

## 2022-06-12 NOTE — H&P
"ADMIT NOTE  =================  40w4d    Mira Cao is a 36 year old female with an Patient's last menstrual period was 2021. and Estimated Date of Delivery: 2022 is [facility]22 at 4:46 PM for induction of labor.  Indication: Oligohydramnios that was diagnosed on 6/10/22.    HPI  ================  Patient initially presented to labor and delivery stating that she did not want to stay for induction today, unless there were concerns for her fetal heart rate tracing.    Denies fever, cough, SOB or chest pain. Denies having contact with anyone who is Covid-19 positive. Pt has had Covid-19 Vaccinations. Had Covid-19 testing 22 and is negative  Contractions- irreg and not felt by patient  Fetal movement- active, patient reports doing kick counts  ROM- no.  Vaginal bleeding- none  GBS- negative  FOB- is involved, Ottoniel at bedside  Other labor support- none    Weight gain- 152 - 128 lbs, Total weight gain- 24 lbs  Height- 66\"  BMI- 20  First prenatal visit at 8 weeks, Total visits- 10    PROBLEM LIST  =================  Patient Active Problem List    Diagnosis Date Noted     Encounter for triage in pregnant patient 2022     Priority: Medium     Oligohydramnios in third trimester 06/10/2022     Priority: Medium     BPP 6/8 at 40+2. HILDA 3.9cm.        Kidney abnormality of fetus on prenatal ultrasound 06/10/2022     Priority: Medium     @ 40+2 Mildly dilated renal pelves bilaterally, measuring 0.8 cm on the right and 0.4 cm on the left.       Labor and delivery, indication for care 06/10/2022     Priority: Medium     History of radiation therapy 2022     Priority: Medium     2022: per MFM, recommend growth US given history       Rh negative state in antepartum period 10/29/2021     Priority: Medium     Will need rhogam at 28 weeks       Supervision of high-risk pregnancy of elderly multigravida 10/29/2021     Priority: Medium     [facility] CNM pt  Partner's " name: Ottoniel  [x ] NOB folder  [ x] Dating via LMP  [x] First tri screen ordered  [ ] QS/AFP ordered declined   [x] Fetal anatomy US ordered  [x] Rubella immune  [x] Hep B immune  [x] Pap 12/2020 NILM, HPV neg, due 2025  [x] Started ASA AMA Nullip  [x] NO utox in labor   [x ] COVID vaccine completed  _____________________________________  [ x] EOB folder  [x ] PP Contraception plan: likely condoms  [x ] Labor plans: unsure but open to options  [ x] :  with Blooma  [ x] Infant feeding plan: breast  [ ] FLU shot  [x ] TDAP given  [ x] Rhogam if needed, date: 4/1/22  [ ] TOLAC consent done  [ ] Waterbirth declines, consent done  [x] GCT, elevated, normal 3 hour  ________________________________________  [ ] OTC PP meds sent  [ ] Planning CS-ERAS pkt    Per MFM from preconception:     Level II anatomy US at 18-20 weeks    Growth US at 28 and 34 weeks given increased risk of lower birth weight associated with prior radiation therapy.    Close follow-up and surveillance with ENT/oncology team throughout the pregnancy, as recommended         Squamous cell carcinoma of neck 10/02/2019     Priority: Medium     Recommendations from Preconception Counseling by MFM, if pregnant:     Begin taking prenatal vitamins 2-3 months prior to conception to decrease the risk of neural tube defects.    Baseline TSH at initial prenatal visit if not performed recently with ENT, followed by serial TSH every trimester given her increased risk of developing hypothyroidism in the setting of prior neck radiation. If overt hypothyroidism is diagnosed, treatment with Synthroid is recommended.     Defer discussion of optimal timing of pregnancy as it relates to her cancer surveillance to ENT/oncology team.    Upon diagnosis of pregnancy, recommend early dating US    Level II anatomy US at 18-20 weeks    Growth US at 28 and 34 weeks given increased risk of lower birth weight associated with prior radiation therapy.    Close follow-up and  surveillance with ENT/oncology team throughout the pregnancy, as recommended      Formatting of this note might be different from the original.  Added automatically from request for surgery [id number]       Irritable bladder 2019     Priority: Medium     Abnormal glandular Papanicolaou smear of cervix 2011     Priority: Medium     Formatting of this note might be different from the original.  LW Modifier:  HPV 2006  ; Pap Smear Abnormal Pers Hx         HISTORIES  ============  No Known Allergies  Past Medical History:   Diagnosis Date     Irritable bladder      Squamous cell carcinoma of neck      Uncomplicated asthma      Past Surgical History:   Procedure Laterality Date     DISSECTION RADICAL NECK MODIFIED Left 2019    Procedure: DISSECTION, NECK, MODIFIED RADICAL LEFT;  Surgeon: Ruth Tello MD;  Location: [facility]     LARYNGOSCOPY WITH BIOPSY(IES) N/A 10/24/2019    Procedure: Direct laryngscopy with biopsy;  Surgeon: Ruth Tello MD;  Location: [facility]     TONSILLECTOMY ADULT Left 10/24/2019    Procedure: Left Radical  tonsillectomy;  Surgeon: Ruth Tello MD;  Location: [facility] OR   .  Family History   Problem Relation Age of Onset     Breast Cancer Mother      Cancer Mother         Breast cancer     Hyperlipidemia Father      Bone Cancer Sister      Cancer Sister         Bone cancer     Diabetes No family hx of      Social History     Tobacco Use     Smoking status: Never Smoker     Smokeless tobacco: Never Used   Substance Use Topics     Alcohol use: Not Currently     Comment: 2-3 drinks per week     OB History    Para Term  AB Living   2 0 0 0 1 0   SAB IAB Ectopic Multiple Live Births   0 0 0 0 0      # Outcome Date GA Lbr Richard/2nd Weight Sex Delivery Anes PTL Lv   2 Current            1 AB                 LABS:   ===========  Prenatal Labs:  Rhogam not indicated   Lab Results   Component Value Date    ABO O 2020    RH Neg 2020    AS Negative 10/29/2021     RUQIGG Positive (A) 10/29/2021    HEPBANG Nonreactive 10/29/2021    HGB 12.3 05/19/2022    HIAGAB Nonreactive 10/29/2021    GLU1 143 (H) 04/01/2022     Rubella immune  GBS negative  Other labs:  COVID-19 PCR Results    COVID-19 PCR Results 12/27/20 12/27/20 6/11/22    1341 1341    COVID-19 Virus PCR to [facility] - Result Test received-See reflex to IDDL test SARS CoV2 (COVID-19) Virus RT-PCR     COVID-19 Virus PCR to [facility] - Source Nasopharyngeal     SARS-CoV-2 Virus Specimen Source  Nasopharyngeal    SARS-CoV-2 PCR Result  NEGATIVE    SARS CoV2 PCR   Negative      Comments are available for some flowsheets but are not being displayed.         COVID-19 Antibody Results, Testing for Immunity    COVID-19 Antibody Results, Testing for Immunity   No data to display.            No results found for this or any previous visit (from the past 24 hour(s)).    ROS  =========  Pt denies significant respiratory, cardiovacular, GI, or muscular/skeletalcomplaints.    See RN data base ROS.       PHYSICAL EXAM:  ===============  /72 (BP Location: Left arm, Patient Position: Semi-Stinson's, Cuff Size: Adult Regular)   Temp 97.9  F (36.6  C) (Oral)   Resp 17   LMP 09/01/2021   General appearance: comfortable  GENERAL APPEARANCE: healthy, alert and no distress  RESP: lungs clear to auscultation - no rales, rhonchi or wheezes  CV: regular rates and rhythm, normal S1 S2, no S3 or S4 and no murmur,and no varicosities  ABDOMEN:  soft, nontender, no epigastric pain  SKIN: no suspicious lesions or rashes  NEURO: Denies headache, blurred vision, other vision changes  PSYCH: mentation appears normal. and affect normal/bright  Legs: Reflexes normal bilaterally and No edema     Abdomen: gravid, vertex fetus per Leopold's, non-tender between contractions.   Cephalic presentation confirmed by palpable suture lines  EFW-  9 lbs.   CONTRACTIONS: irreg  FETAL HEART TONES: continuous EFM- baseline 145 with moderate variability and positive  accelerations. 5-7 minute period of minimal variability with subtle late deceleration and early deceleration noted with following contraction.  PELVIC EXAM: closed/ 50%/ Posterior/ average/ -1   TAVAREZ SCORE: 4  BLOODY SHOW: no   ROM:no      # Pain Assessment:  Current Pain Score 6/12/2022   Patient currently in pain? denies   Pain score (0-10) -   Pain descriptors -         ASSESSMENT:  ==============  IUP @ 40w4d admitted for induction of labor.  Indication: Oligohydramnios   NST REACTIVE after 20 minutes of monitoring  Fetal Heart Tones - category one  GBS- negative  Covid- negative 6/11/22    Patient Active Problem List   Diagnosis     Squamous cell carcinoma of neck     Rh negative state in antepartum period     Supervision of high-risk pregnancy of elderly multigravida     Abnormal glandular Papanicolaou smear of cervix     Irritable bladder     History of radiation therapy     Oligohydramnios in third trimester     Kidney abnormality of fetus on prenatal ultrasound     Labor and delivery, indication for care     Encounter for triage in pregnant patient       PLAN:  ===========  Reviewed initial fetal heart rate monitoring with patient and her , recommend patient stay for induction of labor due to changes in fetal heart rate.  Patient now agrees to stay for induction.  Consents to IV access and routine lab work.  Reviewed un-favorable cervix and the need for cervical ripening.  R/B/A to oral and vaginal Misoprostol and valencia balloon.  Patient desires to start with oral Misoprostol at this time.  Discussed what to expect with induction length and progression with using vaelncia balloon and eventually Pitocin.    Patient is planning a labor epidural with active labor, would be interested in other pain medication as needed in early labor or for therapeutic sleep.   ÁNGEL Keyes CNM

## 2022-06-12 NOTE — PROGRESS NOTES
Data: Patient admitted to room 442 at 1608. Patient is a . Prenatal record reviewed.   OB History    Para Term  AB Living   2 0 0 0 1 0   SAB IAB Ectopic Multiple Live Births   0 0 0 0 0      # Outcome Date GA Lbr Richard/2nd Weight Sex Delivery Anes PTL Lv   2 Current            1 AB            .  Medical History:   Past Medical History:   Diagnosis Date     Irritable bladder      Squamous cell carcinoma of neck      Uncomplicated asthma    .  Gestational age 40w4d. Vital signs per doc flowsheet. Fetal movement present. Support person Ottoniel is present and supportive.  Action: Verbal consent for EFM, external fetal monitors applied. Admission assessment completed. Patient and support persons oriented to room and call light is within reach. Patient instructed to report change in fetal movement, contractions, vaginal leaking of fluid or bleeding, abdominal pain, or any concerns related to the pregnancy to her nurse/physician. Pt states she plans on epidural and will be having support from her  during labor and pt and her  are in contact with .  Response: FRANCI Hernandes CNM informed of arrival and decelerations noted on EFM, CNM reviewed EFM and discussed with patient. Plan per provider and patient is Greta AGUIAR CNM bedside to discuss IOL and plan of care, all questions answered at the bedside by CNM. Reviewed EFM at bedside. Patient verbalized understanding of education and verbalized agreement with plan. Patient plans for coping with labor via epidural, spousal and  support.

## 2022-06-13 ENCOUNTER — ANESTHESIA (OUTPATIENT)
Dept: OBGYN | Facility: CLINIC | Age: 37
End: 2022-06-13
Payer: COMMERCIAL

## 2022-06-13 ENCOUNTER — ANESTHESIA EVENT (OUTPATIENT)
Dept: OBGYN | Facility: CLINIC | Age: 37
End: 2022-06-13
Payer: COMMERCIAL

## 2022-06-13 LAB — T PALLIDUM AB SER QL: NONREACTIVE

## 2022-06-13 PROCEDURE — C9290 INJ, BUPIVACAINE LIPOSOME: HCPCS | Performed by: ANESTHESIOLOGY

## 2022-06-13 PROCEDURE — 250N000011 HC RX IP 250 OP 636: Performed by: ANESTHESIOLOGY

## 2022-06-13 PROCEDURE — 59514 CESAREAN DELIVERY ONLY: CPT | Mod: GC | Performed by: OBSTETRICS & GYNECOLOGY

## 2022-06-13 PROCEDURE — 258N000003 HC RX IP 258 OP 636: Performed by: ADVANCED PRACTICE MIDWIFE

## 2022-06-13 PROCEDURE — 250N000009 HC RX 250

## 2022-06-13 PROCEDURE — 250N000013 HC RX MED GY IP 250 OP 250 PS 637: Performed by: OBSTETRICS & GYNECOLOGY

## 2022-06-13 PROCEDURE — 250N000011 HC RX IP 250 OP 636

## 2022-06-13 PROCEDURE — 3E0R3BZ INTRODUCTION OF ANESTHETIC AGENT INTO SPINAL CANAL, PERCUTANEOUS APPROACH: ICD-10-PCS | Performed by: ANESTHESIOLOGY

## 2022-06-13 PROCEDURE — 250N000009 HC RX 250: Performed by: OBSTETRICS & GYNECOLOGY

## 2022-06-13 PROCEDURE — 271N000001 HC OR GENERAL SUPPLY NON-STERILE: Performed by: OBSTETRICS & GYNECOLOGY

## 2022-06-13 PROCEDURE — 250N000013 HC RX MED GY IP 250 OP 250 PS 637: Performed by: ADVANCED PRACTICE MIDWIFE

## 2022-06-13 PROCEDURE — 120N000002 HC R&B MED SURG/OB UMMC

## 2022-06-13 PROCEDURE — 258N000003 HC RX IP 258 OP 636

## 2022-06-13 PROCEDURE — 370N000017 HC ANESTHESIA TECHNICAL FEE, PER MIN: Performed by: OBSTETRICS & GYNECOLOGY

## 2022-06-13 PROCEDURE — 250N000011 HC RX IP 250 OP 636: Performed by: OBSTETRICS & GYNECOLOGY

## 2022-06-13 PROCEDURE — 272N000001 HC OR GENERAL SUPPLY STERILE: Performed by: OBSTETRICS & GYNECOLOGY

## 2022-06-13 PROCEDURE — 250N000013 HC RX MED GY IP 250 OP 250 PS 637: Performed by: REGISTERED NURSE

## 2022-06-13 PROCEDURE — 710N000010 HC RECOVERY PHASE 1, LEVEL 2, PER MIN: Performed by: OBSTETRICS & GYNECOLOGY

## 2022-06-13 PROCEDURE — 250N000011 HC RX IP 250 OP 636: Performed by: REGISTERED NURSE

## 2022-06-13 PROCEDURE — 250N000011 HC RX IP 250 OP 636: Performed by: ADVANCED PRACTICE MIDWIFE

## 2022-06-13 PROCEDURE — 00HU33Z INSERTION OF INFUSION DEVICE INTO SPINAL CANAL, PERCUTANEOUS APPROACH: ICD-10-PCS | Performed by: ANESTHESIOLOGY

## 2022-06-13 PROCEDURE — 360N000076 HC SURGERY LEVEL 3, PER MIN: Performed by: OBSTETRICS & GYNECOLOGY

## 2022-06-13 RX ORDER — OXYTOCIN/0.9 % SODIUM CHLORIDE 30/500 ML
1-24 PLASTIC BAG, INJECTION (ML) INTRAVENOUS CONTINUOUS
Status: DISCONTINUED | OUTPATIENT
Start: 2022-06-13 | End: 2022-06-13

## 2022-06-13 RX ORDER — OXYTOCIN 10 [USP'U]/ML
10 INJECTION, SOLUTION INTRAMUSCULAR; INTRAVENOUS
Status: DISCONTINUED | OUTPATIENT
Start: 2022-06-13 | End: 2022-06-14 | Stop reason: HOSPADM

## 2022-06-13 RX ORDER — LIDOCAINE HCL/EPINEPHRINE/PF 2%-1:200K
VIAL (ML) INJECTION PRN
Status: DISCONTINUED | OUTPATIENT
Start: 2022-06-13 | End: 2022-06-13

## 2022-06-13 RX ORDER — MORPHINE SULFATE 1 MG/ML
INJECTION, SOLUTION EPIDURAL; INTRATHECAL; INTRAVENOUS PRN
Status: DISCONTINUED | OUTPATIENT
Start: 2022-06-13 | End: 2022-06-13

## 2022-06-13 RX ORDER — BUPIVACAINE HYDROCHLORIDE 2.5 MG/ML
INJECTION, SOLUTION EPIDURAL; INFILTRATION; INTRACAUDAL
Status: COMPLETED | OUTPATIENT
Start: 2022-06-13 | End: 2022-06-13

## 2022-06-13 RX ORDER — LIDOCAINE 40 MG/G
CREAM TOPICAL
Status: DISCONTINUED | OUTPATIENT
Start: 2022-06-13 | End: 2022-06-13

## 2022-06-13 RX ORDER — SODIUM CHLORIDE, SODIUM LACTATE, POTASSIUM CHLORIDE, CALCIUM CHLORIDE 600; 310; 30; 20 MG/100ML; MG/100ML; MG/100ML; MG/100ML
INJECTION, SOLUTION INTRAVENOUS CONTINUOUS PRN
Status: DISCONTINUED | OUTPATIENT
Start: 2022-06-13 | End: 2022-06-13

## 2022-06-13 RX ORDER — METHYLERGONOVINE MALEATE 0.2 MG/ML
200 INJECTION INTRAVENOUS
Status: DISCONTINUED | OUTPATIENT
Start: 2022-06-13 | End: 2022-06-14 | Stop reason: HOSPADM

## 2022-06-13 RX ORDER — LIDOCAINE HYDROCHLORIDE 20 MG/ML
INJECTION, SOLUTION EPIDURAL; INFILTRATION; INTRACAUDAL; PERINEURAL PRN
Status: DISCONTINUED | OUTPATIENT
Start: 2022-06-13 | End: 2022-06-13

## 2022-06-13 RX ORDER — CEFAZOLIN SODIUM 2 G/100ML
2 INJECTION, SOLUTION INTRAVENOUS
Status: COMPLETED | OUTPATIENT
Start: 2022-06-13 | End: 2022-06-13

## 2022-06-13 RX ORDER — TRANEXAMIC ACID 10 MG/ML
1 INJECTION, SOLUTION INTRAVENOUS EVERY 30 MIN PRN
Status: DISCONTINUED | OUTPATIENT
Start: 2022-06-13 | End: 2022-06-14 | Stop reason: HOSPADM

## 2022-06-13 RX ORDER — MISOPROSTOL 200 UG/1
800 TABLET ORAL
Status: DISCONTINUED | OUTPATIENT
Start: 2022-06-13 | End: 2022-06-14 | Stop reason: HOSPADM

## 2022-06-13 RX ORDER — ACETAMINOPHEN 325 MG/1
975 TABLET ORAL ONCE
Status: COMPLETED | OUTPATIENT
Start: 2022-06-13 | End: 2022-06-13

## 2022-06-13 RX ORDER — SODIUM CHLORIDE, SODIUM LACTATE, POTASSIUM CHLORIDE, CALCIUM CHLORIDE 600; 310; 30; 20 MG/100ML; MG/100ML; MG/100ML; MG/100ML
INJECTION, SOLUTION INTRAVENOUS CONTINUOUS
Status: DISCONTINUED | OUTPATIENT
Start: 2022-06-13 | End: 2022-06-14 | Stop reason: HOSPADM

## 2022-06-13 RX ORDER — NALBUPHINE HYDROCHLORIDE 20 MG/ML
2.5-5 INJECTION, SOLUTION INTRAMUSCULAR; INTRAVENOUS; SUBCUTANEOUS EVERY 6 HOURS PRN
Status: DISCONTINUED | OUTPATIENT
Start: 2022-06-13 | End: 2022-06-14

## 2022-06-13 RX ORDER — FENTANYL CITRATE-0.9 % NACL/PF 10 MCG/ML
100 PLASTIC BAG, INJECTION (ML) INTRAVENOUS EVERY 5 MIN PRN
Status: DISCONTINUED | OUTPATIENT
Start: 2022-06-13 | End: 2022-06-14 | Stop reason: HOSPADM

## 2022-06-13 RX ORDER — FENTANYL/ROPIVACAINE/NS/PF 2MCG/ML-.1
PLASTIC BAG, INJECTION (ML) EPIDURAL
Status: DISCONTINUED | OUTPATIENT
Start: 2022-06-13 | End: 2022-06-14 | Stop reason: HOSPADM

## 2022-06-13 RX ORDER — LIDOCAINE 40 MG/G
CREAM TOPICAL
Status: DISCONTINUED | OUTPATIENT
Start: 2022-06-13 | End: 2022-06-14 | Stop reason: HOSPADM

## 2022-06-13 RX ORDER — AZITHROMYCIN 500 MG/5ML
500 INJECTION, POWDER, LYOPHILIZED, FOR SOLUTION INTRAVENOUS
Status: COMPLETED | OUTPATIENT
Start: 2022-06-13 | End: 2022-06-13

## 2022-06-13 RX ORDER — ONDANSETRON 2 MG/ML
INJECTION INTRAMUSCULAR; INTRAVENOUS PRN
Status: DISCONTINUED | OUTPATIENT
Start: 2022-06-13 | End: 2022-06-13

## 2022-06-13 RX ORDER — CEFAZOLIN SODIUM 2 G/100ML
2 INJECTION, SOLUTION INTRAVENOUS SEE ADMIN INSTRUCTIONS
Status: DISCONTINUED | OUTPATIENT
Start: 2022-06-13 | End: 2022-06-14 | Stop reason: HOSPADM

## 2022-06-13 RX ORDER — MISOPROSTOL 200 UG/1
400 TABLET ORAL
Status: DISCONTINUED | OUTPATIENT
Start: 2022-06-13 | End: 2022-06-14 | Stop reason: HOSPADM

## 2022-06-13 RX ORDER — CITRIC ACID/SODIUM CITRATE 334-500MG
30 SOLUTION, ORAL ORAL
Status: COMPLETED | OUTPATIENT
Start: 2022-06-13 | End: 2022-06-13

## 2022-06-13 RX ORDER — FENTANYL/ROPIVACAINE/NS/PF 2MCG/ML-.1
PLASTIC BAG, INJECTION (ML) EPIDURAL
Status: DISCONTINUED
Start: 2022-06-13 | End: 2022-06-13 | Stop reason: HOSPADM

## 2022-06-13 RX ORDER — DIPHENHYDRAMINE HYDROCHLORIDE 50 MG/ML
25 INJECTION INTRAMUSCULAR; INTRAVENOUS ONCE
Status: COMPLETED | OUTPATIENT
Start: 2022-06-13 | End: 2022-06-13

## 2022-06-13 RX ORDER — FENTANYL CITRATE-0.9 % NACL/PF 10 MCG/ML
PLASTIC BAG, INJECTION (ML) INTRAVENOUS
Status: DISCONTINUED
Start: 2022-06-13 | End: 2022-06-13 | Stop reason: HOSPADM

## 2022-06-13 RX ORDER — CARBOPROST TROMETHAMINE 250 UG/ML
250 INJECTION, SOLUTION INTRAMUSCULAR
Status: DISCONTINUED | OUTPATIENT
Start: 2022-06-13 | End: 2022-06-14 | Stop reason: HOSPADM

## 2022-06-13 RX ORDER — OXYTOCIN/0.9 % SODIUM CHLORIDE 30/500 ML
340 PLASTIC BAG, INJECTION (ML) INTRAVENOUS CONTINUOUS PRN
Status: COMPLETED | OUTPATIENT
Start: 2022-06-13 | End: 2022-06-13

## 2022-06-13 RX ORDER — FENTANYL CITRATE 50 UG/ML
INJECTION, SOLUTION INTRAMUSCULAR; INTRAVENOUS PRN
Status: DISCONTINUED | OUTPATIENT
Start: 2022-06-13 | End: 2022-06-13

## 2022-06-13 RX ADMIN — METHYLERGONOVINE MALEATE 200 MCG: 0.2 INJECTION INTRAVENOUS at 22:36

## 2022-06-13 RX ADMIN — SODIUM CHLORIDE, POTASSIUM CHLORIDE, SODIUM LACTATE AND CALCIUM CHLORIDE: 600; 310; 30; 20 INJECTION, SOLUTION INTRAVENOUS at 22:50

## 2022-06-13 RX ADMIN — SODIUM CHLORIDE, SODIUM LACTATE, POTASSIUM CHLORIDE, CALCIUM CHLORIDE: 600; 310; 30; 20 INJECTION, SOLUTION INTRAVENOUS at 22:42

## 2022-06-13 RX ADMIN — ONDANSETRON 4 MG: 2 INJECTION INTRAMUSCULAR; INTRAVENOUS at 22:22

## 2022-06-13 RX ADMIN — SODIUM CHLORIDE, POTASSIUM CHLORIDE, SODIUM LACTATE AND CALCIUM CHLORIDE 500 ML: 600; 310; 30; 20 INJECTION, SOLUTION INTRAVENOUS at 08:02

## 2022-06-13 RX ADMIN — FENTANYL CITRATE 100 MCG: 50 INJECTION, SOLUTION INTRAMUSCULAR; INTRAVENOUS at 10:38

## 2022-06-13 RX ADMIN — MORPHINE SULFATE 10 MG: 10 INJECTION INTRAVENOUS at 02:19

## 2022-06-13 RX ADMIN — Medication 500 MG: at 21:55

## 2022-06-13 RX ADMIN — FENTANYL CITRATE 50 MCG: 50 INJECTION, SOLUTION INTRAMUSCULAR; INTRAVENOUS at 22:20

## 2022-06-13 RX ADMIN — SODIUM CITRATE AND CITRIC ACID MONOHYDRATE 30 ML: 500; 334 SOLUTION ORAL at 21:37

## 2022-06-13 RX ADMIN — OXYTOCIN-SODIUM CHLORIDE 0.9% IV SOLN 30 UNIT/500ML 600 ML/HR: 30-0.9/5 SOLUTION at 22:28

## 2022-06-13 RX ADMIN — Medication: at 11:28

## 2022-06-13 RX ADMIN — LIDOCAINE HYDROCHLORIDE 5 ML: 20 INJECTION, SOLUTION EPIDURAL; INFILTRATION; INTRACAUDAL; PERINEURAL at 22:05

## 2022-06-13 RX ADMIN — LIDOCAINE HYDROCHLORIDE,EPINEPHRINE BITARTRATE 5 ML: 20; .005 INJECTION, SOLUTION EPIDURAL; INFILTRATION; INTRACAUDAL; PERINEURAL at 22:46

## 2022-06-13 RX ADMIN — LIDOCAINE HYDROCHLORIDE,EPINEPHRINE BITARTRATE 4 ML: 20; .005 INJECTION, SOLUTION EPIDURAL; INFILTRATION; INTRACAUDAL; PERINEURAL at 22:27

## 2022-06-13 RX ADMIN — ACETAMINOPHEN 325MG 975 MG: 325 TABLET ORAL at 21:28

## 2022-06-13 RX ADMIN — Medication 100 MCG/MIN: at 22:34

## 2022-06-13 RX ADMIN — Medication 2 G: at 21:55

## 2022-06-13 RX ADMIN — MISOPROSTOL 25 MCG: 100 TABLET ORAL at 08:44

## 2022-06-13 RX ADMIN — SODIUM CHLORIDE, POTASSIUM CHLORIDE, SODIUM LACTATE AND CALCIUM CHLORIDE: 600; 310; 30; 20 INJECTION, SOLUTION INTRAVENOUS at 21:51

## 2022-06-13 RX ADMIN — ONDANSETRON 4 MG: 2 INJECTION INTRAMUSCULAR; INTRAVENOUS at 10:52

## 2022-06-13 RX ADMIN — DIPHENHYDRAMINE HYDROCHLORIDE 25 MG: 50 INJECTION, SOLUTION INTRAMUSCULAR; INTRAVENOUS at 15:05

## 2022-06-13 RX ADMIN — LIDOCAINE HYDROCHLORIDE,EPINEPHRINE BITARTRATE 4 ML: 20; .005 INJECTION, SOLUTION EPIDURAL; INFILTRATION; INTRACAUDAL; PERINEURAL at 22:09

## 2022-06-13 RX ADMIN — BUPIVACAINE 20 ML: 13.3 INJECTION, SUSPENSION, LIPOSOMAL INFILTRATION at 23:29

## 2022-06-13 RX ADMIN — MISOPROSTOL 25 MCG: 100 TABLET ORAL at 05:36

## 2022-06-13 RX ADMIN — MORPHINE SULFATE 2 MG: 1 INJECTION EPIDURAL; INTRATHECAL; INTRAVENOUS at 22:49

## 2022-06-13 RX ADMIN — FENTANYL CITRATE 50 MCG: 50 INJECTION, SOLUTION INTRAMUSCULAR; INTRAVENOUS at 22:31

## 2022-06-13 RX ADMIN — HYDROXYZINE HYDROCHLORIDE 100 MG: 50 TABLET, FILM COATED ORAL at 00:18

## 2022-06-13 RX ADMIN — LIDOCAINE HYDROCHLORIDE 5 ML: 20 INJECTION, SOLUTION EPIDURAL; INFILTRATION; INTRACAUDAL; PERINEURAL at 21:52

## 2022-06-13 RX ADMIN — LIDOCAINE HYDROCHLORIDE 5 ML: 20 INJECTION, SOLUTION EPIDURAL; INFILTRATION; INTRACAUDAL; PERINEURAL at 21:46

## 2022-06-13 RX ADMIN — LIDOCAINE HYDROCHLORIDE 5 ML: 20 INJECTION, SOLUTION EPIDURAL; INFILTRATION; INTRACAUDAL; PERINEURAL at 21:57

## 2022-06-13 RX ADMIN — FENTANYL CITRATE 100 MCG: 50 INJECTION, SOLUTION INTRAMUSCULAR; INTRAVENOUS at 08:41

## 2022-06-13 RX ADMIN — BUPIVACAINE HYDROCHLORIDE 10 ML: 2.5 INJECTION, SOLUTION EPIDURAL; INFILTRATION; INTRACAUDAL at 23:29

## 2022-06-13 RX ADMIN — TRANEXAMIC ACID 1 G: 10 INJECTION, SOLUTION INTRAVENOUS at 22:33

## 2022-06-13 RX ADMIN — SODIUM CHLORIDE, POTASSIUM CHLORIDE, SODIUM LACTATE AND CALCIUM CHLORIDE: 600; 310; 30; 20 INJECTION, SOLUTION INTRAVENOUS at 15:03

## 2022-06-13 RX ADMIN — SODIUM CHLORIDE, POTASSIUM CHLORIDE, SODIUM LACTATE AND CALCIUM CHLORIDE: 600; 310; 30; 20 INJECTION, SOLUTION INTRAVENOUS at 20:40

## 2022-06-13 RX ADMIN — SODIUM CHLORIDE, POTASSIUM CHLORIDE, SODIUM LACTATE AND CALCIUM CHLORIDE 1000 ML: 600; 310; 30; 20 INJECTION, SOLUTION INTRAVENOUS at 10:52

## 2022-06-13 ASSESSMENT — ACTIVITIES OF DAILY LIVING (ADL)
ADLS_ACUITY_SCORE: 20

## 2022-06-13 NOTE — PLAN OF CARE
Goal Outcome Evaluation:  Labor Pain Intervention  DATA:  Patient reports increased labor pain during ctxs.  Requests epidural for pain relief.   INTERVENTIONS: Consent signed, bolus started, questions answered. Epidural administered per  Dr Justice.    ASSESSMENT:   Tolerated well.  Relief complete. Mother and  at bedside  PLAN:  Continue intrapartum cares and assessments.  Anticipate .

## 2022-06-13 NOTE — ANESTHESIA PROCEDURE NOTES
Epidural catheter Procedure Note    Pre-Procedure   Staff -        Anesthesiologist:  Maldonado Justice DO       Performed By: anesthesiologist       Location: OB       Procedure Start/Stop Times: 6/13/2022 11:00 AM and 6/13/2022 11:05 AM       Pre-Anesthestic Checklist: patient identified, IV checked, risks and benefits discussed, informed consent, monitors and equipment checked, pre-op evaluation, at physician/surgeon's request and post-op pain management  Timeout:       Correct Patient: Yes        Correct Procedure: Yes        Correct Site: Yes        Correct Position: Yes   Procedure Documentation  Procedure: epidural catheter       Diagnosis: labor       Patient Position: sitting       Patient Prep/Sterile Barriers: sterile gloves, mask, patient draped       Skin prep: Chloraprep       Local skin infiltrated with 2 mL of 1% lidocaine.        Insertion Site: L3-4. (midline approach).       Technique: LORT saline        YARON at 3.5 cm.       Needle Type: [employer] needle       Needle Gauge: 17.        Needle Length (Inches): 3.5        Catheter: 19 G.          Catheter threaded easily.           Threaded 8 cm at skin.         # of attempts: 1 and  # of redirects:  0    Assessment/Narrative         Paresthesias: No.       Test dose of 3 mL lidocaine 1.5% w/ 1:200,000 epinephrine at.        .       Insertion/Infusion Method: LORT saline       Aspiration negative for Heme or CSF via Epidural Catheter.    Medication(s) Administered   0.125% Bupivacaine + 2 mcg/mL Fentanyl via CADD (Epidural) - EPIDURAL   6 mL - 6/13/2022 11:00:00 AM  Medication Administration Time: 6/13/2022 11:00 AM     Comments:  Informed consent obtained.  All risks and benefits of the epidural/spinal discussed with the patient.  All questions answered and all parties agreed with the plan.

## 2022-06-13 NOTE — PROGRESS NOTES
Labor progress note    S:  Feeling well. Just finished dinner. Endorses some mild abdominal cramping.     O:  Blood pressure 116/72, temperature 97.9  F (36.6  C), temperature source Oral, resp. rate 17, last menstrual period 09/01/2021, not currently breastfeeding.  General appearance: comfortable.  Contractions: Rare, irregular  FHT: Baseline 140 with moderate variability. Accelerations present. Possible late decleration x1 @ 1958, non-continuous. Overall reassuring FHR Tracing.   ROM: not ruptured.   Pelvic exam: deferred  -------------------------------  Pitocin- none,  Antibiotics- none      A:  IUP @ 40w4d IOL for oligohydramnios   Fetal Heart rate tracing Category one overall  GBS- negative  Patient Active Problem List   Diagnosis     Squamous cell carcinoma of neck     Rh negative state in antepartum period     Supervision of high-risk pregnancy of elderly multigravida     Abnormal glandular Papanicolaou smear of cervix     Irritable bladder     History of radiation therapy     Oligohydramnios in third trimester     Kidney abnormality of fetus on prenatal ultrasound     Labor and delivery, indication for care     Encounter for triage in pregnant patient         P:  Discussed option to switch to PV misoprostol overnight. She would like to continue with PO miso.   comfort measures prn   Theraputic sleep-- offered. Declines at this time.   Labor induction continues with PO cytotec. Due for dose #2  Continuous EFM. Plan cervical exam if unable to dose oral miso or with change in fetal status.     ÁNGEL Covington CNM

## 2022-06-13 NOTE — PROGRESS NOTES
Labor progress note    S:  Resting. Feeling some more lower abdominal tightening. Noticed leaking of fluid 20 minutes ago when she got up to void.     O:  Blood pressure 116/72, temperature 97.9  F (36.6  C), temperature source Oral, resp. rate 17, last menstrual period 09/01/2021, not currently breastfeeding.  General appearance: comfortable.  Contractions: Every 2-3 minutes. 40-80 seconds duration.    FHT: Baseline 145 with moderate variability. Accelerations present. No decelerations present.  ROM: clear fluid. Membranes have been ruptured for 20 minutes   Pelvic exam: FT/ 50%/ Posterior/ soft/ -2  Benavides score: 4  -------------------------------  Pitocin- none,  Antibiotics- none    A:  IUP @ 40w5d IOL for oligohydramnios   Fetal Heart rate tracing Category one  GBS- negative  S/p PO miso x2 doses  S/p SROM clear fluid at 0110  Patient Active Problem List   Diagnosis     Squamous cell carcinoma of neck     Rh negative state in antepartum period     Supervision of high-risk pregnancy of elderly multigravida     Abnormal glandular Papanicolaou smear of cervix     Irritable bladder     History of radiation therapy     Oligohydramnios in third trimester     Kidney abnormality of fetus on prenatal ultrasound     Labor and delivery, indication for care     Encounter for triage in pregnant patient         P:  Discussed monitoring to see if contractions increase in intensity over the next few hours given the new ROM and regular contractions vs. Starting pitocin. She would like to expectantly manage and rest while she can. Just took vistaril.   comfort measures prn   If contractions space, plan another dose of PO miso. If contractions continue with current frequency of every 2-3 minutes, consider pitocin.     ÁNGEL Covington

## 2022-06-13 NOTE — CARE PLAN
Pt getting some rest following morphine/vistaril. VSS, afebrile. Feeling contractions, however able to rest through them. Last PO miso at 0536 once ctx spaced out, Cat I EFM. See FS for EFM interpretation. Continues to leak scant amount of clear fluid, no vaginal bleeding, HA, blurry vision, N/V, RUQ pain.

## 2022-06-13 NOTE — PROGRESS NOTES
Consulted with and reviewed tracing with Dr. Pacheco.    1408- start of recurrent late decelerations.  Interventions: IVF bolus, position change  1440- cervical exam, change in baseline to 155  Overall intermittent late decelerations, periods of recurrent late decelerations  Sifting performed, repositioned to far left side lying release    1500- 155, moderate variability, +accels. Rare late decelerations    Reviewed tracing and plan of care with Dr. Eaton and RN.    Recommendation by MD to monitor closely- not currently persistent cat 2 tracing.  Recommendation by MD to recheck cervix approx 2 hours from last exam.    Pt and support people aware and are agreeable.    Mikel Jo, ÁNGEL, CNM

## 2022-06-13 NOTE — PROGRESS NOTES
Blood pressure 105/66, temperature 97.9  F (36.6  C), temperature source Oral, resp. rate 16, last menstrual period 09/01/2021, not currently breastfeeding.  Patient Vitals for the past 24 hrs:   BP Temp Temp src Resp   06/13/22 0805 105/66 97.9  F (36.6  C) Oral 16   06/13/22 0617 105/57 98.2  F (36.8  C) Oral --   06/13/22 0545 -- 98.2  F (36.8  C) Oral --   06/13/22 0425 -- 97.7  F (36.5  C) Oral --   06/13/22 0310 -- 97.9  F (36.6  C) Oral --   06/13/22 0200 -- 97.9  F (36.6  C) Oral --   06/13/22 0123 -- 98.2  F (36.8  C) Oral --   06/13/22 0019 123/56 97.9  F (36.6  C) Oral --   06/12/22 1619 116/72 97.9  F (36.6  C) Oral 17     General appearance: Pt requested pain medication, received dose of IV fentanyl; another dose of PO miso given  CONTRACTIONS: every 6-7 minutes, irregular  Pitocin- none,  Antibiotics- none  FETAL HEART TONES: continuous EFM- baseline 135 with moderate variability and positive accelerations. Rare late decelerations.  ROM: not ruptured  PELVIC EXAM:deferred    # Pain Assessment:  Current Pain Score 6/13/2022   Patient currently in pain? yes   Pain score (0-10) -   Pain descriptors -   - Mira is experiencing pain due to contractions. Pain management was discussed and the plan was created in a collaborative fashion.  Mira's response to the current recommendations: engaged  - IV fentanyl x 1    ASSESSMENT:  ==============  IUP @ 40w5d for induction of labor.  Indication: oligohydramnios   Fetal Heart Rate Tracing category two  GBS- negative    IV fentanyl x 1    S/p PO miso x 4  Patient Active Problem List   Diagnosis     Squamous cell carcinoma of neck     Rh negative state in antepartum period     Supervision of high-risk pregnancy of elderly multigravida     Abnormal glandular Papanicolaou smear of cervix     Irritable bladder     History of radiation therapy     Oligohydramnios in third trimester     Kidney abnormality of fetus on prenatal ultrasound     Labor and delivery,  indication for care     Encounter for triage in pregnant patient     PLAN:  ===========  Received IV fentanyl x 1. Additional dose or epidural as requested.  4th dose of PO miso was administered.   Reevaluate prn per maternal/fetal indications.     ÁNGEL Lopez, CNM

## 2022-06-13 NOTE — PROGRESS NOTES
Blood pressure 105/66, temperature 97.9  F (36.6  C), temperature source Oral, resp. rate 16, last menstrual period 09/01/2021, not currently breastfeeding.  Patient Vitals for the past 24 hrs:   BP Temp Temp src Resp   06/13/22 0805 105/66 97.9  F (36.6  C) Oral 16   06/13/22 0617 105/57 98.2  F (36.8  C) Oral --   06/13/22 0545 -- 98.2  F (36.8  C) Oral --   06/13/22 0425 -- 97.7  F (36.5  C) Oral --   06/13/22 0310 -- 97.9  F (36.6  C) Oral --   06/13/22 0200 -- 97.9  F (36.6  C) Oral --   06/13/22 0123 -- 98.2  F (36.8  C) Oral --   06/13/22 0019 123/56 97.9  F (36.6  C) Oral --   06/12/22 1619 116/72 97.9  F (36.6  C) Oral 17     General appearance: Pt uncomfortable with contractions  CONTRACTIONS: every 3-7 minutes  Pitocin- none,  Antibiotics- none  FETAL HEART TONES: continuous EFM- baseline 140 with moderate variability and positive accelerations. Late  Deceleration x2. IVF bolus given.  ROM: clear fluid  PELVIC EXAM:deferred    # Pain Assessment:  Current Pain Score 6/13/2022   Patient currently in pain? yes   Pain score (0-10) -   Pain descriptors -   - Mira is experiencing pain due to contractions. Pain management was discussed and the plan was created in a collaborative fashion.  Mira's response to the current recommendations: engaged  - s/p morphine, considering options    ASSESSMENT:  ==============  IUP @ 40w5d for induction of labor.  Indication: oligohydramnios   Fetal Heart Rate Tracing category two  GBS- negative    S/p PO miso x 3    Patient Active Problem List   Diagnosis     Squamous cell carcinoma of neck     Rh negative state in antepartum period     Supervision of high-risk pregnancy of elderly multigravida     Abnormal glandular Papanicolaou smear of cervix     Irritable bladder     History of radiation therapy     Oligohydramnios in third trimester     Kidney abnormality of fetus on prenatal ultrasound     Labor and delivery, indication for care     Encounter for triage in pregnant  patient     PLAN:  ===========  Consider PO miso #4 if contractions space.  May receive IV fentanyl if requests.    ÁNGEL Lopez, STEVIEM

## 2022-06-13 NOTE — PROVIDER NOTIFICATION
06/13/22 0742   Provider Notification   Provider Name/Title Mikel Jo CNM   Method of Notification Electronic Page   Notification Reason Pain;Decels   Mikel Jo CNM notified that pt requesting more pain meds, ctxing too frequently for another miso, x1 LD. Plan per provider to give fluid bolus, pt ok to have fentanyl if she wants pain management. Pt agreed to bolus, denies need for fentanyl at this time. Will continue with plan of care.

## 2022-06-13 NOTE — PROGRESS NOTES
Blood pressure 105/66, temperature 98.1  F (36.7  C), temperature source Oral, resp. rate 16, last menstrual period 09/01/2021, not currently breastfeeding.  Patient Vitals for the past 24 hrs:   BP Temp Temp src Resp   06/13/22 0945 -- 98.1  F (36.7  C) Oral 16   06/13/22 0805 105/66 97.9  F (36.6  C) Oral 16   06/13/22 0617 105/57 98.2  F (36.8  C) Oral --   06/13/22 0545 -- 98.2  F (36.8  C) Oral --   06/13/22 0425 -- 97.7  F (36.5  C) Oral --   06/13/22 0310 -- 97.9  F (36.6  C) Oral --   06/13/22 0200 -- 97.9  F (36.6  C) Oral --   06/13/22 0123 -- 98.2  F (36.8  C) Oral --   06/13/22 0019 123/56 97.9  F (36.6  C) Oral --   06/12/22 1619 116/72 97.9  F (36.6  C) Oral 17     General appearance: Pt tearful, reporting contractions are stronger and more painful. Receiving 2nd dose of IV fentanyl and zofran for nausea. Agreeable to exam and discussion of pain management options.  CONTRACTIONS: every 2-4 minutes  Pitocin- none,  Antibiotics- none  FETAL HEART TONES: continuous EFM- baseline 140 with moderate variability and positive accelerations. Rare early decelerations.  ROM: clear fluid  PELVIC EXAM: 5/60/-1, mid/med, benavides 9    # Pain Assessment:  Current Pain Score 6/13/2022   Patient currently in pain? yes   Pain score (0-10) -   Pain descriptors -   - Mira is experiencing pain due to contractions. Pain management was discussed and the plan was created in a collaborative fashion.  Mira's response to the current recommendations: engaged  - IV fentanyl #2    ASSESSMENT:  ==============  IUP @ 40w5d for induction of labor.  Indication: oligohydramnios   Fetal Heart Rate Tracing category one  GBS- negative    Cervical change - 5/60/-1, mid/med  Benavides score 9    IV fentanyl x 2  IV zofran    S/p PO miso x 4  Patient Active Problem List   Diagnosis     Squamous cell carcinoma of neck     Rh negative state in antepartum period     Supervision of high-risk pregnancy of elderly multigravida     Abnormal  glandular Papanicolaou smear of cervix     Irritable bladder     History of radiation therapy     Oligohydramnios in third trimester     Kidney abnormality of fetus on prenatal ultrasound     Labor and delivery, indication for care     Encounter for triage in pregnant patient     PLAN:  ===========  Cervical change, regular contractions,  Pt desires epidural.  IVF bolus started by RN.  Anesthesia notified. Will come to unit.  Encouraged rest after epidural placed.  May consider pitocin if contractions space.    Mikel Jo, APRN, CNM

## 2022-06-13 NOTE — PROGRESS NOTES
Blood pressure 104/59, temperature 98.1  F (36.7  C), temperature source Oral, resp. rate 16, last menstrual period 09/01/2021, not currently breastfeeding.  Patient Vitals for the past 24 hrs:   BP Temp Temp src Resp   06/13/22 1149 104/59 -- -- --   06/13/22 1144 113/68 -- -- --   06/13/22 1138 109/67 -- -- --   06/13/22 1136 117/66 -- -- --   06/13/22 1134 116/64 -- -- --   06/13/22 1132 116/63 -- -- --   06/13/22 1130 117/66 -- -- --   06/13/22 1128 118/64 -- -- --   06/13/22 1126 112/65 -- -- --   06/13/22 1124 113/63 -- -- --   06/13/22 1122 115/64 -- -- --   06/13/22 1111 (!) 148/83 -- -- --   06/13/22 0945 -- 98.1  F (36.7  C) Oral 16   06/13/22 0805 105/66 97.9  F (36.6  C) Oral 16   06/13/22 0617 105/57 98.2  F (36.8  C) Oral --   06/13/22 0545 -- 98.2  F (36.8  C) Oral --   06/13/22 0425 -- 97.7  F (36.5  C) Oral --   06/13/22 0310 -- 97.9  F (36.6  C) Oral --   06/13/22 0200 -- 97.9  F (36.6  C) Oral --   06/13/22 0123 -- 98.2  F (36.8  C) Oral --   06/13/22 0019 123/56 97.9  F (36.6  C) Oral --   06/12/22 1619 116/72 97.9  F (36.6  C) Oral 17     General appearance: Pt received epidural, comfortable, sleeping.  CONTRACTIONS: every 2-6 minutes  Pitocin- none,  Antibiotics- none  FETAL HEART TONES: continuous EFM- baseline 135 with moderate variability and positive accelerations. Intermittent early and late decelerations.  ROM: clear fluid  PELVIC EXAM: deferred    # Pain Assessment:  Comfortable s/p epidural    ASSESSMENT:  ==============  IUP @ 40w5d for induction of labor.  Indication: oligohydramnios   Fetal Heart Rate Tracing category two  GBS- negative     IV fentanyl x 2     S/p PO miso x 4    Epidural in place  Patient Active Problem List   Diagnosis     Squamous cell carcinoma of neck     Rh negative state in antepartum period     Supervision of high-risk pregnancy of elderly multigravida     Abnormal glandular Papanicolaou smear of cervix     Irritable bladder     History of radiation therapy      Oligohydramnios in third trimester     Kidney abnormality of fetus on prenatal ultrasound     Labor and delivery, indication for care     Encounter for triage in pregnant patient     PLAN:  ===========  Pt comfortable with epidural.  Frequent position changes to facilitate labor with epidural anesthesia.   Continue to monitor FHR and contractions.  Pitocin if irregular ctx pattern. Spoke with pt who is agreeable. Orders in place; RN will start if contractions remain spaced.  Monitor fluid color, maternal temp, FHR, s/s of triple I.    ÁNGEL Lopez, CNM

## 2022-06-13 NOTE — PROGRESS NOTES
Labor progress note    S:  Sleeping    O:  Blood pressure 123/56, temperature 98.2  F (36.8  C), temperature source Oral, resp. rate 17, last menstrual period 09/01/2021, not currently breastfeeding.  General appearance: comfortable.  Contractions: Every 2-4 minutes. 50-60 seconds duration.    FHT: Baseline 140 with moderate variability. Accelerations absent. No decelerations present.  ROM: clear fluid. Membranes have been ruptured for 5 hours.  Pelvic exam: deferred  -------------------------------  Pitocin- none,  Antibiotics- none    A:  IUP @ 40w5d IOL for oligohydramnios   Fetal Heart rate tracing Category one  GBS- negative  S/p SROM   S/p PO miso x3 doses  Patient Active Problem List   Diagnosis     Squamous cell carcinoma of neck     Rh negative state in antepartum period     Supervision of high-risk pregnancy of elderly multigravida     Abnormal glandular Papanicolaou smear of cervix     Irritable bladder     History of radiation therapy     Oligohydramnios in third trimester     Kidney abnormality of fetus on prenatal ultrasound     Labor and delivery, indication for care     Encounter for triage in pregnant patient         P:  Continue with PO miso per protocol as able  Consider starting pitocin if unable to dose next PO misoprostol     ÁNGEL Covington CNM

## 2022-06-13 NOTE — ANESTHESIA PREPROCEDURE EVALUATION
Anesthesia Pre-Procedure Evaluation    Patient: Mira Cao   MRN: [id number] : 1985        Procedure :           Past Medical History:   Diagnosis Date     Irritable bladder      Squamous cell carcinoma of neck      Uncomplicated asthma       Past Surgical History:   Procedure Laterality Date     DISSECTION RADICAL NECK MODIFIED Left 2019    Procedure: DISSECTION, NECK, MODIFIED RADICAL LEFT;  Surgeon: Ruth Tello MD;  Location: [facility] OR     LARYNGOSCOPY WITH BIOPSY(IES) N/A 10/24/2019    Procedure: Direct laryngscopy with biopsy;  Surgeon: Ruth Tlelo MD;  Location: [facility] OR     TONSILLECTOMY ADULT Left 10/24/2019    Procedure: Left Radical  tonsillectomy;  Surgeon: Ruth Tello MD;  Location: [facility] OR      No Known Allergies   Social History     Tobacco Use     Smoking status: Never Smoker     Smokeless tobacco: Never Used   Substance Use Topics     Alcohol use: Not Currently     Comment: 2-3 drinks per week      Wt Readings from Last 1 Encounters:   06/10/22 68.9 kg (152 lb)        Anesthesia Evaluation   Pt has had prior anesthetic.     No history of anesthetic complications       ROS/MED HX  ENT/Pulmonary:     (+) asthma     Neurologic:       Cardiovascular:  - neg cardiovascular ROS     METS/Exercise Tolerance: >4 METS    Hematologic:  - neg hematologic  ROS     Musculoskeletal:  - neg musculoskeletal ROS     GI/Hepatic:  - neg GI/hepatic ROS     Renal/Genitourinary:  - neg Renal ROS     Endo:  - neg endo ROS     Psychiatric/Substance Use:  - neg psychiatric ROS     Infectious Disease:  - neg infectious disease ROS     Malignancy:  - neg malignancy ROS     Other:  - neg other ROS Oligohydramnios in third trimester         Physical Exam    Airway        Mallampati: I   TM distance: > 3 FB   Neck ROM: full   Mouth opening: > 3 cm    Respiratory Devices and Support         Dental  no notable dental history         Cardiovascular   cardiovascular exam normal          Pulmonary    pulmonary exam normal            Other findings: Lab Results       Component                Value               Date                       WBC                      10.2                06/12/2022                 WBC                      3.9                 11/08/2020            Lab Results       Component                Value               Date                       RBC                      3.85                06/12/2022                 RBC                      4.14                11/08/2020            Lab Results       Component                Value               Date                       HGB                      11.8                06/12/2022                 HGB                      12.2                11/08/2020            Lab Results       Component                Value               Date                       HCT                      34.1                06/12/2022                 HCT                      37.6                11/08/2020            No components found for: MCT  Lab Results       Component                Value               Date                       MCV                      89                  06/12/2022                 MCV                      91                  11/08/2020            Lab Results       Component                Value               Date                       MCH                      30.6                06/12/2022                 MCH                      29.5                11/08/2020            Lab Results       Component                Value               Date                       MCHC                     34.6                06/12/2022                 MCHC                     32.4                11/08/2020            Lab Results       Component                Value               Date                       RDW                      12.7                06/12/2022                 RDW                      12.2                11/08/2020            Lab Results       Component                 Value               Date                       PLT                      284                 2022                 PLT                      285                 2020              OUTSIDE LABS:  CBC:   Lab Results   Component Value Date    WBC 10.2 2022    WBC 11.0 2022    HGB 11.8 2022    HGB 12.3 2022    HCT 34.1 (L) 2022    HCT 36.2 2022     2022     2022     BMP:   Lab Results   Component Value Date     (H) 2020     2019    POTASSIUM 3.8 2020    POTASSIUM 3.6 2019    CHLORIDE 112 (H) 2020    CHLORIDE 110 (H) 2019    CO2 25 2020    CO2 26 2019    BUN 10 2020    BUN 5 (L) 2019    CR 0.69 2020    CR 0.60 2019    GLC 85 2020    GLC 84 2019     COAGS:   Lab Results   Component Value Date    INR 0.98 2020     POC:   Lab Results   Component Value Date    BGM 83 2019    HCG Negative 2020     HEPATIC:   Lab Results   Component Value Date    ALBUMIN 4.0 2020    PROTTOTAL 7.9 2020    ALT 17 2020    AST 22 2020    ALKPHOS 61 2020    BILITOTAL 0.2 2020     OTHER:   Lab Results   Component Value Date    SYDNI 8.6 2020    LIPASE 63 (L) 2020    TSH 2.67 2022    T4 0.95 10/29/2021       Anesthesia Plan    ASA Status:  2      Anesthesia Type: Epidural.              Consents    Anesthesia Plan(s) and associated risks, benefits, and realistic alternatives discussed. Questions answered and patient/representative(s) expressed understanding.    - Discussed:     - Discussed with:  Patient         Postoperative Care            Comments:    Other Comments: Patient taken for urgent . Labor epidural has been working. Discussed plan to use labor epidural for C section. Discussed backup plan of general anesthesia and risks of general anesthesia, including aspiration pneumonia, sore throat/hoarse  voice, abrasions/damage to lips/tongue/teeth, nausea, rare complications (including medication reactions, cardiac, pulmonary). Discussed higher risks of awareness (OB case, red colored hair), and difficult intubation (pregnancy, s/p neck radiation). Ensured understanding, invited questions and all questions were answered.  Postoperative TAP block with liposomal bupivacaine discussed. Discussed risks of nerve block, including nerve injury, bleeding, infection, incomplete analgesia. Ensured understanding, invited questions and all questions were answered.    Postoperative bilateral transversus abdominis plane nerve block with liposomal bupivacaine, per surgeon request. Discussed risks of nerve block, including nerve injury, bleeding, infection, incomplete analgesia. Discussed alternative of not performing a nerve block. Ensured understanding, invited questions and all questions were answered. Patient wishes to proceed.                Maldonado Justice, DO

## 2022-06-13 NOTE — PROGRESS NOTES
Blood pressure 104/59, temperature 98.2  F (36.8  C), temperature source Oral, resp. rate 16, last menstrual period 09/01/2021, SpO2 96 %, not currently breastfeeding.  Patient Vitals for the past 24 hrs:   BP Temp Temp src Resp SpO2   06/13/22 1300 -- -- -- -- 96 %   06/13/22 1230 -- -- -- -- 96 %   06/13/22 1149 104/59 98.2  F (36.8  C) Oral -- --   06/13/22 1144 113/68 -- -- -- --   06/13/22 1138 109/67 -- -- -- --   06/13/22 1136 117/66 -- -- -- --   06/13/22 1134 116/64 -- -- -- --   06/13/22 1132 116/63 -- -- -- --   06/13/22 1130 117/66 -- -- -- --   06/13/22 1128 118/64 -- -- -- --   06/13/22 1126 112/65 -- -- -- --   06/13/22 1124 113/63 -- -- -- --   06/13/22 1122 115/64 -- -- -- --   06/13/22 1111 (!) 148/83 97.9  F (36.6  C) Oral -- --   06/13/22 0945 -- 98.1  F (36.7  C) Oral 16 --   06/13/22 0805 105/66 97.9  F (36.6  C) Oral 16 --   06/13/22 0617 105/57 98.2  F (36.8  C) Oral -- --   06/13/22 0545 -- 98.2  F (36.8  C) Oral -- --   06/13/22 0425 -- 97.7  F (36.5  C) Oral -- --   06/13/22 0310 -- 97.9  F (36.6  C) Oral -- --   06/13/22 0200 -- 97.9  F (36.6  C) Oral -- --   06/13/22 0123 -- 98.2  F (36.8  C) Oral -- --   06/13/22 0019 123/56 97.9  F (36.6  C) Oral -- --   06/12/22 1619 116/72 97.9  F (36.6  C) Oral 17 --     General appearance: Pt asleep. Comfortable. Agreeable to exam.  CONTRACTIONS: every 2-5 minutes  Pitocin- none,  Antibiotics- none  FETAL HEART TONES: continuous EFM- baseline 155 with moderate variability and positive accelerations. Periods of recurrent and periods of late decelerations.  ROM: clear fluid  PELVIC EXAM: 5/60/-1 to -2, cervix now very swollen, anterior lip very swollen. Baby not OA, OP, asynclitic    # Pain Assessment:  Comfortable with epidural      ASSESSMENT:  ==============  IUP @ 40w5d for induction of labor.  Indication: oligohydramnios   Fetal Heart Rate Tracing category two  GBS- negative     5/60/-1 to -2, cervix now very swollen, anterior lip very swollen.  Baby not OA, OP, asynclitic    Patient Active Problem List   Diagnosis     Squamous cell carcinoma of neck     Rh negative state in antepartum period     Supervision of high-risk pregnancy of elderly multigravida     Abnormal glandular Papanicolaou smear of cervix     Irritable bladder     History of radiation therapy     Oligohydramnios in third trimester     Kidney abnormality of fetus on prenatal ultrasound     Labor and delivery, indication for care     Encounter for triage in pregnant patient     PLAN:  ===========  Unchanged cervical exam- swollen with OP, asynclitic position.  Sifting performed and patient repositioned to far left side lying release.  Discussed with pt and family concern r/t category 2 fetal tracing, unchanged exam with swelling and asynclitic position.   Reviewed potential indications for  section.   RN to start 500ml IVF bolus.  Will consult with Dr. Job Meeks, OB MD.    Addendum @ 7969:    Consulted with Dr. Eaton re: category 2 fetal tracing, labor progress.   Pt in far side lying release, difficulty tracing contractions. Will continue to adjust toco.  IV benadryl administered by RN.    Monitor closely, cat 2 algorithm. Consult with MD.    S: I was notified by labor RN of persistent category II fetal heart rate tracing for 60 minutes.  Strip reviewed at bedside.  I came to the bedside to review with the RN.     O: Baseline rate 155, normal  Variability moderate  Accelerations present  Decelerations present, deceleration type: late , deceleration frequency: periods of intermittent and periods of recurrent     Assessment: EFM interpretation suggests absence of concern for fetal metabolic acidemia at this time due to moderate variability and accelerations present    Uterine Activity normal/regular.     The interventions currently taken to improve the fetal heart rate tracing and fetal oxygenation are: maternal positioning, IV fluid bolus , increase frequency of  assessment, consult Category 2 algorithm, evaluate labor progress, sterile vaginal exam and Consult with OB MD    A: Persistent category II tracing.    P: Per the category II algorithm, the plan is to continue observe/conservative management. Reevaluate in 30 minutes.     ÁNGEL Lopez, CNM

## 2022-06-13 NOTE — PROGRESS NOTES
Blood pressure 106/58, temperature 97.9  F (36.6  C), temperature source Oral, resp. rate 20, last menstrual period 09/01/2021, SpO2 96 %, not currently breastfeeding.  Patient Vitals for the past 24 hrs:   BP Temp Temp src Resp SpO2   06/13/22 1630 -- -- -- -- 96 %   06/13/22 1620 106/58 -- -- -- 96 %   06/13/22 1600 -- -- -- -- 96 %   06/13/22 1551 104/56 -- -- -- 96 %   06/13/22 1522 107/62 -- -- -- --   06/13/22 1452 118/77 97.9  F (36.6  C) Oral 20 --   06/13/22 1430 -- -- -- -- 95 %   06/13/22 1421 100/55 -- -- -- --   06/13/22 1400 -- 98.3  F (36.8  C) Oral 22 96 %   06/13/22 1350 98/50 -- -- -- 95 %   06/13/22 1322 119/72 -- -- -- 96 %   06/13/22 1300 -- -- -- -- 96 %   06/13/22 1250 110/70 98.6  F (37  C) Oral 20 97 %   06/13/22 1230 -- -- -- -- 96 %   06/13/22 1207 108/60 -- -- -- 96 %   06/13/22 1149 104/59 98.2  F (36.8  C) Oral -- --   06/13/22 1144 113/68 -- -- -- --   06/13/22 1138 109/67 -- -- -- --   06/13/22 1136 117/66 -- -- -- --   06/13/22 1134 116/64 -- -- -- --   06/13/22 1132 116/63 -- -- -- --   06/13/22 1130 117/66 -- -- -- --   06/13/22 1128 118/64 -- -- -- --   06/13/22 1126 112/65 -- -- -- --   06/13/22 1124 113/63 -- -- -- --   06/13/22 1122 115/64 -- -- -- --   06/13/22 1111 (!) 148/83 97.9  F (36.6  C) Oral -- --   06/13/22 0945 -- 98.1  F (36.7  C) Oral 16 --   06/13/22 0805 105/66 97.9  F (36.6  C) Oral 16 --   06/13/22 0617 105/57 98.2  F (36.8  C) Oral -- --   06/13/22 0545 -- 98.2  F (36.8  C) Oral -- --   06/13/22 0425 -- 97.7  F (36.5  C) Oral -- --   06/13/22 0310 -- 97.9  F (36.6  C) Oral -- --   06/13/22 0200 -- 97.9  F (36.6  C) Oral -- --   06/13/22 0123 -- 98.2  F (36.8  C) Oral -- --   06/13/22 0019 123/56 97.9  F (36.6  C) Oral -- --     General appearance: sleeping soundly; explained recommendation for cervical exam- pt agreeable  CONTRACTIONS: q3-5  Pitocin- none,  Antibiotics- none    FETAL HEART TONES:   1600- 1628: 155, moderate variability, +accels. Intermittent  late decels  1629- 1700:  155, moderate variability, +accels. Recurrent late decels    ROM: clear fluid  PELVIC EXAM: 6/60/-2, less swollen but remains swollen    OP, the fetal head movement noted to LOT    # Pain Assessment:  Current Pain Score 6/13/2022   Patient currently in pain? sleeping, patient not able to self report   Pain score (0-10) -   Pain descriptors -   states she is comfortable    ASSESSMENT:  ==============  IUP @ 40w5d for induction of labor.  Indication: oligohydramnios   Fetal Heart Rate Tracing category two  GBS- negative     6/60/-2, less swollen but remains swollen   OP, the fetal head movement noted to LOT    Temperature 99.9     Patient Active Problem List   Diagnosis     Squamous cell carcinoma of neck     Rh negative state in antepartum period     Supervision of high-risk pregnancy of elderly multigravida     Abnormal glandular Papanicolaou smear of cervix     Irritable bladder     History of radiation therapy     Oligohydramnios in third trimester     Kidney abnormality of fetus on prenatal ultrasound     Labor and delivery, indication for care     Encounter for triage in pregnant patient     PLAN:  ===========  Pt repositioned to right side lying release.  LOT position, relatively unchanged cervical exam.  Consulted with Dr. Powers. Monitor FHR with cat 2 algorithm.  MD will review with pt.    ÁNGEL Lopez CNM    S: Persistent category II fetal heart rate tracing for 20 minutes.  Strip reviewed at bedside.  I came to the bedside to review with the RN.     O: Baseline rate 155, normal  Variability moderate  Accelerations present  Decelerations present, deceleration type: late , deceleration frequency: recurrent    Assessment: EFM interpretation suggests absence of concern for fetal metabolic acidemia at this time due to moderate variability and accelerations present    Uterine Activity normal/regular.    The interventions currently taken to improve the fetal heart rate  tracing and fetal oxygenation are: maternal positioning, increase frequency of assessment, consult Category 2 algorithm, evaluate labor progress and sterile vaginal exam    A: Persistent category II tracing.    P: Per the category II algorithm, the plan is to continue observe/conservative management.Consulted with Dr. Powers. Reevaluate in 30 minutes. Decels not recurrent after intervention. See progress note above.    ÁNGEL Lopez CNM    Addendum@ 1745:    No decelerations from [id number]. Reviewed with Dr. Powers.  [id number]: Occasional variable decelerations. Consulted with Dr. Powers.  Continue close observation.     ÁNGEL Lopez, FRANCISCO J

## 2022-06-13 NOTE — PROGRESS NOTES
Pt is doing well, feeling ctx more in lower abdomen. Morphine IM administered, and pt is resting left lateral with pillow support. Her SO is bedside.

## 2022-06-14 LAB
ABO/RH(D): NORMAL
FBST KIT EXP: NORMAL
FBST LOT #: NORMAL
FETAL BLEED SCREEN: NORMAL
HGB BLD-MCNC: 9.7 G/DL (ref 11.7–15.7)
HOLD SPECIMEN: NORMAL
SPECIMEN EXPIRATION DATE: NORMAL

## 2022-06-14 PROCEDURE — 250N000011 HC RX IP 250 OP 636: Performed by: ADVANCED PRACTICE MIDWIFE

## 2022-06-14 PROCEDURE — 120N000002 HC R&B MED SURG/OB UMMC

## 2022-06-14 PROCEDURE — 85461 HEMOGLOBIN FETAL: CPT

## 2022-06-14 PROCEDURE — 86901 BLOOD TYPING SEROLOGIC RH(D): CPT

## 2022-06-14 PROCEDURE — 250N000013 HC RX MED GY IP 250 OP 250 PS 637

## 2022-06-14 PROCEDURE — 36415 COLL VENOUS BLD VENIPUNCTURE: CPT

## 2022-06-14 PROCEDURE — 250N000011 HC RX IP 250 OP 636

## 2022-06-14 PROCEDURE — 85018 HEMOGLOBIN: CPT

## 2022-06-14 RX ORDER — CYCLOBENZAPRINE HCL 5 MG
10 TABLET ORAL 2 TIMES DAILY
Status: DISCONTINUED | OUTPATIENT
Start: 2022-06-14 | End: 2022-06-16 | Stop reason: HOSPADM

## 2022-06-14 RX ORDER — ONDANSETRON 2 MG/ML
4 INJECTION INTRAMUSCULAR; INTRAVENOUS EVERY 6 HOURS PRN
Status: DISCONTINUED | OUTPATIENT
Start: 2022-06-14 | End: 2022-06-16 | Stop reason: HOSPADM

## 2022-06-14 RX ORDER — MODIFIED LANOLIN
OINTMENT (GRAM) TOPICAL
Status: DISCONTINUED | OUTPATIENT
Start: 2022-06-14 | End: 2022-06-16 | Stop reason: HOSPADM

## 2022-06-14 RX ORDER — ACETAMINOPHEN 325 MG/1
975 TABLET ORAL EVERY 6 HOURS
Status: DISCONTINUED | OUTPATIENT
Start: 2022-06-14 | End: 2022-06-16 | Stop reason: HOSPADM

## 2022-06-14 RX ORDER — OXYTOCIN 10 [USP'U]/ML
10 INJECTION, SOLUTION INTRAMUSCULAR; INTRAVENOUS
Status: DISCONTINUED | OUTPATIENT
Start: 2022-06-14 | End: 2022-06-16 | Stop reason: HOSPADM

## 2022-06-14 RX ORDER — SIMETHICONE 80 MG
80 TABLET,CHEWABLE ORAL 4 TIMES DAILY PRN
Status: DISCONTINUED | OUTPATIENT
Start: 2022-06-14 | End: 2022-06-16 | Stop reason: HOSPADM

## 2022-06-14 RX ORDER — ONDANSETRON 4 MG/1
4 TABLET, ORALLY DISINTEGRATING ORAL EVERY 6 HOURS PRN
Status: DISCONTINUED | OUTPATIENT
Start: 2022-06-14 | End: 2022-06-16 | Stop reason: HOSPADM

## 2022-06-14 RX ORDER — IBUPROFEN 800 MG/1
800 TABLET, FILM COATED ORAL EVERY 6 HOURS
Status: DISCONTINUED | OUTPATIENT
Start: 2022-06-15 | End: 2022-06-16 | Stop reason: HOSPADM

## 2022-06-14 RX ORDER — LIDOCAINE 4 G/G
1 PATCH TOPICAL
Status: DISCONTINUED | OUTPATIENT
Start: 2022-06-14 | End: 2022-06-16 | Stop reason: HOSPADM

## 2022-06-14 RX ORDER — PROCHLORPERAZINE 25 MG
25 SUPPOSITORY, RECTAL RECTAL EVERY 12 HOURS PRN
Status: DISCONTINUED | OUTPATIENT
Start: 2022-06-14 | End: 2022-06-16 | Stop reason: HOSPADM

## 2022-06-14 RX ORDER — OXYCODONE HYDROCHLORIDE 5 MG/1
5 TABLET ORAL EVERY 4 HOURS PRN
Status: DISCONTINUED | OUTPATIENT
Start: 2022-06-14 | End: 2022-06-15

## 2022-06-14 RX ORDER — SODIUM CHLORIDE, SODIUM LACTATE, POTASSIUM CHLORIDE, CALCIUM CHLORIDE 600; 310; 30; 20 MG/100ML; MG/100ML; MG/100ML; MG/100ML
INJECTION, SOLUTION INTRAVENOUS CONTINUOUS
Status: DISCONTINUED | OUTPATIENT
Start: 2022-06-14 | End: 2022-06-14 | Stop reason: HOSPADM

## 2022-06-14 RX ORDER — OXYTOCIN/0.9 % SODIUM CHLORIDE 30/500 ML
340 PLASTIC BAG, INJECTION (ML) INTRAVENOUS CONTINUOUS PRN
Status: DISCONTINUED | OUTPATIENT
Start: 2022-06-14 | End: 2022-06-16 | Stop reason: HOSPADM

## 2022-06-14 RX ORDER — AMOXICILLIN 250 MG
1 CAPSULE ORAL DAILY
Qty: 100 TABLET | Refills: 0 | Status: SHIPPED | OUTPATIENT
Start: 2022-06-14 | End: 2022-07-26

## 2022-06-14 RX ORDER — PROCHLORPERAZINE MALEATE 10 MG
10 TABLET ORAL EVERY 6 HOURS PRN
Status: DISCONTINUED | OUTPATIENT
Start: 2022-06-14 | End: 2022-06-16 | Stop reason: HOSPADM

## 2022-06-14 RX ORDER — BISACODYL 10 MG
10 SUPPOSITORY, RECTAL RECTAL DAILY PRN
Status: DISCONTINUED | OUTPATIENT
Start: 2022-06-15 | End: 2022-06-16 | Stop reason: HOSPADM

## 2022-06-14 RX ORDER — LIDOCAINE 40 MG/G
CREAM TOPICAL
Status: DISCONTINUED | OUTPATIENT
Start: 2022-06-14 | End: 2022-06-16 | Stop reason: HOSPADM

## 2022-06-14 RX ORDER — METOCLOPRAMIDE HYDROCHLORIDE 5 MG/ML
10 INJECTION INTRAMUSCULAR; INTRAVENOUS EVERY 6 HOURS PRN
Status: DISCONTINUED | OUTPATIENT
Start: 2022-06-14 | End: 2022-06-16 | Stop reason: HOSPADM

## 2022-06-14 RX ORDER — MISOPROSTOL 200 UG/1
400 TABLET ORAL
Status: DISCONTINUED | OUTPATIENT
Start: 2022-06-14 | End: 2022-06-16 | Stop reason: HOSPADM

## 2022-06-14 RX ORDER — HYDROCORTISONE 25 MG/G
CREAM TOPICAL 3 TIMES DAILY PRN
Status: DISCONTINUED | OUTPATIENT
Start: 2022-06-14 | End: 2022-06-16 | Stop reason: HOSPADM

## 2022-06-14 RX ORDER — KETOROLAC TROMETHAMINE 30 MG/ML
30 INJECTION, SOLUTION INTRAMUSCULAR; INTRAVENOUS EVERY 6 HOURS
Status: COMPLETED | OUTPATIENT
Start: 2022-06-14 | End: 2022-06-14

## 2022-06-14 RX ORDER — METHYLERGONOVINE MALEATE 0.2 MG/ML
200 INJECTION INTRAVENOUS
Status: DISCONTINUED | OUTPATIENT
Start: 2022-06-14 | End: 2022-06-16 | Stop reason: HOSPADM

## 2022-06-14 RX ORDER — IBUPROFEN 600 MG/1
600 TABLET, FILM COATED ORAL EVERY 6 HOURS PRN
Qty: 60 TABLET | Refills: 0 | Status: SHIPPED | OUTPATIENT
Start: 2022-06-14 | End: 2022-07-26

## 2022-06-14 RX ORDER — AMOXICILLIN 250 MG
1 CAPSULE ORAL 2 TIMES DAILY
Status: DISCONTINUED | OUTPATIENT
Start: 2022-06-14 | End: 2022-06-16 | Stop reason: HOSPADM

## 2022-06-14 RX ORDER — ONDANSETRON 4 MG/1
4 TABLET, ORALLY DISINTEGRATING ORAL EVERY 30 MIN PRN
Status: DISCONTINUED | OUTPATIENT
Start: 2022-06-14 | End: 2022-06-14 | Stop reason: HOSPADM

## 2022-06-14 RX ORDER — DEXTROSE, SODIUM CHLORIDE, SODIUM LACTATE, POTASSIUM CHLORIDE, AND CALCIUM CHLORIDE 5; .6; .31; .03; .02 G/100ML; G/100ML; G/100ML; G/100ML; G/100ML
INJECTION, SOLUTION INTRAVENOUS CONTINUOUS
Status: DISCONTINUED | OUTPATIENT
Start: 2022-06-14 | End: 2022-06-16 | Stop reason: HOSPADM

## 2022-06-14 RX ORDER — TRANEXAMIC ACID 10 MG/ML
1 INJECTION, SOLUTION INTRAVENOUS EVERY 30 MIN PRN
Status: DISCONTINUED | OUTPATIENT
Start: 2022-06-14 | End: 2022-06-16 | Stop reason: HOSPADM

## 2022-06-14 RX ORDER — MISOPROSTOL 200 UG/1
800 TABLET ORAL
Status: DISCONTINUED | OUTPATIENT
Start: 2022-06-14 | End: 2022-06-16 | Stop reason: HOSPADM

## 2022-06-14 RX ORDER — CARBOPROST TROMETHAMINE 250 UG/ML
250 INJECTION, SOLUTION INTRAMUSCULAR
Status: DISCONTINUED | OUTPATIENT
Start: 2022-06-14 | End: 2022-06-16 | Stop reason: HOSPADM

## 2022-06-14 RX ORDER — AMOXICILLIN 250 MG
2 CAPSULE ORAL 2 TIMES DAILY
Status: DISCONTINUED | OUTPATIENT
Start: 2022-06-14 | End: 2022-06-16 | Stop reason: HOSPADM

## 2022-06-14 RX ORDER — ACETAMINOPHEN 325 MG/1
650 TABLET ORAL EVERY 6 HOURS PRN
Qty: 100 TABLET | Refills: 0 | Status: SHIPPED | OUTPATIENT
Start: 2022-06-14

## 2022-06-14 RX ORDER — METOCLOPRAMIDE 10 MG/1
10 TABLET ORAL EVERY 6 HOURS PRN
Status: DISCONTINUED | OUTPATIENT
Start: 2022-06-14 | End: 2022-06-16 | Stop reason: HOSPADM

## 2022-06-14 RX ORDER — ONDANSETRON 2 MG/ML
4 INJECTION INTRAMUSCULAR; INTRAVENOUS EVERY 30 MIN PRN
Status: DISCONTINUED | OUTPATIENT
Start: 2022-06-14 | End: 2022-06-14 | Stop reason: HOSPADM

## 2022-06-14 RX ORDER — OXYTOCIN/0.9 % SODIUM CHLORIDE 30/500 ML
100-340 PLASTIC BAG, INJECTION (ML) INTRAVENOUS CONTINUOUS PRN
Status: DISCONTINUED | OUTPATIENT
Start: 2022-06-14 | End: 2022-06-16 | Stop reason: HOSPADM

## 2022-06-14 RX ADMIN — ACETAMINOPHEN 325MG 975 MG: 325 TABLET ORAL at 22:18

## 2022-06-14 RX ADMIN — KETOROLAC TROMETHAMINE 30 MG: 30 INJECTION, SOLUTION INTRAMUSCULAR at 00:38

## 2022-06-14 RX ADMIN — LIDOCAINE PATCH 4% 1 PATCH: 40 PATCH TOPICAL at 14:36

## 2022-06-14 RX ADMIN — KETOROLAC TROMETHAMINE 30 MG: 30 INJECTION, SOLUTION INTRAMUSCULAR at 06:17

## 2022-06-14 RX ADMIN — SIMETHICONE 80 MG: 80 TABLET, CHEWABLE ORAL at 14:01

## 2022-06-14 RX ADMIN — OXYCODONE HYDROCHLORIDE 5 MG: 5 TABLET ORAL at 06:14

## 2022-06-14 RX ADMIN — OXYCODONE HYDROCHLORIDE 5 MG: 5 TABLET ORAL at 17:58

## 2022-06-14 RX ADMIN — ACETAMINOPHEN 325MG 975 MG: 325 TABLET ORAL at 16:23

## 2022-06-14 RX ADMIN — KETOROLAC TROMETHAMINE 30 MG: 30 INJECTION, SOLUTION INTRAMUSCULAR at 12:01

## 2022-06-14 RX ADMIN — IBUPROFEN 800 MG: 800 TABLET ORAL at 23:50

## 2022-06-14 RX ADMIN — SIMETHICONE 80 MG: 80 TABLET, CHEWABLE ORAL at 07:46

## 2022-06-14 RX ADMIN — SENNOSIDES AND DOCUSATE SODIUM 1 TABLET: 50; 8.6 TABLET ORAL at 19:59

## 2022-06-14 RX ADMIN — OXYCODONE HYDROCHLORIDE 5 MG: 5 TABLET ORAL at 02:02

## 2022-06-14 RX ADMIN — OXYCODONE HYDROCHLORIDE 5 MG: 5 TABLET ORAL at 13:38

## 2022-06-14 RX ADMIN — CYCLOBENZAPRINE HYDROCHLORIDE 10 MG: 5 TABLET, FILM COATED ORAL at 19:58

## 2022-06-14 RX ADMIN — HUMAN RHO(D) IMMUNE GLOBULIN 300 MCG: 1500 SOLUTION INTRAMUSCULAR; INTRAVENOUS at 14:37

## 2022-06-14 RX ADMIN — KETOROLAC TROMETHAMINE 30 MG: 30 INJECTION, SOLUTION INTRAMUSCULAR at 17:54

## 2022-06-14 RX ADMIN — ACETAMINOPHEN 325MG 975 MG: 325 TABLET ORAL at 09:52

## 2022-06-14 RX ADMIN — SENNOSIDES AND DOCUSATE SODIUM 2 TABLET: 50; 8.6 TABLET ORAL at 07:50

## 2022-06-14 RX ADMIN — ACETAMINOPHEN 325MG 975 MG: 325 TABLET ORAL at 04:12

## 2022-06-14 RX ADMIN — OXYCODONE HYDROCHLORIDE 5 MG: 5 TABLET ORAL at 22:18

## 2022-06-14 ASSESSMENT — ACTIVITIES OF DAILY LIVING (ADL)
ADLS_ACUITY_SCORE: 20

## 2022-06-14 NOTE — CONSULTS
Reviewed tracing and course with FRANCISCO J Clay. Agree with her assessment of arrest dilation/fetal tachycardia and her recommendation to proceed with  section.    Sonya Powers MD

## 2022-06-14 NOTE — PROVIDER NOTIFICATION
06/14/22 1820   Provider Notification   Provider Name/Title [facility]   Method of Notification Electronic Page   Request Evaluate-Remote   Notification Reason Medication Request   pt c/o left side incisional pulling 8/10,  any other pain meds? flexeril?

## 2022-06-14 NOTE — PROGRESS NOTES
Data: transferred to room 454 via cart from PACU. Baby transferred via patients arms. Patient waiting for transfer to postpartum when a bed is available. To stay in labor room 454 until bed is available.   Action: Patient and family notified of room change. Belongings sent to room 454.  Oriented patient to surroundings. Call light and phone within reach. ID bands safety checked with RN X2.   Response: Patient tolerated transfer and is stable at time this time.

## 2022-06-14 NOTE — DISCHARGE SUMMARY
[facility] Discharge Summary    Mira Cao MRN# [id number]   Age: 36 year old YOB: 1985     Date of Admission:  2022  Date of Discharge::  2022    Admitting Physician:  ÁNGEL Keyes CN  Discharge Physician:  Sonya Powers MD          Admission Diagnoses:   - Intrauterine pregnancy at 40w6d  - Asthma          Discharge Diagnosis:   - Same, delivered   - Postpartum hemorrhage  - Acute blood loss anemia         Procedures:   - Primary low transverse  section with double layer closure via Pfannenstiel skin incision         Medications Prior to Admission:     Medications Prior to Admission   Medication Sig Dispense Refill Last Dose     aspirin (ASA) 81 MG EC tablet Take 1 tablet (81 mg) by mouth daily 60 tablet 3 2022 at 1500     cyanocobalamin (VITAMIN B-12) 100 MCG tablet Take 250 mcg by mouth daily   More than a month at Unknown time     lactobacillus rhamnosus, GG, (CULTURELL) capsule Take 1 capsule by mouth daily   2022 at 1800     loratadine (CLARITIN) 10 MG tablet Take 10 mg by mouth daily   Past Week at Unknown time     Magnesium Oxide 250 MG TABS Take 250 mg by mouth daily   2022 at 1800     omega 3 1000 MG CAPS Take by mouth daily   More than a month at Unknown time     omeprazole (PRILOSEC) 20 MG DR capsule Take 1 capsule (20 mg) by mouth daily 90 capsule 1 More than a month at Unknown time     Prenatal Vit-Fe Fumarate-FA (PRENATAL MULTIVITAMIN W/IRON) 27-0.8 MG tablet Take 1 tablet by mouth daily   2022 at 1800     Vitamin D, Cholecalciferol, 25 MCG (1000 UT) CAPS Take by mouth daily   More than a month at Unknown time              Discharge Medications:        Review of your medicines      START taking      Dose / Directions   acetaminophen 325 MG tablet  Commonly known as: TYLENOL  Used for: S/P  section      Dose: 650 mg  Take 2 tablets (650 mg) by mouth every 6 hours as needed for mild pain  Start after Delivery.  Quantity: 100 tablet  Refills: 0     ibuprofen 600 MG tablet  Commonly known as: ADVIL/MOTRIN  Used for: S/P  section      Dose: 600 mg  Take 1 tablet (600 mg) by mouth every 6 hours as needed for moderate pain Start after delivery  Quantity: 60 tablet  Refills: 0     oxyCODONE 5 MG tablet  Commonly known as: ROXICODONE  Used for: S/P  section      Dose: 5 mg  Take 1 tablet (5 mg) by mouth every 6 hours as needed for pain  Quantity: 20 tablet  Refills: 0     senna-docusate 8.6-50 MG tablet  Commonly known as: SENOKOT-S/PERICOLACE  Used for: S/P  section      Dose: 1 tablet  Take 1 tablet by mouth daily Start after delivery.  Quantity: 100 tablet  Refills: 0        CONTINUE these medicines which have NOT CHANGED      Dose / Directions   cyanocobalamin 100 MCG tablet  Commonly known as: VITAMIN B-12      Dose: 250 mcg  Take 250 mcg by mouth daily  Refills: 0     lactobacillus rhamnosus (GG) capsule      Dose: 1 capsule  Take 1 capsule by mouth daily  Refills: 0     loratadine 10 MG tablet  Commonly known as: CLARITIN      Dose: 10 mg  Take 10 mg by mouth daily  Refills: 0     Magnesium Oxide 250 MG Tabs      Dose: 250 mg  Take 250 mg by mouth daily  Refills: 0     omega 3 1000 MG Caps      Take by mouth daily  Refills: 0     omeprazole 20 MG DR capsule  Commonly known as: priLOSEC  Used for: Heartburn      Dose: 20 mg  Take 1 capsule (20 mg) by mouth daily  Quantity: 90 capsule  Refills: 1     prenatal multivitamin w/iron 27-0.8 MG tablet      Dose: 1 tablet  Take 1 tablet by mouth daily  Refills: 0     Vitamin D (Cholecalciferol) 25 MCG (1000 UT) Caps      Take by mouth daily  Refills: 0        STOP taking    aspirin 81 MG EC tablet  Commonly known as: ASA              Where to get your medicines      These medications were sent to [facility] [place], [place] - [address]  [address] [address]    Phone: [phone]      acetaminophen 325 MG tablet    ibuprofen 600 MG tablet    oxyCODONE 5 MG tablet    senna-docusate 8.6-50 MG tablet                 Consultations:   - None          Brief Admission History:   Ms. Mira Cao is a 36 year old  who presented at 40w6d for urgent  section.    Patient initially presented to labor and delivery stating that she did not want to stay for induction today, unless there were concerns for her fetal heart rate tracing.    Denies fever, cough, SOB or chest pain. Denies having contact with anyone who is Covid-19 positive. Pt has had Covid-19 Vaccinations. Had Covid-19 testing 22 and is negative  Contractions- irreg and not felt by patient  Fetal movement- active, patient reports doing kick counts  ROM- no.  Vaginal bleeding- none  GBS- negative  FOB- is involved, Ottoniel at bedside  Other labor support- none     Intraoperative course   The risks, benefits, and alternatives of  section were discussed with the patient, and she agreed to proceed.     The procedure was uncomplicated.  QBL 1054 mL.  See operative report for details.     Findings:   1. No subcutaneous scarring, no rectofascial adhesions, no intraabdominal adhesions, no adhesions between bladder and lower uterine segment  2. Clear amniotic fluid  3. Liveborn female infant in LOT presentation. Born at 2228 on 22. No nuchal cord. Apgars 8 at 1 minute & 9 at 5 minutes. Weight 3680g (8 lb 1.8 oz).  4. Normal uterus, fallopian tubes, and ovaries.        Postpartum Course   The patient's hospital course was unremarkable.  She recovered as anticipated and experienced no post-operative complications. On discharge, her pain was well controlled. Vaginal bleeding is similar to peak menstrual flow.  Voiding without difficulty.  Passing flatus.  Ambulating well and tolerating a normal diet.  No fever or significant wound drainage.  Breastfeeding well.  Infant is stable.  She was discharged on post-partum day  #3.    Post-partum hemoglobin:   Hemoglobin   Date Value Ref Range Status   06/12/2022 11.8 11.7 - 15.7 g/dL Final   11/08/2020 12.2 11.7 - 15.7 g/dL Final             Discharge Instructions and Follow-Up:     1) Diet: Regular  2) Feeding: breast  3) Contraception: declines  4) Activity: No lifting greater than 20 lbs, pushing, pulling, or other strenuous activity for 6 weeks. Pelvic rest for 6 weeks including no sexual intercourse, tampons, or douching. No driving until you can slam on the breaks without pain or while on narcotic pain medications.   5) Precautions: Call for temperature > 100.4, bright red vaginal bleeding >1 pad an hour x 2 hours, foul smelling vaginal discharge, pain not controlled by usual oral pain meds, persistent nausea and vomiting not controlled on medications, drainage or redness from incision site  6) Follow-up:  - Primary OB in 6 weeks for routine postpartum visit         Discharge Disposition:   Discharge to home.    Martha South MD  OB/GYN Resident, PGY-2    I have seen, examined, and counseled the patient on the day of discharge. I have reviewed and edited the summary.  Sonya Powers

## 2022-06-14 NOTE — PROGRESS NOTES
[facility]   Post-partum Progress Note    Name:  Mira Cao  MRN: [id number]    S:   Patient reports feeling well this morning.  Pain noticeable but well controlled.  Tolerated a granola bar, has not tried other food. Has not yet been out of bed, valencia in place. Has not yet passed flatus; has not yet had bowel movement. Lochia minimal. Denies fever/chills, headache, vision changes, chest pain, shortness of breath, RUQ pain, increased edema. Breastfeeding.    O:   Patient Vitals for the past 24 hrs:   BP Temp Temp src Pulse Resp SpO2   06/14/22 0613 94/59 -- -- -- 15 --   06/14/22 0611 -- -- -- -- -- 97 %   06/14/22 0511 100/59 -- -- -- 15 98 %   06/14/22 0506 -- -- -- -- -- 97 %   06/14/22 0414 115/58 -- -- -- 18 --   06/14/22 0411 -- -- -- -- -- 97 %   06/14/22 0319 103/57 98.2  F (36.8  C) Oral -- 17 --   06/14/22 0316 -- -- -- -- -- 97 %   06/14/22 0206 -- -- -- -- -- 96 %   06/14/22 0204 115/68 -- -- -- 18 --   06/14/22 0135 108/69 -- -- -- 18 97 %   06/14/22 0100 136/81 -- -- 71 -- 96 %   06/14/22 0059 -- -- -- 73 -- 97 %   06/14/22 0045 127/86 -- -- 71 17 96 %   06/14/22 0030 127/84 -- -- 78 -- 98 %   06/14/22 0015 123/85 -- -- 79 18 97 %   06/14/22 0000 130/80 -- -- 78 17 97 %   06/13/22 2348 123/81 -- -- 73 18 97 %   06/13/22 2335 (!) 106/96 98  F (36.7  C) Oral -- 16 --   06/13/22 2125 -- -- -- -- -- 97 %   06/13/22 2121 124/74 -- -- -- -- --   06/13/22 2055 -- -- -- -- -- 98 %   06/13/22 2051 118/59 -- -- -- -- --   06/13/22 2031 131/72 -- -- -- -- --   06/13/22 2030 -- -- -- -- -- 97 %   06/13/22 2004 -- 98.7  F (37.1  C) Oral -- -- --   06/13/22 1922 131/66 -- -- -- -- 98 %   06/13/22 1900 -- 98.4  F (36.9  C) Oral -- -- 98 %   06/13/22 1850 114/70 -- -- -- -- 97 %   06/13/22 1830 -- -- -- -- -- 98 %   06/13/22 1822 116/69 -- -- -- -- 98 %   06/13/22 1800 -- 98.3  F (36.8  C) Oral -- -- 97 %   06/13/22 1750 111/66 -- -- -- -- 97 %   06/13/22 1730 -- -- -- -- -- 97 %   06/13/22  1720 110/66 -- -- -- -- 97 %   22 1700 -- 98.8  F (37.1  C) Oral -- -- 97 %   22 1652 121/71 -- -- -- -- 97 %   22 1630 -- -- -- -- -- 96 %   22 1620 106/58 99.9  F (37.7  C) Oral -- -- 96 %   22 1600 -- -- -- -- -- 96 %   22 1551 104/56 -- -- -- -- 96 %   22 1522 107/62 -- -- -- -- --   22 1500 -- -- -- -- -- 97 %   22 1452 118/77 97.9  F (36.6  C) Oral -- 20 --   22 1430 -- -- -- -- -- 95 %   22 1421 100/55 -- -- -- -- --   22 1400 -- 98.3  F (36.8  C) Oral -- 22 96 %   22 1350 98/50 -- -- -- -- 95 %   22 1322 119/72 -- -- -- -- 96 %   22 1300 -- -- -- -- -- 96 %   22 1250 110/70 98.6  F (37  C) Oral -- 20 97 %   22 1230 -- -- -- -- -- 96 %   22 1207 108/60 -- -- -- -- 96 %   22 1149 104/59 98.2  F (36.8  C) Oral -- -- --   22 1144 113/68 -- -- -- -- --   22 1138 109/67 -- -- -- -- --   22 1136 117/66 -- -- -- -- --   22 1134 116/64 -- -- -- -- --   22 1132 116/63 -- -- -- -- --   22 1130 117/66 -- -- -- -- --   22 1128 118/64 -- -- -- -- --   22 1126 112/65 -- -- -- -- --   22 1124 113/63 -- -- -- -- --   22 1122 115/64 -- -- -- -- --   22 1111 (!) 148/83 97.9  F (36.6  C) Oral -- -- --   22 0945 -- 98.1  F (36.7  C) Oral -- 16 --   22 0805 105/66 97.9  F (36.6  C) Oral -- 16 --     Gen:  Resting comfortably, NAD  CV:  RRR  Pulm:  Non-labored breathing. No cough or audible wheezing.   Abd:  Soft, appropriately tender to palpation, non-distended.  Fundus below umbilicus, firm, and non-tender.  Inc:  Bandage in place, c/d/i with minimal overlying shadowing, no surrounding erythema or edema  Ext:  Non-tender, 1+ LE edema b/l    Hgb:   Recent Labs   Lab 22  1716   HGB 11.8     A/P:  36 year old  POD#1 s/p PLTCS for cat II FHT remote from delivery. Pregnancy uncomplicated. Meeting early postop goals. Continue with  routine postpartum management.     #Routine Postpartum  Pain: Well-controlled with scheduled tylenol, toradol > ibuprofen, PRN oxy  Hgb: 11.8 > EBL 1054 mL, TXA > AM Hgb pending. VSS as noted above, asymptomatic. Will discharge with PO Fe if hgb < 10  GI:  Regular diet. BID Senna/Colace. PRN Miralax, Simethicone. Encourage hydration and ambulation  : Voiding spontaneously  PPx:  Encouraged ambulation, Lovenox  Rh: Positive  Rub:  Immune  Hep B: Surface antigen negative  Baby: In room, doing well  Feed: Breastfeeding  BC: Declines  Dispo: Plan for home on POD#2-3.    Patient discussed with Dr. Richards.    Daniela Daly MD  Ob/Gyn Resident, PGY-2  06/14/2022 6:32 AM    Hemoglobin   Date Value Ref Range Status   06/14/2022 9.7 (L) 11.7 - 15.7 g/dL Final   06/12/2022 11.8 11.7 - 15.7 g/dL Final   11/08/2020 12.2 11.7 - 15.7 g/dL Final   11/21/2019 8.7 (L) 11.7 - 15.7 g/dL Final     Appreciate note by Dr. Daly, patient also seen and examined by me.  Patient having pain when moving from supine to seated or standing position.  Pain controlled at rest. Denies feeling dizzy or lightheaded with standing.  Patient has not yet passed gas and feels bloated.  Exam notable for distended abdomen but soft, no rebound or guarding.  Patient is asymptomatic of blood loss anemia.  I agree with the note above.

## 2022-06-14 NOTE — PLAN OF CARE
Patient arrived to [facility] unit via zoom cart at before shift began,with belongings, accompanied by spouse/ significant other, with infant in arms. Received report from NAKITA Wick and checked bands. Unit and room orientation completd. Call light given; no concerns present at this time. Continue with plan of care.

## 2022-06-14 NOTE — PROGRESS NOTES
Data: transferred to room 7136 via wheelchair at 0650. Baby transferred via patients arms.  Action: Receiving unit notified of transfer: Yes. Patient and family notified of room change. Report given to [facility], RN at 0715. Belongings sent to receiving unit. Accompanied by RN. Oriented patient to surroundings. Call light and phone within reach. ID bands safety checked with receiving RN.   Response: Patient tolerated transfer and is stable at time of hand off.

## 2022-06-14 NOTE — PROVIDER NOTIFICATION
06/14/22 1415   Provider Notification   Provider Name/Title G2 Brannon   Method of Notification Electronic Page   Request Evaluate-Remote   Notification Reason Medication Request   pt c/o right sided incisional pain; can she have lidocaine patch? FYI: BP 89/51

## 2022-06-14 NOTE — LACTATION NOTE
This note was copied from a baby's chart.  Consult for: First time breastfeeding, difficulty with latch and using nipple shield.     History:   delivery for fetal status @ 40w5d, AGA @ 8# 1.8 ounce birthweight, venita positive less than 24 hours old @ time of visit. 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     Breast exam of mom: Soft, symmetric, rounded breasts with denser areolae and intact, everted, small and shorter shaft nipples bilaterally. Mira noted early tenderness & significant breast growth during pregnancy.     Oral exam of baby: normal arch to palate, mildly recessed chin. Infant pursed lips and pushed away, did not get good feel under tongue but visualized extending past lips x1.     Feeding assessment:  Attempt latching without shield, demo ways to shape areola and aim high for deep latch. Mom attempting to return demo though fingers kept getting in her way. Demo how to place nipple shield and had mom return demo, then she was able to latch baby quite well independently. Without shield baby kept coming off (few sucks with full assist only) but with shield she maintained rhythmic sucking >10 minutes/side (some stimulation when she'd get sleepy and return to sucking) and drops of colostrum visible in shield when she came off.    Education provided: Discussed nose to nipple positioning with good support, anatomy of breast and infant mouth, tips to get and maintain deeper latch with and without shield, breast compressions prn to enhance milk transfer while feeding, nutritive vs. non-nutritive suck and how to hear swallows, benefits of skin to skin and feeding on cue, supply and demand with potential for shield to decrease milk transfer initially and benefits of, how to do breast massage & hand expression. Encouraged  hands on pumping by this evening, goal at least 4-6 times/24 hours (after every other feeding) for this first week. Reviewed baby's second night, how to tell if getting enough. Will plan follow up visit tomorrow to check in on latching and review support options at discharge.    Feeding Plan: Encourage frequent skin to skin, breastfeed on cue 8 to 12 times per day, use nipple shield if baby unable to sustain latch without it. Hand express after each feeding until milk is in & hands on pumping after every other for first week at home, feed back results. Can begin to wean from pumping if/when getting more than baby will take back in. Follow up with outpatient lactation consultant within a week of discharge for support weaning from nipple shield and pumping, continued support with establishing supply and first time breastfeeding.

## 2022-06-14 NOTE — PLAN OF CARE
Goal Outcome Evaluation:    Plan of Care Reviewed With: patient, spouse     Overall Patient Progress: improving         Problem: Plan of Care - These are the overarching goals to be used throughout the patient stay.      Outcome: Ongoing, Not Progressing  Flowsheets (Taken 6/14/2022 1830)  Individualized Care Needs: help with breastfeeding  Patient-Specific Goals (Include Timeframe): pain managed adequately     Data: VSS, postpartum assessments WNL. She is voiding without difficulty, up ad doroteo, passing gas, eating and drinking normally. Incision is covered and unable to assess, lochia WNL, no s/s infection. Breastfeeding infant  and hand expressing with full assistance. Taking ibuprofen, tylenol, oxycodone, lidocaine patch for pain and using abdominal binder, heat and cold packs   . Reports pain is not managed adequately.   Action: Education provided on plan of care, breastfeeding position, hand expression technique  Response: Pt is agreeable with her plan of care. Positive attachment behaviors observed with infant. Support person,  Ottoniel, present. Anticipate discharge per care plan.

## 2022-06-14 NOTE — ANESTHESIA CARE TRANSFER NOTE
Patient: Mira Cao    Procedure: Procedure(s):   SECTION       Diagnosis: * No pre-op diagnosis entered *  Diagnosis Additional Information: No value filed.    Anesthesia Type:   Epidural     Note:    Oropharynx: spontaneously breathing  Level of Consciousness: awake  Oxygen Supplementation: room air    Independent Airway: airway patency satisfactory and stable  Dentition: dentition unchanged  Vital Signs Stable: post-procedure vital signs reviewed and stable  Report to RN Given: handoff report given  Patient transferred to: PACU    Handoff Report: Identifed the Patient, Identified the Reponsible Provider, Reviewed the pertinent medical history, Discussed the surgical course, Reviewed Intra-OP anesthesia mangement and issues during anesthesia, Set expectations for post-procedure period and Allowed opportunity for questions and acknowledgement of understanding      Vitals:  Vitals Value Taken Time   /96 22 2335   Temp     Pulse 81 22 2345   Resp     SpO2 98 % 22 2345   Vitals shown include unvalidated device data.    Electronically Signed By: Timothy Brewer MD  2022  11:45 PM

## 2022-06-14 NOTE — ANESTHESIA PROCEDURE NOTES
TAP Procedure Note    Pre-Procedure   Staff -        Anesthesiologist:  Hillary Castellano MD       Resident/Fellow: Timothy Brewer MD       Performed By: anesthesiologist and with residents       Procedure performed by resident/fellow/CRNA in presence of a teaching physician.         Location: OR       Procedure Start/Stop Times: 6/13/2022 11:23 PM and 6/13/2022 11:29 PM       Pre-Anesthestic Checklist: patient identified, IV checked, site marked, risks and benefits discussed, informed consent, monitors and equipment checked, pre-op evaluation, at physician/surgeon's request and post-op pain management  Timeout:       Correct Patient: Yes        Correct Procedure: Yes        Correct Site: Yes        Correct Position: Yes        Correct Laterality: Yes        Site Marked: Yes  Procedure Documentation  Procedure: TAP       Diagnosis: POSTOPERATIVE ANALGESIA       Laterality: bilateral       Patient Position: supine       Patient Prep/Sterile Barriers: sterile gloves, mask       Skin prep: Chloraprep       Needle Type: short bevel       Needle Gauge: 21.        Needle Length (millimeters): 110        Ultrasound guided       1. Ultrasound was used to identify targeted nerve, plexus, vascular marker, or fascial plane and place a needle adjacent to it in real-time.       2. Ultrasound was used to visualize the spread of anesthetic in close proximity to the above referenced structure.       3. A permanent image is entered into the patient's record.    Assessment/Narrative         The placement was negative for: blood aspirated, painful injection and site bleeding       Paresthesias: No.       Bolus given via needle..        Secured via.        Insertion/Infusion Method: Single Shot       Complications: none       Injection made incrementally with aspirations every 5 mL.    Medication(s) Administered   Bupivacaine 0.25% PF (Infiltration) - Infiltration   10 mL - 6/13/2022 11:29:00 PM  Bupivacaine liposome (Exparel)  1.3% LA inj susp (Infiltration) - Infiltration   20 mL - 6/13/2022 11:29:00 PM  Medication Administration Time: 6/13/2022 11:23 PM     Comments:  Discussed risks of nerve block, including nerve injury, bleeding, infection, incomplete analgesia. Discussed alternative of not performing a nerve block. Ensured understanding, invited questions and all questions were answered. Patient wishes to proceed.    Informed consent was obtained.   Patient tolerated well. Incremental aspiration every 5 mL. No paresthesia, no heme. Needle tip visualized throughout with appropriate spread of local anesthetic in fascial plane.  Block was placed at the surgeon's request for post operative pain control.

## 2022-06-14 NOTE — ANESTHESIA POSTPROCEDURE EVALUATION
Patient: Mira Cao    Procedure: Procedure(s):   SECTION       Anesthesia Type:  Epidural    Note:  Disposition: Inpatient   Postop Pain Control: Uneventful            Sign Out: Well controlled pain   PONV: No   Neuro/Psych: Uneventful            Sign Out: Acceptable/Baseline neuro status   Airway/Respiratory: Uneventful            Sign Out: Acceptable/Baseline resp. status   CV/Hemodynamics: Uneventful            Sign Out: Acceptable CV status; No obvious hypovolemia; No obvious fluid overload   Other NRE: NONE   DID A NON-ROUTINE EVENT OCCUR? No    Event details/Postop Comments:  Alert, oriented. Anticipate epidural block wearing off.            Last vitals:  Vitals Value Taken Time   /81 22 0100   Temp 36.7  C (98  F) 22 2335   Pulse 78 22 0104   Resp 17 22 0045   SpO2 96 % 22 0104   Vitals shown include unvalidated device data.    Electronically Signed By: Hillary Castellano MD  2022  3:51 AM   I performed the procedure, with no assistant

## 2022-06-14 NOTE — OP NOTE
[facility]  Operative Note     Surgery Date:  2022  Surgeon:  Sonya Powers MD  Assistants:  Daniela Daly MD, PGY-2    Danielle Pool MS-3    Pre-op Diagnosis:    - Intrauterine pregnancy at 40w5d  - Category II FHT remote from delivery       Post-op Diagnosis:    - Same   - Liveborn female infant     Procedure:    - Primary low-transverse  section with double layer uterine closure via pfannenstiel incision    Anesthesia:  Epidural bolus  QBL:    1054 mL  IVF:    600 mL crystalloid  UOP:    100 mL clear yellow urine at the end of the case  Drains:   Cheng Catheter   Specimens:   Routine cord blood/segment, placenta  Antibiotics:  2g Ancef, 500mg Azithromycin  Additional medications: 1g TXA IV, Pitocin 30U  Complications:  None    Indications:   Mira Cao is a 36 year old  at 40w5d admitted for IOL for oligohydramnios. Her induction was started with PO misoprostol. She had SROM at 0110 on . She received a total of 4 doses if PO misoprostol, at which point she was noted to be 5/60/-1, and was westley spontaneously. She developed intermittent late decelerations starting at 1408, and made minimal cervical change for the next 4 hours. OB was consulted to review the fetal heart tracing, and at 1810 she developed fetal tachycardia with recurrent late decelerations that did not improve with intrauterine resuscitation or position changes. At  hr cervix was noted to be 6-7/70/-2 and the head was noted to be ROT with persistent Cat II tracing. At this time  section was recommended given inability to augment labor and persistent Cat II tracing with minimal cervical change. Reviewed risks, benefits, and alternatives of  section including bleeding, infection, and damage to surrounding structures and patient agreed to proceed.    Findings:   1. No subcutaneous scarring, no rectofascial adhesions, no intraabdominal adhesions, no adhesions  between bladder and lower uterine segment  2. Clear amniotic fluid  3. Liveborn female infant in LOT presentation. Born at 2228 on [birth year]. No nuchal cord. Apgars 8 at 1 minute & 9 at 5 minutes. Weight 3680g (8 lb 1.8 oz).  4. Normal uterus, fallopian tubes, and ovaries.     Procedure Details:   The patient was brought to the OR, where adequate epidural anesthesia was administered. She was placed in the dorsal supine position with a slight leftward tilt. She was prepped and draped in the usual sterile fashion. A surgical time out was performed. A pfannenstiel skin incision was made with the scalpel, and carried down to the underlying fascia with sharp and blunt dissection. The fascia was incised in the midline, and the incision was extended laterally with the Garibay scissors. The superior aspect of the fascia was grasped with the Kocher clamps and dissected off of the underlying rectus muscles with blunt and sharp dissection. Attention was then turned to the inferior aspect of the fascia, which was similarly dissected off of the underlying rectus muscles. The rectus muscles were  in the midline, and the peritoneum was entered bluntly, and the opening was extended with digital pressure and electrocautery. The bladder blade was placed. A transverse hysterotomy was made with the scalpel in the lower uterine segment, and the incision was extended with digital pressure. The infant was noted to be in the LOT position, and was delivered atraumatically. The shoulders delivered easily. No nuchal cord was noted. The cord was doubly clamped and cut after 30 seconds, and the infant was handed off to the awaiting nursery staff. A segment of cord was cut and sent to lab. The placenta was delivered with gentle traction on the umbilical cord and uterine massage. The placenta was discarded. The uterus was exteriorized and cleared of all clots and debris. Uterine tone was noted to be boggy with 300 units pitocin running in IV  and uterine massage. 1 g IV TXA was given. 200 mg methergine IM was given. The hysterotomy was closed with a running locked suture of 0 Vicryl. The hysterotomy was then imbricated using an 0 Monocryl suture. The hysterotomy was noted to be hemostatic. The posterior cul-de-sac was cleared of all clots and debris. The uterus was returned to the abdomen. The pericolic gutters were cleared of all clots and debris. The hysterotomy was reexamined and noted to be hemostatic. The peritoneum was closed with a running 3-0 Vicryl suture. The fascia and rectus muscles were examined and areas of oozing were controlled with electrocautery. The fascia was closed with a running 0 Vicryl suture. The subcutaneous tissue was irrigated and areas of oozing were controlled with electrocautery. The subcutaneous tissue was closed using a 3-0 Plain gut suture. The skin was closed with a running subcuticular 4-0 Vicryl on a Sina needle and covered with steri-strips and a sterile dressing.    All sponge, needle, and instrument counts were correct. The patient tolerated the procedure well, and was transferred to recovery in stable condition. Dr. Powers was present and scrubbed for the procedure.     Daniela Daly MD  Ob/Gyn Resident, PGY-2  06/13/2022 11:45 PM     I was present and scrubbed through fascial closure and immediately available thereafter. I have reviewed and edited this note.   Sonya Powres MD

## 2022-06-14 NOTE — PROGRESS NOTES
Labor Progress Note    Subjective: Mira is comfortable with epidural, has utilized many position changes with . Well supported by     Objective:  /66   Temp 98.7  F (37.1  C) (Oral)   Resp 20   LMP 2021   SpO2 98%   Breastfeeding No    General appearance: comfortable.  Contractions: Every 2-3 minutes.  seconds duration.    FHR: continuous EFM- baseline 170 with moderate variability and positive accelerations. No decelerations.  ROM: clear fluid x 19 hours  PELVIC EXAM: 6.5/ 70%/ Mid/ soft/ -1   Pitocin - none,  Antibiotics - none    Assessment:  IUP @ 40w5d for induction of labor.  Indication: oligohydramnios    Fetal Heart Rate Tracing category two  GBS - negative  Fetal tachycardia  Minimal labor progress  Afebrile  LGA fetus     Patient Active Problem List   Diagnosis     Squamous cell carcinoma of neck     Rh negative state in antepartum period     Supervision of high-risk pregnancy of elderly multigravida     Abnormal glandular Papanicolaou smear of cervix     Irritable bladder     History of radiation therapy     Oligohydramnios in third trimester     Kidney abnormality of fetus on prenatal ultrasound     Labor and delivery, indication for care     Encounter for triage in pregnant patient       Plan:  - Dr. Powers consulted. Reviewed POC, FHR, and labor progress estensively with Dr. Powers via phone, MD advised would not recommend attempting manual rotation at this time and instead recommends placement of IUPC. Consider starting pitocin if MVU inadequate, may consider manual rotation if pt is 9-10cm dilated. Pt agreeable.     ÁNGEL Childs CNM    ADDENDUM   - CNM at bedside reviewing IUPC placement and fetus began to have further elevations of tachycardia along with late decelerations.   - Dr. Powers paged to come evaluate tracing in person with CNM.   - SVE done, unchanged. MD recommends  at this time. Pt consents.     S: Strip reviewed at bedside.  I  came to the bedside to review with the RN.     O: Baseline rate 180, tachycardia  Variability moderate  Accelerations not present  Decelerations present, deceleration type: late , deceleration frequency: intermittent    Assessment: EFM interpretation suggests absence of concern for fetal metabolic acidemia at this time due to moderate variability    Uterine Activity normal/regular.     The interventions currently taken to improve the fetal heart rate tracing and fetal oxygenation are: maternal positioning, IV fluid bolus , consult Category 2 algorithm and sterile vaginal exam    A: Persistent category II tracing.    P: Per the category II algorithm, the proceed to  section or operative vaginal birth . See MD note.   ÁNGEL Childs CNM

## 2022-06-14 NOTE — PROGRESS NOTES
Came to the room to discuss  section. Reviewed concerns regarding fetal tracing and inability to further augment labor given fetal tachycardia and late decelerations for > 1 hour. Reviewed risks and benefits of  section including bleeding, infection, and damage to surrounding structures. Patient agreed to proceed with  section.    Daniela Daly MD  Ob/Gyn Resident, PGY-2  2022 9:37 PM

## 2022-06-15 PROCEDURE — 250N000013 HC RX MED GY IP 250 OP 250 PS 637

## 2022-06-15 PROCEDURE — 120N000002 HC R&B MED SURG/OB UMMC

## 2022-06-15 RX ORDER — OXYCODONE HYDROCHLORIDE 5 MG/1
5 TABLET ORAL
Status: DISCONTINUED | OUTPATIENT
Start: 2022-06-15 | End: 2022-06-16 | Stop reason: HOSPADM

## 2022-06-15 RX ADMIN — SIMETHICONE 80 MG: 80 TABLET, CHEWABLE ORAL at 14:43

## 2022-06-15 RX ADMIN — ACETAMINOPHEN 325MG 975 MG: 325 TABLET ORAL at 06:05

## 2022-06-15 RX ADMIN — OXYCODONE HYDROCHLORIDE 5 MG: 5 TABLET ORAL at 14:43

## 2022-06-15 RX ADMIN — SIMETHICONE 80 MG: 80 TABLET, CHEWABLE ORAL at 06:55

## 2022-06-15 RX ADMIN — BISACODYL 10 MG: 10 SUPPOSITORY RECTAL at 15:29

## 2022-06-15 RX ADMIN — CYCLOBENZAPRINE HYDROCHLORIDE 10 MG: 5 TABLET, FILM COATED ORAL at 20:08

## 2022-06-15 RX ADMIN — LIDOCAINE PATCH 4% 1 PATCH: 40 PATCH TOPICAL at 08:14

## 2022-06-15 RX ADMIN — IBUPROFEN 800 MG: 800 TABLET ORAL at 13:52

## 2022-06-15 RX ADMIN — SIMETHICONE 80 MG: 80 TABLET, CHEWABLE ORAL at 20:08

## 2022-06-15 RX ADMIN — OXYCODONE HYDROCHLORIDE 5 MG: 5 TABLET ORAL at 21:16

## 2022-06-15 RX ADMIN — IBUPROFEN 800 MG: 800 TABLET ORAL at 06:05

## 2022-06-15 RX ADMIN — OXYCODONE HYDROCHLORIDE 5 MG: 5 TABLET ORAL at 06:50

## 2022-06-15 RX ADMIN — ACETAMINOPHEN 325MG 975 MG: 325 TABLET ORAL at 20:09

## 2022-06-15 RX ADMIN — IBUPROFEN 800 MG: 800 TABLET ORAL at 20:09

## 2022-06-15 RX ADMIN — ACETAMINOPHEN 325MG 975 MG: 325 TABLET ORAL at 13:52

## 2022-06-15 RX ADMIN — SENNOSIDES AND DOCUSATE SODIUM 2 TABLET: 50; 8.6 TABLET ORAL at 20:08

## 2022-06-15 RX ADMIN — SENNOSIDES AND DOCUSATE SODIUM 1 TABLET: 50; 8.6 TABLET ORAL at 08:14

## 2022-06-15 RX ADMIN — CYCLOBENZAPRINE HYDROCHLORIDE 10 MG: 5 TABLET, FILM COATED ORAL at 08:14

## 2022-06-15 RX ADMIN — OXYCODONE HYDROCHLORIDE 5 MG: 5 TABLET ORAL at 02:46

## 2022-06-15 RX ADMIN — OXYCODONE HYDROCHLORIDE 5 MG: 5 TABLET ORAL at 18:04

## 2022-06-15 ASSESSMENT — ACTIVITIES OF DAILY LIVING (ADL)
ADLS_ACUITY_SCORE: 20

## 2022-06-15 NOTE — PLAN OF CARE
Goal Outcome Evaluation:     Data: VSS and postpartum checks WNL. Patient eating and drinking normally. Patient able to empty bladder independently and up ambulating with stand by assist.  Patient performing self care and able to care for infant.Fundus at .2 cm below U and firm  without massage.  lochia scant and  no blood clots. Incision with steri strips. Lidocaine applied for pain.   Action: Patient taking Tylenol and ibuprofen for pain and pain tolerable. Encouraged patient to breast feed every 2 - 3 hours and to monitor for cues to feed infant.  Assisted with latching and positioning of infant and mom was able to demonstrate latching and positioning after teaching.  Patient education done( education record).   Response: Patient participating in infant's care. Positive attachment with infant observed. Support/ spouse present at bedside and attentive to infant and patient.   Plan: Continue with the plan of cares.

## 2022-06-15 NOTE — PROGRESS NOTES
FRANCISCO J Courtesy Round:    Patient Name:  Mira Cao  :      1985  MRN:      [id number]    Assessment:   35 yo  Day 2 postpartum, s/p  for Category II FHT remote from delivery  Incisional pain  At risk for PPD/postpartum anxiety  FRANCISCO J courtesy round.    Plan:      -Offered therapeutic listening: encouraged Miar to continue to process her birth and offered to review the details of her birth anytime. Encouraged continued therapy and discussed option of SSRIs postpartum if she develops pp depression or anxiety.  -Advised Mira to schedule therapy appointments early in the postpartum period.   -Planning condoms for pp birth control. Discussed that Mira would be a good candidate for a TOLAC but that we advise her to allow her uterus to heal for 18 months before giving birth again. She may want to consider a more reliable method during this time. Will discuss further at her postpartum visits.     -Plan for today: encouraged rest, skin-to-skin, breastfeeding on cue, adequate pain control and limiting visitors.     -Discussed that patient is under physician management with CNM support as needed.        Subjective:  Integrating birth experience. Please with her care. She is noticing increased pain on the left side of her incision when she walks. Her pain is 3/10 when she is at rest. The MD team is managing her medications and is aware of this concern.     Mira is tearful talking about her  birth, sharing that it is triggering other painful memories of medical interventions she has had related to her cancer diagnosis and treatment.      The patient is voiding and ambulating without difficulty. Tolerating normal diet and passing flatus.  Bleeding is decreasing.  Baby is feeding on cue, Mira has met with a lactation specialist and feels like the baby has a good latch. She does have a breast pump at home.  Support at home identified: both of their families live close by and are  supportive. Patient will have 12 weeks off from work, paid. Ottoniel works for the family business and is able to take as much time off as he needs.  Patient does history of depression related to her cancer treatment. She and Ottoniel have a family therapist they see every month. They currently had a therapy session scheduled in the next few weeks.     Objective:  No exam. Courtesy round only.          Fartun Moise CNM, ÁNGEL, FRANCISCO J   6/15/2022 9:48 AM

## 2022-06-15 NOTE — PLAN OF CARE
Goal Outcome Evaluation:    1900-0730: Vitals stable overnight with one low blood pressure reading while patient was sleeping (89/53). Adequate pain control while laying in bed, but she rates her pain with movement at an 8. She delays going to the bathroom because getting out of bed is a struggle. Encouraged her to void frequently since she has been voiding large volumes (900cc last two voids). Taking ibuprofen, tylenol, oxycodone and flexeril for pain. She is also using a lidocaine patch. Describes pain as sharp, burning and tight internally on left side of incision. Breastfeeding with assistance. Increased size of nipple shield to 24mm since her nipples were rubbing against the 20mm shield. Baby reluctant to latch. Able to hand express up to 5cc of EBM to feed back to baby. Initiated pumping overnight since baby was not latching well. Patient unable to collect any colostrum with pumping. Reassured her this was normal. Nipples are cracked and scabbed, with left nipple bleeding with hand expression. Patient informed RN that her plan is to combo feed with formula at home, so formula was introduced overnight. Fed formula via finger feed last night to promote breastfeeding.

## 2022-06-15 NOTE — DISCHARGE INSTRUCTIONS
New Mother Care    Postpartum Appointment:  You should have your postpartum appointment six weeks after delivery.  If you had a  birth, you should have an appointment at two weeks with the physician who performed your surgery, and six weeks with the midwives. Call [phone] to schedule this appointment.     Lactation Appointment with CNM:   If you would like to make a clinic appointment for breastfeeding support with one of the Certified Nurse-Midwives who is also an International Board Certified Lactation Consultant, please call [phone].     Postpartum Changes:  You have experienced many physical and emotional changes during your pregnancy.  After delivery, you will notice other changes.  Here are some tips for common experiences after your baby is born.      Sign/Symptom Cause Suggestions   Mood Swings Hormonal changes, stress, fatigue Rest.  Ask for help.  Contact your health care provider if sadness continues more than two weeks, or caring for baby becomes overwhelming.   Engorged Breasts Swelling with more fluid and breast milk production two to five days after delivery.  May occur whether or not you are breastfeeding. Heat or ice for comfort.  If breastfeeding, nurse frequently.  Use supportive bra without underwire.  Should improve in one to two days.   After pains/ Cramping Cramping of uterus, often with nursing which stimulates the uterus to tighten.  Stronger with subsequent babies (second or more). Use non-aspirin pain reliever (ibuprofen or acetaminophen), especially before breastfeeding.  Empty your bladder frequently (at least every 2 hours).   Increased vaginal bleeding Too much physical activity; breastfeeding (due to uterine contractions). Rest more.  Avoid use of tampons.  Redness and amount of vaginal discharge (bleeding) decreases by three to four weeks after delivery.  Call clinic if you fill more than one pad within two hours.   Perineal discomfort and hemorrhoids Swelling from  "delivery; episiotomy or laceration (tearing); stitches. Ice, non-asprin pain reliever, witch hazel pads, warm tub soaks, stool softener, high fiber diet, kegel exercises. Stitches are absorbed.  Healing in two to three weeks. Do NOT use \"doughnut\" pillow for sitting.   Increased sweating and urinating Body losing extra fluid from pregnancy; hormonal shifts. Empy bladder more often, especially before breastfeeding; increase water intake; improves within three to four days.   Constipation Change in abdominal pressure and swelling after delivery; hormonal changes. Increase fluids and fiber in diet; Metamucil or stool softner as needed.   Skin coloring  Hair Thickness  Swelling Skin darkening and pregnancy rash due to hormonal changes; extra hair growth during pregnancy; increased fluids from pregnancy and birth causes swelling. Darker skin coloring and stretch marks with fade after delivery (no treatment needed).  Extra hair will be lost in two to four months.  Pregnancy swelling resolves in one to four weeks. Continue to hydrate.   Sciatica pain Nerve irritation due to extra weight and pressure during pregnancy. May continue after delivery; heat, acetaminophen, ibuprofen, position changes for comfort.     Postpartum Exercises  These exercises may be started soon after delivery.  If you feel tired or uncomfortable, stop and try these exercises after resting.  Check for any separation in your stomach wall before doing exercises that involve twising or stress on your stomach muscles.  Place your fingers in the center of your upper abdomen and lift your head and shoulders up in a partial sit-up.  If you feel a separation in your muscle wall, it is too soon to do sit ups.  Try the following instead:  Tighten and relax your pelvic floor muscles often.  Breathe deeply while laying on your back.  As you breathe out, lift just your head up and pull the sides of your stomach toward the middle with your hands.  Then breathe in " as you put your head down.  This will help to close the separation of your stomach.  Tilt your pelvis back and forth.  Alternate this with full body stretches.  Move your feet back and forth, then in circular motions.  While lying on your back, slide your heels toward your hips and bend your knees one at a time, then together.  While lying flat on the floor, bend your knees and raise your legs one at a time.  When the stomach wall separation has closed, you can progress to straight curl-ups, diagonal curl-ups, and side leg lifts.    After a  Birth  In addition to the instruction after a vaginal delivery, follow these guidelines:  Check your incision each day for signs of infection: redness, drainage, warmth or discomfort.  Contact your midwife or OB who performed your surgery if you have any of these signs or heavy vaginal bleeding.  Check with your midwife or surgeon before taking tub baths.  You may start driving a car two weeks after delivery.  Gradually increase your physical activity and exercise level according to how comfortable or tired you feel.  During the first days after delivery, try deep breathing, bending and stretching your feet in up-and-down circular motions, extending and tightening your legs while crossed at the ankles, and doing isometric exercises while lying down.  Next, try pelvic lifts with bent knees, bending and straightening your knees separately and together.  After checking for the abdominal rectus (stomach wall) separation, advance to back arching, twisting to each side, and reaching for your knees with pillow support.  Straight curl-ups, diagonal curl-ups and side leg lifts can then be added.    Reminders  Healthy nutrition is still important for your recovery and breastfeeding.  Continue your prenatal vitamins if you are breastfeeding.  It is important for your health and your baby's health to stay smoke-free.  Babies exposed to smoke are sick more often.  Family planning is  important.  Discuss birth control options with your provider.    Warning Signs  Call the on-call midwife if you have:  Heavy bleeding (filling one pad within one to two hours).  Blood clots larger than the size of a golf ball, especially with heavy bleeding.  Vaginal discharge with foul odor or green color.  Fever (oral temperature over 100.3 F).  Signs of bladder infection (burning, frequent urination)  Signs of vaginal infection (vaginal itching, irritation)  Painful, hard lump in breast and/or red streaks with fever.  Vaginal pain or tissue coming out of vagina.  Abdominal pain or abdomen tender to the touch.  Signs of depression or anxiety, affecting sleep or activities of daily living.    If you have questions or concerns and need to speak with a midwife, please call the on-call midwife at [phone].    Thank you for sharing your birth experience with the [facility]  Congratulations on the birth of your baby!

## 2022-06-15 NOTE — PROGRESS NOTES
[facility]   Post-partum Progress Note    Name:  Mira Cao  MRN: [id number]    S:   Patient reports feeling okay this morning. Pain moderately well controlled, having muscle soreness and gas pain. Tolerating small bites of food without nausea or vomiting. Ambulating without dizziness. Voiding spontaneously. Passing some flatus; has not yet had bowel movement. Lochia minimal. Denies fever/chills, headache, vision changes, chest pain, shortness of breath, RUQ pain, increased edema. Breastfeeding.    O:   Patient Vitals for the past 24 hrs:   BP Temp Temp src Pulse Resp SpO2   06/15/22 0246 (!) 89/53 98  F (36.7  C) Oral 69 16 --   22 2350 95/57 97.8  F (36.6  C) Oral 72 16 --   22 1937 102/63 98.2  F (36.8  C) Oral 76 18 --   22 1800 104/69 98.4  F (36.9  C) Oral 61 16 --   22 1400 (!) 89/51 97.7  F (36.5  C) Oral 82 16 98 %   22 0945 101/60 98.3  F (36.8  C) Oral 71 16 --     Gen:  Resting comfortably, NAD  CV:  RRR  Pulm:  Non-labored breathing. No cough or audible wheezing.   Abd:  Soft, appropriately tender to palpation, non-distended.  Fundus below umbilicus, firm, and non-tender.  Inc:  Bandage in place, c/d/i with minimal overlying shadowing, no surrounding erythema or edema  Ext:  Non-tender, 1+ LE edema b/l    Hgb:   Recent Labs   Lab 22  0633 22  1716   HGB 9.7* 11.8     A/P:  36 year old  POD#2 s/p PLTCS for cat II FHT remote from delivery. Pregnancy uncomplicated. Meeting early postop goals, still working on pain control. Continue with routine postpartum management.     #Routine Postpartum  Pain: Well-controlled with scheduled tylenol, toradol > ibuprofen, PRN oxy increase to q3hr  Hgb: 11.8 > EBL 1054 mL, Pitocin, TXA, methergine> 9.7. Acute blood loss anemia secondary to postpartum hemorrhage and surgical blood loss, asymptomatic. Will discharge with PO Fe.  GI:  Regular diet. BID Senna/Colace. PRN Miralax, Simethicone. Encourage  hydration and ambulation  : Voiding spontaneously  PPx:  Encouraged ambulation, Lovenox  Rh: Positive  Rub:  Immune  Hep B: Surface antigen negative  Baby: In room, doing well  Feed: Breastfeeding  BC: Declines  Dispo: Plan for home on POD#2-3.    Patient discussed with Dr. Mg.    Daniela Daly MD  Ob/Gyn Resident, PGY-2  06/15/2022 7:49 AM     Staff MD Note    I appreciate the note by Dr. Daly.  Any necessary changes have been made by me.  I saw and evaluated the patient and agree with the findings and plan of care as documented in the note.  Still needs improved pain control, changed frequency of oxycodone today, do think some of this could be gas related.  Will try suppository for this too.  No nausea/vomiting and tolerating regular diet.  Working on feeding with baby.    Martha Mg MD

## 2022-06-16 VITALS
DIASTOLIC BLOOD PRESSURE: 69 MMHG | SYSTOLIC BLOOD PRESSURE: 105 MMHG | TEMPERATURE: 97.5 F | RESPIRATION RATE: 16 BRPM | OXYGEN SATURATION: 98 % | HEART RATE: 79 BPM

## 2022-06-16 PROCEDURE — 250N000013 HC RX MED GY IP 250 OP 250 PS 637

## 2022-06-16 RX ORDER — OXYCODONE HYDROCHLORIDE 5 MG/1
5 TABLET ORAL EVERY 6 HOURS PRN
Qty: 20 TABLET | Refills: 0 | Status: SHIPPED | OUTPATIENT
Start: 2022-06-16 | End: 2022-06-21

## 2022-06-16 RX ADMIN — LIDOCAINE PATCH 4% 1 PATCH: 40 PATCH TOPICAL at 08:45

## 2022-06-16 RX ADMIN — CYCLOBENZAPRINE HYDROCHLORIDE 10 MG: 5 TABLET, FILM COATED ORAL at 08:45

## 2022-06-16 RX ADMIN — ACETAMINOPHEN 325MG 975 MG: 325 TABLET ORAL at 15:12

## 2022-06-16 RX ADMIN — OXYCODONE HYDROCHLORIDE 5 MG: 5 TABLET ORAL at 11:47

## 2022-06-16 RX ADMIN — OXYCODONE HYDROCHLORIDE 5 MG: 5 TABLET ORAL at 00:25

## 2022-06-16 RX ADMIN — ACETAMINOPHEN 325MG 975 MG: 325 TABLET ORAL at 02:39

## 2022-06-16 RX ADMIN — SENNOSIDES AND DOCUSATE SODIUM 2 TABLET: 50; 8.6 TABLET ORAL at 08:45

## 2022-06-16 RX ADMIN — IBUPROFEN 800 MG: 800 TABLET ORAL at 15:12

## 2022-06-16 RX ADMIN — OXYCODONE HYDROCHLORIDE 5 MG: 5 TABLET ORAL at 05:23

## 2022-06-16 RX ADMIN — SIMETHICONE 80 MG: 80 TABLET, CHEWABLE ORAL at 02:39

## 2022-06-16 RX ADMIN — OXYCODONE HYDROCHLORIDE 5 MG: 5 TABLET ORAL at 08:50

## 2022-06-16 RX ADMIN — ACETAMINOPHEN 325MG 975 MG: 325 TABLET ORAL at 08:45

## 2022-06-16 RX ADMIN — IBUPROFEN 800 MG: 800 TABLET ORAL at 02:39

## 2022-06-16 RX ADMIN — IBUPROFEN 800 MG: 800 TABLET ORAL at 08:45

## 2022-06-16 RX ADMIN — OXYCODONE HYDROCHLORIDE 5 MG: 5 TABLET ORAL at 15:12

## 2022-06-16 ASSESSMENT — ACTIVITIES OF DAILY LIVING (ADL)
ADLS_ACUITY_SCORE: 20

## 2022-06-16 NOTE — PLAN OF CARE
Goal Outcome Evaluation:    1900-0730: Vitals stable overnight. Pain improving but she is more sore on the right side since last evening when she states she moved in the wrong way. Patient is more mobile and getting up to go the bathroom more frequently. Walked around room a couple time last night after taking oxy. Taking pain medications when available but did stretch out last dose of oxycodone. Patient opted to bottle feed baby EBM and formula overnight. No breastfeeding attempts. When asked if she still plans to breastfeed, she says that she has been doing the bottle since baby seems to be taking to it better. Discussed pumping frequency if she plans to combo feed at home. Suggested pumping every other feeding (or as often as she would like) if she is not concerned with provided full milk supply.

## 2022-06-16 NOTE — PLAN OF CARE
Goal Outcome Evaluation:      Data: VSS and postpartum checks WNL. Patient eating and drinking normally. Patient able to empty bladder independently and up ambulating. Patient performing self care and able to care for infant.Fundus at  3 cm below U and firm  without massage.  lochia scant and  no blood clots. Incision open to air. Steri strips intact.   Action: Patient taking Oxycodone, Tylenol and Ibuprofen for pain and pain tolerable. Encouraged patient to breast / formula  feed every 2 - 3 hours and to monitor for cues to feed infant. Patient education done( education record).   Response: Patient participating in infant's care. Positive attachment with infant observed. Support/ spouse present at bedside and attentive to infant and patient.   Plan: Continue with the plan of cares and discharge today.

## 2022-06-16 NOTE — PROGRESS NOTES
[facility]   Post-partum Progress Note    Name:  Mira Cao  MRN: [id number]    S:   Patient reports feeling much better this morning. Pain well controlled. Tolerating a regular diet without nausea or vomiting. Ambulating without dizziness. Voiding spontaneously. Passing some flatus; has not yet had bowel movement. Lochia minimal. Denies fever/chills, headache, vision changes, chest pain, shortness of breath, RUQ pain, increased edema. Breastfeeding.    O:   Patient Vitals for the past 24 hrs:   BP Temp Temp src Pulse Resp   22 0842 -- 97.5  F (36.4  C) Oral -- 16   22 0019 98/63 97.8  F (36.6  C) Oral 79 18   06/15/22 2005 100/67 98.4  F (36.9  C) Oral 78 16     Gen:  Resting comfortably, NAD  CV:  RRR  Pulm:  Non-labored breathing. No cough or audible wheezing.   Abd:  Soft, appropriately tender to palpation, non-distended.  Fundus below umbilicus, firm, and non-tender.  Inc:  Bandage in place, c/d/i with minimal overlying shadowing, no surrounding erythema or edema  Ext:  Non-tender, 1+ LE edema b/l    Hgb:   Recent Labs   Lab 22  0633 22  1716   HGB 9.7* 11.8     A/P:  36 year old  POD#3 s/p PLTCS for cat II FHT remote from delivery. Pregnancy uncomplicated. Meeting postop goals. Continue with routine postpartum management.     #Routine Postpartum  Pain: Well-controlled with scheduled tylenol, toradol > ibuprofen, PRN oxy increase to q3hr  Hgb: 11.8 > EBL 1054 mL, Pitocin, TXA, methergine> 9.7. Acute blood loss anemia secondary to postpartum hemorrhage and surgical blood loss, asymptomatic. Will discharge with PO Fe.  GI:  Regular diet. BID Senna/Colace. PRN Miralax, Simethicone. Encourage hydration and ambulation  : Voiding spontaneously  PPx:  Encouraged ambulation, Lovenox  Rh: Positive  Rub:  Immune  Hep B: Surface antigen negative  Baby: In room, doing well  Feed: Breastfeeding  BC: Declines  Dispo: Plan for home on POD#2-3.      Daniela Daly MD  Ob/Gyn  Resident, PGY-2  06/16/2022 8:43 AM     I have seen and examined the patient without the resident. I have reviewed, edited, and agree with the note.   My findings are: appears well  Incision CDI  Abdomen soft NT ND  Hemoglobin   Date Value Ref Range Status   06/14/2022 9.7 (L) 11.7 - 15.7 g/dL Final   06/12/2022 11.8 11.7 - 15.7 g/dL Final   11/08/2020 12.2 11.7 - 15.7 g/dL Final   11/21/2019 8.7 (L) 11.7 - 15.7 g/dL Final       Sonya Powers MD

## 2022-06-27 ENCOUNTER — TELEPHONE (OUTPATIENT)
Dept: OBGYN | Facility: CLINIC | Age: 37
End: 2022-06-27

## 2022-06-27 NOTE — TELEPHONE ENCOUNTER
[facility] Call Center    Phone Message    May a detailed message be left on voicemail: yes     Reason for Call: Other: Lorraine is calling stating that she needs a form filled out for her work that states she had her baby and it was a . She stated she would fax the form to us. Please have provider sign and fax back to her asap, thanks!     Action Taken: Other: whs    Travel Screening: Not Applicable

## 2022-06-28 ENCOUNTER — TELEPHONE (OUTPATIENT)
Dept: OBGYN | Facility: CLINIC | Age: 37
End: 2022-06-28

## 2022-06-28 NOTE — TELEPHONE ENCOUNTER
Received fax for maternity leave certification form to be completed.     Placed in CNM basket in nurse triage area.     Patient has appointment on 7/5.     - Victoria Teixeira   Clinic Services Assistant

## 2022-06-28 NOTE — TELEPHONE ENCOUNTER
[facility] paperwork completed and signed, emailed to patient per request.

## 2022-06-29 NOTE — TELEPHONE ENCOUNTER
Patient is calling about the FMLA form.  She states she did not receive it in her email from Myesha Schmitt RN.  Could you please resend to this email address:  [email or web address].  Thank you!

## 2022-06-30 NOTE — TELEPHONE ENCOUNTER
[facility] Call Center    Phone Message    May a detailed message be left on voicemail: yes     Reason for Call: Other: Pt called to say she has not need the emailed paperwork yet.  Can you email again?   Writer confirmed the email address we have is correct.  Also can you put the paperwork on to pts Lianet?   Thank you     Action Taken: Message routed to:  [facility]    Travel Screening: Not Applicable

## 2022-07-05 ENCOUNTER — OFFICE VISIT (OUTPATIENT)
Dept: OBGYN | Facility: CLINIC | Age: 37
End: 2022-07-05
Attending: REGISTERED NURSE
Payer: COMMERCIAL

## 2022-07-05 VITALS
WEIGHT: 135 LBS | HEART RATE: 73 BPM | SYSTOLIC BLOOD PRESSURE: 116 MMHG | BODY MASS INDEX: 21.79 KG/M2 | DIASTOLIC BLOOD PRESSURE: 80 MMHG

## 2022-07-05 DIAGNOSIS — F41.8 POSTPARTUM ANXIETY: ICD-10-CM

## 2022-07-05 DIAGNOSIS — C44.42 SQUAMOUS CELL CARCINOMA OF NECK: ICD-10-CM

## 2022-07-05 DIAGNOSIS — Z98.891 STATUS POST CESAREAN DELIVERY: ICD-10-CM

## 2022-07-05 PROCEDURE — 99207 PR POST PARTUM EXAM: CPT | Performed by: REGISTERED NURSE

## 2022-07-05 PROCEDURE — G0463 HOSPITAL OUTPT CLINIC VISIT: HCPCS

## 2022-07-05 ASSESSMENT — ANXIETY QUESTIONNAIRES
6. BECOMING EASILY ANNOYED OR IRRITABLE: SEVERAL DAYS
IF YOU CHECKED OFF ANY PROBLEMS ON THIS QUESTIONNAIRE, HOW DIFFICULT HAVE THESE PROBLEMS MADE IT FOR YOU TO DO YOUR WORK, TAKE CARE OF THINGS AT HOME, OR GET ALONG WITH OTHER PEOPLE: SOMEWHAT DIFFICULT
1. FEELING NERVOUS, ANXIOUS, OR ON EDGE: MORE THAN HALF THE DAYS
GAD7 TOTAL SCORE: 8
3. WORRYING TOO MUCH ABOUT DIFFERENT THINGS: SEVERAL DAYS
GAD7 TOTAL SCORE: 8
7. FEELING AFRAID AS IF SOMETHING AWFUL MIGHT HAPPEN: MORE THAN HALF THE DAYS
5. BEING SO RESTLESS THAT IT IS HARD TO SIT STILL: NOT AT ALL
2. NOT BEING ABLE TO STOP OR CONTROL WORRYING: SEVERAL DAYS

## 2022-07-05 ASSESSMENT — PAIN SCALES - GENERAL: PAINLEVEL: NO PAIN (0)

## 2022-07-05 ASSESSMENT — PATIENT HEALTH QUESTIONNAIRE - PHQ9
SUM OF ALL RESPONSES TO PHQ QUESTIONS 1-9: 5
5. POOR APPETITE OR OVEREATING: SEVERAL DAYS

## 2022-07-05 NOTE — PROGRESS NOTES
"2-Week Postpartum Visit    Subjective:  Mira Cao 36 year old year old female, , who presents for 2 week postpartum follow-up.  Pt had a primary  of viable girl d/t category II FHT remote from delivery.     Breast feeding: formula, reports she had \"high anxiety\" with breastfeeding and decided not to continue.   Abdominal pain: minimal, not taking oxy or tylenol   Bleeding since delivery: still have bleeding lighter than a period  Contraception choice: condoms    Last PAP:   Lab Results   Component Value Date    PAP NIL 2020       Pt screened for postpartum depression and complaints are: positive for anxiety     PHQ 10/29/2021 2022 2022   PHQ-9 Total Score 1 8 5   Q9: Thoughts of better off dead/self-harm past 2 weeks Not at all Not at all Not at all     SP-7 SCORE 2020 10/29/2021 2022   Total Score 3 1 8     She is tearful when discussing her mood since having her baby. Reports she is very anxious and not sleeping much contributes to this further. She is seeing a therapist, has had one meeting since having baby. Declines wanting to discuss medication options or referral to [facility] therapist today but may consider in several weeks if no improvement in mood.     Other concerns:   - Steri strips still covering incision, wonders if she should take these off?  - Has some decreased sensation of urination/ urge void but has overall had improvement in this feeling since surgery. May be interested in PT referral if no improvement by 6 week visit.     Objective:   /80   Pulse 73   Wt 61.2 kg (135 lb)   LMP 2021   BMI 21.79 kg/m      She appears well, afebrile.  Abdomen: benign, soft, nontender, no masses.   Incision:  well healed, dry and intact and steri strips remain in place.   Breasts: Engorgement resolved.  Pelvic Exam: Deferred per pt request.     Assessment:   2 week postpartum   Postpartum anxiety  S/p  delivery    Plan:  No orders of the " defined types were placed in this encounter.      1. Adjustment to parenting, self care and importance of a support system discussed. Postpartum education given including: postpartum mood changes and postpartum depression. Offered resources for postpartum mental health. Consider referral to [facility]by/starting Medication if no improvement in mood at 6 week visit.   2. Return of fertility discussed. Plans condoms for contraception. Education given on this method. Advised to abstain from intercourse until she has completed her 6 week physical examination.   3. Nutrition and supplements reviewed.  Advised continuation of a prenatal or multivitamin, also Vitamin D3 2000 IU geltab daily and an omega 3 fatty acid supplement.  4. Reviewed warning signs of pelvic pain, excessive bleeding or abdominal pain, fever/chills, or signs of breast infection.   5.    Consider referral to PT if no improvement in sensation for urge to void.   6.    May remove steri strips gently from incision.     Follow-up at 6 weeks postpartum for routine visit or sooner as needed.    ÁNGEL Childs CNM

## 2022-07-05 NOTE — LETTER
"2022       RE: Mira Cao  [address]  [place] [address]     Dear Colleague,    Thank you for referring your patient, Mira Cao, to the [facility] [place] [facility]. Please see a copy of my visit note below.    2-Week Postpartum Visit    Subjective:  Mira Cao 36 year old year old female, , who presents for 2 week postpartum follow-up.  Pt had a primary  of viable girl d/t category II FHT remote from delivery.     Breast feeding: formula, reports she had \"high anxiety\" with breastfeeding and decided not to continue.   Abdominal pain: minimal, not taking oxy or tylenol   Bleeding since delivery: still have bleeding lighter than a period  Contraception choice: condoms    Last PAP:   Lab Results   Component Value Date    PAP NIL 2020       Pt screened for postpartum depression and complaints are: positive for anxiety     PHQ 10/29/2021 2022 2022   PHQ-9 Total Score 1 8 5   Q9: Thoughts of better off dead/self-harm past 2 weeks Not at all Not at all Not at all     SP-7 SCORE 2020 10/29/2021 2022   Total Score 3 1 8     She is tearful when discussing her mood since having her baby. Reports she is very anxious and not sleeping much contributes to this further. She is seeing a therapist, has had one meeting since having baby. Declines wanting to discuss medication options or referral to [facility] therapist today but may consider in several weeks if no improvement in mood.     Other concerns:   - Steri strips still covering incision, wonders if she should take these off?  - Has some decreased sensation of urination/ urge void but has overall had improvement in this feeling since surgery. May be interested in PT referral if no improvement by 6 week visit.     Objective:   /80   Pulse 73   Wt 61.2 kg (135 lb)   LMP 2021   BMI 21.79 kg/m      She appears well, " afebrile.  Abdomen: benign, soft, nontender, no masses.   Incision:  well healed, dry and intact and steri strips remain in place.   Breasts: Engorgement resolved.  Pelvic Exam: Deferred per pt request.     Assessment:   2 week postpartum   Postpartum anxiety  S/p  delivery    Plan:  No orders of the defined types were placed in this encounter.      1. Adjustment to parenting, self care and importance of a support system discussed. Postpartum education given including: postpartum mood changes and postpartum depression. Offered resources for postpartum mental health. Consider referral to [facility]/starting Medication if no improvement in mood at 6 week visit.   2. Return of fertility discussed. Plans condoms for contraception. Education given on this method. Advised to abstain from intercourse until she has completed her 6 week physical examination.   3. Nutrition and supplements reviewed.  Advised continuation of a prenatal or multivitamin, also Vitamin D3 2000 IU geltab daily and an omega 3 fatty acid supplement.  4. Reviewed warning signs of pelvic pain, excessive bleeding or abdominal pain, fever/chills, or signs of breast infection.   5.    Consider referral to PT if no improvement in sensation for urge to void.   6.    May remove steri strips gently from incision.     Follow-up at 6 weeks postpartum for routine visit or sooner as needed.    ÁNGEL Childs CNM

## 2022-07-07 PROBLEM — O41.03X0 OLIGOHYDRAMNIOS IN THIRD TRIMESTER: Status: RESOLVED | Noted: 2022-06-10 | Resolved: 2022-07-07

## 2022-07-07 PROBLEM — Z36.89 ENCOUNTER FOR TRIAGE IN PREGNANT PATIENT: Status: RESOLVED | Noted: 2022-06-11 | Resolved: 2022-07-07

## 2022-07-25 NOTE — PROGRESS NOTES
"Postpartum Visit Note    S:  Ms. Mira Cao is a 36 year old  here for her 6-week postpartum checkup.     Pt had a primary  of viable girl d/t category II FHT remote from delivery.     Seen at 2 week visit and had stopped breastfeeding due to anxiety. At that time she was very anxious and overwhelmed but this has now improved. She and her  feel more equipped to take care of , and hired nanny for a few nights so they could get full night of sleep. She also has help from her mother who comes a few times per week.     Bleeding absent. Has some decreased sensation of urination/ urge void but has overall had improvement in this feeling since surgery. Has been sexually active without pain.     ================================================================  ROS: 10 point ROS neg other than the symptoms noted above in the HPI.     O:  /76   Pulse 68   Ht 1.676 m (5' 6\")   Wt 62.1 kg (136 lb 12.8 oz)   LMP 2021   BMI 22.08 kg/m    Gen: Well-appearing, NAD  Psych:  positive for anxiety, sleep disturbance, negative for depression and thoughts of self-harm   Abd:  Benign and Soft, flat, non-tender  Inc:   well healed and dry and intact   Pelvic: Deferred      A/P:  Ms. Mira Cao is a 36 year old  here for 6 week postpartum visit after PLTCS for Cat 2 FHT in setting of oligohydramnios, LOT position, weight 8lbs 1.8oz. Doing well postpartum.    - Contraception: Condoms. Discussed pregnancy spacing.   - Feeding: Formula  - Mood: Hx anxiety, now improved postpartum, seeing therapist.   - Voiding sensation has normalized  - Discussed future pregnancy following CS for delivery   - Follow-up: As needed    Staffed with Dr. Haritha Hill MD PGY4  Obstetrics & Gynecology  22     The Patient was seen in [facility] by Dr. Hill.   I reviewed the history & exam. Assessment and plan were jointly made.   Martha Mg MD, MPH      "

## 2022-07-26 ENCOUNTER — OFFICE VISIT (OUTPATIENT)
Dept: OBGYN | Facility: CLINIC | Age: 37
End: 2022-07-26
Payer: COMMERCIAL

## 2022-07-26 ENCOUNTER — LAB (OUTPATIENT)
Dept: LAB | Facility: CLINIC | Age: 37
End: 2022-07-26
Payer: COMMERCIAL

## 2022-07-26 VITALS
HEART RATE: 68 BPM | HEIGHT: 66 IN | BODY MASS INDEX: 21.98 KG/M2 | DIASTOLIC BLOOD PRESSURE: 76 MMHG | SYSTOLIC BLOOD PRESSURE: 109 MMHG | WEIGHT: 136.8 LBS

## 2022-07-26 DIAGNOSIS — Z98.891 S/P CESAREAN SECTION: ICD-10-CM

## 2022-07-26 LAB
T4 FREE SERPL-MCNC: 0.92 NG/DL (ref 0.76–1.46)
TSH SERPL DL<=0.005 MIU/L-ACNC: 4.46 MU/L (ref 0.4–4)

## 2022-07-26 PROCEDURE — 36415 COLL VENOUS BLD VENIPUNCTURE: CPT

## 2022-07-26 PROCEDURE — G0463 HOSPITAL OUTPT CLINIC VISIT: HCPCS | Mod: 25

## 2022-07-26 PROCEDURE — 84439 ASSAY OF FREE THYROXINE: CPT

## 2022-07-26 PROCEDURE — 84443 ASSAY THYROID STIM HORMONE: CPT

## 2022-07-26 NOTE — NURSING NOTE
SUBJECTIVE:   Mira Cao is here for her 6-week postpartum checkup.     PHQ-9 score:   Hx of Abuse:  No    Delivery Date: 22.    Delivering provider:  Sonya Powers MD.    Type of delivery:  .     Delivery complications: None  Infant gender:  girl, weight 8 pounds 1 oz.  Feeding Method:  Bottlefed.  Complications reported with feeding:  none, infant thriving .    Bleeding:  None.  Duration:  5 weeks.  Menses resumed:  No  Bowel/Urinary problems:  No    Contraception Planned:  None -- is she planning pregnancy? no  She  has had intercourse since delivery and experienced  No discomfort.  .

## 2022-10-10 ENCOUNTER — HEALTH MAINTENANCE LETTER (OUTPATIENT)
Age: 37
End: 2022-10-10

## 2022-10-10 ENCOUNTER — MYC MEDICAL ADVICE (OUTPATIENT)
Dept: OTOLARYNGOLOGY | Facility: CLINIC | Age: 37
End: 2022-10-10

## 2022-10-11 NOTE — TELEPHONE ENCOUNTER
Called patient to review MyChart message. Discussed symptoms with Dr. Tello. Patient does not need to come in to clinic unless symptoms persist for 2-3 weeks. Patient agreeable with plan of care and will update clinic if symptoms worsen.

## 2022-11-13 NOTE — PROGRESS NOTES
I had the pleasure of seeing Mira Cao in follow-up today at the [facility].     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 The patient noticed a left neck mass in June. She thought this was related to a swollen lymph node due to stress as she had just bought a house.  She delayed evaluation until approximately July when she presented to urgent care.  She had an ultrasound obtained on 7/12/2019 which demonstrated a 3.7 x 2.3 x 1.3 cm mass in the left neck.  She then had a CT scan on 8/7/2019 which demonstrated a 1.8 x 1.4 x 3.8 cm partially necrotic/cystic level 2/3 neck mass, concerning for metastatic squamous cell carcinoma.  She was seen by Dr.Todd Cao and had an FNA performed on 8/8/2019.  The pathology from the FNA demonstrated atypical squamous proliferation which was p16-.  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  She elected for removal which was performed by Dr. Cao on 10/2/2019.  The excision was performed without a completion neck dissection.  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  She had a PET CT scan on 2019 locally which demonstrated postop changes without residual disease and no obvious primary malignancy.  On 10/10/2019 she saw Dr. Neely at [facility] for medical oncology consultation where possible chemotherapy was discussed.  She has met with Dr Melendez from radiation oncology at [facility].  She had a dental cleaning.  She was taken to the operating room on 10/24/2019 for direct laryngoscopy with biopsy.  Initial biopsy of the tonsil demonstrated high-grade dysplasia.  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  A radical tonsillectomy was then performed based on  the preoperative discussion with the patient. Final pathology demonstrated a T1 SCC of the tonsil with negative margins. She was taken to the OR on 2019 for a left neck dissection with resection of her scar. She had  lymph nodes positive - 0.4 cm deposit of metastatic SCC without STEVE. She had proton beam therapy postoperatively at [facility] with Dr Smith. She completed her treatment on 2020. She lost about 20 lbs with treatment. She had her 3 month post treatment PET in May 2020 which showed uptake in the right tonsil and right level II node thought to be reactive.       Interval history:  She comes in today for follow-up. She was last seen in clinic in May 2022. Her TSH was mildly elevated in 2022, normal T4. She had her baby in 2022 via . She says things are going ok. She is adjusted to having the new baby. She is back to work. She feels like her energy is good. She is eating and drinking without issue. She does have to be careful with dry foods, tends to eat these and sugary foods on the right side. She is seeing the dentist, no concerns. She has no pain, no otalgia, no sore throat, no odynophagia. She feels like her left ear gets plugged easily. She has no bleeding. She says her weight is overall up from her baseline prior to her pregnancy. She has had to deal with more illness from her baby bringing home infections from .        MEDICATIONS:     Current Outpatient Medications   Medication Sig Dispense Refill     acetaminophen (TYLENOL) 325 MG tablet Take 2 tablets (650 mg) by mouth every 6 hours as needed for mild pain Start after Delivery. 100 tablet 0     lactobacillus rhamnosus, GG, (CULTURELL) capsule Take 1 capsule by mouth daily       loratadine (CLARITIN) 10 MG tablet Take 10 mg by mouth daily       Magnesium Oxide 250 MG TABS Take 250 mg by mouth daily       omega 3 1000 MG CAPS Take by mouth daily       Prenatal Vit-Fe Fumarate-FA (PRENATAL MULTIVITAMIN W/IRON)  27-0.8 MG tablet Take 1 tablet by mouth daily       Vitamin D, Cholecalciferol, 25 MCG (1000 UT) CAPS Take by mouth daily         ALLERGIES:  No Known Allergies    HABITS/SOCIAL HISTORY:   Works as a .    She is  with no children.    She has no smoking history.  She has about 10 alcoholic beverages on weekends.  She has no chewing tobacco or vaping history.    Social History     Socioeconomic History     Marital status:      Spouse name: Ottoniel     Number of children: Not on file     Years of education: Not on file     Highest education level: Not on file   Occupational History     Not on file   Tobacco Use     Smoking status: Never     Smokeless tobacco: Never   Vaping Use     Vaping Use: Never used   Substance and Sexual Activity     Alcohol use: Yes     Comment: 2-3 drinks per week     Drug use: Never     Sexual activity: Yes     Partners: Male   Other Topics Concern     Not on file   Social History Narrative     Not on file     Social Determinants of Health     Financial Resource Strain: Not on file   Food Insecurity: Not on file   Transportation Needs: Not on file   Physical Activity: Not on file   Stress: Not on file   Social Connections: Not on file   Intimate Partner Violence: Not on file   Housing Stability: Not on file       PAST MEDICAL HISTORY:   Past Medical History:   Diagnosis Date     Irritable bladder      Squamous cell carcinoma of neck      Uncomplicated asthma         PAST SURGICAL HISTORY:   Past Surgical History:   Procedure Laterality Date      SECTION N/A 2022    Procedure:  SECTION;  Surgeon: Sonya Powers MD;  [facility]     DISSECTION RADICAL NECK MODIFIED Left 2019    Procedure: DISSECTION, NECK, MODIFIED RADICAL LEFT;  Surgeon: Ruth Tello MD;  [facility]     LARYNGOSCOPY WITH BIOPSY(IES) N/A 10/24/2019    Procedure: Direct laryngscopy with biopsy;  Surgeon: Ruth Tello MD;  [facility]      "TONSILLECTOMY ADULT Left 10/24/2019    Procedure: Left Radical  tonsillectomy;  Surgeon: Ruth Tello MD;  [facility]       FAMILY HISTORY:    Family History   Problem Relation Age of Onset     Breast Cancer Mother      Cancer Mother         Breast cancer     Hyperlipidemia Father      Bone Cancer Sister      Cancer Sister         Bone cancer     Diabetes No family hx of        REVIEW OF SYSTEMS:  12 point ROS was negative other than the symptoms noted above in the HPI.  Patient Supplied Answers to Review of Systems  [facility] ROS 11/16/2022   Constitutional: -   Neurology: -   Psychology: -   Ears, Nose, Throat: Sore throat, Hoarseness   Cardiopulmonary: Cough   Musculoskeletal: -   Endocrine: -         PHYSICAL EXAMINATION:   /70   Pulse 77   Temp 97.3  F (36.3  C)   Ht 1.676 m (5' 6\")   Wt 62.6 kg (138 lb)   SpO2 100%   BMI 22.27 kg/m     Appearance:   normal; NAD, age-appropriate appearance   Communication:   normal; communicates verbally, normal voice quality   Head/Face:   inspection -  Normal; no scars or visible lesions   Salivary glands -  Normal size, no tenderness, swelling, or palpable masses   Facial strength -  Normal and symmetric    Skin:  normal, no rash   Ears:  auricle (AD) -  normal  EAC (AD) -  normal  TM (AD) -  Normal, no effusion  auricle (AS) -  normal  EAC (AS) -  normal  TM (AS) -  Normal, no effusion  Normal clinical speech reception   Nose:  Ext. inspection -  Normal  Internal Inspection -  Normal mucosa, septum, and turbinates   Nasopharynx:  normal mucosa, no masses   Oral Cavity:  lips -  Normal mucosa, oral competence, and stoma size   Age-appropriate dentition, healthy gingival mucosa   Hard palate, buccal, floor of mouth mucosa normal with minimal thickening of secretions   Tongue - normal movement, no lesions   Oropharynx:  mucosa -  Normal, no lesions  soft palate -  Expected post radical tonsillectomy asymmetry of left soft palate  tonsils -  S/p left " radical tonsillectomy, no secretions, no masses, no concerning mucosal lesions, no palpable masses, expected scar tissue; right tonsil with no palpable masses slightly cryptic in appearance which is stable, no mucosal lesions  BOT - normal  Vallecula - clear   Hypopharynx:  Normal pyriform sinus and pharyngeal wall mucosa   No pooled secretions    Larynx:  Epiglottis, AE folds, false vocal cords, true vocal cords, arytenoids normal in appearance with no edema of AE folds or arytenoids   bilaterally mobile cords    Neck: Well healed left neck incision   Radiation fibrosis of the neck  Some skin changes to the neck skin from radiation  No lymphedema  Normal range of motion   Lymphatic:  no abnormal nodes   Cardiovascular:  warm, pink, well-perfused extremities without swelling, tenderness, or edema   Respiratory:  Normal respiratory effort, no stridor   Neuro/Psych.:  mood/affect -  normal  mental status -  normal       PROCEDURES:   Flexible fiberoptic laryngoscopy: Scope exam was indicated due to oropharyngeal cancer. Verbal consent was obtained. The nasal cavity was prepped with an aerosolized solution of topical anesthetic and vasoconstrictive agent. The scope was passed through the anterior nasal cavity and advanced. Inspection of the nasopharynx revealed no gross abnormality. The base of tongue and vallecula are normal. There are no abnormalities in the area of the left tonsil. The epiglottis, AE folds, false cords, true cords, arytenoids are normal and there is no edema of the arytenoids or AE folds.  Inspection of the larynx revealed bilaterally mobile vocal cords. Pyriform sinuses are symmetric. The airway is patent. Procedure tolerated well with no immediate complications noted.              RESULTS REVIEWED:   I reviewed most recent note from OB and discharge summary    TSH reviewed    I independently reviewed the CT neck and chest images     CT neck and chest reports reviewed       IMPRESSION AND PLAN:    Mira Cao is a 37-year-old woman with a likely T1N1 p16+ squamous cell carcinoma of the oropharynx. She had an excision of a cystic neck mass by a local ENT which demonstrated a metastatic SCC. She underwent a radical tonsillectomy which showed a small primary tumor in the tonsil which was completely resected. She then had a completion neck dissection on 11/20/2019 with final pathology showing 1/38 nodes. She completed postoperative proton beam therapy at [facility] on 2/24/2020.     She has no concerning findings on exam today.    She had repeat imaging today, which was negative. We will repeat imaging in a year.    Her TSH was checked in July 2022, repeat in 6 months.    I will see her back in 6 months with labs.    Thank you very much for the opportunity to participate in the care of your patient.      Ruth Tello MD  Otolaryngology- Head & Neck Surgery      This note was dictated with voice recognition software and then edited. Please excuse any unintentional errors.             CC:  Ifeanyi Soliman MD  Department of Radiation Oncology  [facility]

## 2022-11-16 ENCOUNTER — OFFICE VISIT (OUTPATIENT)
Dept: OTOLARYNGOLOGY | Facility: CLINIC | Age: 37
End: 2022-11-16
Payer: COMMERCIAL

## 2022-11-16 ENCOUNTER — ANCILLARY PROCEDURE (OUTPATIENT)
Dept: CT IMAGING | Facility: CLINIC | Age: 37
End: 2022-11-16
Attending: OTOLARYNGOLOGY
Payer: COMMERCIAL

## 2022-11-16 ENCOUNTER — MYC MEDICAL ADVICE (OUTPATIENT)
Dept: OTOLARYNGOLOGY | Facility: CLINIC | Age: 37
End: 2022-11-16

## 2022-11-16 VITALS
BODY MASS INDEX: 22.18 KG/M2 | HEART RATE: 77 BPM | OXYGEN SATURATION: 100 % | TEMPERATURE: 97.3 F | HEIGHT: 66 IN | WEIGHT: 138 LBS | DIASTOLIC BLOOD PRESSURE: 70 MMHG | SYSTOLIC BLOOD PRESSURE: 110 MMHG

## 2022-11-16 DIAGNOSIS — C77.0 SECONDARY MALIGNANCY OF LYMPH NODES OF HEAD, FACE AND NECK (H): ICD-10-CM

## 2022-11-16 DIAGNOSIS — C10.9 SQUAMOUS CELL CARCINOMA OF OROPHARYNX (H): ICD-10-CM

## 2022-11-16 DIAGNOSIS — C10.9 SQUAMOUS CELL CARCINOMA OF OROPHARYNX (H): Primary | ICD-10-CM

## 2022-11-16 PROCEDURE — 71260 CT THORAX DX C+: CPT | Mod: GC | Performed by: RADIOLOGY

## 2022-11-16 PROCEDURE — 31575 DIAGNOSTIC LARYNGOSCOPY: CPT | Performed by: OTOLARYNGOLOGY

## 2022-11-16 PROCEDURE — 99213 OFFICE O/P EST LOW 20 MIN: CPT | Mod: 25 | Performed by: OTOLARYNGOLOGY

## 2022-11-16 PROCEDURE — 70491 CT SOFT TISSUE NECK W/DYE: CPT | Mod: GC | Performed by: RADIOLOGY

## 2022-11-16 RX ORDER — IOPAMIDOL 755 MG/ML
67 INJECTION, SOLUTION INTRAVASCULAR ONCE
Status: COMPLETED | OUTPATIENT
Start: 2022-11-16 | End: 2022-11-16

## 2022-11-16 RX ADMIN — IOPAMIDOL 67 ML: 755 INJECTION, SOLUTION INTRAVASCULAR at 14:31

## 2022-11-16 ASSESSMENT — PAIN SCALES - GENERAL: PAINLEVEL: NO PAIN (0)

## 2022-11-16 NOTE — LETTER
11/16/2022       RE: Mira Cao  [address]  [place] [address]     Dear Colleague,    Thank you for referring your patient, Mira Cao, to the [facility] [place] at [facility]. Please see a copy of my visit note below.    I had the pleasure of seeing Mira Cao in follow-up today at the [facility].     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 The patient noticed a left neck mass in June. She thought this was related to a swollen lymph node due to stress as she had just bought a house.  She delayed evaluation until approximately July when she presented to urgent care.  She had an ultrasound obtained on 7/12/2019 which demonstrated a 3.7 x 2.3 x 1.3 cm mass in the left neck.  She then had a CT scan on 8/7/2019 which demonstrated a 1.8 x 1.4 x 3.8 cm partially necrotic/cystic level 2/3 neck mass, concerning for metastatic squamous cell carcinoma.  She was seen by Dr.Todd Cao and had an FNA performed on 8/8/2019.  The pathology from the FNA demonstrated atypical squamous proliferation which was p16-.  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  She elected for removal which was performed by Dr. Cao on 10/2/2019.  The excision was performed without a completion neck dissection.  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  She had a PET CT scan on 2019 locally which demonstrated postop changes without residual disease and no obvious primary malignancy.  On 10/10/2019 she saw Dr. Neely at [facility] for medical oncology consultation where possible chemotherapy was discussed.  She has met with Dr Melendez from radiation oncology at [facility].  She had a dental  cleaning.  She was taken to the operating room on 10/24/2019 for direct laryngoscopy with biopsy.  Initial biopsy of the tonsil demonstrated high-grade dysplasia.  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  A radical tonsillectomy was then performed based on the preoperative discussion with the patient. Final pathology demonstrated a T1 SCC of the tonsil with negative margins. She was taken to the OR on 2019 for a left neck dissection with resection of her scar. She had  lymph nodes positive - 0.4 cm deposit of metastatic SCC without STEVE. She had proton beam therapy postoperatively at [facility] with Dr Smith. She completed her treatment on 2020. She lost about 20 lbs with treatment. She had her 3 month post treatment PET in May 2020 which showed uptake in the right tonsil and right level II node thought to be reactive.       Interval history:  She comes in today for follow-up. She was last seen in clinic in May 2022. Her TSH was mildly elevated in 2022, normal T4. She had her baby in 2022 via . She says things are going ok. She is adjusted to having the new baby. She is back to work. She feels like her energy is good. She is eating and drinking without issue. She does have to be careful with dry foods, tends to eat these and sugary foods on the right side. She is seeing the dentist, no concerns. She has no pain, no otalgia, no sore throat, no odynophagia. She feels like her left ear gets plugged easily. She has no bleeding. She says her weight is overall up from her baseline prior to her pregnancy. She has had to deal with more illness from her baby bringing home infections from .        MEDICATIONS:     Current Outpatient Medications   Medication Sig Dispense Refill     acetaminophen (TYLENOL) 325 MG tablet Take 2 tablets (650 mg) by mouth every 6 hours as needed for mild pain Start after Delivery. 100  tablet 0     lactobacillus rhamnosus, GG, (CULTURELL) capsule Take 1 capsule by mouth daily       loratadine (CLARITIN) 10 MG tablet Take 10 mg by mouth daily       Magnesium Oxide 250 MG TABS Take 250 mg by mouth daily       omega 3 1000 MG CAPS Take by mouth daily       Prenatal Vit-Fe Fumarate-FA (PRENATAL MULTIVITAMIN W/IRON) 27-0.8 MG tablet Take 1 tablet by mouth daily       Vitamin D, Cholecalciferol, 25 MCG (1000 UT) CAPS Take by mouth daily         ALLERGIES:  No Known Allergies    HABITS/SOCIAL HISTORY:   Works as a .    She is  with no children.    She has no smoking history.  She has about 10 alcoholic beverages on weekends.  She has no chewing tobacco or vaping history.    Social History     Socioeconomic History     Marital status:      Spouse name: Ottoniel     Number of children: Not on file     Years of education: Not on file     Highest education level: Not on file   Occupational History     Not on file   Tobacco Use     Smoking status: Never     Smokeless tobacco: Never   Vaping Use     Vaping Use: Never used   Substance and Sexual Activity     Alcohol use: Yes     Comment: 2-3 drinks per week     Drug use: Never     Sexual activity: Yes     Partners: Male   Other Topics Concern     Not on file   Social History Narrative     Not on file     Social Determinants of Health     Financial Resource Strain: Not on file   Food Insecurity: Not on file   Transportation Needs: Not on file   Physical Activity: Not on file   Stress: Not on file   Social Connections: Not on file   Intimate Partner Violence: Not on file   Housing Stability: Not on file       PAST MEDICAL HISTORY:   Past Medical History:   Diagnosis Date     Irritable bladder      Squamous cell carcinoma of neck      Uncomplicated asthma         PAST SURGICAL HISTORY:   Past Surgical History:   Procedure Laterality Date      SECTION N/A 2022    Procedure:  SECTION;  Surgeon: Sonya Powers MD;   "[facility] L[facility]     DISSECTION RADICAL NECK MODIFIED Left 11/20/2019    Procedure: DISSECTION, NECK, MODIFIED RADICAL LEFT;  Surgeon: Ruth Tello MD;  [facility]     LARYNGOSCOPY WITH BIOPSY(IES) N/A 10/24/2019    Procedure: Direct laryngscopy with biopsy;  Surgeon: Ruth Tello MD;  [facility]     TONSILLECTOMY ADULT Left 10/24/2019    Procedure: Left Radical  tonsillectomy;  Surgeon: Ruth Tello MD;  [facility]       FAMILY HISTORY:    Family History   Problem Relation Age of Onset     Breast Cancer Mother      Cancer Mother         Breast cancer     Hyperlipidemia Father      Bone Cancer Sister      Cancer Sister         Bone cancer     Diabetes No family hx of        REVIEW OF SYSTEMS:  12 point ROS was negative other than the symptoms noted above in the HPI.  Patient Supplied Answers to Review of Systems  [facility] ENT ROS 11/16/2022   Constitutional: -   Neurology: -   Psychology: -   Ears, Nose, Throat: Sore throat, Hoarseness   Cardiopulmonary: Cough   Musculoskeletal: -   Endocrine: -         PHYSICAL EXAMINATION:   /70   Pulse 77   Temp 97.3  F (36.3  C)   Ht 1.676 m (5' 6\")   Wt 62.6 kg (138 lb)   SpO2 100%   BMI 22.27 kg/m     Appearance:   normal; NAD, age-appropriate appearance   Communication:   normal; communicates verbally, normal voice quality   Head/Face:   inspection -  Normal; no scars or visible lesions   Salivary glands -  Normal size, no tenderness, swelling, or palpable masses   Facial strength -  Normal and symmetric    Skin:  normal, no rash   Ears:  auricle (AD) -  normal  EAC (AD) -  normal  TM (AD) -  Normal, no effusion  auricle (AS) -  normal  EAC (AS) -  normal  TM (AS) -  Normal, no effusion  Normal clinical speech reception   Nose:  Ext. inspection -  Normal  Internal Inspection -  Normal mucosa, septum, and turbinates   Nasopharynx:  normal mucosa, no masses   Oral Cavity:  lips -  Normal mucosa, oral competence, and stoma size   " Age-appropriate dentition, healthy gingival mucosa   Hard palate, buccal, floor of mouth mucosa normal with minimal thickening of secretions   Tongue - normal movement, no lesions   Oropharynx:  mucosa -  Normal, no lesions  soft palate -  Expected post radical tonsillectomy asymmetry of left soft palate  tonsils -  S/p left radical tonsillectomy, no secretions, no masses, no concerning mucosal lesions, no palpable masses, expected scar tissue; right tonsil with no palpable masses slightly cryptic in appearance which is stable, no mucosal lesions  BOT - normal  Vallecula - clear   Hypopharynx:  Normal pyriform sinus and pharyngeal wall mucosa   No pooled secretions    Larynx:  Epiglottis, AE folds, false vocal cords, true vocal cords, arytenoids normal in appearance with no edema of AE folds or arytenoids   bilaterally mobile cords    Neck: Well healed left neck incision   Radiation fibrosis of the neck  Some skin changes to the neck skin from radiation  No lymphedema  Normal range of motion   Lymphatic:  no abnormal nodes   Cardiovascular:  warm, pink, well-perfused extremities without swelling, tenderness, or edema   Respiratory:  Normal respiratory effort, no stridor   Neuro/Psych.:  mood/affect -  normal  mental status -  normal       PROCEDURES:   Flexible fiberoptic laryngoscopy: Scope exam was indicated due to oropharyngeal cancer. Verbal consent was obtained. The nasal cavity was prepped with an aerosolized solution of topical anesthetic and vasoconstrictive agent. The scope was passed through the anterior nasal cavity and advanced. Inspection of the nasopharynx revealed no gross abnormality. The base of tongue and vallecula are normal. There are no abnormalities in the area of the left tonsil. The epiglottis, AE folds, false cords, true cords, arytenoids are normal and there is no edema of the arytenoids or AE folds.  Inspection of the larynx revealed bilaterally mobile vocal cords. Pyriform sinuses are  symmetric. The airway is patent. Procedure tolerated well with no immediate complications noted.              RESULTS REVIEWED:   I reviewed most recent note from OB and discharge summary    TSH reviewed    I independently reviewed the CT neck and chest images     CT neck and chest reports reviewed       IMPRESSION AND PLAN:   Mira Cao is a 37-year-old woman with a likely T1N1 p16+ squamous cell carcinoma of the oropharynx. She had an excision of a cystic neck mass by a local ENT which demonstrated a metastatic SCC. She underwent a radical tonsillectomy which showed a small primary tumor in the tonsil which was completely resected. She then had a completion neck dissection on 11/20/2019 with final pathology showing 1/38 nodes. She completed postoperative proton beam therapy at [facility] on 2/24/2020.     She has no concerning findings on exam today.    She had repeat imaging today, which was negative. We will repeat imaging in a year.    Her TSH was checked in July 2022, repeat in 6 months.    I will see her back in 6 months with labs.    Thank you very much for the opportunity to participate in the care of your patient.      Ruth Tello MD  Otolaryngology- Head & Neck Surgery      This note was dictated with voice recognition software and then edited. Please excuse any unintentional errors.     CC:  Ifeanyi Soliman MD  Department of Radiation Oncology  [facility]

## 2022-11-16 NOTE — NURSING NOTE
"Chief Complaint   Patient presents with     RECHECK     6 month follow up with CT      Blood pressure 110/70, pulse 77, temperature 97.3  F (36.3  C), height 1.676 m (5' 6\"), weight 62.6 kg (138 lb), SpO2 100 %, unknown if currently breastfeeding.    Richar Camejo LPN    "

## 2023-01-24 ENCOUNTER — MYC MEDICAL ADVICE (OUTPATIENT)
Dept: OTOLARYNGOLOGY | Facility: CLINIC | Age: 38
End: 2023-01-24
Payer: COMMERCIAL

## 2023-01-24 DIAGNOSIS — I89.0 LYMPHEDEMA: ICD-10-CM

## 2023-01-24 DIAGNOSIS — C77.0 SECONDARY MALIGNANCY OF LYMPH NODES OF HEAD, FACE AND NECK (H): Primary | ICD-10-CM

## 2023-01-24 DIAGNOSIS — C10.9 SQUAMOUS CELL CARCINOMA OF OROPHARYNX (H): ICD-10-CM

## 2023-01-30 ENCOUNTER — HOSPITAL ENCOUNTER (OUTPATIENT)
Dept: OCCUPATIONAL THERAPY | Facility: CLINIC | Age: 38
Discharge: HOME | End: 2023-01-30
Attending: OTOLARYNGOLOGY
Payer: COMMERCIAL

## 2023-01-30 DIAGNOSIS — C77.0 SECONDARY MALIGNANCY OF LYMPH NODES OF HEAD, FACE AND NECK (H): ICD-10-CM

## 2023-01-30 DIAGNOSIS — C10.9 SQUAMOUS CELL CARCINOMA OF OROPHARYNX (H): ICD-10-CM

## 2023-01-30 DIAGNOSIS — I89.0 LYMPHEDEMA: ICD-10-CM

## 2023-01-30 PROCEDURE — 97165 OT EVAL LOW COMPLEX 30 MIN: CPT | Mod: GO

## 2023-01-30 PROCEDURE — 97140 MANUAL THERAPY 1/> REGIONS: CPT | Mod: GO

## 2023-01-31 NOTE — PROGRESS NOTES
01/30/23 0740   Rehab Discipline   Discipline OT   Type of Visit   Type of visit Initial Edema Evaluation   General Information   Start of care 01/30/23   Referring physician Dr. Ruth Tello   Orders Evaluate and treat as indicated   Order date 01/26/23   Medical diagnosis lymphedema, secondary malignancy of lymph nodes of head, face and neck, squamous cell cancer of oropharynx   Edema onset 01/26/23   Affected body parts Head / Neck   Edema etiology Cancer with lymph node dissection;Radiation;Surgery   Edema etiology comments sqamous cell CA of oropharynx with left radical tonsillectomy, complete neck dissection 11/20/2019 followed by proton beam therapy at [facility], 1/38 LN +   Pertinent history of current problem (PT: include personal factors and/or comorbidities that impact the POC; OT: include additional occupational profile info) Patient known to this therapist through prior episodes of care; states she continues to use head and neck compression garment with swell pad every night, replaces e/o month.  Patient had a baby in June, and states she has found it hard to keep up with her HP, and has recently noticed increased swelling.  Has been seeing therapist for MFR ~ one time/month. PMH otherwise unremarkable.   Surgical / medical history reviewed Yes   Prior level of functional mobility ind   Prior treatment Compression garments;Exercise;MLD;Other  (MFR, soft tissue mobs)   Patient role / employment history Employed   Living environment House / [facility]   Living environment comments lives with spouse   Fall Risk Screen   Fall screen completed by OT   Have you fallen 2 or more times in the past year? No   Have you fallen and had an injury in the past year? No   Is patient a fall risk? No   Abuse Screen (yes response referral indicated)   Feels Unsafe at Home or Work/School no   Feels Threatened by Someone no   Does Anyone Try to Keep You From Having Contact with Others or Doing Things Outside Your Home? no    Physical Signs of Abuse Present no   System Outcome Measures   Outcome Measures Lymphedema   Lymphedema Life Impact Scale (score range 0-72). A higher score indicates greater impairment.   (NA, head and neck lymphedema)   Subjective Report   Patient report of symptoms increased swelling left cheek   Patient / Family Goals   Patient / family goals statement reduce swelling   Pain   Patient currently in pain No   Cognitive Status   Orientation Orientation to person, place and time   Level of consciousness Alert   Follows commands and answers questions 100% of the time   Personal safety and judgement Intact   Memory Intact   Edema Exam / Assessment   Skin condition comments L buccal region with mild swelling sup to mandible, no evidence of swelling in neck, mild swelling inf/sup left labial region, scar tissue unadhered.   Range of Motion   ROM comments decreased neck AROM R lat flex   Activities of Daily Living   Activities of Daily Living ind all I/ADL   Planned Edema Interventions   Planned edema interventions Manual lymph drainage;Exercises;Manual therapy;Scar mobilization;Soft tissue mobilization;Myofascial release;Home management program development   Planned edema intervention comments recommend 1x/week/6   Clinical Impression   Criteria for skilled therapeutic intervention met Yes   Therapy diagnosis lymphedema head and neck secondary to cancer treatment   Influenced by the following impairments / conditions Stage 2   Assessment of Occupational Performance 1-3 Performance Deficits   Identified Performance Deficits decreased neck AROM, increased risk of infection and progressive soft tissue fibrosis   Clinical Decision Making (Complexity) Low complexity   Treatment Frequency 1x/week   Treatment duration 6 weeks   Patient / family and/or staff in agreement with plan of care Yes   Risks and benefits of therapy have been explained Yes   Goals   Edema Eval Goals 1;2;3   Goal 1   Goal identifier volume   Goal  description For decreased risk of infection and progressive soft tissue fibrosis, volume L buccal and labial regions will approximate that of right s/p MLD   Target date 03/30/23   Goal 2   Goal identifier neck AROM   Goal description For continued participation in I/ADL, R lat flex will approximate that of L (WFL)   Target date 03/30/23   Goal 3   Goal identifier home program   Goal description For long-term management of lymphedema, patient will be independent in HP, including self-MLD, scar massage, HEP and use of compression.   Target date 03/30/23   Total Evaluation Time   OT Eval, Low Complexity Minutes (39139) 15

## 2023-02-16 ENCOUNTER — HOSPITAL ENCOUNTER (OUTPATIENT)
Dept: OCCUPATIONAL THERAPY | Facility: CLINIC | Age: 38
Discharge: HOME | End: 2023-02-16
Payer: COMMERCIAL

## 2023-02-16 DIAGNOSIS — I89.0 LYMPHEDEMA: ICD-10-CM

## 2023-02-16 DIAGNOSIS — C77.0 SECONDARY MALIGNANCY OF LYMPH NODES OF HEAD, FACE AND NECK (H): Primary | ICD-10-CM

## 2023-02-16 DIAGNOSIS — C44.42 SQUAMOUS CELL CARCINOMA OF NECK: ICD-10-CM

## 2023-02-16 DIAGNOSIS — C10.9 SQUAMOUS CELL CARCINOMA OF OROPHARYNX (H): ICD-10-CM

## 2023-02-16 PROCEDURE — 97140 MANUAL THERAPY 1/> REGIONS: CPT | Mod: GO

## 2023-03-02 ENCOUNTER — HOSPITAL ENCOUNTER (OUTPATIENT)
Dept: OCCUPATIONAL THERAPY | Facility: CLINIC | Age: 38
Discharge: HOME | End: 2023-03-02
Payer: COMMERCIAL

## 2023-03-02 DIAGNOSIS — C77.0 SECONDARY MALIGNANCY OF LYMPH NODES OF HEAD, FACE AND NECK (H): Primary | ICD-10-CM

## 2023-03-02 DIAGNOSIS — C10.9 SQUAMOUS CELL CARCINOMA OF OROPHARYNX (H): ICD-10-CM

## 2023-03-02 DIAGNOSIS — I89.0 LYMPHEDEMA: ICD-10-CM

## 2023-03-02 DIAGNOSIS — C44.42 SQUAMOUS CELL CARCINOMA OF NECK: ICD-10-CM

## 2023-03-02 PROCEDURE — 97140 MANUAL THERAPY 1/> REGIONS: CPT | Mod: GO

## 2023-03-16 ENCOUNTER — HOSPITAL ENCOUNTER (OUTPATIENT)
Dept: OCCUPATIONAL THERAPY | Facility: CLINIC | Age: 38
Discharge: HOME | End: 2023-03-16
Payer: COMMERCIAL

## 2023-03-16 DIAGNOSIS — I89.0 LYMPHEDEMA: ICD-10-CM

## 2023-03-16 DIAGNOSIS — C44.42 SQUAMOUS CELL CARCINOMA OF NECK: ICD-10-CM

## 2023-03-16 DIAGNOSIS — C10.9 SQUAMOUS CELL CARCINOMA OF OROPHARYNX (H): ICD-10-CM

## 2023-03-16 DIAGNOSIS — C77.0 SECONDARY MALIGNANCY OF LYMPH NODES OF HEAD, FACE AND NECK (H): Primary | ICD-10-CM

## 2023-03-16 PROCEDURE — 97140 MANUAL THERAPY 1/> REGIONS: CPT | Mod: GO

## 2023-03-20 ENCOUNTER — HOSPITAL ENCOUNTER (OUTPATIENT)
Dept: OCCUPATIONAL THERAPY | Facility: CLINIC | Age: 38
Discharge: HOME | End: 2023-03-20
Payer: COMMERCIAL

## 2023-03-20 DIAGNOSIS — C77.0 SECONDARY MALIGNANCY OF LYMPH NODES OF HEAD, FACE AND NECK (H): Primary | ICD-10-CM

## 2023-03-20 DIAGNOSIS — C44.42 SQUAMOUS CELL CARCINOMA OF NECK: ICD-10-CM

## 2023-03-20 DIAGNOSIS — I89.0 LYMPHEDEMA: ICD-10-CM

## 2023-03-20 DIAGNOSIS — C10.9 SQUAMOUS CELL CARCINOMA OF OROPHARYNX (H): ICD-10-CM

## 2023-03-20 PROCEDURE — 97140 MANUAL THERAPY 1/> REGIONS: CPT | Mod: GO

## 2023-03-25 ENCOUNTER — HEALTH MAINTENANCE LETTER (OUTPATIENT)
Age: 38
End: 2023-03-25

## 2023-05-15 NOTE — PROGRESS NOTES
I had the pleasure of seeing Mira Cao in follow-up today at the [facility].     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 The patient noticed a left neck mass in June. She thought this was related to a swollen lymph node due to stress as she had just bought a house.  She delayed evaluation until approximately July when she presented to urgent care.  She had an ultrasound obtained on 7/12/2019 which demonstrated a 3.7 x 2.3 x 1.3 cm mass in the left neck.  She then had a CT scan on 8/7/2019 which demonstrated a 1.8 x 1.4 x 3.8 cm partially necrotic/cystic level 2/3 neck mass, concerning for metastatic squamous cell carcinoma.  She was seen by Dr.Todd Cao and had an FNA performed on 8/8/2019.  The pathology from the FNA demonstrated atypical squamous proliferation which was p16-.  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  She elected for removal which was performed by Dr. Cao on 10/2/2019.  The excision was performed without a completion neck dissection.  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  She had a PET CT scan on 2019 locally which demonstrated postop changes without residual disease and no obvious primary malignancy.  On 10/10/2019 she saw Dr. Neely at [facility] for medical oncology consultation where possible chemotherapy was discussed.  She has met with Dr Melendez from radiation oncology at [facility].  She had a dental cleaning.  She was taken to the operating room on 10/24/2019 for direct laryngoscopy with biopsy.  Initial biopsy of the tonsil demonstrated high-grade dysplasia.  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  A radical tonsillectomy was then performed based on  the preoperative discussion with the patient. Final pathology demonstrated a T1 SCC of the tonsil with negative margins. She was taken to the OR on 11/20/2019 for a left neck dissection with resection of her scar. She had 1/38 lymph nodes positive - 0.4 cm deposit of metastatic SCC without STEVE. She had proton beam therapy postoperatively at [facility] with Dr Smith. She completed her treatment on 2/24/2020. She lost about 20 lbs with treatment. She had her 3 month post treatment PET in May 2020 which showed uptake in the right tonsil and right level II node thought to be reactive.       Interval history:  She comes in today for follow-up. She was last seen in clinic in November 2022. She comes in with labs. She went back and saw the lymphedema therapist again in January 2023. She things are going well. She has no problems or concerns. She is continuing to do the stretching and massage and swallowing exercises. She feels the lymphedema fluctuates. She got a new mask to wear from the lymphedema therapist which is harder to wear. She feels like her swallowing is fine. She has no sticking of food. She has no pain with swallowing. She feels like when she talks saliva will collect in her throat. She has no coughing on liquids. She can eat everything she wants. She avoids really dry foods. She can do breads. She uses more sauce on things. She has no pain anywhere. Her hearing is fine. She gets this sensation of cold flush in her ear with doing her lymphedema therapy. Her weight is stable. She has no other changes in her health. She is seeing the dentist regularly. She is having some movement in her teeth, has been talking to an orthodontist.       MEDICATIONS:     Current Outpatient Medications   Medication Sig Dispense Refill     acetaminophen (TYLENOL) 325 MG tablet Take 2 tablets (650 mg) by mouth every 6 hours as needed for mild pain Start after Delivery. 100 tablet 0     lactobacillus rhamnosus, GG, (CULTURELL) capsule  Take 1 capsule by mouth daily       loratadine (CLARITIN) 10 MG tablet Take 10 mg by mouth daily       Magnesium Oxide 250 MG TABS Take 250 mg by mouth daily       omega 3 1000 MG CAPS Take by mouth daily       Prenatal Vit-Fe Fumarate-FA (PRENATAL MULTIVITAMIN W/IRON) 27-0.8 MG tablet Take 1 tablet by mouth daily       Vitamin D, Cholecalciferol, 25 MCG (1000 UT) CAPS Take by mouth daily         ALLERGIES:  No Known Allergies    HABITS/SOCIAL HISTORY:   Works as a .    She is  with no children.    She has no smoking history.  She has about 10 alcoholic beverages on weekends.  She has no chewing tobacco or vaping history.    Social History     Socioeconomic History     Marital status:      Spouse name: Ottoniel     Number of children: Not on file     Years of education: Not on file     Highest education level: Not on file   Occupational History     Not on file   Tobacco Use     Smoking status: Never     Smokeless tobacco: Never   Vaping Use     Vaping status: Never Used   Substance and Sexual Activity     Alcohol use: Yes     Comment: 2-3 drinks per week     Drug use: Never     Sexual activity: Yes     Partners: Male   Other Topics Concern     Not on file   Social History Narrative     Not on file     Social Determinants of Health     Financial Resource Strain: Not on file   Food Insecurity: Not on file   Transportation Needs: Not on file   Physical Activity: Not on file   Stress: Not on file   Social Connections: Not on file   Intimate Partner Violence: Not on file   Housing Stability: Not on file       PAST MEDICAL HISTORY:   Past Medical History:   Diagnosis Date     Irritable bladder      Squamous cell carcinoma of neck      Uncomplicated asthma         PAST SURGICAL HISTORY:   Past Surgical History:   Procedure Laterality Date      SECTION N/A 2022    Procedure:  SECTION;  Surgeon: Sonya Powers MD;  Location: [facility] L[facility]D     DISSECTION RADICAL NECK MODIFIED Left  "11/20/2019    Procedure: DISSECTION, NECK, MODIFIED RADICAL LEFT;  Surgeon: Ruth Tello MD;  Location: [facility] OR     LARYNGOSCOPY WITH BIOPSY(IES) N/A 10/24/2019    Procedure: Direct laryngscopy with biopsy;  Surgeon: Ruth Tello MD;  Location: [facility] OR     TONSILLECTOMY ADULT Left 10/24/2019    Procedure: Left Radical  tonsillectomy;  Surgeon: Ruth Tello MD;  Location: [facility] OR       FAMILY HISTORY:    Family History   Problem Relation Age of Onset     Breast Cancer Mother      Cancer Mother         Breast cancer     Hyperlipidemia Father      Bone Cancer Sister      Cancer Sister         Bone cancer     Diabetes No family hx of        REVIEW OF SYSTEMS:  12 point ROS was negative other than the symptoms noted above in the HPI.  Patient Supplied Answers to Review of Systems      11/16/2022     4:06 PM    ENT ROS   Ears, Nose, Throat Sore throat    Hoarseness   Cardiopulmonary Cough         PHYSICAL EXAMINATION:   /79   Pulse 66   Temp 97.7  F (36.5  C)   Ht 1.676 m (5' 6\")   Wt 59.4 kg (131 lb)   SpO2 99%   BMI 21.14 kg/m     Appearance:   normal; NAD, age-appropriate appearance   Communication:   normal; communicates verbally, normal voice quality   Head/Face:   inspection -  Normal; no scars or visible lesions   Salivary glands -  Normal size, no tenderness, swelling, or palpable masses   Facial strength -  Normal and symmetric    Skin:  normal, no rash   Ears:  auricle (AD) -  normal  EAC (AD) -  normal  TM (AD) -  Normal, no effusion  auricle (AS) -  normal  EAC (AS) -  normal  TM (AS) -  Normal, no effusion  Normal clinical speech reception   Nose:  Ext. inspection -  Normal  Internal Inspection -  Normal mucosa, septum, and turbinates   Nasopharynx:  normal mucosa, no masses   Oral Cavity:  lips -  Normal mucosa, oral competence, and stoma size   Age-appropriate dentition, healthy gingival mucosa   Hard palate, buccal, floor of mouth mucosa normal with minimal thickening of " secretions   Tongue - normal movement, no lesions   Oropharynx:  mucosa -  Normal, no lesions  soft palate -  Expected post radical tonsillectomy asymmetry of left soft palate  tonsils -  S/p left radical tonsillectomy, no secretions, no masses, no concerning mucosal lesions, no palpable masses, expected scar tissue; right tonsil with no palpable masses slightly cryptic in appearance which is stable, no mucosal lesions  BOT - normal  Vallecula - few secretions   Hypopharynx:  Normal pyriform sinus and pharyngeal wall mucosa   No pooled secretions    Larynx:  Epiglottis, AE folds, false vocal cords, true vocal cords, arytenoids normal in appearance with no edema of AE folds or arytenoids   bilaterally mobile cords    Neck: Well healed left neck incision   Radiation fibrosis of the left neck  Some skin changes to the neck skin from radiation, telangiectasias in left level V  No lymphedema  Normal range of motion   Lymphatic:  no abnormal nodes   Cardiovascular:  warm, pink, well-perfused extremities without swelling, tenderness, or edema   Respiratory:  Normal respiratory effort, no stridor   Neuro/Psych.:  mood/affect -  normal  mental status -  normal       PROCEDURES:   Flexible fiberoptic laryngoscopy: Scope exam was indicated due to oropharyngeal cancer. Verbal consent was obtained. The nasal cavity was prepped with an aerosolized solution of topical anesthetic and vasoconstrictive agent. The scope was passed through the anterior nasal cavity and advanced. Inspection of the nasopharynx revealed no gross abnormality. The base of tongue and vallecula are normal. There are a few secretions present in the vallecula. There are no abnormalities in the area of the left tonsil. The epiglottis, AE folds, false cords, true cords, arytenoids are normal and there is no edema of the arytenoids or AE folds.  Inspection of the larynx revealed bilaterally mobile vocal cords. Pyriform sinuses are symmetric. The airway is patent.  Procedure tolerated well with no immediate complications noted.                  RESULTS REVIEWED:     TSH normal      IMPRESSION AND PLAN:   Mria Cao is a 37-year-old woman with a likely T1N1 p16+ squamous cell carcinoma of the oropharynx. She had an excision of a cystic neck mass by a local ENT which demonstrated a metastatic SCC. She underwent a radical tonsillectomy which showed a small primary tumor in the tonsil which was completely resected. She then had a completion neck dissection on 11/20/2019 with final pathology showing 1/38 nodes. She completed postoperative proton beam therapy at [facility] on 2/24/2020.     She has no concerning findings on exam today.    We discussed that dermatology may be able to do laser treatment to the left level V skin changes, but may not be covered by insurance. If she wants to meet with the dermatology team for consult we can find out who our facial plastics team recommends.    We will repeat imaging in November 2023.    Her TSH was checked today and was normal, repeat in 6 months.    I will see her back in 6 months with labs and imaging.    Thank you very much for the opportunity to participate in the care of your patient.      Ruth Tello MD  Otolaryngology- Head & Neck Surgery      This note was dictated with voice recognition software and then edited. Please excuse any unintentional errors.             CC:  Ifeanyi Soliman MD  Department of Radiation Oncology  [facility]

## 2023-05-17 ENCOUNTER — OFFICE VISIT (OUTPATIENT)
Dept: OTOLARYNGOLOGY | Facility: CLINIC | Age: 38
End: 2023-05-17
Payer: COMMERCIAL

## 2023-05-17 ENCOUNTER — LAB (OUTPATIENT)
Dept: LAB | Facility: CLINIC | Age: 38
End: 2023-05-17
Payer: COMMERCIAL

## 2023-05-17 VITALS
HEIGHT: 66 IN | BODY MASS INDEX: 21.05 KG/M2 | HEART RATE: 66 BPM | WEIGHT: 131 LBS | DIASTOLIC BLOOD PRESSURE: 79 MMHG | SYSTOLIC BLOOD PRESSURE: 126 MMHG | OXYGEN SATURATION: 99 % | TEMPERATURE: 97.7 F

## 2023-05-17 DIAGNOSIS — C77.0 SECONDARY MALIGNANCY OF LYMPH NODES OF HEAD, FACE AND NECK (H): Primary | ICD-10-CM

## 2023-05-17 DIAGNOSIS — C10.9 SQUAMOUS CELL CARCINOMA OF OROPHARYNX (H): ICD-10-CM

## 2023-05-17 DIAGNOSIS — E03.4 HYPOTHYROIDISM DUE TO ACQUIRED ATROPHY OF THYROID: ICD-10-CM

## 2023-05-17 LAB — TSH SERPL DL<=0.005 MIU/L-ACNC: 4.04 UIU/ML (ref 0.3–4.2)

## 2023-05-17 PROCEDURE — 36415 COLL VENOUS BLD VENIPUNCTURE: CPT | Performed by: PATHOLOGY

## 2023-05-17 PROCEDURE — 99213 OFFICE O/P EST LOW 20 MIN: CPT | Mod: 25 | Performed by: OTOLARYNGOLOGY

## 2023-05-17 PROCEDURE — 31575 DIAGNOSTIC LARYNGOSCOPY: CPT | Performed by: OTOLARYNGOLOGY

## 2023-05-17 PROCEDURE — 84443 ASSAY THYROID STIM HORMONE: CPT | Performed by: PATHOLOGY

## 2023-05-17 ASSESSMENT — PAIN SCALES - GENERAL: PAINLEVEL: NO PAIN (0)

## 2023-05-17 NOTE — LETTER
5/17/2023       RE: Mira Cao  [address]  [place] [address]     Dear Colleague,    Thank you for referring your patient, Mira Cao, to the [facility] [place] at [facility]. Please see a copy of my visit note below.    I had the pleasure of seeing Mira Cao in follow-up today at the [facility].     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 The patient noticed a left neck mass in June. She thought this was related to a swollen lymph node due to stress as she had just bought a house.  She delayed evaluation until approximately July when she presented to urgent care.  She had an ultrasound obtained on 7/12/2019 which demonstrated a 3.7 x 2.3 x 1.3 cm mass in the left neck.  She then had a CT scan on 8/7/2019 which demonstrated a 1.8 x 1.4 x 3.8 cm partially necrotic/cystic level 2/3 neck mass, concerning for metastatic squamous cell carcinoma.  She was seen by Dr.Todd Cao and had an FNA performed on 8/8/2019.  The pathology from the FNA demonstrated atypical squamous proliferation which was p16-.  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  She elected for removal which was performed by Dr. Cao on 10/2/2019.  The excision was performed without a completion neck dissection.  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  She had a PET CT scan on 2019 locally which demonstrated postop changes without residual disease and no obvious primary malignancy.  On 10/10/2019 she saw Dr. Neely at [facility] [place] for medical oncology consultation where possible chemotherapy was discussed.  She has met with Dr Melendez from radiation oncology at [facility] [place].  She had a dental cleaning.   She was taken to the operating room on 10/24/2019 for direct laryngoscopy with biopsy.  Initial biopsy of the tonsil demonstrated high-grade dysplasia.  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  A radical tonsillectomy was then performed based on the preoperative discussion with the patient. Final pathology demonstrated a T1 SCC of the tonsil with negative margins. She was taken to the OR on 11/20/2019 for a left neck dissection with resection of her scar. She had 1/38 lymph nodes positive - 0.4 cm deposit of metastatic SCC without STEVE. She had proton beam therapy postoperatively at [facility] with Dr Smith. She completed her treatment on 2/24/2020. She lost about 20 lbs with treatment. She had her 3 month post treatment PET in May 2020 which showed uptake in the right tonsil and right level II node thought to be reactive.       Interval history:  She comes in today for follow-up. She was last seen in clinic in November 2022. She comes in with labs. She went back and saw the lymphedema therapist again in January 2023. She things are going well. She has no problems or concerns. She is continuing to do the stretching and massage and swallowing exercises. She feels the lymphedema fluctuates. She got a new mask to wear from the lymphedema therapist which is harder to wear. She feels like her swallowing is fine. She has no sticking of food. She has no pain with swallowing. She feels like when she talks saliva will collect in her throat. She has no coughing on liquids. She can eat everything she wants. She avoids really dry foods. She can do breads. She uses more sauce on things. She has no pain anywhere. Her hearing is fine. She gets this sensation of cold flush in her ear with doing her lymphedema therapy. Her weight is stable. She has no other changes in her health. She is seeing the dentist regularly. She is having some movement in her teeth,  has been talking to an orthodontist.       MEDICATIONS:     Current Outpatient Medications   Medication Sig Dispense Refill    acetaminophen (TYLENOL) 325 MG tablet Take 2 tablets (650 mg) by mouth every 6 hours as needed for mild pain Start after Delivery. 100 tablet 0    lactobacillus rhamnosus, GG, (CULTURELL) capsule Take 1 capsule by mouth daily      loratadine (CLARITIN) 10 MG tablet Take 10 mg by mouth daily      Magnesium Oxide 250 MG TABS Take 250 mg by mouth daily      omega 3 1000 MG CAPS Take by mouth daily      Prenatal Vit-Fe Fumarate-FA (PRENATAL MULTIVITAMIN W/IRON) 27-0.8 MG tablet Take 1 tablet by mouth daily      Vitamin D, Cholecalciferol, 25 MCG (1000 UT) CAPS Take by mouth daily         ALLERGIES:  No Known Allergies    HABITS/SOCIAL HISTORY:   Works as a .    She is  with no children.    She has no smoking history.  She has about 10 alcoholic beverages on weekends.  She has no chewing tobacco or vaping history.    Social History     Socioeconomic History    Marital status:      Spouse name: Ottoniel    Number of children: Not on file    Years of education: Not on file    Highest education level: Not on file   Occupational History    Not on file   Tobacco Use    Smoking status: Never    Smokeless tobacco: Never   Vaping Use    Vaping status: Never Used   Substance and Sexual Activity    Alcohol use: Yes     Comment: 2-3 drinks per week    Drug use: Never    Sexual activity: Yes     Partners: Male   Other Topics Concern    Not on file   Social History Narrative    Not on file     Social Determinants of Health     Financial Resource Strain: Not on file   Food Insecurity: Not on file   Transportation Needs: Not on file   Physical Activity: Not on file   Stress: Not on file   Social Connections: Not on file   Intimate Partner Violence: Not on file   Housing Stability: Not on file       PAST MEDICAL HISTORY:   Past Medical History:   Diagnosis Date    Irritable bladder      "Squamous cell carcinoma of neck     Uncomplicated asthma         PAST SURGICAL HISTORY:   Past Surgical History:   Procedure Laterality Date     SECTION N/A 2022    Procedure:  SECTION;  Surgeon: Sonya Powers MD;  Location: [facility]    DISSECTION RADICAL NECK MODIFIED Left 2019    Procedure: DISSECTION, NECK, MODIFIED RADICAL LEFT;  Surgeon: Ruth Tello MD;  Location: [facility] OR    LARYNGOSCOPY WITH BIOPSY(IES) N/A 10/24/2019    Procedure: Direct laryngscopy with biopsy;  Surgeon: Ruth Tello MD;  Location: [facility] OR    TONSILLECTOMY ADULT Left 10/24/2019    Procedure: Left Radical  tonsillectomy;  Surgeon: Ruth Tello MD;  Location: [facility] OR       FAMILY HISTORY:    Family History   Problem Relation Age of Onset    Breast Cancer Mother     Cancer Mother         Breast cancer    Hyperlipidemia Father     Bone Cancer Sister     Cancer Sister         Bone cancer    Diabetes No family hx of        REVIEW OF SYSTEMS:  12 point ROS was negative other than the symptoms noted above in the HPI.  Patient Supplied Answers to Review of Systems      2022     4:06 PM    ENT ROS   Ears, Nose, Throat Sore throat    Hoarseness   Cardiopulmonary Cough         PHYSICAL EXAMINATION:   /79   Pulse 66   Temp 97.7  F (36.5  C)   Ht 1.676 m (5' 6\")   Wt 59.4 kg (131 lb)   SpO2 99%   BMI 21.14 kg/m     Appearance:   normal; NAD, age-appropriate appearance   Communication:   normal; communicates verbally, normal voice quality   Head/Face:   inspection -  Normal; no scars or visible lesions   Salivary glands -  Normal size, no tenderness, swelling, or palpable masses   Facial strength -  Normal and symmetric    Skin:  normal, no rash   Ears:  auricle (AD) -  normal  EAC (AD) -  normal  TM (AD) -  Normal, no effusion  auricle (AS) -  normal  EAC (AS) -  normal  TM (AS) -  Normal, no effusion  Normal clinical speech reception   Nose:  Ext. inspection -  Normal  Internal " Inspection -  Normal mucosa, septum, and turbinates   Nasopharynx:  normal mucosa, no masses   Oral Cavity:  lips -  Normal mucosa, oral competence, and stoma size   Age-appropriate dentition, healthy gingival mucosa   Hard palate, buccal, floor of mouth mucosa normal with minimal thickening of secretions   Tongue - normal movement, no lesions   Oropharynx:  mucosa -  Normal, no lesions  soft palate -  Expected post radical tonsillectomy asymmetry of left soft palate  tonsils -  S/p left radical tonsillectomy, no secretions, no masses, no concerning mucosal lesions, no palpable masses, expected scar tissue; right tonsil with no palpable masses slightly cryptic in appearance which is stable, no mucosal lesions  BOT - normal  Vallecula - few secretions   Hypopharynx:  Normal pyriform sinus and pharyngeal wall mucosa   No pooled secretions    Larynx:  Epiglottis, AE folds, false vocal cords, true vocal cords, arytenoids normal in appearance with no edema of AE folds or arytenoids   bilaterally mobile cords    Neck: Well healed left neck incision   Radiation fibrosis of the left neck  Some skin changes to the neck skin from radiation, telangiectasias in left level V  No lymphedema  Normal range of motion   Lymphatic:  no abnormal nodes   Cardiovascular:  warm, pink, well-perfused extremities without swelling, tenderness, or edema   Respiratory:  Normal respiratory effort, no stridor   Neuro/Psych.:  mood/affect -  normal  mental status -  normal       PROCEDURES:   Flexible fiberoptic laryngoscopy: Scope exam was indicated due to oropharyngeal cancer. Verbal consent was obtained. The nasal cavity was prepped with an aerosolized solution of topical anesthetic and vasoconstrictive agent. The scope was passed through the anterior nasal cavity and advanced. Inspection of the nasopharynx revealed no gross abnormality. The base of tongue and vallecula are normal. There are a few secretions present in the vallecula. There are  no abnormalities in the area of the left tonsil. The epiglottis, AE folds, false cords, true cords, arytenoids are normal and there is no edema of the arytenoids or AE folds.  Inspection of the larynx revealed bilaterally mobile vocal cords. Pyriform sinuses are symmetric. The airway is patent. Procedure tolerated well with no immediate complications noted.                  RESULTS REVIEWED:     TSH normal      IMPRESSION AND PLAN:   Mira Cao is a 37-year-old woman with a likely T1N1 p16+ squamous cell carcinoma of the oropharynx. She had an excision of a cystic neck mass by a local ENT which demonstrated a metastatic SCC. She underwent a radical tonsillectomy which showed a small primary tumor in the tonsil which was completely resected. She then had a completion neck dissection on 11/20/2019 with final pathology showing 1/38 nodes. She completed postoperative proton beam therapy at [place] on 2/24/2020.     She has no concerning findings on exam today.    We discussed that dermatology may be able to do laser treatment to the left level V skin changes, but may not be covered by insurance. If she wants to meet with the dermatology team for consult we can find out who our facial plastics team recommends.    We will repeat imaging in November 2023.    Her TSH was checked today and was normal, repeat in 6 months.    I will see her back in 6 months with labs and imaging.    Thank you very much for the opportunity to participate in the care of your patient.      Ruth Tello MD  Otolaryngology- Head & Neck Surgery      This note was dictated with voice recognition software and then edited. Please excuse any unintentional errors.         CC:  Ifeanyi Soliman MD  Department of Radiation Oncology  [facility]

## 2023-05-17 NOTE — NURSING NOTE
"Chief Complaint   Patient presents with     RECHECK     6 month follow up      Blood pressure 126/79, pulse 66, temperature 97.7  F (36.5  C), height 1.676 m (5' 6\"), weight 59.4 kg (131 lb), SpO2 99 %, unknown if currently breastfeeding.    .  Richar Camejo LPN    "

## 2023-10-12 ENCOUNTER — LAB (OUTPATIENT)
Dept: LAB | Facility: CLINIC | Age: 38
End: 2023-10-12
Payer: COMMERCIAL

## 2023-10-12 DIAGNOSIS — C10.9 SQUAMOUS CELL CARCINOMA OF OROPHARYNX (H): ICD-10-CM

## 2023-10-12 DIAGNOSIS — E03.4 HYPOTHYROIDISM DUE TO ACQUIRED ATROPHY OF THYROID: ICD-10-CM

## 2023-10-12 DIAGNOSIS — C77.0 SECONDARY MALIGNANCY OF LYMPH NODES OF HEAD, FACE AND NECK (H): ICD-10-CM

## 2023-10-12 LAB — TSH SERPL DL<=0.005 MIU/L-ACNC: 4.06 UIU/ML (ref 0.3–4.2)

## 2023-10-12 PROCEDURE — 84443 ASSAY THYROID STIM HORMONE: CPT | Performed by: OTOLARYNGOLOGY

## 2023-10-12 PROCEDURE — 36415 COLL VENOUS BLD VENIPUNCTURE: CPT | Performed by: PATHOLOGY

## 2023-10-12 PROCEDURE — 99000 SPECIMEN HANDLING OFFICE-LAB: CPT | Performed by: PATHOLOGY

## 2023-10-23 NOTE — OR NURSING
Dr. Walden paged regarding patient's concern about nausea, and questioning option of admission.  Dr. Walden ordered Compazine 10 mg IV.  Administered to patient, resting in chair with    Quinolones Counseling:  I discussed with the patient the risks of fluoroquinolones including but not limited to GI upset, allergic reaction, drug rash, diarrhea, dizziness, photosensitivity, yeast infections, liver function test abnormalities, tendonitis/tendon rupture.

## 2023-11-03 ENCOUNTER — DOCUMENTATION ONLY (OUTPATIENT)
Dept: OTOLARYNGOLOGY | Facility: CLINIC | Age: 38
End: 2023-11-03
Payer: COMMERCIAL

## 2023-11-04 NOTE — PROGRESS NOTES
I had the pleasure of seeing Mira Cao in follow-up today at the [facility].     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 The patient noticed a left neck mass in June. She thought this was related to a swollen lymph node due to stress as she had just bought a house.  She delayed evaluation until approximately July when she presented to urgent care.  She had an ultrasound obtained on 7/12/2019 which demonstrated a 3.7 x 2.3 x 1.3 cm mass in the left neck.  She then had a CT scan on 8/7/2019 which demonstrated a 1.8 x 1.4 x 3.8 cm partially necrotic/cystic level 2/3 neck mass, concerning for metastatic squamous cell carcinoma.  She was seen by Dr.Todd Cao and had an FNA performed on 8/8/2019.  The pathology from the FNA demonstrated atypical squamous proliferation which was p16-.  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  She elected for removal which was performed by Dr. Cao on 10/2/2019.  The excision was performed without a completion neck dissection.  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  She had a PET CT scan on 2019 locally which demonstrated postop changes without residual disease and no obvious primary malignancy.  On 10/10/2019 she saw Dr. Neely at [facility], [place], operated by [facility] for medical oncology consultation where possible chemotherapy was discussed.  She has met with Dr Melendez from radiation oncology at [facility], [place], operated by [facility].  She had a dental cleaning.  She was taken to the operating room on 10/24/2019 for direct laryngoscopy with biopsy.  Initial biopsy of the tonsil demonstrated high-grade dysplasia.  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  A radical tonsillectomy was then performed based on  the preoperative discussion with the patient. Final pathology demonstrated a T1 SCC of the tonsil with negative margins. She was taken to the OR on 11/20/2019 for a left neck dissection with resection of her scar. She had 1/38 lymph nodes positive - 0.4 cm deposit of metastatic SCC without STEVE. She had proton beam therapy postoperatively at [facility] with Dr Smith. She completed her treatment on 2/24/2020. She lost about 20 lbs with treatment. She had her 3 month post treatment PET in May 2020 which showed uptake in the right tonsil and right level II node thought to be reactive.       Interval history:  She comes in today for follow-up. She was last seen in clinic in May 2023. She comes in with repeat imaging. She had normal TSH in October 2023. She says things are going ok. She feels like she is kind of off. She is tired. She has cold issues. She feels run down. She has felt that way for awhile. Her baby sleeps through the night. She had postpartum issues which improved after the first 3 months. She is having issues with focus and problems sleeping. She is groggy. She is getting 8-9 hours of sleep. She feels rested some of the time when waking. She feels like she could sleep for 10 hrs without improvement. She does not really have a PCP. 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 She is stressed because she can't be productive. She feels her mood is off.     As far as the throat things are going well. She has no specific concerns. She says eating and drinking are going ok. She can eat everything that she wants. She has no sticking of food. She realized she can't eat and talk at the same time. Her weight is stable. She has no throat pain, pain with swallowing, ear pain. She has to swallow hard to make her ears popped. She has no masses she is worried about.       MEDICATIONS:     Current Outpatient Medications   Medication Sig Dispense Refill    acetaminophen (TYLENOL) 325 MG tablet Take 2 tablets (650 mg) by mouth  every 6 hours as needed for mild pain Start after Delivery. 100 tablet 0    lactobacillus rhamnosus, GG, (CULTURELL) capsule Take 1 capsule by mouth daily      loratadine (CLARITIN) 10 MG tablet Take 10 mg by mouth daily      Magnesium Oxide 250 MG TABS Take 250 mg by mouth daily      omega 3 1000 MG CAPS Take by mouth daily      Prenatal Vit-Fe Fumarate-FA (PRENATAL MULTIVITAMIN W/IRON) 27-0.8 MG tablet Take 1 tablet by mouth daily      Vitamin D, Cholecalciferol, 25 MCG (1000 UT) CAPS Take by mouth daily         ALLERGIES:  No Known Allergies    HABITS/SOCIAL HISTORY:   Works as a .    She is  with no children.    She has no smoking history.  She has about 10 alcoholic beverages on weekends.  She has no chewing tobacco or vaping history.    Social History     Socioeconomic History    Marital status:      Spouse name: Ottoniel    Number of children: Not on file    Years of education: Not on file    Highest education level: Not on file   Occupational History    Not on file   Tobacco Use    Smoking status: Never    Smokeless tobacco: Never   Vaping Use    Vaping Use: Never used   Substance and Sexual Activity    Alcohol use: Yes     Comment: 2-3 drinks per week    Drug use: Never    Sexual activity: Yes     Partners: Male   Other Topics Concern    Not on file   Social History Narrative    Not on file     Social Determinants of Health     Financial Resource Strain: Not on file   Food Insecurity: Not on file   Transportation Needs: Not on file   Physical Activity: Not on file   Stress: Not on file   Social Connections: Not on file   Interpersonal Safety: Not on file   Housing Stability: Not on file       PAST MEDICAL HISTORY:   Past Medical History:   Diagnosis Date    Irritable bladder     Squamous cell carcinoma of neck     Uncomplicated asthma         PAST SURGICAL HISTORY:   Past Surgical History:   Procedure Laterality Date     SECTION N/A 2022    Procedure:  SECTION;   "Surgeon: Sonya Powers MD;  Location: [facility]    DISSECTION RADICAL NECK MODIFIED Left 11/20/2019    Procedure: DISSECTION, NECK, MODIFIED RADICAL LEFT;  Surgeon: Ruth Tello MD;  Location: [facility] OR    LARYNGOSCOPY WITH BIOPSY(IES) N/A 10/24/2019    Procedure: Direct laryngscopy with biopsy;  Surgeon: Ruth Tello MD;  Location: [facility] OR    TONSILLECTOMY ADULT Left 10/24/2019    Procedure: Left Radical  tonsillectomy;  Surgeon: Ruth Tello MD;  Location: [facility] OR       FAMILY HISTORY:    Family History   Problem Relation Age of Onset    Breast Cancer Mother     Cancer Mother         Breast cancer    Hyperlipidemia Father     Bone Cancer Sister     Cancer Sister         Bone cancer    Diabetes No family hx of        REVIEW OF SYSTEMS:  12 point ROS was negative other than the symptoms noted above in the HPI.  Patient Supplied Answers to Review of Systems      11/16/2022     4:06 PM    ENT ROS   Ears, Nose, Throat Sore throat    Hoarseness   Cardiopulmonary Cough         PHYSICAL EXAMINATION:   /80 (BP Location: Right arm, Patient Position: Sitting, Cuff Size: Adult Regular)   Pulse 71   Ht 1.676 m (5' 6\")   Wt 59.7 kg (131 lb 9.6 oz)   BMI 21.24 kg/m     Appearance:   normal; NAD, age-appropriate appearance   Communication:   normal; communicates verbally, normal voice quality   Head/Face:   inspection -  Normal; no scars or visible lesions   Salivary glands -  Normal size, no tenderness, swelling, or palpable masses   Facial strength -  Normal and symmetric    Skin:  normal, no rash   Ears:  auricle (AD) -  normal  EAC (AD) -  normal  TM (AD) -  Normal, no effusion  auricle (AS) -  normal  EAC (AS) -  normal  TM (AS) -  Normal, no effusion  Normal clinical speech reception   Nose:  Ext. inspection -  Normal  Internal Inspection -  Normal mucosa, septum, and turbinates   Nasopharynx:  normal mucosa, no masses   Oral Cavity:  lips -  Normal mucosa, oral competence, and stoma size   " Age-appropriate dentition, healthy gingival mucosa   Hard palate, buccal, floor of mouth mucosa normal with minimal thickening of secretions   Tongue - normal movement, no lesions, no palpable masses   Oropharynx:  mucosa -  Normal, no lesions  soft palate -  Expected post radical tonsillectomy asymmetry of left soft palate  tonsils -  S/p left radical tonsillectomy, no secretions, no masses, no concerning mucosal lesions, no palpable masses, expected scar tissue; right tonsil with no palpable masses slightly cryptic in appearance which is stable, no mucosal lesions  BOT - normal  Vallecula - few secretions   Hypopharynx:  Normal pyriform sinus and pharyngeal wall mucosa   No pooled secretions    Larynx:  Epiglottis, AE folds, false vocal cords, true vocal cords, arytenoids normal in appearance with no edema of AE folds or arytenoids   bilaterally mobile cords    Neck: Well healed left neck incision   Radiation fibrosis of the left neck  Some skin changes to the neck skin from radiation, telangiectasias in left level V  No lymphedema   Lymphatic:  no abnormal nodes   Cardiovascular:  warm, pink, well-perfused extremities without swelling, tenderness, or edema   Respiratory:  Normal respiratory effort, no stridor   Neuro/Psych.:  mood/affect -  normal  mental status -  normal       PROCEDURES:   Flexible fiberoptic laryngoscopy: Scope exam was indicated due to oropharyngeal cancer. Verbal consent was obtained. The nasal cavity was prepped with an aerosolized solution of topical anesthetic and vasoconstrictive agent. The scope was passed through the anterior nasal cavity and advanced. Inspection of the nasopharynx revealed no gross abnormality. There are postsurgical changes of the left tonsillar fossa, no masses or mucosal lesions. The base of tongue and vallecula are normal. There are a few secretions present in the vallecula. There are no abnormalities in the area of the left tonsil. The epiglottis, AE folds, false  cords, true cords, arytenoids are normal and there is no edema of the arytenoids or AE folds.  Inspection of the larynx revealed bilaterally mobile vocal cords. Pyriform sinuses are symmetric. The airway is patent. Procedure tolerated well with no immediate complications noted.            RESULTS REVIEWED:     TSH normal    CT neck and chest imaging independently reviewed     CT chest reports reviewed       IMPRESSION AND PLAN:   Mira Cao is a 38-year-old woman with a likely T1N1 p16+ squamous cell carcinoma of the oropharynx. She had an excision of a cystic neck mass by a local ENT which demonstrated a metastatic SCC. She underwent a radical tonsillectomy which showed a small primary tumor in the tonsil which was completely resected. She then had a completion neck dissection on 11/20/2019 with final pathology showing 1/38 nodes. She completed postoperative proton beam therapy at [place] on 2/24/2020.     She has no concerning findings on exam today.    She needs to establish care with a PCP. Referral placed today.     I am concerned that some of her fatigue related issues may be related to mood. Certainly would defer further medical workup to PCP given normal thyroid function. She did have some issues with postpartum which she feels resolved. We discussed consideration of referral to psychology for support - [facility] through. We discussed that we have referral options through the oncology clinic as well.     She had repeat imaging today. Repeat in 1 year pending final results. Will send neck CT results to [facility].    Her TSH was normal 10/2023, repeat in 6 months.    I will see her back in 6 months with labs.    Thank you very much for the opportunity to participate in the care of your patient.      Ruht Tello MD  Otolaryngology- Head & Neck Surgery      This note was dictated with voice recognition software and then edited. Please  excuse any unintentional errors.             CC:  Ifeanyi Soliman MD  Department of Radiation Oncology  [facility]

## 2023-11-08 ENCOUNTER — OFFICE VISIT (OUTPATIENT)
Dept: OTOLARYNGOLOGY | Facility: CLINIC | Age: 38
End: 2023-11-08
Payer: COMMERCIAL

## 2023-11-08 ENCOUNTER — ANCILLARY PROCEDURE (OUTPATIENT)
Dept: CT IMAGING | Facility: CLINIC | Age: 38
End: 2023-11-08
Attending: OTOLARYNGOLOGY
Payer: COMMERCIAL

## 2023-11-08 VITALS
DIASTOLIC BLOOD PRESSURE: 80 MMHG | SYSTOLIC BLOOD PRESSURE: 127 MMHG | HEIGHT: 66 IN | BODY MASS INDEX: 21.15 KG/M2 | HEART RATE: 71 BPM | WEIGHT: 131.6 LBS

## 2023-11-08 DIAGNOSIS — C77.0 SECONDARY MALIGNANCY OF LYMPH NODES OF HEAD, FACE AND NECK (H): ICD-10-CM

## 2023-11-08 DIAGNOSIS — E03.4 HYPOTHYROIDISM DUE TO ACQUIRED ATROPHY OF THYROID: ICD-10-CM

## 2023-11-08 DIAGNOSIS — C10.9 SQUAMOUS CELL CARCINOMA OF OROPHARYNX (H): ICD-10-CM

## 2023-11-08 DIAGNOSIS — C77.0 SECONDARY MALIGNANCY OF LYMPH NODES OF HEAD, FACE AND NECK (H): Primary | ICD-10-CM

## 2023-11-08 PROCEDURE — 31575 DIAGNOSTIC LARYNGOSCOPY: CPT | Performed by: OTOLARYNGOLOGY

## 2023-11-08 PROCEDURE — 71260 CT THORAX DX C+: CPT | Mod: GC | Performed by: RADIOLOGY

## 2023-11-08 PROCEDURE — 70491 CT SOFT TISSUE NECK W/DYE: CPT | Mod: GC | Performed by: RADIOLOGY

## 2023-11-08 PROCEDURE — 99214 OFFICE O/P EST MOD 30 MIN: CPT | Mod: 25 | Performed by: OTOLARYNGOLOGY

## 2023-11-08 RX ORDER — IOPAMIDOL 755 MG/ML
64 INJECTION, SOLUTION INTRAVASCULAR ONCE
Status: COMPLETED | OUTPATIENT
Start: 2023-11-08 | End: 2023-11-08

## 2023-11-08 RX ADMIN — IOPAMIDOL 64 ML: 755 INJECTION, SOLUTION INTRAVASCULAR at 15:22

## 2023-11-08 ASSESSMENT — PAIN SCALES - GENERAL: PAINLEVEL: NO PAIN (0)

## 2023-11-08 NOTE — DISCHARGE INSTRUCTIONS

## 2023-11-08 NOTE — NURSING NOTE
"Chief Complaint   Patient presents with    RECHECK     6 month follow up       Blood pressure 127/80, pulse 71, height 1.676 m (5' 6\"), weight 59.7 kg (131 lb 9.6 oz), unknown if currently breastfeeding.    Linnea Dudley, EMT    "

## 2023-11-08 NOTE — DISCHARGE INSTRUCTIONS

## 2023-11-08 NOTE — LETTER
11/8/2023       RE: Mira Cao  [address]  [place] [address]     Dear Colleague,    Thank you for referring your patient, Mira Cao, to the [facility] EAR NOSE AND THROAT CLINIC [place] at [facility]. Please see a copy of my visit note below.    I had the pleasure of seeing Mira Cao in follow-up today at the [facility] Ot[facility].     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 The patient noticed a left neck mass in June. She thought this was related to a swollen lymph node due to stress as she had just bought a house.  She delayed evaluation until approximately July when she presented to urgent care.  She had an ultrasound obtained on 7/12/2019 which demonstrated a 3.7 x 2.3 x 1.3 cm mass in the left neck.  She then had a CT scan on 8/7/2019 which demonstrated a 1.8 x 1.4 x 3.8 cm partially necrotic/cystic level 2/3 neck mass, concerning for metastatic squamous cell carcinoma.  She was seen by Dr.Todd Cao and had an FNA performed on 8/8/2019.  The pathology from the FNA demonstrated atypical squamous proliferation which was p16-.  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  She elected for removal which was performed by Dr. Cao on 10/2/2019.  The excision was performed without a completion neck dissection.  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  She had a PET CT scan on 2019 locally which demonstrated postop changes without residual disease and no obvious primary malignancy.  On 10/10/2019 she saw Dr. Neely at [facility] for medical oncology consultation where possible chemotherapy was discussed.  She has met with Dr Melendez from radiation oncology at [facility].  She had a dental cleaning.   She was taken to the operating room on 10/24/2019 for direct laryngoscopy with biopsy.  Initial biopsy of the tonsil demonstrated high-grade dysplasia.  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  A radical tonsillectomy was then performed based on the preoperative discussion with the patient. Final pathology demonstrated a T1 SCC of the tonsil with negative margins. She was taken to the OR on 11/20/2019 for a left neck dissection with resection of her scar. She had 1/38 lymph nodes positive - 0.4 cm deposit of metastatic SCC without STEVE. She had proton beam therapy postoperatively at [facility] with Dr Smith. She completed her treatment on 2/24/2020. She lost about 20 lbs with treatment. She had her 3 month post treatment PET in May 2020 which showed uptake in the right tonsil and right level II node thought to be reactive.       Interval history:  She comes in today for follow-up. She was last seen in clinic in May 2023. She comes in with repeat imaging. She had normal TSH in October 2023. She says things are going ok. She feels like she is kind of off. She is tired. She has cold issues. She feels run down. She has felt that way for awhile. Her baby sleeps through the night. She had postpartum issues which improved after the first 3 months. She is having issues with focus and problems sleeping. She is groggy. She is getting 8-9 hours of sleep. She feels rested some of the time when waking. She feels like she could sleep for 10 hrs without improvement. She does not really have a PCP. 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 She is stressed because she can't be productive. She feels her mood is off.     As far as the throat things are going well. She has no specific concerns. She says eating and drinking are going ok. She can eat everything that she wants. She has no sticking of food. She realized she can't eat and talk at the same time. Her  weight is stable. She has no throat pain, pain with swallowing, ear pain. She has to swallow hard to make her ears popped. She has no masses she is worried about.       MEDICATIONS:     Current Outpatient Medications   Medication Sig Dispense Refill    acetaminophen (TYLENOL) 325 MG tablet Take 2 tablets (650 mg) by mouth every 6 hours as needed for mild pain Start after Delivery. 100 tablet 0    lactobacillus rhamnosus, GG, (CULTURELL) capsule Take 1 capsule by mouth daily      loratadine (CLARITIN) 10 MG tablet Take 10 mg by mouth daily      Magnesium Oxide 250 MG TABS Take 250 mg by mouth daily      omega 3 1000 MG CAPS Take by mouth daily      Prenatal Vit-Fe Fumarate-FA (PRENATAL MULTIVITAMIN W/IRON) 27-0.8 MG tablet Take 1 tablet by mouth daily      Vitamin D, Cholecalciferol, 25 MCG (1000 UT) CAPS Take by mouth daily         ALLERGIES:  No Known Allergies    HABITS/SOCIAL HISTORY:   Works as a .    She is  with no children.    She has no smoking history.  She has about 10 alcoholic beverages on weekends.  She has no chewing tobacco or vaping history.    Social History     Socioeconomic History    Marital status:      Spouse name: Ottoniel    Number of children: Not on file    Years of education: Not on file    Highest education level: Not on file   Occupational History    Not on file   Tobacco Use    Smoking status: Never    Smokeless tobacco: Never   Vaping Use    Vaping Use: Never used   Substance and Sexual Activity    Alcohol use: Yes     Comment: 2-3 drinks per week    Drug use: Never    Sexual activity: Yes     Partners: Male   Other Topics Concern    Not on file   Social History Narrative    Not on file     Social Determinants of Health     Financial Resource Strain: Not on file   Food Insecurity: Not on file   Transportation Needs: Not on file   Physical Activity: Not on file   Stress: Not on file   Social Connections: Not on file   Interpersonal Safety: Not on file   Housing  "Stability: Not on file       PAST MEDICAL HISTORY:   Past Medical History:   Diagnosis Date    Irritable bladder     Squamous cell carcinoma of neck     Uncomplicated asthma         PAST SURGICAL HISTORY:   Past Surgical History:   Procedure Laterality Date     SECTION N/A 2022    Procedure:  SECTION;  Surgeon: Sonya Powers MD;  [facility]: [facility]    DISSECTION RADICAL NECK MODIFIED Left 2019    Procedure: DISSECTION, NECK, MODIFIED RADICAL LEFT;  Surgeon: Ruth Tello MD;  Location: [facility] OR    LARYNGOSCOPY WITH BIOPSY(IES) N/A 10/24/2019    Procedure: Direct laryngscopy with biopsy;  Surgeon: Ruth Tello MD;  Location: [facility] OR    TONSILLECTOMY ADULT Left 10/24/2019    Procedure: Left Radical  tonsillectomy;  Surgeon: Ruth Tello MD;  Location: [facility] OR       FAMILY HISTORY:    Family History   Problem Relation Age of Onset    Breast Cancer Mother     Cancer Mother         Breast cancer    Hyperlipidemia Father     Bone Cancer Sister     Cancer Sister         Bone cancer    Diabetes No family hx of        REVIEW OF SYSTEMS:  12 point ROS was negative other than the symptoms noted above in the HPI.  Patient Supplied Answers to Review of Systems      2022     4:06 PM    ENT ROS   Ears, Nose, Throat Sore throat    Hoarseness   Cardiopulmonary Cough         PHYSICAL EXAMINATION:   /80 (BP Location: Right arm, Patient Position: Sitting, Cuff Size: Adult Regular)   Pulse 71   Ht 1.676 m (5' 6\")   Wt 59.7 kg (131 lb 9.6 oz)   BMI 21.24 kg/m     Appearance:   normal; NAD, age-appropriate appearance   Communication:   normal; communicates verbally, normal voice quality   Head/Face:   inspection -  Normal; no scars or visible lesions   Salivary glands -  Normal size, no tenderness, swelling, or palpable masses   Facial strength -  Normal and symmetric    Skin:  normal, no rash   Ears:  auricle (AD) -  normal  EAC (AD) -  normal  TM (AD) -  Normal, no " effusion  auricle (AS) -  normal  EAC (AS) -  normal  TM (AS) -  Normal, no effusion  Normal clinical speech reception   Nose:  Ext. inspection -  Normal  Internal Inspection -  Normal mucosa, septum, and turbinates   Nasopharynx:  normal mucosa, no masses   Oral Cavity:  lips -  Normal mucosa, oral competence, and stoma size   Age-appropriate dentition, healthy gingival mucosa   Hard palate, buccal, floor of mouth mucosa normal with minimal thickening of secretions   Tongue - normal movement, no lesions, no palpable masses   Oropharynx:  mucosa -  Normal, no lesions  soft palate -  Expected post radical tonsillectomy asymmetry of left soft palate  tonsils -  S/p left radical tonsillectomy, no secretions, no masses, no concerning mucosal lesions, no palpable masses, expected scar tissue; right tonsil with no palpable masses slightly cryptic in appearance which is stable, no mucosal lesions  BOT - normal  Vallecula - few secretions   Hypopharynx:  Normal pyriform sinus and pharyngeal wall mucosa   No pooled secretions    Larynx:  Epiglottis, AE folds, false vocal cords, true vocal cords, arytenoids normal in appearance with no edema of AE folds or arytenoids   bilaterally mobile cords    Neck: Well healed left neck incision   Radiation fibrosis of the left neck  Some skin changes to the neck skin from radiation, telangiectasias in left level V  No lymphedema   Lymphatic:  no abnormal nodes   Cardiovascular:  warm, pink, well-perfused extremities without swelling, tenderness, or edema   Respiratory:  Normal respiratory effort, no stridor   Neuro/Psych.:  mood/affect -  normal  mental status -  normal       PROCEDURES:   Flexible fiberoptic laryngoscopy: Scope exam was indicated due to oropharyngeal cancer. Verbal consent was obtained. The nasal cavity was prepped with an aerosolized solution of topical anesthetic and vasoconstrictive agent. The scope was passed through the anterior nasal cavity and advanced.  Inspection of the nasopharynx revealed no gross abnormality. There are postsurgical changes of the left tonsillar fossa, no masses or mucosal lesions. The base of tongue and vallecula are normal. There are a few secretions present in the vallecula. There are no abnormalities in the area of the left tonsil. The epiglottis, AE folds, false cords, true cords, arytenoids are normal and there is no edema of the arytenoids or AE folds.  Inspection of the larynx revealed bilaterally mobile vocal cords. Pyriform sinuses are symmetric. The airway is patent. Procedure tolerated well with no immediate complications noted.            RESULTS REVIEWED:     TSH normal    CT neck and chest imaging independently reviewed     CT chest reports reviewed       IMPRESSION AND PLAN:   Mira Cao is a 38-year-old woman with a likely T1N1 p16+ squamous cell carcinoma of the oropharynx. She had an excision of a cystic neck mass by a local ENT which demonstrated a metastatic SCC. She underwent a radical tonsillectomy which showed a small primary tumor in the tonsil which was completely resected. She then had a completion neck dissection on 11/20/2019 with final pathology showing 1/38 nodes. She completed postoperative proton beam therapy at [place] on 2/24/2020.     She has no concerning findings on exam today.    She needs to establish care with a PCP. Referral placed today.     I am concerned that some of her fatigue related issues may be related to mood. Certainly would defer further medical workup to PCP given normal thyroid function. She did have some issues with postpartum which she feels resolved. We discussed consideration of referral to psychology for support - [facility] through. We discussed that we have referral options through the oncology clinic as well.     She had repeat imaging today. Repeat in 1 year pending final results. Will send neck CT results  armida roper.    Her TSH was normal 10/2023, repeat in 6 months.    I will see her back in 6 months with labs.    Thank you very much for the opportunity to participate in the care of your patient.      Ruth Tello MD  Otolaryngology- Head & Neck Surgery      This note was dictated with voice recognition software and then edited. Please excuse any unintentional errors.       CC:  Ifeanyi Soliman MD  Department of Radiation Oncology  [facility]

## 2023-11-22 ENCOUNTER — TELEPHONE (OUTPATIENT)
Dept: INTERNAL MEDICINE | Facility: CLINIC | Age: 38
End: 2023-11-22
Payer: COMMERCIAL

## 2024-01-01 NOTE — LETTER
10/25/2019       RE: Mira Cao  [address]  [place] [address]     Dear Colleague,    Thank you for referring your patient, Mira Cao, to the [facility] EAR NOSE AND THROAT at [facility]. Please see a copy of my visit note below.    I had the pleasure of seeing Mira Cao in follow-up today at the [facility].     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 The patient noticed a left neck mass in June. She thought this was related to a swollen lymph node due to stress as she had just bought a house.  She delayed evaluation until approximately July when she presented to urgent care.  She had an ultrasound obtained on 7/12/2019 which demonstrated a 3.7 x 2.3 x 1.3 cm mass in the left neck.  She then had a CT scan on 8/7/2019 which demonstrated a 1.8 x 1.4 x 3.8 cm partially necrotic/cystic level 2/3 neck mass, concerning for metastatic squamous cell carcinoma.  She was seen by Dr.Todd Cao and had an FNA performed on 8/8/2019.  The pathology from the FNA demonstrated atypical squamous proliferation which was p16-.  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  She elected for removal which was performed by Dr. Cao on 10/2/2019.  The excision was performed without a completion neck dissection.  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  She had a PET CT scan on 2019 locally which demonstrated postop changes without residual disease and no obvious primary malignancy.  On 10/10/2019 she saw Dr. Neely at [facility] for medical oncology consultation where possible chemotherapy was discussed.  She has met with Dr Melendez from radiation oncology at [facility].  She had a dental cleaning.        Interval history:  She was  taken to the operating room on 10/24/2019 for direct laryngoscopy with biopsy.  Initial biopsy of the tonsil demonstrated high-grade dysplasia.  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  A radical tonsillectomy was then performed based on the preoperative discussion with the patient.  She says that since this morning she has had significant difficulty with her swallowing.  She has only been able to get down 5 mils of her oxycodone.  She feels like every time she swallows that it is going down into her airway.  She feels like she is dehydrated.      MEDICATIONS:     No current outpatient medications on file.       ALLERGIES:  No Known Allergies    HABITS/SOCIAL HISTORY:   Works as a .    She is  with no children.    She has no smoking history.  She has about 10 alcoholic beverages on weekends.  She has no chewing tobacco or vaping history.    Social History     Socioeconomic History     Marital status:      Spouse name: Not on file     Number of children: Not on file     Years of education: Not on file     Highest education level: Not on file   Occupational History     Not on file   Social Needs     Financial resource strain: Not on file     Food insecurity:     Worry: Not on file     Inability: Not on file     Transportation needs:     Medical: Not on file     Non-medical: Not on file   Tobacco Use     Smoking status: Never Smoker     Smokeless tobacco: Never Used   Substance and Sexual Activity     Alcohol use: Yes     Drug use: Never     Sexual activity: Not on file   Lifestyle     Physical activity:     Days per week: Not on file     Minutes per session: Not on file     Stress: Not on file   Relationships     Social connections:     Talks on phone: Not on file     Gets together: Not on file     Attends Sikhism service: Not on file     Active member of club or organization: Not on file     Attends meetings of  clubs or organizations: Not on file     Relationship status: Not on file     Intimate partner violence:     Fear of current or ex partner: Not on file     Emotionally abused: Not on file     Physically abused: Not on file     Forced sexual activity: Not on file   Other Topics Concern     Not on file   Social History Narrative     Not on file       PAST MEDICAL HISTORY:   Past Medical History:   Diagnosis Date     Squamous cell carcinoma of neck         PAST SURGICAL HISTORY:   Past Surgical History:   Procedure Laterality Date     LARYNGOSCOPY WITH BIOPSY(IES) N/A 10/24/2019    Procedure: Direct laryngscopy with biopsy;  Surgeon: Ruth Tello MD;  [facility]: [facility] OR     TONSILLECTOMY ADULT Left 10/24/2019    Procedure: Left Radical  tonsillectomy;  Surgeon: Ruth Tello MD;  Location: [facility] OR       FAMILY HISTORY:    Family History   Problem Relation Age of Onset     Breast Cancer Mother      Bone Cancer Sister        REVIEW OF SYSTEMS:  12 point ROS was negative other than the symptoms noted above in the HPI.  Patient Supplied Answers to Review of Systems  [facility] ENT ROS 10/25/2019   Constitutional Weight loss   Psychology Frequently feeling anxious   Ears, Nose, Throat Nasal congestion or drainage, Sore throat, Trouble swallowing   Musculoskeletal Neck pain   Endocrine Thirst         PHYSICAL EXAMINATION:   Wt 57.6 kg (127 lb)   LMP 10/01/2019   BMI 20.51 kg/m      Patient in no apparent distress, slightly pale  Mild dysarthria secondary to secretions, appears to be limiting tongue movement due to pain  No evidence of bleeding      PROCEDURES:   Flexible fiberoptic laryngoscopy with FEES: The flexible fiberoptic laryngoscope was passed through the right side of the nasal cavity back to the nasopharynx.  There was postoperative changes along the left base of tongue as expected.  There are thick secretions along the left base of tongue and within the vallecula.  The patient was given thin liquids as well as  thickened liquids well scope was in place.  There was residuals that collected within the valleculae and along the area of surgery on the left base of tongue.  On initial swallow there was no penetration but the patient had evidence of penetration as the residual spilled over.  She had no obvious aspiration.  There was slight improvement with head turn.  The thin liquids appeared to have fewer residuals than the thickened liquids.      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  She underwent a radical tonsillectomy yesterday based on frozen section pathology consistent with the left tonsil being the primary site of her disease.  We discussed staying overnight last night but she was insistent on going home.  We discussed NG tube placement at the time of surgery versus if she has issues.  At this point she is behind on her pain control.  We discussed that it probably reasonable to admit her for IV pain medicines and fluids with placement of an NG tube.  The alternative would be placement of NG tube and discharged home through the clinic without admission.  I am concerned though that she is dehydrated and I think it would be preferable to admit her.  She is amenable to this today.    We discussed that we would anticipate that her swallow should recover.  She is otherwise young and healthy.  There are multiple issues contributing to her swallowing, certainly there are the postsurgical changes.  Additionally she is having pain so she is not making an effortful swallow which is allowing residuals and secretions to collect along the left base of tongue.  The speech therapist gave her exercises to start using.  We will have her continue to do with sips of water as tolerated.  She also needs good oral care and I think salt and soda rinses would be beneficial with some gargle to help clear out and thin the  secretions that are sitting in the back of her throat.      We briefly discussed that we reviewed her case at tumor conference today and the hope is pending the final pathology from the radical tonsillectomy that we could potentially proceed with a left neck dissection (with removal of her scar) and try to avoid radiation.  There was an overall strong feeling at tumor conference today that we try to avoid radiation and chemotherapy given her young age and no risk of long-term side effects.  She understands that certainly pending the tonsil pathology or the neck pathology that this recommendation could change.  Given that she is feeling poorly today we will not plan on having an extensive conversation about the neck dissection today and will be revisit when she is feeling better.    Given the delay for bed availability in the hospital we will send her to the emergency department for IV fluids, IV pain meds, and NG-tube placement.    Thank you very much for the opportunity to participate in the care of your patient.      Ruth Tello MD, M.D.  Otolaryngology- Head & Neck Surgery      This note was dictated with voice recognition software and then edited. Please excuse any unintentional errors.     CC:  Ifeanyi Soliman MD  Department of Radiation Oncology  [facility]           - may have an elongated or misshapen head.  The head is shaped according to the birth canal for easier birth.  This is called molding of the head and will round out in a few days.

## 2024-05-26 ENCOUNTER — HEALTH MAINTENANCE LETTER (OUTPATIENT)
Age: 39
End: 2024-05-26

## 2024-06-15 NOTE — PROGRESS NOTES
I had the pleasure of seeing Mira Cao in follow-up today at the [facility].     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 The patient noticed a left neck mass in June. She thought this was related to a swollen lymph node due to stress as she had just bought a house.  She delayed evaluation until approximately July when she presented to urgent care.  She had an ultrasound obtained on 7/12/2019 which demonstrated a 3.7 x 2.3 x 1.3 cm mass in the left neck.  She then had a CT scan on 8/7/2019 which demonstrated a 1.8 x 1.4 x 3.8 cm partially necrotic/cystic level 2/3 neck mass, concerning for metastatic squamous cell carcinoma.  She was seen by Dr.Todd Cao and had an FNA performed on 8/8/2019.  The pathology from the FNA demonstrated atypical squamous proliferation which was p16-.  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  She elected for removal which was performed by Dr. Cao on 10/2/2019.  The excision was performed without a completion neck dissection.  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  She had a PET CT scan on 2019 locally which demonstrated postop changes without residual disease and no obvious primary malignancy.  On 10/10/2019 she saw Dr. Neely at [facility] for medical oncology consultation where possible chemotherapy was discussed.  She has met with Dr Melendez from radiation oncology at [facility].  She had a dental cleaning.  She was taken to the operating room on 10/24/2019 for direct laryngoscopy with biopsy.  Initial biopsy of the tonsil demonstrated high-grade dysplasia.  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  A radical tonsillectomy was then performed based on  No the preoperative discussion with the patient. Final pathology demonstrated a T1 SCC of the tonsil with negative margins. She was taken to the OR on 11/20/2019 for a left neck dissection with resection of her scar. She had 1/38 lymph nodes positive - 0.4 cm deposit of metastatic SCC without STEVE. She had proton beam therapy postoperatively at [facility] with Dr Smith. She completed her treatment on 2/24/2020. She lost about 20 lbs with treatment. She had her 3 month post treatment PET in May 2020 which showed uptake in the right tonsil and right level II node thought to be reactive.       Interval history:  She comes in today for follow-up. She was last seen in clinic in October 2021. She is currently in her second trimester. She last saw OB on 1/10/2022. She says things are going well. She has no problems or concerns. She is trying to do her exercises still. She is doing myofascial release. She feels like things feel different after the massage, helps her with doing her exercises. She sees her every 3 weeks. She says eating is doing well. The morning sickness has resolved. She has nothing she can't eat or swallow. She feels like her throat is more dry on the left side. She has not been using a humidifier. She has no coughing or choking. She may have some issues with dry foods but can still swallow. She is gaining weight. She has no sore throat, odynophagia, otalgia, neck masses. She feels like her energy is ok. She says work is going ok. She is working from home. She is due in June.        MEDICATIONS:     Current Outpatient Medications   Medication Sig Dispense Refill     aspirin (ASA) 81 MG EC tablet Take 1 tablet (81 mg) by mouth daily 60 tablet 3     biotin 1000 MCG TABS tablet Take 1,000 mcg by mouth daily       cyanocobalamin (VITAMIN B-12) 100 MCG tablet Take 250 mcg by mouth daily       Magnesium Oxide 250 MG TABS Take 250 mg by mouth daily       Milk Thistle-Dand-Fennel-Licor (MILK THISTLE XTRA) CAPS capsule  Take 1 capsule by mouth daily       omeprazole (PRILOSEC) 20 MG DR capsule Take 1 capsule (20 mg) by mouth daily 30 capsule 0     ondansetron (ZOFRAN) 4 MG tablet Take 1 tablet (4 mg) by mouth every 8 hours as needed for nausea 20 tablet 0     Prenatal Vit-Fe Fumarate-FA (PRENATAL MULTIVITAMIN W/IRON) 27-0.8 MG tablet Take 1 tablet by mouth daily       pyridOXINE (VITAMIN B6) 25 MG tablet Take 1 tablet (25 mg) by mouth daily 120 tablet 3       ALLERGIES:  No Known Allergies    HABITS/SOCIAL HISTORY:   Works as a .    She is  with no children.    She has no smoking history.  She has about 10 alcoholic beverages on weekends.  She has no chewing tobacco or vaping history.    Social History     Socioeconomic History     Marital status:      Spouse name: Ottoniel     Number of children: Not on file     Years of education: Not on file     Highest education level: Not on file   Occupational History     Not on file   Tobacco Use     Smoking status: Never Smoker     Smokeless tobacco: Never Used   Vaping Use     Vaping Use: Never used   Substance and Sexual Activity     Alcohol use: Not Currently     Comment: 2-3 drinks per week     Drug use: Never     Sexual activity: Yes     Partners: Male   Other Topics Concern     Not on file   Social History Narrative     Not on file     Social Determinants of Health     Financial Resource Strain: Not on file   Food Insecurity: Not on file   Transportation Needs: Not on file   Physical Activity: Not on file   Stress: Not on file   Social Connections: Not on file   Intimate Partner Violence: Not on file   Housing Stability: Not on file       PAST MEDICAL HISTORY:   Past Medical History:   Diagnosis Date     Irritable bladder      Squamous cell carcinoma of neck      Uncomplicated asthma         PAST SURGICAL HISTORY:   Past Surgical History:   Procedure Laterality Date     DISSECTION RADICAL NECK MODIFIED Left 11/20/2019    Procedure: DISSECTION, NECK, MODIFIED  "RADICAL LEFT;  Surgeon: Ruth Tello MD;  Location: [facility] OR     LARYNGOSCOPY WITH BIOPSY(IES) N/A 10/24/2019    Procedure: Direct laryngscopy with biopsy;  Surgeon: Ruth Tello MD;  Location: [facility] OR     TONSILLECTOMY ADULT Left 10/24/2019    Procedure: Left Radical  tonsillectomy;  Surgeon: Ruth Tello MD;  Location: [facility] OR       FAMILY HISTORY:    Family History   Problem Relation Age of Onset     Breast Cancer Mother      Cancer Mother         Breast cancer     Hyperlipidemia Father      Bone Cancer Sister      Cancer Sister         Bone cancer     Diabetes No family hx of        REVIEW OF SYSTEMS:  12 point ROS was negative other than the symptoms noted above in the HPI.  Patient Supplied Answers to Review of Systems  [facility] E[facility] ROS 5/14/2020   Constitutional -   Neurology Headache   Psychology -   Ears, Nose, Throat Ear pain   Musculoskeletal -   Endocrine -         PHYSICAL EXAMINATION:   BP 99/63 (BP Location: Right arm, Patient Position: Sitting, Cuff Size: Adult Regular)   Temp 97.5  F (36.4  C) (Temporal)   Ht 1.676 m (5' 6\")   Wt 63 kg (139 lb)   LMP 09/01/2021   SpO2 (!) 82%   BMI 22.44 kg/m     Appearance:   normal; NAD, age-appropriate appearance   Communication:   normal; communicates verbally, normal voice quality   Head/Face:   inspection -  Normal; no scars or visible lesions   Salivary glands -  Normal size, no tenderness, swelling, or palpable masses   Facial strength -  Normal and symmetric    Skin:  normal, no rash   Ears:  auricle (AD) -  normal  EAC (AD) -  normal  TM (AD) -  Normal, no effusion  auricle (AS) -  normal  EAC (AS) -  normal  TM (AS) -  Normal, no effusion  Normal clinical speech reception   Nose:  Ext. inspection -  Normal  Internal Inspection -  Normal mucosa, septum, and turbinates   Nasopharynx:  normal mucosa, no masses   Oral Cavity:  lips -  Normal mucosa, oral competence, and stoma size   Age-appropriate dentition, healthy gingival mucosa   Hard " palate, buccal, floor of mouth mucosa normal   Tongue - normal movement, no lesions   Oropharynx:  mucosa -  Normal, no lesions  soft palate -  Expected post radical tonsillectomy asymmetry of left soft palate  tonsils -  S/p left radical tonsillectomy, no secretions, no masses, no concerning mucosal lesions, no palpable masses, expected scar tissue; right tonsil with no palpable masses slightly cryptic in appearance which is stable, no mucosal lesions  BOT - normal  Vallecula - clear   Hypopharynx:  Normal pyriform sinus and pharyngeal wall mucosa   No pooled secretions    Larynx:  Epiglottis, AE folds, false vocal cords, true vocal cords, arytenoids normal in appearance with no edema of AE folds or arytenoids   bilaterally mobile cords    Neck: Well healed left neck incision   Radiation fibrosis of the neck  Some skin changes to the neck skin from radiation  No significant lymphedema  Normal range of motion   Lymphatic:  no abnormal nodes   Cardiovascular:  warm, pink, well-perfused extremities without swelling, tenderness, or edema   Respiratory:  Normal respiratory effort, no stridor   Neuro/Psych.:  mood/affect -  normal  mental status -  normal       PROCEDURES:   Flexible fiberoptic laryngoscopy: Scope exam was indicated due to oropharyngeal cancer. Verbal consent was obtained. The nasal cavity was prepped with an aerosolized solution of topical anesthetic and vasoconstrictive agent. The scope was passed through the anterior nasal cavity and advanced. Inspection of the nasopharynx revealed no gross abnormality. The base of tongue and vallecula are normal. There are no abnormalities in the area of the left tonsil. The epiglottis, AE folds, false cords, true cords, arytenoids are normal and there is no edema of the arytenoids or AE folds.  Inspection of the larynx revealed bilaterally mobile vocal cords. Pyriform sinuses are symmetric. The airway is patent. Procedure tolerated well with no immediate  complications noted.      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 She had an excision of a cystic neck mass by a local ENT which demonstrated a metastatic SCC. She underwent a radical tonsillectomy which showed a small primary tumor in the tonsil which was completely resected. She then had a completion neck dissection on 11/20/2019 with final pathology showing 1/38 nodes. She completed postoperative proton beam therapy at [place] on 2/24/2020.     She has no concerning findings on exam today.    She is pregnant so we will defer imaging to avoid the risk of radiation to the fetus. We can repeat her imaging after her delivery.    She is having her TSH checked every trimester during her pregnancy by OB.    I will see her back in 4 months. She can readjust pending her pregnancy/delivery.    Thank you very much for the opportunity to participate in the care of your patient.      Ruth Tello MD  Otolaryngology- Head & Neck Surgery      This note was dictated with voice recognition software and then edited. Please excuse any unintentional errors.             CC:  Ifeanyi Soliman MD  Department of Radiation Oncology  [facility]

## 2024-07-27 NOTE — PROGRESS NOTES
I had the pleasure of seeing Mira Cao in follow-up today at the [facility].     History of Present Illness:   Mira Cao is a 38 year old woman with a T1N1 p16+ SCC of the oropharynx. The patient noticed a left neck mass in June. She thought this was related to a swollen lymph node due to stress as she had just bought a house.  She delayed evaluation until approximately July when she presented to urgent care.  She had an ultrasound obtained on 7/12/2019 which demonstrated a 3.7 x 2.3 x 1.3 cm mass in the left neck.  She then had a CT scan on 8/7/2019 which demonstrated a 1.8 x 1.4 x 3.8 cm partially necrotic/cystic level 2/3 neck mass, concerning for metastatic squamous cell carcinoma.  She was seen by Dr.Todd Cao and had an FNA performed on 8/8/2019.  The pathology from the FNA demonstrated atypical squamous proliferation which was p16-.  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  She elected for removal which was performed by Dr. Cao on 10/2/2019.  The excision was performed without a completion neck dissection.  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  She had a PET CT scan on 2019 locally which demonstrated postop changes without residual disease and no obvious primary malignancy.  On 10/10/2019 she saw Dr. Neely at [facility] for medical oncology consultation where possible chemotherapy was discussed.  She has met with Dr Melendez from radiation oncology at [facility].  She had a dental cleaning.  She was taken to the operating room on 10/24/2019 for direct laryngoscopy with biopsy.  Initial biopsy of the tonsil demonstrated high-grade dysplasia.  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  A radical tonsillectomy was then performed based on the  preoperative discussion with the patient. Final pathology demonstrated a T1 SCC of the tonsil with negative margins. She was taken to the OR on 11/20/2019 for a left neck dissection with resection of her scar. She had 1/38 lymph nodes positive - 0.4 cm deposit of metastatic SCC without STEVE. She had proton beam therapy postoperatively at [facility] with Dr Smith. She completed her treatment on 2/24/2020. She lost about 20 lbs with treatment. She had her 3 month post treatment PET in May 2020 which showed uptake in the right tonsil and right level II node thought to be reactive.       Interval history:  She comes in today for follow-up. She was last seen in clinic in November 2023. She comes in with repeat labs. She says things are going well. She had COVID earlier this summer. She says she saw a hormone specialist because she was feeling off. She says she was given medication for progesterone and thyroid. She says she is feeling better with these changes. She says eating and drinking are going well. She has to keep up with her swallowing exercises. She says when she got sick she was less diligent and lost some ability, is improving with restarting. She is working on stretching. She has no concerns for sore throat. She has no pain. She has no neck masses. She has no sticking of food with swallowing. She does have to drink water to get some things to go down from the top of her throat. She has no coughing on liquids. She has lost a few pounds intentionally. She says her energy is better. She says her anxiety is better. She has no other changes in her health.         MEDICATIONS:     Current Outpatient Medications   Medication Sig Dispense Refill    progesterone, in sesame oil, 50 MG/ML injection Inject into the muscle daily      acetaminophen (TYLENOL) 325 MG tablet Take 2 tablets (650 mg) by mouth every 6 hours as needed for mild pain Start after Delivery. 100 tablet 0    lactobacillus rhamnosus, GG, (CULTURELL) capsule  Take 1 capsule by mouth daily      loratadine (CLARITIN) 10 MG tablet Take 10 mg by mouth daily      Magnesium Oxide 250 MG TABS Take 250 mg by mouth daily      omega 3 1000 MG CAPS Take by mouth daily      Prenatal Vit-Fe Fumarate-FA (PRENATAL MULTIVITAMIN W/IRON) 27-0.8 MG tablet Take 1 tablet by mouth daily      Vitamin D, Cholecalciferol, 25 MCG (1000 UT) CAPS Take by mouth daily         ALLERGIES:  No Known Allergies    HABITS/SOCIAL HISTORY:   Works as a .    She is  with no children.    She has no smoking history.  She has about 10 alcoholic beverages on weekends.  She has no chewing tobacco or vaping history.    Social History     Socioeconomic History    Marital status:      Spouse name: Ottoniel    Number of children: Not on file    Years of education: Not on file    Highest education level: Not on file   Occupational History    Not on file   Tobacco Use    Smoking status: Never    Smokeless tobacco: Never   Vaping Use    Vaping status: Never Used   Substance and Sexual Activity    Alcohol use: Yes     Comment: 2-3 drinks per week    Drug use: Never    Sexual activity: Yes     Partners: Male   Other Topics Concern    Not on file   Social History Narrative    Not on file     Social Determinants of Health     Financial Resource Strain: Not on file   Food Insecurity: Not on file   Transportation Needs: Not on file   Physical Activity: Not on file   Stress: Not on file   Social Connections: Not on file   Interpersonal Safety: Not At Risk (6/4/2024)    Received from [facility] and [facility]    [facility] Custom IPV     Do you feel UNSAFE in any of your personal relationships with your family members or any other acquaintances?: No   Housing Stability: Not on file       PAST MEDICAL HISTORY:   Past Medical History:   Diagnosis Date    Irritable bladder     Squamous cell carcinoma of neck     Uncomplicated asthma         PAST SURGICAL HISTORY:   Past Surgical History:  "  Procedure Laterality Date     SECTION N/A 2022    Procedure:  SECTION;  Surgeon: Sonya Powers MD;  [facility]: [facility] L[facility]    DISSECTION RADICAL NECK MODIFIED Left 2019    Procedure: DISSECTION, NECK, MODIFIED RADICAL LEFT;  Surgeon: Ruth Tello MD;  Location: [facility] OR    LARYNGOSCOPY WITH BIOPSY(IES) N/A 10/24/2019    Procedure: Direct laryngscopy with biopsy;  Surgeon: Ruth Tello MD;  Location: [facility] OR    TONSILLECTOMY ADULT Left 10/24/2019    Procedure: Left Radical  tonsillectomy;  Surgeon: Ruth Tello MD;  Location: [facility] OR       FAMILY HISTORY:    Family History   Problem Relation Age of Onset    Breast Cancer Mother     Cancer Mother         Breast cancer    Hyperlipidemia Father     Bone Cancer Sister     Cancer Sister         Bone cancer    Diabetes No family hx of        REVIEW OF SYSTEMS:  12 point ROS was negative other than the symptoms noted above in the HPI.  Patient Supplied Answers to Review of Systems      2024     7:36 AM   [facility] ENT ROS   Ears, Nose, Throat Nasal congestion or drainage    Sore throat    Hoarseness   Musculoskeletal Sore or stiff joints   Allergy/Immunology Allergies or hay fever         PHYSICAL EXAMINATION:   /83 (BP Location: Right arm, Patient Position: Sitting, Cuff Size: Adult Regular)   Pulse 71   Ht 1.676 m (5' 6\")   Wt 58.1 kg (128 lb)   SpO2 97%   BMI 20.66 kg/m     Appearance:   normal; NAD, age-appropriate appearance   Communication:   normal; communicates verbally, normal voice quality   Head/Face:   inspection -  Normal; no scars or visible lesions   Salivary glands -  Normal size, no tenderness, swelling, or palpable masses   Facial strength -  Normal and symmetric    Skin:  normal, no rash   Ears:  auricle (AD) -  normal  EAC (AD) -  normal  TM (AD) -  Normal, no effusion  auricle (AS) -  normal  EAC (AS) -  normal  TM (AS) -  Normal, no effusion  Normal clinical speech reception   Nose:  Ext. inspection " -  Normal  Internal Inspection -  Normal mucosa, septum, and turbinates   Nasopharynx:  normal mucosa, no masses   Oral Cavity:  lips -  Normal mucosa, oral competence, and stoma size   Age-appropriate dentition, healthy gingival mucosa   Hard palate, buccal, floor of mouth mucosa normal with minimal thickening of secretions   Tongue - normal movement, no lesions, no palpable masses   Oropharynx:  mucosa -  Normal, no lesions  soft palate -  Expected post radical tonsillectomy asymmetry of left soft palate  tonsils -  S/p left radical tonsillectomy, no secretions, no masses, no concerning mucosal lesions, no palpable masses, expected scar tissue; right tonsil with no palpable masses slightly cryptic in appearance which is stable, no mucosal lesions  BOT - normal  Vallecula - clear   Hypopharynx:  Normal pyriform sinus and pharyngeal wall mucosa   No pooled secretions    Larynx:  Epiglottis, AE folds, false vocal cords, true vocal cords, arytenoids normal in appearance with no edema of AE folds or arytenoids   bilaterally mobile cords    Neck: Well healed left neck incision   Radiation fibrosis of the left neck  Some skin changes to the neck skin from radiation, telangiectasias in left level V  No lymphedema   Lymphatic:  no abnormal nodes   Cardiovascular:  warm, pink, well-perfused extremities without swelling, tenderness, or edema   Respiratory:  Normal respiratory effort, no stridor   Neuro/Psych.:  mood/affect -  normal  mental status -  normal       PROCEDURES:   Flexible fiberoptic laryngoscopy: Scope exam was indicated due to oropharyngeal cancer. Verbal consent was obtained. The nasal cavity was prepped with an aerosolized solution of topical anesthetic and vasoconstrictive agent. The scope was passed through the anterior nasal cavity and advanced. Inspection of the nasopharynx revealed no gross abnormality. There are postsurgical changes of the left tonsillar fossa, no masses or mucosal lesions. The base of  tongue and vallecula are normal. There are no abnormalities in the area of the left tonsil. The epiglottis, AE folds, false cords, true cords, arytenoids are normal and there is no edema of the arytenoids or AE folds.  Inspection of the larynx revealed bilaterally mobile vocal cords. Pyriform sinuses are symmetric. The airway is patent. Procedure tolerated well with no immediate complications noted.      RESULTS REVIEWED:     TSH elevated, T4 normal      IMPRESSION AND PLAN:   Mira Cao is a 38-year-old woman with a likely T1N1 p16+ squamous cell carcinoma of the oropharynx. She had an excision of a cystic neck mass by a local ENT which demonstrated a metastatic SCC. She underwent a radical tonsillectomy which showed a small primary tumor in the tonsil which was completely resected. She then had a completion neck dissection on 11/20/2019 with final pathology showing 1/38 nodes. She completed postoperative proton beam therapy at [place] on 2/24/2020.     She has no concerning findings on exam today.    She has been started on thyroid replacement by another clinician, labs were normal previously, but do not have outside records available. She feels this thyroid replacement has significantly helped with her energy and anxiety. Her TSH is mildly elevated, T4 normal. Since we do not have her doses of medication available or the records for why/when medication was started, I think it would be best that we do not change her medication and instead defer to her outside clinician so that we are not duplicating work. She is going to reach out to that clinician with the labs from today. We will defer further management to their team especially since she was at the point where she would be ready to transition to PCP anyway.    Repeat scans in 6 months. Will discontinue routine scans at that time.     Carotid duplex in February 2025.     I will see her back in 6 months with scans and carotid duplex. She will be 5 years out  from treatment at that time.     Thank you very much for the opportunity to participate in the care of your patient.      Ruth Tello MD  Otolaryngology- Head & Neck Surgery      This note was dictated with voice recognition software and then edited. Please excuse any unintentional errors.             CC:  Ifeanyi Soliman MD  Department of Radiation Oncology  [facility]

## 2024-07-29 ENCOUNTER — OFFICE VISIT (OUTPATIENT)
Dept: OTOLARYNGOLOGY | Facility: CLINIC | Age: 39
End: 2024-07-29
Payer: COMMERCIAL

## 2024-07-29 ENCOUNTER — LAB (OUTPATIENT)
Dept: LAB | Facility: CLINIC | Age: 39
End: 2024-07-29
Payer: COMMERCIAL

## 2024-07-29 VITALS
BODY MASS INDEX: 20.57 KG/M2 | SYSTOLIC BLOOD PRESSURE: 121 MMHG | OXYGEN SATURATION: 97 % | HEART RATE: 71 BPM | HEIGHT: 66 IN | DIASTOLIC BLOOD PRESSURE: 83 MMHG | WEIGHT: 128 LBS

## 2024-07-29 DIAGNOSIS — C10.9 SQUAMOUS CELL CARCINOMA OF OROPHARYNX (H): ICD-10-CM

## 2024-07-29 DIAGNOSIS — C77.0 SECONDARY MALIGNANCY OF LYMPH NODES OF HEAD, FACE AND NECK (H): Primary | ICD-10-CM

## 2024-07-29 DIAGNOSIS — E03.4 HYPOTHYROIDISM DUE TO ACQUIRED ATROPHY OF THYROID: ICD-10-CM

## 2024-07-29 DIAGNOSIS — C77.0 SECONDARY MALIGNANCY OF LYMPH NODES OF HEAD, FACE AND NECK (H): ICD-10-CM

## 2024-07-29 LAB
T4 FREE SERPL-MCNC: 1.04 NG/DL (ref 0.9–1.7)
TSH SERPL DL<=0.005 MIU/L-ACNC: 6.65 UIU/ML (ref 0.3–4.2)

## 2024-07-29 PROCEDURE — 36415 COLL VENOUS BLD VENIPUNCTURE: CPT | Performed by: PATHOLOGY

## 2024-07-29 PROCEDURE — 84439 ASSAY OF FREE THYROXINE: CPT | Performed by: PATHOLOGY

## 2024-07-29 PROCEDURE — 31575 DIAGNOSTIC LARYNGOSCOPY: CPT | Performed by: OTOLARYNGOLOGY

## 2024-07-29 PROCEDURE — 84443 ASSAY THYROID STIM HORMONE: CPT | Performed by: PATHOLOGY

## 2024-07-29 PROCEDURE — 99214 OFFICE O/P EST MOD 30 MIN: CPT | Mod: 25 | Performed by: OTOLARYNGOLOGY

## 2024-07-29 RX ORDER — PROGESTERONE 50 MG/ML
INJECTION, SOLUTION INTRAMUSCULAR DAILY
COMMUNITY

## 2024-07-29 ASSESSMENT — PAIN SCALES - GENERAL: PAINLEVEL: NO PAIN (0)

## 2024-07-29 NOTE — LETTER
7/29/2024       RE: Mira Cao  [address] [place] [address]     Dear Colleague,    Thank you for referring your patient, Mira Cao, to the [facility] [employer] at [facility]. Please see a copy of my visit note below.    I had the pleasure of seeing Mira Cao in follow-up today at the [facility] Ot[facility].     History of Present Illness:   Mira Cao is a 38 year old woman with a T1N1 p16+ SCC of the oropharynx. The patient noticed a left neck mass in June. She thought this was related to a swollen lymph node due to stress as she had just bought a house.  She delayed evaluation until approximately July when she presented to urgent care.  She had an ultrasound obtained on 7/12/2019 which demonstrated a 3.7 x 2.3 x 1.3 cm mass in the left neck.  She then had a CT scan on 8/7/2019 which demonstrated a 1.8 x 1.4 x 3.8 cm partially necrotic/cystic level 2/3 neck mass, concerning for metastatic squamous cell carcinoma.  She was seen by Dr.Todd Cao and had an FNA performed on 8/8/2019.  The pathology from the FNA demonstrated atypical squamous proliferation which was p16-.  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  She elected for removal which was performed by Dr. Cao on 10/2/2019.  The excision was performed without a completion neck dissection.  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  She had a PET CT scan on 2019 locally which demonstrated postop changes without residual disease and no obvious primary malignancy.  On 10/10/2019 she saw Dr. Neely at [facility] for medical oncology consultation where possible chemotherapy was discussed.  She has met with Dr Melendez from radiation oncology at [facility].  She had a dental cleaning.  She was  taken to the operating room on 10/24/2019 for direct laryngoscopy with biopsy.  Initial biopsy of the tonsil demonstrated high-grade dysplasia.  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  A radical tonsillectomy was then performed based on the preoperative discussion with the patient. Final pathology demonstrated a T1 SCC of the tonsil with negative margins. She was taken to the OR on 11/20/2019 for a left neck dissection with resection of her scar. She had 1/38 lymph nodes positive - 0.4 cm deposit of metastatic SCC without STEVE. She had proton beam therapy postoperatively at [facility] with Dr Smith. She completed her treatment on 2/24/2020. She lost about 20 lbs with treatment. She had her 3 month post treatment PET in May 2020 which showed uptake in the right tonsil and right level II node thought to be reactive.       Interval history:  She comes in today for follow-up. She was last seen in clinic in November 2023. She comes in with repeat labs. She says things are going well. She had COVID earlier this summer. She says she saw a hormone specialist because she was feeling off. She says she was given medication for progesterone and thyroid. She says she is feeling better with these changes. She says eating and drinking are going well. She has to keep up with her swallowing exercises. She says when she got sick she was less diligent and lost some ability, is improving with restarting. She is working on stretching. She has no concerns for sore throat. She has no pain. She has no neck masses. She has no sticking of food with swallowing. She does have to drink water to get some things to go down from the top of her throat. She has no coughing on liquids. She has lost a few pounds intentionally. She says her energy is better. She says her anxiety is better. She has no other changes in her health.         MEDICATIONS:     Current Outpatient  Medications   Medication Sig Dispense Refill     progesterone, in sesame oil, 50 MG/ML injection Inject into the muscle daily       acetaminophen (TYLENOL) 325 MG tablet Take 2 tablets (650 mg) by mouth every 6 hours as needed for mild pain Start after Delivery. 100 tablet 0     lactobacillus rhamnosus, GG, (CULTURELL) capsule Take 1 capsule by mouth daily       loratadine (CLARITIN) 10 MG tablet Take 10 mg by mouth daily       Magnesium Oxide 250 MG TABS Take 250 mg by mouth daily       omega 3 1000 MG CAPS Take by mouth daily       Prenatal Vit-Fe Fumarate-FA (PRENATAL MULTIVITAMIN W/IRON) 27-0.8 MG tablet Take 1 tablet by mouth daily       Vitamin D, Cholecalciferol, 25 MCG (1000 UT) CAPS Take by mouth daily         ALLERGIES:  No Known Allergies    HABITS/SOCIAL HISTORY:   Works as a .    She is  with no children.    She has no smoking history.  She has about 10 alcoholic beverages on weekends.  She has no chewing tobacco or vaping history.    Social History     Socioeconomic History     Marital status:      Spouse name: Ottoniel     Number of children: Not on file     Years of education: Not on file     Highest education level: Not on file   Occupational History     Not on file   Tobacco Use     Smoking status: Never     Smokeless tobacco: Never   Vaping Use     Vaping status: Never Used   Substance and Sexual Activity     Alcohol use: Yes     Comment: 2-3 drinks per week     Drug use: Never     Sexual activity: Yes     Partners: Male   Other Topics Concern     Not on file   Social History Narrative     Not on file     Social Determinants of Health     Financial Resource Strain: Not on file   Food Insecurity: Not on file   Transportation Needs: Not on file   Physical Activity: Not on file   Stress: Not on file   Social Connections: Not on file   Interpersonal Safety: Not At Risk (6/4/2024)    Received from [facility]     IP Custom IPV      Do you  "feel UNSAFE in any of your personal relationships with your family members or any other acquaintances?: No   Housing Stability: Not on file       PAST MEDICAL HISTORY:   Past Medical History:   Diagnosis Date     Irritable bladder      Squamous cell carcinoma of neck      Uncomplicated asthma         PAST SURGICAL HISTORY:   Past Surgical History:   Procedure Laterality Date      SECTION N/A 2022    Procedure:  SECTION;  Surgeon: Sonya Powers MD;  Location: [facility] L[facility]D     DISSECTION RADICAL NECK MODIFIED Left 2019    Procedure: DISSECTION, NECK, MODIFIED RADICAL LEFT;  Surgeon: Ruth Tello MD;  Location: [facility] OR     LARYNGOSCOPY WITH BIOPSY(IES) N/A 10/24/2019    Procedure: Direct laryngscopy with biopsy;  Surgeon: Ruth Tello MD;  Location: [facility] OR     TONSILLECTOMY ADULT Left 10/24/2019    Procedure: Left Radical  tonsillectomy;  Surgeon: Ruth Tello MD;  Location: [facility] OR       FAMILY HISTORY:    Family History   Problem Relation Age of Onset     Breast Cancer Mother      Cancer Mother         Breast cancer     Hyperlipidemia Father      Bone Cancer Sister      Cancer Sister         Bone cancer     Diabetes No family hx of        REVIEW OF SYSTEMS:  12 point ROS was negative other than the symptoms noted above in the HPI.  Patient Supplied Answers to Review of Systems      2024     7:36 AM   [facility] ROS   Ears, Nose, Throat Nasal congestion or drainage    Sore throat    Hoarseness   Musculoskeletal Sore or stiff joints   Allergy/Immunology Allergies or hay fever         PHYSICAL EXAMINATION:   /83 (BP Location: Right arm, Patient Position: Sitting, Cuff Size: Adult Regular)   Pulse 71   Ht 1.676 m (5' 6\")   Wt 58.1 kg (128 lb)   SpO2 97%   BMI 20.66 kg/m     Appearance:   normal; NAD, age-appropriate appearance   Communication:   normal; communicates verbally, normal voice quality   Head/Face:   inspection -  Normal; no scars or visible lesions   " Salivary glands -  Normal size, no tenderness, swelling, or palpable masses   Facial strength -  Normal and symmetric    Skin:  normal, no rash   Ears:  auricle (AD) -  normal  EAC (AD) -  normal  TM (AD) -  Normal, no effusion  auricle (AS) -  normal  EAC (AS) -  normal  TM (AS) -  Normal, no effusion  Normal clinical speech reception   Nose:  Ext. inspection -  Normal  Internal Inspection -  Normal mucosa, septum, and turbinates   Nasopharynx:  normal mucosa, no masses   Oral Cavity:  lips -  Normal mucosa, oral competence, and stoma size   Age-appropriate dentition, healthy gingival mucosa   Hard palate, buccal, floor of mouth mucosa normal with minimal thickening of secretions   Tongue - normal movement, no lesions, no palpable masses   Oropharynx:  mucosa -  Normal, no lesions  soft palate -  Expected post radical tonsillectomy asymmetry of left soft palate  tonsils -  S/p left radical tonsillectomy, no secretions, no masses, no concerning mucosal lesions, no palpable masses, expected scar tissue; right tonsil with no palpable masses slightly cryptic in appearance which is stable, no mucosal lesions  BOT - normal  Vallecula - clear   Hypopharynx:  Normal pyriform sinus and pharyngeal wall mucosa   No pooled secretions    Larynx:  Epiglottis, AE folds, false vocal cords, true vocal cords, arytenoids normal in appearance with no edema of AE folds or arytenoids   bilaterally mobile cords    Neck: Well healed left neck incision   Radiation fibrosis of the left neck  Some skin changes to the neck skin from radiation, telangiectasias in left level V  No lymphedema   Lymphatic:  no abnormal nodes   Cardiovascular:  warm, pink, well-perfused extremities without swelling, tenderness, or edema   Respiratory:  Normal respiratory effort, no stridor   Neuro/Psych.:  mood/affect -  normal  mental status -  normal       PROCEDURES:   Flexible fiberoptic laryngoscopy: Scope exam was indicated due to oropharyngeal cancer.  Verbal consent was obtained. The nasal cavity was prepped with an aerosolized solution of topical anesthetic and vasoconstrictive agent. The scope was passed through the anterior nasal cavity and advanced. Inspection of the nasopharynx revealed no gross abnormality. There are postsurgical changes of the left tonsillar fossa, no masses or mucosal lesions. The base of tongue and vallecula are normal. There are no abnormalities in the area of the left tonsil. The epiglottis, AE folds, false cords, true cords, arytenoids are normal and there is no edema of the arytenoids or AE folds.  Inspection of the larynx revealed bilaterally mobile vocal cords. Pyriform sinuses are symmetric. The airway is patent. Procedure tolerated well with no immediate complications noted.      RESULTS REVIEWED:     TSH elevated, T4 normal      IMPRESSION AND PLAN:   Mira Cao is a 38-year-old woman with a likely T1N1 p16+ squamous cell carcinoma of the oropharynx. She had an excision of a cystic neck mass by a local ENT which demonstrated a metastatic SCC. She underwent a radical tonsillectomy which showed a small primary tumor in the tonsil which was completely resected. She then had a completion neck dissection on 11/20/2019 with final pathology showing 1/38 nodes. She completed postoperative proton beam therapy at [place] on 2/24/2020.     She has no concerning findings on exam today.    She has been started on thyroid replacement by another clinician, labs were normal previously, but do not have outside records available. She feels this thyroid replacement has significantly helped with her energy and anxiety. Her TSH is mildly elevated, T4 normal. Since we do not have her doses of medication available or the records for why/when medication was started, I think it would be best that we do not change her medication and instead defer to her outside clinician so that we are not duplicating work. She is going to reach out to that clinician  with the labs from today. We will defer further management to their team especially since she was at the point where she would be ready to transition to PCP anyway.    Repeat scans in 6 months. Will discontinue routine scans at that time.     Carotid duplex in February 2025.     I will see her back in 6 months with scans and carotid duplex. She will be 5 years out from treatment at that time.     Thank you very much for the opportunity to participate in the care of your patient.      Ruth Tello MD  Otolaryngology- Head & Neck Surgery      This note was dictated with voice recognition software and then edited. Please excuse any unintentional errors.             CC:  Ifeanyi Soliman MD  Department of Radiation Oncology  [facility]        Again, thank you for allowing me to participate in the care of your patient.      Sincerely,    Ruth Tello MD

## 2024-07-29 NOTE — NURSING NOTE
"Chief Complaint   Patient presents with    RECHECK   Blood pressure 121/83, pulse 71, height 1.676 m (5' 6\"), weight 58.1 kg (128 lb), SpO2 97%, unknown if currently breastfeeding. Jose Mcknight, EMT    "

## 2024-10-13 ENCOUNTER — HEALTH MAINTENANCE LETTER (OUTPATIENT)
Age: 39
End: 2024-10-13

## 2025-01-23 NOTE — PROGRESS NOTES
I had the pleasure of seeing Mira Cao in follow-up today at the [facility].     History of Present Illness:   Mira Cao is a 39 year old woman with a T1N1 p16+ SCC of the oropharynx. The patient noticed a left neck mass in June. She thought this was related to a swollen lymph node due to stress as she had just bought a house.  She delayed evaluation until approximately July when she presented to urgent care.  She had an ultrasound obtained on 7/12/2019 which demonstrated a 3.7 x 2.3 x 1.3 cm mass in the left neck.  She then had a CT scan on 8/7/2019 which demonstrated a 1.8 x 1.4 x 3.8 cm partially necrotic/cystic level 2/3 neck mass, concerning for metastatic squamous cell carcinoma.  She was seen by Dr.Todd Cao and had an FNA performed on 8/8/2019.  The pathology from the FNA demonstrated atypical squamous proliferation which was p16-.  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  She elected for removal which was performed by Dr. Cao on 10/2/2019.  The excision was performed without a completion neck dissection.  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  She had a PET CT scan on 2019 locally which demonstrated postop changes without residual disease and no obvious primary malignancy.  On 10/10/2019 she saw Dr. Neely at [facility] for medical oncology consultation where possible chemotherapy was discussed.  She has met with Dr Melendez from radiation oncology at [facility].  She had a dental cleaning.  She was taken to the operating room on 10/24/2019 for direct laryngoscopy with biopsy.  Initial biopsy of the tonsil demonstrated high-grade dysplasia.  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  A radical tonsillectomy was then performed based on the  preoperative discussion with the patient. Final pathology demonstrated a T1 SCC of the tonsil with negative margins. She was taken to the OR on 11/20/2019 for a left neck dissection with resection of her scar. She had 1/38 lymph nodes positive - 0.4 cm deposit of metastatic SCC without STEVE. She had proton beam therapy postoperatively at [facility] with Dr Smith. She completed her treatment on 2/24/2020. She lost about 20 lbs with treatment. She had her 3 month post treatment PET in May 2020 which showed uptake in the right tonsil and right level II node thought to be reactive.       Interval history:  She comes in today for follow-up. She was last seen in clinic in July 2024. She comes in with scans, labs and carotid duplex. She says things are going ok. She has a little congestion. She says she had some weakness of her voice yesterday. She says eating and drinking are going well. She had the flu over the holidays and felt like her swallowing muscles were more weak during that time. She says she increased her exercises which she feels helped. She has no dysphagia or odynophagia on a regular basis. She tries to still do her swallowing exercises on her morning commute. She can notice if she is less diligent. She says she cannot talk and eat at the same time without issue. She is in person at work 4 days per neck. She says the neck tightness is ok. She does not feel this is worse. She still doing massage for it. She is getting cramps for it there more often. She gets the cramps once per week, last about 10-15 seconds. She says her weight is fine. She has no issues with her appetite. She feels like her left ear gets fluid in it more often. This is worse when she is sick. She feels her energy is good. She is seeing the dentist regularly. She needs to establish care with a PCP. She says her thyroid medication is being managed by a hormone therapist.         MEDICATIONS:     Current Outpatient Medications   Medication Sig  Dispense Refill    acetaminophen (TYLENOL) 325 MG tablet Take 2 tablets (650 mg) by mouth every 6 hours as needed for mild pain Start after Delivery. 100 tablet 0    lactobacillus rhamnosus, GG, (CULTURELL) capsule Take 1 capsule by mouth daily      loratadine (CLARITIN) 10 MG tablet Take 10 mg by mouth daily      Magnesium Oxide 250 MG TABS Take 250 mg by mouth daily      omega 3 1000 MG CAPS Take by mouth daily      Prenatal Vit-Fe Fumarate-FA (PRENATAL MULTIVITAMIN W/IRON) 27-0.8 MG tablet Take 1 tablet by mouth daily      progesterone, in sesame oil, 50 MG/ML injection Inject into the muscle daily      Vitamin D, Cholecalciferol, 25 MCG (1000 UT) CAPS Take by mouth daily         ALLERGIES:  No Known Allergies    HABITS/SOCIAL HISTORY:   Works as a .    She is  with no children.    She has no smoking history.  She has about 10 alcoholic beverages on weekends.  She has no chewing tobacco or vaping history.    Social History     Socioeconomic History    Marital status:      Spouse name: Ottoniel    Number of children: Not on file    Years of education: Not on file    Highest education level: Not on file   Occupational History    Not on file   Tobacco Use    Smoking status: Never    Smokeless tobacco: Never   Vaping Use    Vaping status: Never Used   Substance and Sexual Activity    Alcohol use: Yes     Comment: 2-3 drinks per week    Drug use: Never    Sexual activity: Yes     Partners: Male   Other Topics Concern    Not on file   Social History Narrative    Not on file     Social Drivers of Health     Financial Resource Strain: Not on file   Food Insecurity: Not on file   Transportation Needs: Not on file   Physical Activity: Not on file   Stress: Not on file   Social Connections: Not on file   Interpersonal Safety: Not At Risk (6/4/2024)    Received from [facility] and [facility]     IP Custom IPV     Do you feel UNSAFE in any of your personal relationships with  "your family members or any other acquaintances?: No   Housing Stability: Not on file       PAST MEDICAL HISTORY:   Past Medical History:   Diagnosis Date    Irritable bladder     Squamous cell carcinoma of neck     Uncomplicated asthma         PAST SURGICAL HISTORY:   Past Surgical History:   Procedure Laterality Date     SECTION N/A 2022    Procedure:  SECTION;  Surgeon: Sonya Powers MD;  Location: [facility] L[facility]    DISSECTION RADICAL NECK MODIFIED Left 2019    Procedure: DISSECTION, NECK, MODIFIED RADICAL LEFT;  Surgeon: Ruth Tello MD;  Location: [facility] OR    LARYNGOSCOPY WITH BIOPSY(IES) N/A 10/24/2019    Procedure: Direct laryngscopy with biopsy;  Surgeon: Ruth Tello MD;  Location: [facility] OR    TONSILLECTOMY ADULT Left 10/24/2019    Procedure: Left Radical  tonsillectomy;  Surgeon: Ruth Tello MD;  Location: [facility] OR       FAMILY HISTORY:    Family History   Problem Relation Age of Onset    Breast Cancer Mother     Cancer Mother         Breast cancer    Hyperlipidemia Father     Bone Cancer Sister     Cancer Sister         Bone cancer    Diabetes No family hx of        REVIEW OF SYSTEMS:  12 point ROS was negative other than the symptoms noted above in the HPI.  Patient Supplied Answers to Review of Systems      2024     7:36 AM    ENT ROS   Ears, Nose, Throat Nasal congestion or drainage    Sore throat    Hoarseness   Musculoskeletal Sore or stiff joints   Allergy/Immunology Allergies or hay fever         PHYSICAL EXAMINATION:   /76 (BP Location: Right arm, Patient Position: Sitting, Cuff Size: Adult Regular)   Pulse 78   Ht 1.676 m (5' 6\")   Wt 58.1 kg (128 lb)   SpO2 100%   BMI 20.66 kg/m     Appearance:   normal; NAD, age-appropriate appearance   Communication:   normal; communicates verbally, normal voice quality   Head/Face:   inspection -  Normal; no scars or visible lesions   Salivary glands -  Normal size, no tenderness, swelling, or palpable " masses   Facial strength -  Normal and symmetric    Skin:  normal, no rash   Ears:  auricle (AD) -  normal  EAC (AD) -  normal  TM (AD) -  Normal, no effusion  auricle (AS) -  normal  EAC (AS) -  normal  TM (AS) -  Normal, no effusion  Normal clinical speech reception   Nose:  Ext. inspection -  Normal  Internal Inspection -  Normal mucosa, septum, and turbinates   Nasopharynx:  normal mucosa, no masses   Oral Cavity:  lips -  Normal mucosa, oral competence, and stoma size   Age-appropriate dentition, healthy gingival mucosa   Hard palate, buccal, floor of mouth mucosa normal with minimal thickening of secretions   Tongue - normal movement, no lesions, no palpable masses   Oropharynx:  mucosa -  Normal, no lesions  soft palate -  Expected post radical tonsillectomy asymmetry of left soft palate  tonsils -  S/p left radical tonsillectomy, no secretions, no masses, no concerning mucosal lesions, no palpable masses, expected scar tissue; right tonsil with no palpable masses slightly cryptic in appearance which is stable, no mucosal lesions  BOT - normal  Vallecula - clear   Hypopharynx:  Normal pyriform sinus and pharyngeal wall mucosa   No pooled secretions    Larynx:  Epiglottis, AE folds, false vocal cords, true vocal cords, arytenoids normal in appearance with no edema of AE folds or arytenoids   bilaterally mobile cords    Neck: Well healed left neck incision   Radiation fibrosis of the left neck  Some skin changes to the neck skin from radiation, telangiectasias in left level V  No lymphedema   Lymphatic:  no abnormal nodes   Cardiovascular:  warm, pink, well-perfused extremities without swelling, tenderness, or edema   Respiratory:  Normal respiratory effort, no stridor   Neuro/Psych.:  mood/affect -  normal  mental status -  normal       PROCEDURES:   Flexible fiberoptic laryngoscopy: Scope exam was indicated due to oropharyngeal cancer. Verbal consent was obtained. The nasal cavity was prepped with an  aerosolized solution of topical anesthetic and vasoconstrictive agent. The scope was passed through the anterior nasal cavity and advanced. Inspection of the nasopharynx revealed no gross abnormality. There are postsurgical changes of the left tonsillar fossa, no masses or mucosal lesions. The base of tongue and vallecula are normal. There are no abnormalities in the area of the left tonsil. The epiglottis, AE folds, false cords, true cords, arytenoids are normal and there is no edema of the arytenoids or AE folds.  Inspection of the larynx revealed bilaterally mobile vocal cords. Pyriform sinuses are symmetric. The airway is patent. Procedure tolerated well with no immediate complications noted.                RESULTS REVIEWED:     CT neck and chest imaging independently reviewed     CT chest reports reviewed     Carotid duplex report reviewed       IMPRESSION AND PLAN:   Mira Cao is a 39-year-old woman with a likely T1N1 p16+ squamous cell carcinoma of the oropharynx. She had an excision of a cystic neck mass by a local ENT which demonstrated a metastatic SCC. She underwent a radical tonsillectomy which showed a small primary tumor in the tonsil which was completely resected. She then had a completion neck dissection on 11/20/2019 with final pathology showing 1/38 nodes. She completed postoperative proton beam therapy at [place] on 2/24/2020.     She has no concerning findings on exam today.    She is having some neck cramping, will place referral to Dr Potts to discuss options including possible botox.     Defer thyroid labs/replacement to PCP - started on replacement by outside clinician, patient says labs are being checked regularly.    Repeat scans today. Will discontinue routine scans at this time since 5 years out from treatment.     Carotid duplex today since 5 years out from treatment. This was negative for concerning stenosis.     She is 5 years out from treatment. Discussed option of seeing FERNANDA in  1-2 years vs PRN follow-up at this time. She will let us know if issues arise.      Thank you very much for the opportunity to participate in the care of your patient.      Ruth Tello MD  Otolaryngology- Head & Neck Surgery      This note was dictated with voice recognition software and then edited. Please excuse any unintentional errors.             CC:  Ifeanyi Soliman MD  Department of Radiation Oncology  [facility]

## 2025-01-29 ENCOUNTER — OFFICE VISIT (OUTPATIENT)
Dept: OTOLARYNGOLOGY | Facility: CLINIC | Age: 40
End: 2025-01-29
Payer: COMMERCIAL

## 2025-01-29 ENCOUNTER — ANCILLARY PROCEDURE (OUTPATIENT)
Dept: CT IMAGING | Facility: CLINIC | Age: 40
End: 2025-01-29
Attending: OTOLARYNGOLOGY
Payer: COMMERCIAL

## 2025-01-29 ENCOUNTER — ANCILLARY PROCEDURE (OUTPATIENT)
Dept: ULTRASOUND IMAGING | Facility: CLINIC | Age: 40
End: 2025-01-29
Attending: OTOLARYNGOLOGY
Payer: COMMERCIAL

## 2025-01-29 VITALS
HEIGHT: 66 IN | BODY MASS INDEX: 20.57 KG/M2 | SYSTOLIC BLOOD PRESSURE: 131 MMHG | WEIGHT: 128 LBS | DIASTOLIC BLOOD PRESSURE: 76 MMHG | OXYGEN SATURATION: 100 % | HEART RATE: 78 BPM

## 2025-01-29 DIAGNOSIS — C77.0 SECONDARY MALIGNANCY OF LYMPH NODES OF HEAD, FACE AND NECK (H): Primary | ICD-10-CM

## 2025-01-29 DIAGNOSIS — C77.0 SECONDARY MALIGNANCY OF LYMPH NODES OF HEAD, FACE AND NECK (H): ICD-10-CM

## 2025-01-29 DIAGNOSIS — C10.9 SQUAMOUS CELL CARCINOMA OF OROPHARYNX (H): ICD-10-CM

## 2025-01-29 DIAGNOSIS — E03.4 HYPOTHYROIDISM DUE TO ACQUIRED ATROPHY OF THYROID: ICD-10-CM

## 2025-01-29 PROCEDURE — 71260 CT THORAX DX C+: CPT | Mod: GC | Performed by: RADIOLOGY

## 2025-01-29 RX ORDER — IOPAMIDOL 755 MG/ML
63 INJECTION, SOLUTION INTRAVASCULAR ONCE
Status: COMPLETED | OUTPATIENT
Start: 2025-01-29 | End: 2025-01-29

## 2025-01-29 RX ADMIN — IOPAMIDOL 63 ML: 755 INJECTION, SOLUTION INTRAVASCULAR at 14:06

## 2025-01-29 ASSESSMENT — PAIN SCALES - GENERAL: PAINLEVEL_OUTOF10: NO PAIN (0)

## 2025-01-29 NOTE — LETTER
1/29/2025       RE: Mira Cao  [address] [place] [id number]     Dear Colleague,    Thank you for referring your patient, Mira Cao, to the [facility] [place] at [facility]. Please see a copy of my visit note below.    I had the pleasure of seeing Mira Cao in follow-up today at the [facility].     History of Present Illness:   Mira Cao is a 39 year old woman with a T1N1 p16+ SCC of the oropharynx. The patient noticed a left neck mass in June. She thought this was related to a swollen lymph node due to stress as she had just bought a house.  She delayed evaluation until approximately July when she presented to urgent care.  She had an ultrasound obtained on 7/12/2019 which demonstrated a 3.7 x 2.3 x 1.3 cm mass in the left neck.  She then had a CT scan on 8/7/2019 which demonstrated a 1.8 x 1.4 x 3.8 cm partially necrotic/cystic level 2/3 neck mass, concerning for metastatic squamous cell carcinoma.  She was seen by Dr.Todd Cao and had an FNA performed on 8/8/2019.  The pathology from the FNA demonstrated atypical squamous proliferation which was p16-.  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  She elected for removal which was performed by Dr. Cao on 10/2/2019.  The excision was performed without a completion neck dissection.  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  She had a PET CT scan on 2019 locally which demonstrated postop changes without residual disease and no obvious primary malignancy.  On 10/10/2019 she saw Dr. Neely at [facility] for medical oncology consultation where possible chemotherapy was discussed.  She has met with Dr Melendez from radiation oncology at [facility].  She had a dental cleaning.  She was  taken to the operating room on 10/24/2019 for direct laryngoscopy with biopsy.  Initial biopsy of the tonsil demonstrated high-grade dysplasia.  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  A radical tonsillectomy was then performed based on the preoperative discussion with the patient. Final pathology demonstrated a T1 SCC of the tonsil with negative margins. She was taken to the OR on 11/20/2019 for a left neck dissection with resection of her scar. She had 1/38 lymph nodes positive - 0.4 cm deposit of metastatic SCC without STEVE. She had proton beam therapy postoperatively at [facility] with Dr Smith. She completed her treatment on 2/24/2020. She lost about 20 lbs with treatment. She had her 3 month post treatment PET in May 2020 which showed uptake in the right tonsil and right level II node thought to be reactive.       Interval history:  She comes in today for follow-up. She was last seen in clinic in July 2024. She comes in with scans, labs and carotid duplex. She says things are going ok. She has a little congestion. She says she had some weakness of her voice yesterday. She says eating and drinking are going well. She had the flu over the holidays and felt like her swallowing muscles were more weak during that time. She says she increased her exercises which she feels helped. She has no dysphagia or odynophagia on a regular basis. She tries to still do her swallowing exercises on her morning commute. She can notice if she is less diligent. She says she cannot talk and eat at the same time without issue. She is in person at work 4 days per neck. She says the neck tightness is ok. She does not feel this is worse. She still doing massage for it. She is getting cramps for it there more often. She gets the cramps once per week, last about 10-15 seconds. She says her weight is fine. She has no issues with her appetite. She feels like her left  ear gets fluid in it more often. This is worse when she is sick. She feels her energy is good. She is seeing the dentist regularly. She needs to establish care with a PCP. She says her thyroid medication is being managed by a hormone therapist.         MEDICATIONS:     Current Outpatient Medications   Medication Sig Dispense Refill     acetaminophen (TYLENOL) 325 MG tablet Take 2 tablets (650 mg) by mouth every 6 hours as needed for mild pain Start after Delivery. 100 tablet 0     lactobacillus rhamnosus, GG, (CULTURELL) capsule Take 1 capsule by mouth daily       loratadine (CLARITIN) 10 MG tablet Take 10 mg by mouth daily       Magnesium Oxide 250 MG TABS Take 250 mg by mouth daily       omega 3 1000 MG CAPS Take by mouth daily       Prenatal Vit-Fe Fumarate-FA (PRENATAL MULTIVITAMIN W/IRON) 27-0.8 MG tablet Take 1 tablet by mouth daily       progesterone, in sesame oil, 50 MG/ML injection Inject into the muscle daily       Vitamin D, Cholecalciferol, 25 MCG (1000 UT) CAPS Take by mouth daily         ALLERGIES:  No Known Allergies    HABITS/SOCIAL HISTORY:   Works as a .    She is  with no children.    She has no smoking history.  She has about 10 alcoholic beverages on weekends.  She has no chewing tobacco or vaping history.    Social History     Socioeconomic History     Marital status:      Spouse name: Ottoniel     Number of children: Not on file     Years of education: Not on file     Highest education level: Not on file   Occupational History     Not on file   Tobacco Use     Smoking status: Never     Smokeless tobacco: Never   Vaping Use     Vaping status: Never Used   Substance and Sexual Activity     Alcohol use: Yes     Comment: 2-3 drinks per week     Drug use: Never     Sexual activity: Yes     Partners: Male   Other Topics Concern     Not on file   Social History Narrative     Not on file     Social Drivers of Health     Financial Resource Strain: Not on file   Food  Insecurity: Not on file   Transportation Needs: Not on file   Physical Activity: Not on file   Stress: Not on file   Social Connections: Not on file   Interpersonal Safety: Not At Risk (2024)    [facility] from [facility]     IP Custom IPV      Do you feel UNSAFE in any of your personal relationships with your family members or any other acquaintances?: No   Housing Stability: Not on file       PAST MEDICAL HISTORY:   Past Medical History:   Diagnosis Date     Irritable bladder      Squamous cell carcinoma of neck      Uncomplicated asthma         PAST SURGICAL HISTORY:   Past Surgical History:   Procedure Laterality Date      SECTION N/A 2022    Procedure:  SECTION;  Surgeon: Sonya Powers MD;  Location: [facility] L[facility]D     DISSECTION RADICAL NECK MODIFIED Left 2019    Procedure: DISSECTION, NECK, MODIFIED RADICAL LEFT;  Surgeon: Ruth Tello MD;  Location: [facility] OR     LARYNGOSCOPY WITH BIOPSY(IES) N/A 10/24/2019    Procedure: Direct laryngscopy with biopsy;  Surgeon: Ruth Tello MD;  Location: [facility] OR     TONSILLECTOMY ADULT Left 10/24/2019    Procedure: Left Radical  tonsillectomy;  Surgeon: Ruth Tello MD;  Location: [facility] OR       FAMILY HISTORY:    Family History   Problem Relation Age of Onset     Breast Cancer Mother      Cancer Mother         Breast cancer     Hyperlipidemia Father      Bone Cancer Sister      Cancer Sister         Bone cancer     Diabetes No family hx of        REVIEW OF SYSTEMS:  12 point ROS was negative other than the symptoms noted above in the HPI.  Patient Supplied Answers to Review of Systems      2024     7:36 AM    ENT ROS   Ears, Nose, Throat Nasal congestion or drainage    Sore throat    Hoarseness   Musculoskeletal Sore or stiff joints   Allergy/Immunology Allergies or hay fever         PHYSICAL EXAMINATION:   /76 (BP Location: Right arm, Patient Position: Sitting, Cuff Size: Adult  "Regular)   Pulse 78   Ht 1.676 m (5' 6\")   Wt 58.1 kg (128 lb)   SpO2 100%   BMI 20.66 kg/m     Appearance:   normal; NAD, age-appropriate appearance   Communication:   normal; communicates verbally, normal voice quality   Head/Face:   inspection -  Normal; no scars or visible lesions   Salivary glands -  Normal size, no tenderness, swelling, or palpable masses   Facial strength -  Normal and symmetric    Skin:  normal, no rash   Ears:  auricle (AD) -  normal  EAC (AD) -  normal  TM (AD) -  Normal, no effusion  auricle (AS) -  normal  EAC (AS) -  normal  TM (AS) -  Normal, no effusion  Normal clinical speech reception   Nose:  Ext. inspection -  Normal  Internal Inspection -  Normal mucosa, septum, and turbinates   Nasopharynx:  normal mucosa, no masses   Oral Cavity:  lips -  Normal mucosa, oral competence, and stoma size   Age-appropriate dentition, healthy gingival mucosa   Hard palate, buccal, floor of mouth mucosa normal with minimal thickening of secretions   Tongue - normal movement, no lesions, no palpable masses   Oropharynx:  mucosa -  Normal, no lesions  soft palate -  Expected post radical tonsillectomy asymmetry of left soft palate  tonsils -  S/p left radical tonsillectomy, no secretions, no masses, no concerning mucosal lesions, no palpable masses, expected scar tissue; right tonsil with no palpable masses slightly cryptic in appearance which is stable, no mucosal lesions  BOT - normal  Vallecula - clear   Hypopharynx:  Normal pyriform sinus and pharyngeal wall mucosa   No pooled secretions    Larynx:  Epiglottis, AE folds, false vocal cords, true vocal cords, arytenoids normal in appearance with no edema of AE folds or arytenoids   bilaterally mobile cords    Neck: Well healed left neck incision   Radiation fibrosis of the left neck  Some skin changes to the neck skin from radiation, telangiectasias in left level V  No lymphedema   Lymphatic:  no abnormal nodes   Cardiovascular:  warm, pink, " well-perfused extremities without swelling, tenderness, or edema   Respiratory:  Normal respiratory effort, no stridor   Neuro/Psych.:  mood/affect -  normal  mental status -  normal       PROCEDURES:   Flexible fiberoptic laryngoscopy: Scope exam was indicated due to oropharyngeal cancer. Verbal consent was obtained. The nasal cavity was prepped with an aerosolized solution of topical anesthetic and vasoconstrictive agent. The scope was passed through the anterior nasal cavity and advanced. Inspection of the nasopharynx revealed no gross abnormality. There are postsurgical changes of the left tonsillar fossa, no masses or mucosal lesions. The base of tongue and vallecula are normal. There are no abnormalities in the area of the left tonsil. The epiglottis, AE folds, false cords, true cords, arytenoids are normal and there is no edema of the arytenoids or AE folds.  Inspection of the larynx revealed bilaterally mobile vocal cords. Pyriform sinuses are symmetric. The airway is patent. Procedure tolerated well with no immediate complications noted.                RESULTS REVIEWED:     CT neck and chest imaging independently reviewed     CT chest reports reviewed     Carotid duplex report reviewed       IMPRESSION AND PLAN:   Mira Cao is a 39-year-old woman with a likely T1N1 p16+ squamous cell carcinoma of the oropharynx. She had an excision of a cystic neck mass by a local ENT which demonstrated a metastatic SCC. She underwent a radical tonsillectomy which showed a small primary tumor in the tonsil which was completely resected. She then had a completion neck dissection on 11/20/2019 with final pathology showing 1/38 nodes. She completed postoperative proton beam therapy at [place] on 2/24/2020.     She has no concerning findings on exam today.    She is having some neck cramping, will place referral to Dr Potts to discuss options including possible botox.     Defer thyroid labs/replacement to PCP - started on  replacement by outside clinician, patient says labs are being checked regularly.    Repeat scans today. Will discontinue routine scans at this time since 5 years out from treatment.     Carotid duplex today since 5 years out from treatment. This was negative for concerning stenosis.     She is 5 years out from treatment. Discussed option of seeing FERNANDA in 1-2 years vs PRN follow-up at this time. She will let us know if issues arise.      Thank you very much for the opportunity to participate in the care of your patient.      Ruth Tello MD  Otolaryngology- Head & Neck Surgery      This note was dictated with voice recognition software and then edited. Please excuse any unintentional errors.             CC:  Ifeanyi Soliman MD  Department of Radiation Oncology  [facility]        Again, thank you for allowing me to participate in the care of your patient.      Sincerely,    Ruth Tello MD

## 2025-01-29 NOTE — NURSING NOTE
"Chief Complaint   Patient presents with    Follow Up     Secondary malignancy of lymph nodes of head, face and neck       Blood pressure 131/76, pulse 78, height 1.676 m (5' 6\"), weight 58.1 kg (128 lb), SpO2 100%, unknown if currently breastfeeding. Body mass index is 20.66 kg/m .    Patient Active Problem List   Diagnosis    Squamous cell carcinoma of neck    Rh negative state in antepartum period    Supervision of high-risk pregnancy of elderly multigravida    Abnormal glandular Papanicolaou smear of cervix    Irritable bladder    History of radiation therapy    Kidney abnormality of fetus on prenatal ultrasound       No Known Allergies    Current Outpatient Medications   Medication Sig Dispense Refill    acetaminophen (TYLENOL) 325 MG tablet Take 2 tablets (650 mg) by mouth every 6 hours as needed for mild pain Start after Delivery. 100 tablet 0    lactobacillus rhamnosus, GG, (CULTURELL) capsule Take 1 capsule by mouth daily      loratadine (CLARITIN) 10 MG tablet Take 10 mg by mouth daily      Magnesium Oxide 250 MG TABS Take 250 mg by mouth daily      omega 3 1000 MG CAPS Take by mouth daily      Prenatal Vit-Fe Fumarate-FA (PRENATAL MULTIVITAMIN W/IRON) 27-0.8 MG tablet Take 1 tablet by mouth daily      progesterone, in sesame oil, 50 MG/ML injection Inject into the muscle daily      Vitamin D, Cholecalciferol, 25 MCG (1000 UT) CAPS Take by mouth daily         Social History     Tobacco Use    Smoking status: Never    Smokeless tobacco: Never   Vaping Use    Vaping status: Never Used   Substance Use Topics    Alcohol use: Yes     Comment: 2-3 drinks per week    Drug use: Never       Davian Beverly MA  1/29/2025  2:32 PM     "

## 2025-01-29 NOTE — DISCHARGE INSTRUCTIONS

## 2025-06-14 ENCOUNTER — HEALTH MAINTENANCE LETTER (OUTPATIENT)
Age: 40
End: 2025-06-14

## (undated) DEVICE — CATH TRAY FOLEY SURESTEP 16FR W/TMP PRB STLK LATEX A319416AM

## (undated) DEVICE — SPONGE COTTONOID 1/2X1/2" 20-04S

## (undated) DEVICE — ESU HOLSTER PLASTIC DISP E2400

## (undated) DEVICE — LINEN TOWEL PACK X5 5464

## (undated) DEVICE — SU SILK 3-0 TIE 12X30" A304H

## (undated) DEVICE — PREP POVIDONE IODINE SOLUTION 10% 4OZ

## (undated) DEVICE — SOL WATER IRRIG 1000ML BOTTLE 2F7114

## (undated) DEVICE — DRSG TEGADERM 4X4 3/4" 1626W

## (undated) DEVICE — GLOVE PROTEXIS MICRO 6.0  2D73PM60

## (undated) DEVICE — SPONGE KITTNER 30-101

## (undated) DEVICE — SOL WATER IRRIG 1000ML BOTTLE 07139-09

## (undated) DEVICE — SU MONOCRYL 0 CTB-1 36" YB946

## (undated) DEVICE — SU VICRYL 0 CT-1 36" J346H

## (undated) DEVICE — PACK NEURO MINOR UMMC SNE32MNMU4

## (undated) DEVICE — DRSG TELFA 3X8" 1238

## (undated) DEVICE — ESU GROUND PAD UNIVERSAL W/O CORD

## (undated) DEVICE — NIM ELEC SUBDERMAL NDL 3PAIR/BOX

## (undated) DEVICE — ESU GROUND PAD ADULT W/CORD E7507

## (undated) DEVICE — STOCKING SLEEVE COMPRESSION CALF LG

## (undated) DEVICE — CLIP HORIZON SM RED WIDE SLOT 001201

## (undated) DEVICE — Device

## (undated) DEVICE — ESU PENCIL SMOKE EVAC W/ROCKER SWITCH 0703-047-000

## (undated) DEVICE — ESU CORD BIPOLAR GREEN 10-4000

## (undated) DEVICE — SOL NACL 0.9% IRRIG 1000ML BOTTLE 07138-09

## (undated) DEVICE — RETR ELASTIC STAYS LONE STAR BLUNT DUAL LEAD 3550-1G

## (undated) DEVICE — SU SILK 2-0 SH CR 5X18" C0125

## (undated) DEVICE — SU ETHILON 3-0 PS-1 18" 1663H

## (undated) DEVICE — CLIP HORIZON MED BLUE 002200

## (undated) DEVICE — SU VICRYL 4-0 KS 27" UND J662H

## (undated) DEVICE — BLADE KNIFE SURG 15 371115

## (undated) DEVICE — ESU ELEC BLADE 2.75" COATED/INSULATED E1455

## (undated) DEVICE — LIGHT HANDLE X1 31140133

## (undated) DEVICE — SPONGE LAP 18X18" X8435

## (undated) DEVICE — SPONGE RAY-TEC 4X8" 7318

## (undated) DEVICE — ENDO TOOTH GUARD SAC2001

## (undated) DEVICE — LINEN TOWEL PACK X6 WHITE 5487

## (undated) DEVICE — SU PROLENE 5-0 P-3 18" 8698G

## (undated) DEVICE — SYR 10ML FINGER CONTROL W/O NDL 309695

## (undated) DEVICE — GLOVE PROTEXIS BLUE W/NEU-THERA 7.5  2D73EB75

## (undated) DEVICE — PACK ENT ENDOSCOPY UMMC

## (undated) DEVICE — DRAIN JACKSON PRATT 10MM FLAT 4/4 PERF SU130-1311

## (undated) DEVICE — STRAP KNEE/BODY 31143004

## (undated) DEVICE — SOL NACL 0.9% IRRIG 1000ML BOTTLE 2F7124

## (undated) DEVICE — JELLY LUBRICATING SURGILUBE 2OZ TUBE

## (undated) DEVICE — DRAPE STERI TOWEL LG 1010

## (undated) DEVICE — SUCTION SLEEVE NEPTUNE 2 125MM 0703-005-125

## (undated) DEVICE — SU VICRYL 3-0 SH 8X18" UND J864D

## (undated) DEVICE — SU SILK 0 TIE 6X30" A306H

## (undated) DEVICE — SU SILK 2-0 TIE 12X30" A305H

## (undated) DEVICE — PACK C-SECTION LF PL15OTA83B

## (undated) DEVICE — PREP SKIN SCRUB TRAY 4461A

## (undated) DEVICE — TUBING SUCTION 10'X3/16" N510

## (undated) DEVICE — PREP CHLORAPREP 26ML TINTED ORANGE  260815

## (undated) DEVICE — GLOVE ESTEEM POWDER FREE SMT 7.5  2D72PT75

## (undated) DEVICE — DECANTER VIAL 2006S

## (undated) DEVICE — CATH TRAY FOLEY 16FR BARDEX W/DRAIN BAG STATLOCK 300316A

## (undated) DEVICE — SU SILK 4-0 TIE 12X30" A303H

## (undated) DEVICE — GLOVE PROTEXIS BLUE W/NEU-THERA 6.5  2D73EB65

## (undated) DEVICE — DRAIN JACKSON PRATT RESERVOIR 100ML SU130-1305

## (undated) DEVICE — LABEL YELLOW TIME OUT CUSTOM SBA15TOFHB

## (undated) DEVICE — SU PROLENE 5-0 RB-1DA 36"  8556H

## (undated) DEVICE — LABEL MEDICATION SYSTEM 3303-P

## (undated) DEVICE — DRSG TEGADERM 2 3/8X2 3/4" 1624W

## (undated) RX ORDER — LIDOCAINE HYDROCHLORIDE 20 MG/ML
INJECTION, SOLUTION EPIDURAL; INFILTRATION; INTRACAUDAL; PERINEURAL
Status: DISPENSED
Start: 2019-11-20

## (undated) RX ORDER — HYDROMORPHONE HYDROCHLORIDE 1 MG/ML
INJECTION, SOLUTION INTRAMUSCULAR; INTRAVENOUS; SUBCUTANEOUS
Status: DISPENSED
Start: 2019-11-20

## (undated) RX ORDER — ONDANSETRON 2 MG/ML
INJECTION INTRAMUSCULAR; INTRAVENOUS
Status: DISPENSED
Start: 2019-10-24

## (undated) RX ORDER — DEXAMETHASONE SODIUM PHOSPHATE 4 MG/ML
INJECTION, SOLUTION INTRA-ARTICULAR; INTRALESIONAL; INTRAMUSCULAR; INTRAVENOUS; SOFT TISSUE
Status: DISPENSED
Start: 2019-11-20

## (undated) RX ORDER — LIDOCAINE HYDROCHLORIDE 20 MG/ML
INJECTION, SOLUTION EPIDURAL; INFILTRATION; INTRACAUDAL; PERINEURAL
Status: DISPENSED
Start: 2022-06-13

## (undated) RX ORDER — ONDANSETRON 2 MG/ML
INJECTION INTRAMUSCULAR; INTRAVENOUS
Status: DISPENSED
Start: 2019-11-20

## (undated) RX ORDER — PROPOFOL 10 MG/ML
INJECTION, EMULSION INTRAVENOUS
Status: DISPENSED
Start: 2019-11-20

## (undated) RX ORDER — FENTANYL CITRATE 50 UG/ML
INJECTION, SOLUTION INTRAMUSCULAR; INTRAVENOUS
Status: DISPENSED
Start: 2019-11-20

## (undated) RX ORDER — LIDOCAINE HYDROCHLORIDE AND EPINEPHRINE 10; 10 MG/ML; UG/ML
INJECTION, SOLUTION INFILTRATION; PERINEURAL
Status: DISPENSED
Start: 2019-10-24

## (undated) RX ORDER — EPHEDRINE SULFATE 50 MG/ML
INJECTION, SOLUTION INTRAMUSCULAR; INTRAVENOUS; SUBCUTANEOUS
Status: DISPENSED
Start: 2019-11-20

## (undated) RX ORDER — LIDOCAINE HYDROCHLORIDE AND EPINEPHRINE 10; 10 MG/ML; UG/ML
INJECTION, SOLUTION INFILTRATION; PERINEURAL
Status: DISPENSED
Start: 2019-11-20

## (undated) RX ORDER — HYDROMORPHONE HYDROCHLORIDE 1 MG/ML
INJECTION, SOLUTION INTRAMUSCULAR; INTRAVENOUS; SUBCUTANEOUS
Status: DISPENSED
Start: 2019-10-24

## (undated) RX ORDER — DEXAMETHASONE SODIUM PHOSPHATE 10 MG/ML
INJECTION, SOLUTION INTRAMUSCULAR; INTRAVENOUS
Status: DISPENSED
Start: 2019-10-24

## (undated) RX ORDER — ACETAMINOPHEN 325 MG/1
TABLET ORAL
Status: DISPENSED
Start: 2019-11-20

## (undated) RX ORDER — SODIUM CHLORIDE, SODIUM LACTATE, POTASSIUM CHLORIDE, CALCIUM CHLORIDE 600; 310; 30; 20 MG/100ML; MG/100ML; MG/100ML; MG/100ML
INJECTION, SOLUTION INTRAVENOUS
Status: DISPENSED
Start: 2019-10-24

## (undated) RX ORDER — ESMOLOL HYDROCHLORIDE 10 MG/ML
INJECTION INTRAVENOUS
Status: DISPENSED
Start: 2019-11-20

## (undated) RX ORDER — PHENYLEPHRINE HCL IN 0.9% NACL 50MG/250ML
PLASTIC BAG, INJECTION (ML) INTRAVENOUS
Status: DISPENSED
Start: 2022-06-13

## (undated) RX ORDER — TRANEXAMIC ACID 10 MG/ML
INJECTION, SOLUTION INTRAVENOUS
Status: DISPENSED
Start: 2022-06-13

## (undated) RX ORDER — KETOROLAC TROMETHAMINE 30 MG/ML
INJECTION, SOLUTION INTRAMUSCULAR; INTRAVENOUS
Status: DISPENSED
Start: 2022-06-14

## (undated) RX ORDER — MORPHINE SULFATE 2 MG/ML
INJECTION, SOLUTION INTRAMUSCULAR; INTRAVENOUS
Status: DISPENSED
Start: 2022-06-13

## (undated) RX ORDER — GABAPENTIN 300 MG/1
CAPSULE ORAL
Status: DISPENSED
Start: 2019-11-20

## (undated) RX ORDER — SODIUM CHLORIDE, SODIUM LACTATE, POTASSIUM CHLORIDE, CALCIUM CHLORIDE 600; 310; 30; 20 MG/100ML; MG/100ML; MG/100ML; MG/100ML
INJECTION, SOLUTION INTRAVENOUS
Status: DISPENSED
Start: 2019-11-20

## (undated) RX ORDER — FENTANYL CITRATE 50 UG/ML
INJECTION, SOLUTION INTRAMUSCULAR; INTRAVENOUS
Status: DISPENSED
Start: 2019-10-24

## (undated) RX ORDER — OXYTOCIN/0.9 % SODIUM CHLORIDE 30/500 ML
PLASTIC BAG, INJECTION (ML) INTRAVENOUS
Status: DISPENSED
Start: 2022-06-13

## (undated) RX ORDER — OXYMETAZOLINE HYDROCHLORIDE 0.05 G/100ML
SPRAY NASAL
Status: DISPENSED
Start: 2019-10-24

## (undated) RX ORDER — GLYCOPYRROLATE 0.2 MG/ML
INJECTION, SOLUTION INTRAMUSCULAR; INTRAVENOUS
Status: DISPENSED
Start: 2019-11-20

## (undated) RX ORDER — CEFAZOLIN SODIUM 2 G/100ML
INJECTION, SOLUTION INTRAVENOUS
Status: DISPENSED
Start: 2019-11-20

## (undated) RX ORDER — CEFAZOLIN SODIUM 1 G/3ML
INJECTION, POWDER, FOR SOLUTION INTRAMUSCULAR; INTRAVENOUS
Status: DISPENSED
Start: 2019-11-20

## (undated) RX ORDER — FENTANYL CITRATE 50 UG/ML
INJECTION, SOLUTION INTRAMUSCULAR; INTRAVENOUS
Status: DISPENSED
Start: 2022-06-13

## (undated) RX ORDER — HYDROMORPHONE HCL/0.9% NACL/PF 0.2MG/0.2
SYRINGE (ML) INTRAVENOUS
Status: DISPENSED
Start: 2019-11-20